# Patient Record
Sex: FEMALE | Race: WHITE | NOT HISPANIC OR LATINO | Employment: UNEMPLOYED | URBAN - NONMETROPOLITAN AREA
[De-identification: names, ages, dates, MRNs, and addresses within clinical notes are randomized per-mention and may not be internally consistent; named-entity substitution may affect disease eponyms.]

---

## 2017-04-14 RX ORDER — PHENAZOPYRIDINE HYDROCHLORIDE 100 MG/1
TABLET, FILM COATED ORAL
Qty: 15 TABLET | Refills: 0 | OUTPATIENT
Start: 2017-04-14

## 2017-10-16 ENCOUNTER — APPOINTMENT (OUTPATIENT)
Dept: GENERAL RADIOLOGY | Facility: HOSPITAL | Age: 27
End: 2017-10-16

## 2017-10-16 ENCOUNTER — HOSPITAL ENCOUNTER (EMERGENCY)
Facility: HOSPITAL | Age: 27
Discharge: HOME | End: 2017-10-16
Attending: EMERGENCY MEDICINE | Admitting: EMERGENCY MEDICINE

## 2017-10-16 VITALS
TEMPERATURE: 97.8 F | SYSTOLIC BLOOD PRESSURE: 102 MMHG | WEIGHT: 108 LBS | HEIGHT: 61 IN | HEART RATE: 67 BPM | BODY MASS INDEX: 20.39 KG/M2 | OXYGEN SATURATION: 98 % | DIASTOLIC BLOOD PRESSURE: 62 MMHG | RESPIRATION RATE: 16 BRPM

## 2017-10-16 DIAGNOSIS — S90.32XA CONTUSION OF LEFT FOOT, INITIAL ENCOUNTER: Primary | ICD-10-CM

## 2017-10-16 PROCEDURE — 73630 X-RAY EXAM OF FOOT: CPT

## 2017-10-16 PROCEDURE — 99283 EMERGENCY DEPT VISIT LOW MDM: CPT

## 2017-10-16 RX ORDER — IBUPROFEN 400 MG/1
400 TABLET ORAL EVERY 8 HOURS PRN
Qty: 12 TABLET | Refills: 0 | Status: SHIPPED | OUTPATIENT
Start: 2017-10-16 | End: 2017-10-27

## 2017-10-16 RX ORDER — ACETAMINOPHEN 500 MG
500 TABLET ORAL EVERY 6 HOURS PRN
Qty: 12 TABLET | Refills: 0 | Status: SHIPPED | OUTPATIENT
Start: 2017-10-16 | End: 2018-10-11 | Stop reason: HOSPADM

## 2017-10-16 RX ORDER — ACETAMINOPHEN 500 MG
500 TABLET ORAL ONCE
Status: COMPLETED | OUTPATIENT
Start: 2017-10-16 | End: 2017-10-16

## 2017-10-16 RX ADMIN — ACETAMINOPHEN 500 MG: 500 TABLET, FILM COATED ORAL at 15:45

## 2017-10-16 NOTE — ED PROVIDER NOTES
Subjective   HPI Comments: Patient is here with localized injury,  left foot left small toe that occurred about a week ago continues to have soreness to this area presents here for evaluation denies pregnancy no other symptoms no numbness tingling      Review of Systems   Constitutional: Negative.    HENT: Negative.    Respiratory: Negative.    Cardiovascular: Negative.    Musculoskeletal: Positive for arthralgias.   Skin: Negative.    Neurological: Negative.    All other systems reviewed and are negative.      Past Medical History:   Diagnosis Date   • Acid reflux    • Arthritis    • Asthma    • Scoliosis        Allergies   Allergen Reactions   • Sulfa Antibiotics Hives       Past Surgical History:   Procedure Laterality Date   • APPENDECTOMY  2016   • CHOLECYSTECTOMY     • HIP SURGERY      right hip       History reviewed. No pertinent family history.    Social History     Social History   • Marital status: Single     Spouse name: N/A   • Number of children: N/A   • Years of education: N/A     Social History Main Topics   • Smoking status: None   • Smokeless tobacco: None   • Alcohol use None   • Drug use: None   • Sexual activity: Not Asked     Other Topics Concern   • None     Social History Narrative           Objective   Physical Exam   Constitutional: She is oriented to person, place, and time. She appears well-developed and well-nourished.   Afebrile nontoxic no acute distress   HENT:   Head: Normocephalic.   Eyes: Pupils are equal, round, and reactive to light.   Neck: Normal range of motion.   Cardiovascular: Normal rate, regular rhythm and intact distal pulses.    Pulmonary/Chest: Effort normal and breath sounds normal.   Musculoskeletal: She exhibits no edema or deformity.   Mild tenderness left small toe no deformity no bruising   Neurological: She is alert and oriented to person, place, and time.   Skin: Skin is warm and dry. No erythema. No pallor.   Psychiatric: She has a normal mood and affect. Her  behavior is normal. Thought content normal.   Nursing note and vitals reviewed.      Procedures         ED Course  ED Course                  MDM    Final diagnoses:   Contusion of left foot, initial encounter            Michel Díaz PA-C  10/16/17 [id number]

## 2017-10-27 ENCOUNTER — OFFICE VISIT (OUTPATIENT)
Dept: INTERNAL MEDICINE | Facility: CLINIC | Age: 27
End: 2017-10-27

## 2017-10-27 VITALS
OXYGEN SATURATION: 99 % | SYSTOLIC BLOOD PRESSURE: 96 MMHG | TEMPERATURE: 98.7 F | DIASTOLIC BLOOD PRESSURE: 64 MMHG | BODY MASS INDEX: 20.2 KG/M2 | WEIGHT: 107 LBS | HEIGHT: 61 IN | HEART RATE: 110 BPM

## 2017-10-27 DIAGNOSIS — R53.82 CHRONIC FATIGUE: ICD-10-CM

## 2017-10-27 DIAGNOSIS — R73.01 IMPAIRED FASTING GLUCOSE: Primary | ICD-10-CM

## 2017-10-27 DIAGNOSIS — R63.4 WEIGHT LOSS: ICD-10-CM

## 2017-10-27 DIAGNOSIS — R22.1 MASS OF RIGHT SIDE OF NECK: ICD-10-CM

## 2017-10-27 DIAGNOSIS — E01.0 THYROMEGALY: ICD-10-CM

## 2017-10-27 DIAGNOSIS — F41.8 DEPRESSION WITH ANXIETY: ICD-10-CM

## 2017-10-27 DIAGNOSIS — R00.0 TACHYCARDIA: ICD-10-CM

## 2017-10-27 PROBLEM — F60.3 BORDERLINE PERSONALITY DISORDER: Status: ACTIVE | Noted: 2017-10-27

## 2017-10-27 PROCEDURE — 99204 OFFICE O/P NEW MOD 45 MIN: CPT | Performed by: PHYSICIAN ASSISTANT

## 2017-10-27 RX ORDER — CITALOPRAM 10 MG/1
TABLET ORAL
Qty: 30 TABLET | Refills: 0 | Status: SHIPPED | OUTPATIENT
Start: 2017-10-27 | End: 2017-11-24 | Stop reason: SDUPTHER

## 2017-10-27 NOTE — PROGRESS NOTES
Subjective   Radha Dixon is a 27 y.o. female who presents to [facility] care. Previous PCP was with [facility].   She has two children, a 6 year old son and 1 year old daughter.     Chief Complaint:  Has had a knot on her neck for the last year but feels it is getting larger.  She discussed it with her previous PCP who did labs which were unremarkable. Losing weight, losing hair, poor appetite, staying tired, tunnel vision when walking up stairs for the last 2 months or so.  Reports staying cold year round.      Labs with previous PCP did reveal pre-diabetes. Denies polyuria, polydipsia, numbness/tingling/burning in feet.     Was previously treated with Celexa for borderline personality disorder, depression and anxiety prior to her last pregnancy. Interested in restarting due to continued dysphoric mood and anxiety.       The following portions of the patient's history were reviewed and updated as appropriate: She  has a past medical history of Acid reflux; Arthritis; Asthma; Bipolar 1 disorder; Depression; Migraine; Ovarian cyst; and Scoliosis.  She  does not have any pertinent problems on file.  She  has a past surgical history that includes Appendectomy (2016); Cholecystectomy; and Hip surgery.  Her family history includes Arthritis in her other; Cancer in her other; Diabetes in her other; Heart attack in her other; Migraines in her other.  She  reports that she has been smoking Cigarettes.  She has been smoking about 0.50 packs per day. She has never used smokeless tobacco. She reports that she does not drink alcohol or use illicit drugs.  Current Outpatient Prescriptions   Medication Sig Dispense Refill   • acetaminophen (TYLENOL) 500 MG tablet Take 1 tablet by mouth Every 6 (Six) Hours As Needed for Mild Pain . 12 tablet 0   • citalopram (CELEXA) 10 MG tablet Take 1/2 tablet daily for 1 week then increase to full tablet daily if well tolerated. 30 tablet 0     No current facility-administered  medications for this visit.        Review of Systems  Review of Systems   Constitutional: Positive for appetite change (decreased), fatigue and unexpected weight change. Negative for chills and fever.        Malaise   HENT: Positive for voice change (hoarse). Negative for ear pain, hearing loss, nosebleeds, rhinorrhea, sore throat and trouble swallowing.    Eyes: Negative for pain, discharge, redness, itching and visual disturbance.        No dry eyes   Respiratory: Positive for shortness of breath and wheezing. Negative for cough.         No SOB with exertion  No SOB lying down   Cardiovascular: Positive for palpitations (fast heart rate). Negative for chest pain and leg swelling.        No leg cramps     Gastrointestinal: Negative for abdominal pain, blood in stool, constipation, diarrhea, nausea and vomiting.        No change in bowel habits  No heartburn   Endocrine: Positive for cold intolerance. Negative for heat intolerance, polydipsia, polyphagia and polyuria.        No hot flashes  No muscle weakness  No feeling of weakness   Genitourinary: Negative for difficulty urinating, dyspareunia, dysuria, frequency, hematuria, menstrual problem, pelvic pain, urgency, vaginal discharge and vaginal pain.        No urinary incontinence  No change in periods   Musculoskeletal: Positive for arthralgias ( painful joints) and neck pain (swelling). Negative for joint swelling and myalgias.        No limb pain  No limb swelling   Skin: Negative for rash and wound.        No rash  No lesions  No change in mole  No itching   Neurological: Positive for dizziness, weakness and headaches. Negative for tremors, seizures, syncope and numbness.        No confusion  No tingling  No trouble walking   Hematological: Positive for adenopathy (neck). Does not bruise/bleed easily.   Psychiatric/Behavioral: Positive for sleep disturbance. Negative for agitation, behavioral problems, confusion, decreased concentration, dysphoric mood,  "self-injury and suicidal ideas. The patient is nervous/anxious. The patient is not hyperactive.         Change in personality         Objective    BP 96/64  Pulse 110  Temp 98.7 °F (37.1 °C)  Ht 61\" (154.9 cm)  Wt 107 lb (48.5 kg)  SpO2 99%  BMI 20.22 kg/m2  Physical Exam   Constitutional: She is oriented to person, place, and time. She appears well-developed. No distress.   Thin.    HENT:   Head: Normocephalic and atraumatic.   Right Ear: External ear normal.   Left Ear: External ear normal.   Nose: Nose normal.   Mouth/Throat: Oropharynx is clear and moist.   Eyes: Conjunctivae and EOM are normal. Pupils are equal, round, and reactive to light.   Neck: Normal range of motion. Neck supple. No JVD present. No tracheal deviation present. Thyromegaly (mild right larger than left) present.       Cardiovascular: Normal rate, regular rhythm and normal heart sounds.  Exam reveals no gallop and no friction rub.    No murmur heard.  Pulmonary/Chest: Effort normal and breath sounds normal. No stridor. No respiratory distress. She has no wheezes. She has no rales. She exhibits no tenderness.   Abdominal: Soft. Bowel sounds are normal. She exhibits no distension and no mass. There is no tenderness.   Musculoskeletal: Normal range of motion. She exhibits no edema, tenderness or deformity.   Lymphadenopathy:     She has cervical adenopathy.   Neurological: She is alert and oriented to person, place, and time. She displays normal reflexes. No cranial nerve deficit. She exhibits normal muscle tone. Coordination normal.   Skin: Skin is warm and dry. No rash noted. She is not diaphoretic.   Psychiatric: She has a normal mood and affect. Her behavior is normal. Judgment and thought content normal.   Nursing note and vitals reviewed.        Assessment/Plan     1. Impaired fasting glucose  - Comprehensive Metabolic Panel  - Hemoglobin A1c    2. Thyromegaly  - TSH  - T4  - T3, Free  - Thyroid Peroxidase Antibody  - US Head Neck " Soft Tissue    3. Mass of right side of neck  - US Head Neck Soft Tissue  goiter vs lymphadenopathy vs other etiology    4. Weight loss  - CBC & Differential  - Comprehensive Metabolic Panel  - TSH  - T4  - T3, Free  - Thyroid Peroxidase Antibody    5. Chronic fatigue  - TSH  - T4  - T3, Free  - Thyroid Peroxidase Antibody    6. Tachycardia  - TSH  - T4  - T3, Free  - Thyroid Peroxidase Antibody    7. Depression with anxiety  - TSH  - T4  - T3, Free  - Thyroid Peroxidase Antibody    - Begin Celexa 10 mg. Take 1/2 tab daily for 1 week then increase to full tablet daily if well tolerated.               Return in about 1 month (around 11/27/2017) for Next scheduled follow up.

## 2017-10-28 LAB
ALBUMIN SERPL-MCNC: 4.6 G/DL (ref 3.5–5)
ALBUMIN/GLOB SERPL: 1.8 G/DL (ref 1–2)
ALP SERPL-CCNC: 49 U/L (ref 38–126)
ALT SERPL-CCNC: 20 U/L (ref 13–69)
AST SERPL-CCNC: 17 U/L (ref 15–46)
BASOPHILS # BLD AUTO: 0.08 10*3/MM3 (ref 0–0.2)
BASOPHILS NFR BLD AUTO: 0.8 % (ref 0–2.5)
BILIRUB SERPL-MCNC: 1.7 MG/DL (ref 0.2–1.3)
BUN SERPL-MCNC: 11 MG/DL (ref 7–20)
BUN/CREAT SERPL: 13.8 (ref 7.1–23.5)
CALCIUM SERPL-MCNC: 9.7 MG/DL (ref 8.4–10.2)
CHLORIDE SERPL-SCNC: 108 MMOL/L (ref 98–107)
CO2 SERPL-SCNC: 25 MMOL/L (ref 26–30)
CREAT SERPL-MCNC: 0.8 MG/DL (ref 0.6–1.3)
EOSINOPHIL # BLD AUTO: 0.58 10*3/MM3 (ref 0–0.7)
EOSINOPHIL NFR BLD AUTO: 5.7 % (ref 0–7)
ERYTHROCYTE [DISTWIDTH] IN BLOOD BY AUTOMATED COUNT: 11.8 % (ref 11.5–14.5)
GFR SERPLBLD CREATININE-BSD FMLA CKD-EPI: 105 ML/MIN/1.73
GFR SERPLBLD CREATININE-BSD FMLA CKD-EPI: 87 ML/MIN/1.73
GLOBULIN SER CALC-MCNC: 2.6 GM/DL
GLUCOSE SERPL-MCNC: 89 MG/DL (ref 74–98)
HBA1C MFR BLD: 5.4 %
HCT VFR BLD AUTO: 39.4 % (ref 37–47)
HGB BLD-MCNC: 12.8 G/DL (ref 12–16)
IMM GRANULOCYTES # BLD: 0.03 10*3/MM3 (ref 0–0.06)
IMM GRANULOCYTES NFR BLD: 0.3 % (ref 0–0.6)
LYMPHOCYTES # BLD AUTO: 2.76 10*3/MM3 (ref 0.6–3.4)
LYMPHOCYTES NFR BLD AUTO: 27.2 % (ref 10–50)
MCH RBC QN AUTO: 29.6 PG (ref 27–31)
MCHC RBC AUTO-ENTMCNC: 32.5 G/DL (ref 30–37)
MCV RBC AUTO: 91.2 FL (ref 81–99)
MONOCYTES # BLD AUTO: 0.5 10*3/MM3 (ref 0–0.9)
MONOCYTES NFR BLD AUTO: 4.9 % (ref 0–12)
NEUTROPHILS # BLD AUTO: 6.2 10*3/MM3 (ref 2–6.9)
NEUTROPHILS NFR BLD AUTO: 61.1 % (ref 37–80)
NRBC BLD AUTO-RTO: 0 /100 WBC (ref 0–0)
PLATELET # BLD AUTO: 229 10*3/MM3 (ref 130–400)
POTASSIUM SERPL-SCNC: 4.3 MMOL/L (ref 3.5–5.1)
PROT SERPL-MCNC: 7.2 G/DL (ref 6.3–8.2)
RBC # BLD AUTO: 4.32 10*6/MM3 (ref 4.2–5.4)
SODIUM SERPL-SCNC: 144 MMOL/L (ref 137–145)
T3FREE SERPL-MCNC: 4 PG/ML (ref 2–4.4)
T4 SERPL-MCNC: 6.6 UG/DL (ref 4.5–12)
THYROPEROXIDASE AB SERPL-ACNC: 12 IU/ML (ref 0–34)
TSH SERPL DL<=0.005 MIU/L-ACNC: 1.8 MIU/ML (ref 0.47–4.68)
WBC # BLD AUTO: 10.15 10*3/MM3 (ref 4.8–10.8)

## 2017-10-30 ENCOUNTER — HOSPITAL ENCOUNTER (OUTPATIENT)
Dept: ULTRASOUND IMAGING | Facility: HOSPITAL | Age: 27
Discharge: HOME | End: 2017-10-30
Admitting: PHYSICIAN ASSISTANT

## 2017-10-30 PROCEDURE — 76536 US EXAM OF HEAD AND NECK: CPT

## 2017-11-01 DIAGNOSIS — E04.2 MULTINODULAR GOITER: Primary | ICD-10-CM

## 2017-11-01 DIAGNOSIS — E04.1 THYROID NODULE: ICD-10-CM

## 2017-11-03 ENCOUNTER — OFFICE VISIT (OUTPATIENT)
Dept: SURGERY | Facility: CLINIC | Age: 27
End: 2017-11-03

## 2017-11-03 VITALS
WEIGHT: 108.2 LBS | TEMPERATURE: 98.6 F | BODY MASS INDEX: 20.43 KG/M2 | HEART RATE: 79 BPM | DIASTOLIC BLOOD PRESSURE: 80 MMHG | OXYGEN SATURATION: 96 % | HEIGHT: 61 IN | SYSTOLIC BLOOD PRESSURE: 120 MMHG

## 2017-11-03 DIAGNOSIS — R13.11 ORAL PHASE DYSPHAGIA: Primary | ICD-10-CM

## 2017-11-03 DIAGNOSIS — E04.1 THYROID NODULE: ICD-10-CM

## 2017-11-03 DIAGNOSIS — K21.00 GASTROESOPHAGEAL REFLUX DISEASE WITH ESOPHAGITIS: ICD-10-CM

## 2017-11-03 PROCEDURE — 99204 OFFICE O/P NEW MOD 45 MIN: CPT | Performed by: SURGERY

## 2017-11-03 NOTE — PROGRESS NOTES
Patient: Radha Dixon    YOB: 1990    Date: 11/03/2017    Primary Care Provider: Liliana Reynolds MD    Chief complaint:   Chief Complaint   Patient presents with   • Thyroid Problem     Patient is here for a thyroid nodule       Subjective .     History of present illness:  Patient is here for a thyroid nodule.  Patient states that she has had the nodule for over a year. Patient states that she has noticed hair lose, patient states that she feels like food gets stuck in her throat.  Patient denies any pain.  Patient has had a ultrasound 10/27/17 patient has had blood work done also. patient states that she is constantly clearing her throat. Patient has bilateral thyroid nodules, 1.4 cm the left and 1 cm on the right.  Patient here for possible FNA.  Patient also has reflux esophagitis, symptoms she takes Zantac for as needed.  No dark stools or vomiting.    Review of Systems   Constitutional: Negative for chills, fever and unexpected weight change.   HENT: Negative for hearing loss, trouble swallowing and voice change.    Eyes: Negative for visual disturbance.   Respiratory: Positive for shortness of breath. Negative for apnea, cough, chest tightness and wheezing.    Cardiovascular: Negative for chest pain, palpitations and leg swelling.   Gastrointestinal: Negative for abdominal distention, abdominal pain, anal bleeding, blood in stool, constipation, diarrhea, nausea, rectal pain and vomiting.   Endocrine: Negative for cold intolerance and heat intolerance.   Genitourinary: Negative for difficulty urinating, dysuria and flank pain.   Musculoskeletal: Negative for back pain and gait problem.   Skin: Negative for color change, rash and wound.   Neurological: Negative for dizziness, syncope, speech difficulty, weakness, light-headedness, numbness and headaches.   Hematological: Negative for adenopathy. Does not bruise/bleed easily.   Psychiatric/Behavioral: Negative for confusion. The patient is  "not nervous/anxious.        Allergies:  Allergies   Allergen Reactions   • Sulfa Antibiotics Hives       Medications:    Current Outpatient Prescriptions:   •  acetaminophen (TYLENOL) 500 MG tablet, Take 1 tablet by mouth Every 6 (Six) Hours As Needed for Mild Pain ., Disp: 12 tablet, Rfl: 0  •  citalopram (CELEXA) 10 MG tablet, Take 1/2 tablet daily for 1 week then increase to full tablet daily if well tolerated., Disp: 30 tablet, Rfl: 0    History\"  Past Medical History:   Diagnosis Date   • Acid reflux    • Arthritis    • Asthma    • Bipolar 1 disorder    • Depression    • Migraine    • Ovarian cyst    • Scoliosis        Past Surgical History:   Procedure Laterality Date   • APPENDECTOMY  2016   • CHOLECYSTECTOMY     • CYST REMOVAL      cyst removed from ovarie   • HIP SURGERY      right hip       Family History   Problem Relation Age of Onset   • Arthritis Other    • Cancer Other      breast and lung   • Diabetes Other    • Heart attack Other    • Migraines Other        Social History   Substance Use Topics   • Smoking status: Current Every Day Smoker     Packs/day: 0.50     Types: Cigarettes   • Smokeless tobacco: Never Used   • Alcohol use No        Objective     Vital Signs:   /80  Pulse 79  Temp 98.6 °F (37 °C)  Ht 61\" (154.9 cm)  Wt 108 lb 3.2 oz (49.1 kg)  SpO2 96%  BMI 20.44 kg/m2    Physical Exam:   General Appearance:    Alert, cooperative, in no acute distress   Head:    Normocephalic, without obvious abnormality, atraumatic   Eyes:            Lids and lashes normal, conjunctivae and sclerae normal, no   icterus, no pallor, corneas clear, PERRLA   Ears:    Ears appear intact with no abnormalities noted   Throat:   No oral lesions, no thrush, oral mucosa moist   Neck:   No adenopathy, supple, trachea midline, Bilateral thyroid nodules, no   carotid bruit, no JVD   Lungs:     Clear to auscultation,respirations regular, even and                  unlabored    Heart:    Regular rhythm and normal " rate, normal S1 and S2, no            murmur, no gallop, no rub, no click   Chest Wall:    No abnormalities observed   Abdomen:     Normal bowel sounds, no masses, no organomegaly, soft        non-tender, non-distended, no guarding, no rebound                tenderness   Extremities:   Moves all extremities well, no edema, no cyanosis, no             redness   Pulses:   Pulses palpable and equal bilaterally   Skin:   No bleeding, bruising or rash   Lymph nodes:   No palpable adenopathy   Neurologic:   Cranial nerves 2 - 12 grossly intact, sensation intact, DTR       present and equal bilaterally     Results Review:   I reviewed the patient's new clinical results.    Assessment/Plan     1. Oral phase dysphagia    2. Thyroid nodule    3. Gastroesophageal reflux disease with esophagitis        Schedule ultrasound with FNA of thyroid nodules.  Patient will also need to be scheduled for an EGD to evaluate her hiatal hernia, reflux and gastric system.  Patient encouraged to continue Zantac daily.    I discussed the patients findings and my recommendations with patient    Review of Systems was reviewed and confirmed as accurate today.    Electronically signed by Danilo Shabazz MD  11/03/17   Scribed for Danilo Shabazz MD by Daiana Morlaes. 11/3/2017  3:56 PM        .

## 2017-11-06 ENCOUNTER — OFFICE VISIT (OUTPATIENT)
Dept: SURGERY | Facility: CLINIC | Age: 27
End: 2017-11-06

## 2017-11-06 VITALS
HEIGHT: 61 IN | BODY MASS INDEX: 20.39 KG/M2 | TEMPERATURE: 99.6 F | DIASTOLIC BLOOD PRESSURE: 70 MMHG | OXYGEN SATURATION: 99 % | WEIGHT: 108 LBS | HEART RATE: 76 BPM | SYSTOLIC BLOOD PRESSURE: 98 MMHG

## 2017-11-06 DIAGNOSIS — E04.1 THYROID NODULE: Primary | ICD-10-CM

## 2017-11-06 PROCEDURE — 10022 PR FINE NEEDLE ASP;W/IMAGING GUIDANCE: CPT | Performed by: SURGERY

## 2017-11-06 NOTE — PROGRESS NOTES
Procedure: Left thyroid Fine Needle Aspiration. Proper consent has been signed    I recommend a FNA of the left thyroid lesion. The procedure and risks were clearly explained including bleeding, infection and requirement for re-biopsy and the patient understood these and wishes to proceed.    The patient was brought to the procedure room. Consent and time out were performed. The area was prepped and draped in the usual fashion. 1% lidocaine with epinephrine was infused locally. A 20G needle was used to biopsy the lesion with ultrasound guidance.  Minimal blood loss had occurred and hemostasis had been well controlled with pressure.  The patient tolerated the procedure and there were no complications. We will make a f/u appointment to discuss results.

## 2017-11-08 ENCOUNTER — TELEPHONE (OUTPATIENT)
Dept: SURGERY | Facility: CLINIC | Age: 27
End: 2017-11-08

## 2017-11-08 NOTE — TELEPHONE ENCOUNTER
Received call from Gerard @ [facility]. Patient cannot be scheduled at [facility].  She has [employer] insurance.  Please reschedule at [facility].  Thanks.

## 2017-11-20 ENCOUNTER — PREP FOR SURGERY (OUTPATIENT)
Dept: OTHER | Facility: HOSPITAL | Age: 27
End: 2017-11-20

## 2017-11-20 DIAGNOSIS — K21.00 REFLUX ESOPHAGITIS: Primary | ICD-10-CM

## 2017-11-27 ENCOUNTER — OFFICE VISIT (OUTPATIENT)
Dept: INTERNAL MEDICINE | Facility: CLINIC | Age: 27
End: 2017-11-27

## 2017-11-27 VITALS
HEIGHT: 61 IN | BODY MASS INDEX: 20.01 KG/M2 | DIASTOLIC BLOOD PRESSURE: 60 MMHG | TEMPERATURE: 98.7 F | OXYGEN SATURATION: 99 % | HEART RATE: 103 BPM | SYSTOLIC BLOOD PRESSURE: 100 MMHG | WEIGHT: 106 LBS

## 2017-11-27 DIAGNOSIS — J06.9 ACUTE URI: ICD-10-CM

## 2017-11-27 DIAGNOSIS — F41.8 DEPRESSION WITH ANXIETY: Primary | ICD-10-CM

## 2017-11-27 PROCEDURE — 99214 OFFICE O/P EST MOD 30 MIN: CPT | Performed by: PHYSICIAN ASSISTANT

## 2017-11-27 RX ORDER — CITALOPRAM 20 MG/1
20 TABLET ORAL DAILY
Qty: 30 TABLET | Refills: 5 | Status: SHIPPED | OUTPATIENT
Start: 2017-11-27 | End: 2018-09-25

## 2017-11-27 RX ORDER — CITALOPRAM 10 MG/1
TABLET ORAL
Qty: 30 TABLET | Refills: 5 | Status: SHIPPED | OUTPATIENT
Start: 2017-11-27 | End: 2017-11-27 | Stop reason: SDUPTHER

## 2017-11-27 NOTE — PROGRESS NOTES
Radha Dixon is a 27 y.o. female.     Subjective   History of Present Illness   Here today for 1 month follow up of depression and anxiety. Resumed use of Celexa at last appointment.  Has increased from 1/2 to full tablet of Celexa 10 mg daily. Feels it is helping her depression and anxiety some but feels there is room for improvement. Sleeping fine. Denies SI or HI.     Also concerned with cough, little sore throat and congestion for the last 3 days.  Denies SOA, fever or chills. Has heard herself wheeze a few times.     Scheduled for EGD with Dr. Shabazz on 12/1 due to dysphagia.       The following portions of the patient's history were reviewed and updated as appropriate: allergies, current medications, past family history, past medical history, past social history, past surgical history and problem list.    Review of Systems    Constitutional: Negative for appetite change, chills, fatigue, fever and unexpected weight change.   HENT: sore throat, congestion. Negative for congestion, ear pain, hearing loss, nosebleeds, postnasal drip, rhinorrhea, tinnitus and trouble swallowing.    Eyes: Negative for photophobia, discharge and visual disturbance.   Respiratory: cough. Negative for chest tightness, shortness of breath and wheezing.    Cardiovascular: Negative for chest pain, palpitations and leg swelling.   Gastrointestinal: Negative for abdominal distention, abdominal pain, blood in stool, constipation, diarrhea, nausea and vomiting.   Endocrine: Negative for cold intolerance, heat intolerance, polydipsia, polyphagia and polyuria.   Musculoskeletal: Negative for arthralgias, back pain, joint swelling, myalgias, neck pain and neck stiffness.   Skin: Negative for color change, pallor, rash and wound.   Allergic/Immunologic: Negative for environmental allergies, food allergies and immunocompromised state.   Neurological: Negative for dizziness, tremors, seizures, weakness, numbness and headaches.   Hematological:  "Negative for adenopathy. Does not bruise/bleed easily.   Psychiatric/Behavioral: dysphoric mood, anxious. Negative for sleep disturbances, agitation, behavioral problems, confusion, hallucinations, self-injury and suicidal ideas.       Objective    Physical Exam  Constitutional: Oriented to person, place, and time. Appears well-developed and well-nourished.   HENT: OP normal. TMs normal.   Head: no sinus tenderness. Normocephalic and atraumatic.   Eyes: EOM are normal. Pupils are equal, round, and reactive to light.   Neck: Normal range of motion. Neck supple.   Cardiovascular: Normal rate, regular rhythm and normal heart sounds.    Pulmonary/Chest: Effort normal and breath sounds normal. No respiratory distress.  Has no wheezes or rales. Exhibits no chest wall tenderness.   Abdominal: Soft. Bowel sounds are normal. Exhibits no distension and no mass. There is no tenderness.   Musculoskeletal: Normal range of motion. Exhibits no tenderness.   Neurological: Alert and oriented to person, place, and time.   Skin: Skin is warm and dry.   Psychiatric: Has a normal mood and affect. Behavior is normal. Judgment and thought content normal.       /60  Pulse 103  Temp 98.7 °F (37.1 °C)  Ht 61\" (154.9 cm)  Wt 106 lb (48.1 kg)  SpO2 99%  BMI 20.03 kg/m2    Nursing note and vitals reviewed.        Assessment/Plan   Radha was seen today for depression, anxiety, follow-up, sinus problem and cough.    Diagnoses and all orders for this visit:    Depression with anxiety  -     citalopram (CeleXA) 20 MG tablet; Take 1 tablet by mouth Daily.  Increase Celexa from 10 to 20 mg daily.     Acute URI  Increase PO fluid intake. Call or RTC with new or worsened symptoms.       F/u 3 months for depression and anxiety.       F/u 3 months or sooner if needed.        "

## 2017-12-01 ENCOUNTER — ANESTHESIA EVENT (OUTPATIENT)
Dept: GASTROENTEROLOGY | Facility: HOSPITAL | Age: 27
End: 2017-12-01

## 2017-12-01 ENCOUNTER — ANESTHESIA (OUTPATIENT)
Dept: GASTROENTEROLOGY | Facility: HOSPITAL | Age: 27
End: 2017-12-01

## 2017-12-01 ENCOUNTER — HOSPITAL ENCOUNTER (OUTPATIENT)
Facility: HOSPITAL | Age: 27
Setting detail: HOSPITAL OUTPATIENT SURGERY
Discharge: HOME | End: 2017-12-01
Attending: SURGERY | Admitting: SURGERY

## 2017-12-01 VITALS
HEART RATE: 70 BPM | OXYGEN SATURATION: 97 % | WEIGHT: 106 LBS | DIASTOLIC BLOOD PRESSURE: 64 MMHG | RESPIRATION RATE: 18 BRPM | BODY MASS INDEX: 20.01 KG/M2 | TEMPERATURE: 98.3 F | SYSTOLIC BLOOD PRESSURE: 114 MMHG | HEIGHT: 61 IN

## 2017-12-01 DIAGNOSIS — K21.00 REFLUX ESOPHAGITIS: ICD-10-CM

## 2017-12-01 LAB — B-HCG UR QL: NEGATIVE

## 2017-12-01 PROCEDURE — 25010000002 ONDANSETRON PER 1 MG: Performed by: NURSE ANESTHETIST, CERTIFIED REGISTERED

## 2017-12-01 PROCEDURE — 25010000002 PROPOFOL 200 MG/20ML EMULSION: Performed by: NURSE ANESTHETIST, CERTIFIED REGISTERED

## 2017-12-01 PROCEDURE — 81025 URINE PREGNANCY TEST: CPT | Performed by: SURGERY

## 2017-12-01 RX ORDER — PROPOFOL 10 MG/ML
INJECTION, EMULSION INTRAVENOUS AS NEEDED
Status: DISCONTINUED | OUTPATIENT
Start: 2017-12-01 | End: 2017-12-01 | Stop reason: SURG

## 2017-12-01 RX ORDER — ONDANSETRON 2 MG/ML
4 INJECTION INTRAMUSCULAR; INTRAVENOUS ONCE AS NEEDED
Status: DISCONTINUED | OUTPATIENT
Start: 2017-12-01 | End: 2017-12-01 | Stop reason: HOSPADM

## 2017-12-01 RX ORDER — ONDANSETRON 2 MG/ML
INJECTION INTRAMUSCULAR; INTRAVENOUS AS NEEDED
Status: DISCONTINUED | OUTPATIENT
Start: 2017-12-01 | End: 2017-12-01 | Stop reason: SURG

## 2017-12-01 RX ORDER — SODIUM CHLORIDE, SODIUM LACTATE, POTASSIUM CHLORIDE, CALCIUM CHLORIDE 600; 310; 30; 20 MG/100ML; MG/100ML; MG/100ML; MG/100ML
1000 INJECTION, SOLUTION INTRAVENOUS CONTINUOUS PRN
Status: DISCONTINUED | OUTPATIENT
Start: 2017-12-01 | End: 2017-12-01 | Stop reason: HOSPADM

## 2017-12-01 RX ORDER — SODIUM CHLORIDE 0.9 % (FLUSH) 0.9 %
3 SYRINGE (ML) INJECTION AS NEEDED
Status: DISCONTINUED | OUTPATIENT
Start: 2017-12-01 | End: 2017-12-01 | Stop reason: HOSPADM

## 2017-12-01 RX ADMIN — ONDANSETRON 4 MG: 2 INJECTION INTRAMUSCULAR; INTRAVENOUS at 10:42

## 2017-12-01 RX ADMIN — PROPOFOL 100 MG: 10 INJECTION, EMULSION INTRAVENOUS at 10:41

## 2017-12-01 RX ADMIN — SODIUM CHLORIDE, POTASSIUM CHLORIDE, SODIUM LACTATE AND CALCIUM CHLORIDE 1000 ML: 600; 310; 30; 20 INJECTION, SOLUTION INTRAVENOUS at 10:09

## 2017-12-01 NOTE — ANESTHESIA PREPROCEDURE EVALUATION
Anesthesia Evaluation     Patient summary reviewed and Nursing notes reviewed   history of anesthetic complications: PONV  NPO Solid Status: > 8 hours  NPO Liquid Status: > 8 hours     Airway   Mallampati: I  TM distance: >3 FB  Neck ROM: full  no difficulty expected  Dental - normal exam     Pulmonary - negative pulmonary ROS and normal exam   Cardiovascular - negative cardio ROS and normal exam        Neuro/Psych- negative ROS  GI/Hepatic/Renal/Endo - negative ROS     Musculoskeletal (-) negative ROS    Abdominal    Substance History - negative use     OB/GYN negative ob/gyn ROS         Other - negative ROS                                       Anesthesia Plan    ASA 2     MAC     intravenous induction   Anesthetic plan and risks discussed with patient.

## 2017-12-01 NOTE — DISCHARGE INSTRUCTIONS
Rest today  No pushing,pulling,tugging,heavy lifting, or strenuous activity   No major decision making,driving,or drinking alcoholic beverages for 24 hours due to the sedation you received  Always use good hand hygiene/washing technique  No driving on pain medication.

## 2017-12-01 NOTE — PLAN OF CARE
Problem: GI Endoscopy (Adult)  Goal: Signs and Symptoms of Listed Potential Problems Will be Absent or Manageable (GI Endoscopy)    12/01/17 1038   GI Endoscopy   Problems Assessed (GI Endoscopy) all   Problems Present (GI Endoscopy) none

## 2017-12-01 NOTE — PLAN OF CARE
Problem: GI Endoscopy (Adult)  Goal: Signs and Symptoms of Listed Potential Problems Will be Absent or Manageable (GI Endoscopy)  Outcome: Ongoing (interventions implemented as appropriate)    12/01/17 0924   GI Endoscopy   Problems Assessed (GI Endoscopy) all   Problems Present (GI Endoscopy) none

## 2017-12-01 NOTE — ANESTHESIA POSTPROCEDURE EVALUATION
Patient: Radha Dixon    Procedure Summary     Date Anesthesia Start Anesthesia Stop Room / Location    12/01/17 1040 1046 [facility] ENDOSCOPY 3 / [facility] ENDOSCOPY       Procedure Diagnosis Surgeon Provider    ESOPHAGOGASTRODUODENOSCOPY WITH COLD FORCEP BIOPSY (N/A Esophagus) Reflux esophagitis  (Reflux esophagitis [K21.0]) MD Feliciano Larry CRNA          Anesthesia Type: MAC  Last vitals  BP   114/64 (12/01/17 1121)   Temp   98.3 °F (36.8 °C) (12/01/17 1051)   Pulse   70 (12/01/17 1121)   Resp   18 (12/01/17 1121)     SpO2   97 % (12/01/17 1121)     Post Anesthesia Care and Evaluation    Patient location during evaluation: bedside  Patient participation: complete - patient participated  Level of consciousness: awake  Pain score: 0  Pain management: adequate  Airway patency: patent  Anesthetic complications: No anesthetic complications  PONV Status: controlled  Cardiovascular status: acceptable and stable  Respiratory status: acceptable and room air  Hydration status: acceptable

## 2017-12-01 NOTE — H&P (VIEW-ONLY)
Patient: Radha Dixon    YOB: 1990    Date: 11/03/2017    Primary Care Provider: Liliana Reynolds MD    Chief complaint:   Chief Complaint   Patient presents with   • Thyroid Problem     Patient is here for a thyroid nodule       Subjective .     History of present illness:  Patient is here for a thyroid nodule.  Patient states that she has had the nodule for over a year. Patient states that she has noticed hair lose, patient states that she feels like food gets stuck in her throat.  Patient denies any pain.  Patient has had a ultrasound 10/27/17 patient has had blood work done also. patient states that she is constantly clearing her throat. Patient has bilateral thyroid nodules, 1.4 cm the left and 1 cm on the right.  Patient here for possible FNA.  Patient also has reflux esophagitis, symptoms she takes Zantac for as needed.  No dark stools or vomiting.    Review of Systems   Constitutional: Negative for chills, fever and unexpected weight change.   HENT: Negative for hearing loss, trouble swallowing and voice change.    Eyes: Negative for visual disturbance.   Respiratory: Positive for shortness of breath. Negative for apnea, cough, chest tightness and wheezing.    Cardiovascular: Negative for chest pain, palpitations and leg swelling.   Gastrointestinal: Negative for abdominal distention, abdominal pain, anal bleeding, blood in stool, constipation, diarrhea, nausea, rectal pain and vomiting.   Endocrine: Negative for cold intolerance and heat intolerance.   Genitourinary: Negative for difficulty urinating, dysuria and flank pain.   Musculoskeletal: Negative for back pain and gait problem.   Skin: Negative for color change, rash and wound.   Neurological: Negative for dizziness, syncope, speech difficulty, weakness, light-headedness, numbness and headaches.   Hematological: Negative for adenopathy. Does not bruise/bleed easily.   Psychiatric/Behavioral: Negative for confusion. The patient is  "not nervous/anxious.        Allergies:  Allergies   Allergen Reactions   • Sulfa Antibiotics Hives       Medications:    Current Outpatient Prescriptions:   •  acetaminophen (TYLENOL) 500 MG tablet, Take 1 tablet by mouth Every 6 (Six) Hours As Needed for Mild Pain ., Disp: 12 tablet, Rfl: 0  •  citalopram (CELEXA) 10 MG tablet, Take 1/2 tablet daily for 1 week then increase to full tablet daily if well tolerated., Disp: 30 tablet, Rfl: 0    History\"  Past Medical History:   Diagnosis Date   • Acid reflux    • Arthritis    • Asthma    • Bipolar 1 disorder    • Depression    • Migraine    • Ovarian cyst    • Scoliosis        Past Surgical History:   Procedure Laterality Date   • APPENDECTOMY  2016   • CHOLECYSTECTOMY     • CYST REMOVAL      cyst removed from ovarie   • HIP SURGERY      right hip       Family History   Problem Relation Age of Onset   • Arthritis Other    • Cancer Other      breast and lung   • Diabetes Other    • Heart attack Other    • Migraines Other        Social History   Substance Use Topics   • Smoking status: Current Every Day Smoker     Packs/day: 0.50     Types: Cigarettes   • Smokeless tobacco: Never Used   • Alcohol use No        Objective     Vital Signs:   /80  Pulse 79  Temp 98.6 °F (37 °C)  Ht 61\" (154.9 cm)  Wt 108 lb 3.2 oz (49.1 kg)  SpO2 96%  BMI 20.44 kg/m2    Physical Exam:   General Appearance:    Alert, cooperative, in no acute distress   Head:    Normocephalic, without obvious abnormality, atraumatic   Eyes:            Lids and lashes normal, conjunctivae and sclerae normal, no   icterus, no pallor, corneas clear, PERRLA   Ears:    Ears appear intact with no abnormalities noted   Throat:   No oral lesions, no thrush, oral mucosa moist   Neck:   No adenopathy, supple, trachea midline, Bilateral thyroid nodules, no   carotid bruit, no JVD   Lungs:     Clear to auscultation,respirations regular, even and                  unlabored    Heart:    Regular rhythm and normal " rate, normal S1 and S2, no            murmur, no gallop, no rub, no click   Chest Wall:    No abnormalities observed   Abdomen:     Normal bowel sounds, no masses, no organomegaly, soft        non-tender, non-distended, no guarding, no rebound                tenderness   Extremities:   Moves all extremities well, no edema, no cyanosis, no             redness   Pulses:   Pulses palpable and equal bilaterally   Skin:   No bleeding, bruising or rash   Lymph nodes:   No palpable adenopathy   Neurologic:   Cranial nerves 2 - 12 grossly intact, sensation intact, DTR       present and equal bilaterally     Results Review:   I reviewed the patient's new clinical results.    Assessment/Plan     1. Oral phase dysphagia    2. Thyroid nodule    3. Gastroesophageal reflux disease with esophagitis        Schedule ultrasound with FNA of thyroid nodules.  Patient will also need to be scheduled for an EGD to evaluate her hiatal hernia, reflux and gastric system.  Patient encouraged to continue Zantac daily.    I discussed the patients findings and my recommendations with patient    Review of Systems was reviewed and confirmed as accurate today.    Electronically signed by Danilo Shabazz MD  11/03/17   Scribed for Danilo Shabazz MD by Daiana Morales. 11/3/2017  3:56 PM        .

## 2017-12-06 LAB
LAB AP CASE REPORT: NORMAL
Lab: NORMAL
PATH REPORT.FINAL DX SPEC: NORMAL

## 2017-12-08 ENCOUNTER — OFFICE VISIT (OUTPATIENT)
Dept: SURGERY | Facility: CLINIC | Age: 27
End: 2017-12-08

## 2017-12-08 VITALS
HEIGHT: 61 IN | TEMPERATURE: 98.7 F | OXYGEN SATURATION: 98 % | SYSTOLIC BLOOD PRESSURE: 110 MMHG | DIASTOLIC BLOOD PRESSURE: 60 MMHG | HEART RATE: 99 BPM | WEIGHT: 106 LBS | BODY MASS INDEX: 20.01 KG/M2

## 2017-12-08 DIAGNOSIS — E04.1 THYROID NODULE: ICD-10-CM

## 2017-12-08 DIAGNOSIS — K21.00 GASTROESOPHAGEAL REFLUX DISEASE WITH ESOPHAGITIS: Primary | ICD-10-CM

## 2017-12-08 PROCEDURE — 99213 OFFICE O/P EST LOW 20 MIN: CPT | Performed by: SURGERY

## 2017-12-08 NOTE — PROGRESS NOTES
Patient: Radha Dixon    YOB: 1990    Date: 12/08/2017    Primary Care Provider: BRITNI Peres    Reason for Consultation: Follow-up EGD    Chief Complaint:   Chief Complaint   Patient presents with   • Follow-up     Patient is here for a follow up EGD       History of present illness:  I saw the patient in the office today as a followup from their recent EGD with biopsy, the pathology report did show no pathologic alterations negative for H pylori, metaplasia, hysplasia or malignancy.   Patient states that she has had some nausea at times.  Patient states that she is having chest pain when she takes a deep breath, or stretching.Occasional heartburn symptoms controlled with Zantac as needed.  Thyroid FNA was benign, colloid nodule.    The following portions of the patient's history were reviewed and updated as appropriate: allergies, current medications, past family history, past medical history, past social history, past surgical history and problem list.      Review of Systems   Constitutional: Negative for chills, fatigue and fever.   HENT: Negative for trouble swallowing.    Respiratory: Negative for cough and shortness of breath.    Cardiovascular: Negative for chest pain.   Gastrointestinal: Positive for nausea. Negative for abdominal pain, diarrhea and vomiting.       Vital Signs:   Temp:  [98.7 °F (37.1 °C)] 98.7 °F (37.1 °C)  Heart Rate:  [99] 99  BP: (110)/(60) 110/60    Allergies:  Allergies   Allergen Reactions   • Onion Other (See Comments)     REPORTS CAUSES MIGRAINES     • Sulfa Antibiotics Hives       Medications:    Current Outpatient Prescriptions:   •  acetaminophen (TYLENOL) 500 MG tablet, Take 1 tablet by mouth Every 6 (Six) Hours As Needed for Mild Pain ., Disp: 12 tablet, Rfl: 0  •  citalopram (CeleXA) 20 MG tablet, Take 1 tablet by mouth Daily., Disp: 30 tablet, Rfl: 5  •  Levonorgestrel (MEAGHAN) 13.5 MG intrauterine device IUD, 1 each by Intrauterine route 1  (One) Time., Disp: , Rfl:     Physical Exam:   General Appearance:    Alert, cooperative, in no acute distress   Abdomen:     no masses, no organomegaly, soft non-tender, non-distended, no guarding, wounds are well healed, no evidence of recurrent hernia   Chest:      Clear toausculation            Cor:  Regular rate and rhythm      Results Review:   I reviewed the patient's new clinical results.    Assessment / Plan:    1. Gastroesophageal reflux disease with esophagitis    2. Thyroid nodule        I did discuss the situation with the patient today in the office and they have done well from their recent EGD with biopsy. I have told the patient Continue Zantac as needed.  Follow-up in one year for repeat thyroid ultrasound.    Electronically signed by Danilo Shabazz MD  12/08/17   Scribed for Danilo Shabazz MD by Daiana Morales. 12/8/2017  2:13 PM

## 2018-02-02 ENCOUNTER — OFFICE VISIT (OUTPATIENT)
Dept: INTERNAL MEDICINE | Facility: CLINIC | Age: 28
End: 2018-02-02

## 2018-02-02 VITALS
HEART RATE: 79 BPM | OXYGEN SATURATION: 98 % | WEIGHT: 106 LBS | HEIGHT: 61 IN | DIASTOLIC BLOOD PRESSURE: 70 MMHG | BODY MASS INDEX: 20.01 KG/M2 | TEMPERATURE: 98.4 F | SYSTOLIC BLOOD PRESSURE: 98 MMHG

## 2018-02-02 DIAGNOSIS — J02.9 ACUTE PHARYNGITIS, UNSPECIFIED ETIOLOGY: Primary | ICD-10-CM

## 2018-02-02 LAB
EXPIRATION DATE: NORMAL
EXPIRATION DATE: NORMAL
FLUAV AG NPH QL: NEGATIVE
FLUBV AG NPH QL: NEGATIVE
INTERNAL CONTROL: NORMAL
INTERNAL CONTROL: NORMAL
Lab: NORMAL
Lab: NORMAL
S PYO AG THROAT QL: NEGATIVE

## 2018-02-02 PROCEDURE — 87804 INFLUENZA ASSAY W/OPTIC: CPT | Performed by: PHYSICIAN ASSISTANT

## 2018-02-02 PROCEDURE — 99213 OFFICE O/P EST LOW 20 MIN: CPT | Performed by: PHYSICIAN ASSISTANT

## 2018-02-02 PROCEDURE — 87880 STREP A ASSAY W/OPTIC: CPT | Performed by: PHYSICIAN ASSISTANT

## 2018-02-02 NOTE — PROGRESS NOTES
Radha Dixon is a 27 y.o. female.     Subjective   History of Present Illness   Here today with concern of sore throat, chills, runny nose, headache and little cough since yesterday which is worse today. Denies fever, body aches, SOA or sinus pressure. Her son was diagnosed with strep 4 days ago.       The following portions of the patient's history were reviewed and updated as appropriate: allergies, current medications, past family history, past medical history, past social history, past surgical history and problem list.    Review of Systems    Constitutional: chills. Negative for appetite change, fatigue, fever and unexpected weight change.   HENT: rhinorrhea, sore throat.  Negative for congestion, ear pain, hearing loss, nosebleeds, postnasal drip, tinnitus and trouble swallowing.    Eyes: Negative for photophobia, discharge and visual disturbance.   Respiratory: Negative for cough, chest tightness, shortness of breath and wheezing.    Cardiovascular: Negative for chest pain, palpitations and leg swelling.   Gastrointestinal: Negative for abdominal distention, abdominal pain, blood in stool, constipation, diarrhea, nausea and vomiting.   Endocrine: Negative for cold intolerance, heat intolerance, polydipsia, polyphagia and polyuria.   Musculoskeletal: Negative for arthralgias, back pain, joint swelling, myalgias, neck pain and neck stiffness.   Skin: Negative for color change, pallor, rash and wound.   Allergic/Immunologic: Negative for environmental allergies, food allergies and immunocompromised state.   Neurological: headache.  Negative for dizziness, tremors, seizures, weakness, numbness.  Hematological: Negative for adenopathy. Does not bruise/bleed easily.   Psychiatric/Behavioral: Negative for sleep disturbances, agitation, behavioral problems, confusion, hallucinations, self-injury and suicidal ideas. The patient is not nervous/anxious.      Objective    Physical Exam  Constitutional: Oriented to  "person, place, and time. Appears well-developed and well-nourished.   HENT: minor OP erythema, absent tonsils, no exudate  Head: no sinus tenderness. Normocephalic and atraumatic.   Eyes: EOM are normal. Pupils are equal, round, and reactive to light.   Neck: Normal range of motion. Neck supple.   Cardiovascular: Normal rate, regular rhythm and normal heart sounds.    Pulmonary/Chest: Effort normal and breath sounds normal. No respiratory distress.  Has no wheezes or rales. Exhibits no chest wall tenderness.   Abdominal: Soft. Bowel sounds are normal. Exhibits no distension and no mass. There is no tenderness.   Musculoskeletal: Normal range of motion. Exhibits no tenderness.   Neurological: Alert and oriented to person, place, and time.   Skin: Skin is warm and dry.   Psychiatric: Has a normal mood and affect. Behavior is normal. Judgment and thought content normal.       BP 98/70  Pulse 79  Temp 98.4 °F (36.9 °C)  Ht 154.9 cm (60.98\")  Wt 48.1 kg (106 lb)  SpO2 98%  BMI 20.04 kg/m2    Nursing note and vitals reviewed.        Assessment/Plan   Radha was seen today for sore throat, sinus problem and chills.    Diagnoses and all orders for this visit:    Acute pharyngitis, unspecified etiology  Encouraged to increase PO fluid intake and use Tylenol prn.     Strep- negative  Flu- negative.     Call or RTC if symptoms worsen or persist.        "

## 2018-03-26 ENCOUNTER — OFFICE VISIT (OUTPATIENT)
Dept: INTERNAL MEDICINE | Facility: CLINIC | Age: 28
End: 2018-03-26

## 2018-03-26 VITALS
HEART RATE: 88 BPM | BODY MASS INDEX: 20.39 KG/M2 | WEIGHT: 108 LBS | SYSTOLIC BLOOD PRESSURE: 98 MMHG | DIASTOLIC BLOOD PRESSURE: 64 MMHG | OXYGEN SATURATION: 100 % | TEMPERATURE: 98.9 F | HEIGHT: 61 IN

## 2018-03-26 DIAGNOSIS — M54.6 ACUTE RIGHT-SIDED THORACIC BACK PAIN: Primary | ICD-10-CM

## 2018-03-26 PROCEDURE — 99213 OFFICE O/P EST LOW 20 MIN: CPT | Performed by: PHYSICIAN ASSISTANT

## 2018-03-26 RX ORDER — CYCLOBENZAPRINE HCL 5 MG
5 TABLET ORAL NIGHTLY PRN
Qty: 15 TABLET | Refills: 0 | Status: SHIPPED | OUTPATIENT
Start: 2018-03-26 | End: 2018-04-05 | Stop reason: SDUPTHER

## 2018-03-26 RX ORDER — IBUPROFEN 800 MG/1
800 TABLET ORAL EVERY 8 HOURS PRN
Qty: 60 TABLET | Refills: 0 | Status: SHIPPED | OUTPATIENT
Start: 2018-03-26 | End: 2018-04-10 | Stop reason: SDUPTHER

## 2018-03-26 NOTE — PROGRESS NOTES
Radha Dixon is a 28 y.o. female.     Subjective   History of Present Illness   Here today with concern of pain in the right upper back for the last 4 days which has caused her trouble sleeping. She describes the pain as sharp which eases after a few minutes. Pain is triggered by turning and reaching with the right arm.  No known injury and no previous similar occurrences. She denies any abdominal pain or nausea.  She has been using ibuprofen which eases the pain very little. She has not tried heat, ice or muscle relaxer.  Her history is significant for scoliosis.         The following portions of the patient's history were reviewed and updated as appropriate: allergies, current medications, past family history, past medical history, past social history, past surgical history and problem list.    Review of Systems    Constitutional: Negative for appetite change, chills, fatigue, fever and unexpected weight change.   HENT: Negative for congestion, ear pain, hearing loss, nosebleeds, postnasal drip, rhinorrhea, sore throat, tinnitus and trouble swallowing.    Eyes: Negative for photophobia, discharge and visual disturbance.   Respiratory: Negative for cough, chest tightness, shortness of breath and wheezing.    Cardiovascular: Negative for chest pain, palpitations and leg swelling.   Gastrointestinal: Negative for abdominal distention, abdominal pain, blood in stool, constipation, diarrhea, nausea and vomiting.   Endocrine: Negative for cold intolerance, heat intolerance, polydipsia, polyphagia and polyuria.   Musculoskeletal: right upper back pain, scoliosis.  Negative for arthralgias, joint swelling, myalgias, neck pain and neck stiffness.   Skin: Negative for color change, pallor, rash and wound.   Allergic/Immunologic: Negative for environmental allergies, food allergies and immunocompromised state.   Neurological: Negative for dizziness, tremors, seizures, weakness, numbness and headaches.   Hematological:  "Negative for adenopathy. Does not bruise/bleed easily.   Psychiatric/Behavioral: Negative for sleep disturbances, agitation, behavioral problems, confusion, hallucinations, self-injury and suicidal ideas. The patient is not nervous/anxious.      Objective    Physical Exam   Musculoskeletal:        Thoracic back: She exhibits deformity (scoliosis).        Back:      Constitutional: Oriented to person, place, and time. Appears well-developed and well-nourished.   Head: Normocephalic and atraumatic.   Eyes: EOM are normal. Pupils are equal, round, and reactive to light.   Neck: Normal range of motion. Neck supple.   Cardiovascular: Normal rate, regular rhythm and normal heart sounds.    Pulmonary/Chest: Effort normal and breath sounds normal. No respiratory distress.  Has no wheezes or rales. Exhibits no chest wall tenderness.   Abdominal: Soft. Bowel sounds are normal. Exhibits no distension and no mass. There is no tenderness.   Neurological: Alert and oriented to person, place, and time.   Skin: Skin is warm and dry.   Psychiatric: Has a normal mood and affect. Behavior is normal. Judgment and thought content normal.       BP 98/64   Pulse 88   Temp 98.9 °F (37.2 °C)   Ht 154.9 cm (60.98\")   Wt 49 kg (108 lb)   SpO2 100%   BMI 20.42 kg/m²     Nursing note and vitals reviewed.        Assessment/Plan   Radha was seen today for back pain.    Diagnoses and all orders for this visit:    Acute right-sided thoracic back pain  -     cyclobenzaprine (FLEXERIL) 5 MG tablet; Take 1 tablet by mouth At Night As Needed for Muscle Spasms.  -     ibuprofen (ADVIL,MOTRIN) 800 MG tablet; Take 1 tablet by mouth Every 8 (Eight) Hours As Needed for Mild Pain .    Heat prn, rest, frequent stretching and avoid lifting > 10 pounds for the next week.     Call or RTC if symptoms worsen or persist.            "

## 2018-04-03 ENCOUNTER — TELEPHONE (OUTPATIENT)
Dept: INTERNAL MEDICINE | Facility: CLINIC | Age: 28
End: 2018-04-03

## 2018-04-03 DIAGNOSIS — M54.6 ACUTE RIGHT-SIDED THORACIC BACK PAIN: Primary | ICD-10-CM

## 2018-04-03 NOTE — TELEPHONE ENCOUNTER
Please find out if she is needing an extension of her limited duty note for work or if she is interested in a referral to PT or chiropractic care.

## 2018-04-05 DIAGNOSIS — M54.6 ACUTE RIGHT-SIDED THORACIC BACK PAIN: ICD-10-CM

## 2018-04-05 RX ORDER — CYCLOBENZAPRINE HCL 5 MG
TABLET ORAL
Qty: 15 TABLET | Refills: 0 | Status: SHIPPED | OUTPATIENT
Start: 2018-04-05 | End: 2018-04-23 | Stop reason: SDUPTHER

## 2018-04-10 DIAGNOSIS — M54.6 ACUTE RIGHT-SIDED THORACIC BACK PAIN: ICD-10-CM

## 2018-04-10 RX ORDER — IBUPROFEN 800 MG/1
TABLET ORAL
Qty: 60 TABLET | Refills: 0 | Status: SHIPPED | OUTPATIENT
Start: 2018-04-10 | End: 2018-04-27 | Stop reason: SDUPTHER

## 2018-04-19 ENCOUNTER — APPOINTMENT (OUTPATIENT)
Dept: CT IMAGING | Facility: HOSPITAL | Age: 28
End: 2018-04-19

## 2018-04-19 ENCOUNTER — HOSPITAL ENCOUNTER (EMERGENCY)
Facility: HOSPITAL | Age: 28
Discharge: HOME | End: 2018-04-19
Attending: EMERGENCY MEDICINE | Admitting: EMERGENCY MEDICINE

## 2018-04-19 VITALS
HEART RATE: 94 BPM | HEIGHT: 61 IN | RESPIRATION RATE: 16 BRPM | OXYGEN SATURATION: 100 % | SYSTOLIC BLOOD PRESSURE: 114 MMHG | BODY MASS INDEX: 20.92 KG/M2 | TEMPERATURE: 98.6 F | WEIGHT: 110.8 LBS | DIASTOLIC BLOOD PRESSURE: 75 MMHG

## 2018-04-19 DIAGNOSIS — G43.909 MIGRAINE WITHOUT STATUS MIGRAINOSUS, NOT INTRACTABLE, UNSPECIFIED MIGRAINE TYPE: Primary | ICD-10-CM

## 2018-04-19 PROCEDURE — 25010000002 KETOROLAC TROMETHAMINE PER 15 MG: Performed by: PHYSICIAN ASSISTANT

## 2018-04-19 PROCEDURE — 96374 THER/PROPH/DIAG INJ IV PUSH: CPT

## 2018-04-19 PROCEDURE — 96375 TX/PRO/DX INJ NEW DRUG ADDON: CPT

## 2018-04-19 PROCEDURE — 70450 CT HEAD/BRAIN W/O DYE: CPT

## 2018-04-19 PROCEDURE — 25010000002 DIPHENHYDRAMINE PER 50 MG: Performed by: PHYSICIAN ASSISTANT

## 2018-04-19 PROCEDURE — 96361 HYDRATE IV INFUSION ADD-ON: CPT

## 2018-04-19 PROCEDURE — 25010000002 METOCLOPRAMIDE PER 10 MG: Performed by: PHYSICIAN ASSISTANT

## 2018-04-19 PROCEDURE — 99283 EMERGENCY DEPT VISIT LOW MDM: CPT

## 2018-04-19 RX ORDER — DIPHENHYDRAMINE HYDROCHLORIDE 50 MG/ML
25 INJECTION INTRAMUSCULAR; INTRAVENOUS ONCE
Status: COMPLETED | OUTPATIENT
Start: 2018-04-19 | End: 2018-04-19

## 2018-04-19 RX ORDER — METOCLOPRAMIDE HYDROCHLORIDE 5 MG/ML
10 INJECTION INTRAMUSCULAR; INTRAVENOUS ONCE
Status: COMPLETED | OUTPATIENT
Start: 2018-04-19 | End: 2018-04-19

## 2018-04-19 RX ORDER — KETOROLAC TROMETHAMINE 30 MG/ML
30 INJECTION, SOLUTION INTRAMUSCULAR; INTRAVENOUS ONCE
Status: COMPLETED | OUTPATIENT
Start: 2018-04-19 | End: 2018-04-19

## 2018-04-19 RX ADMIN — SODIUM CHLORIDE 1000 ML: 9 INJECTION, SOLUTION INTRAVENOUS at 12:42

## 2018-04-19 RX ADMIN — DIPHENHYDRAMINE HYDROCHLORIDE 25 MG: 50 INJECTION, SOLUTION INTRAMUSCULAR; INTRAVENOUS at 12:39

## 2018-04-19 RX ADMIN — KETOROLAC TROMETHAMINE 30 MG: 30 INJECTION, SOLUTION INTRAMUSCULAR; INTRAVENOUS at 12:38

## 2018-04-19 RX ADMIN — METOCLOPRAMIDE 10 MG: 5 INJECTION, SOLUTION INTRAMUSCULAR; INTRAVENOUS at 12:41

## 2018-04-19 NOTE — ED PROVIDER NOTES
"Subjective   28-year-old female complains of \"migraine\" for the past 3 days or so.  Left temporal region with radiation across to the right forehead.  She's had a little bit of blurry vision from the left eye.  No motor weakness or sensory loss.  No head trauma, fever, chills, URI symptoms, rash.  She said the headache has been persistent and not relieved with ibuprofen.  Furthermore, she said she has had similar headaches for over 10 years although this when is much more intense than usual.  She said she's never had a CT scan of her head.  She denies any recent change of chronic medicines.  She said her sibling also has chronic migraines.  The headache is not worsened by anything specific.  No significant photophobia or phonophobia.            Review of Systems   All other systems reviewed and are negative.      Past Medical History:   Diagnosis Date   • Acid reflux    • Anxiety and depression    • Arthritis    • Asthma    • Bipolar 1 disorder    • Body piercing     EARS, NOSE, BELLY BUTTON   • Depression    • Dysphagia     REPORTS SOMETIMES FEELS LIKE \"FOOD GETS STUCK\"   • Fracture     HISTORY OF RIGHT WRIST AS A CHILD   • History of migraine    • Migraine    • Ovarian cyst    • PONV (postoperative nausea and vomiting)    • Scoliosis    • Tattoo        Allergies   Allergen Reactions   • Onion Other (See Comments)     REPORTS CAUSES MIGRAINES     • Sulfa Antibiotics Hives       Past Surgical History:   Procedure Laterality Date   • APPENDECTOMY  2016   • CHOLECYSTECTOMY     • CYST REMOVAL      OVARIAN CYST   • ENDOSCOPY N/A 12/1/2017    Procedure: ESOPHAGOGASTRODUODENOSCOPY WITH COLD FORCEP BIOPSY;  Surgeon: Danilo Shabazz MD;  Location: [facility] ENDOSCOPY;  Service:    • HIP SURGERY Right     RIGHT HIP, REPORTS HAD BONES SCRAPED   • WISDOM TOOTH EXTRACTION         Family History   Problem Relation Age of Onset   • Arthritis Other    • Cancer Other      breast and lung   • Diabetes Other    • Heart attack Other    • " Migraines Other        Social History     Social History   • Marital status: Single     Social History Main Topics   • Smoking status: Current Every Day Smoker     Packs/day: 0.50     Years: 13.00     Types: Cigarettes   • Smokeless tobacco: Never Used   • Alcohol use No   • Drug use: No   • Sexual activity: Defer     Other Topics Concern   • Not on file           Objective   Physical Exam   Constitutional: She is oriented to person, place, and time. She appears well-developed and well-nourished. No distress.   Thin, healthy-appearing young female in no acute distress   HENT:   Head: Normocephalic and atraumatic.   Nose: Nose normal.   Mouth/Throat: Oropharynx is clear and moist.   Eyes: Conjunctivae and EOM are normal. Right eye exhibits no discharge. Left eye exhibits no discharge. No scleral icterus.   Nondilated funduscopic exam without hemorrhage or papilledema   Neck: Normal range of motion. Neck supple.   Cardiovascular: Normal rate, regular rhythm and normal heart sounds.    No murmur heard.  Pulmonary/Chest: Effort normal and breath sounds normal. No respiratory distress. She has no wheezes. She has no rales.   Abdominal: She exhibits no distension.   Musculoskeletal: Normal range of motion. She exhibits no edema, tenderness or deformity.   Neurological: She is alert and oriented to person, place, and time. No cranial nerve deficit. She exhibits normal muscle tone. Coordination normal.   Skin: Skin is warm and dry. Capillary refill takes less than 2 seconds. No rash noted. She is not diaphoretic.   Psychiatric: She has a normal mood and affect. Her behavior is normal. Thought content normal.   Vitals reviewed.      Procedures         ED Course  ED Course   Comment By Time   Formal CT read is normal Brayan Hernandez PA-C 04/19 4366   Patient said her headache is down to about a 2 or 3 out of 10 from an 8 when she was initially evaluated.  She said she is fine with discharge and will follow up with her PCP.  Brayan Hernandez PA-C 04/19 1411   Since the patient admits to having these intermittent headaches for at least a decade with no prior CT scan, I'm going to pursue CT scan today. Brayan Hernandez PA-C 04/19 1419                  MDM    Final diagnoses:   Migraine without status migrainosus, not intractable, unspecified migraine type            Brayan Hernandez PA-C  04/19/18 1411       Brayan Hernandez PA-C  04/19/18 1423

## 2018-04-20 ENCOUNTER — TREATMENT (OUTPATIENT)
Dept: PHYSICAL THERAPY | Facility: CLINIC | Age: 28
End: 2018-04-20

## 2018-04-20 DIAGNOSIS — S46.911S MUSCLE STRAIN OF RIGHT SCAPULAR REGION, SEQUELA: Primary | ICD-10-CM

## 2018-04-20 PROCEDURE — 97161 PT EVAL LOW COMPLEX 20 MIN: CPT | Performed by: PHYSICAL THERAPIST
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
## 2018-04-23 DIAGNOSIS — M54.6 ACUTE RIGHT-SIDED THORACIC BACK PAIN: ICD-10-CM

## 2018-04-23 RX ORDER — CYCLOBENZAPRINE HCL 5 MG
TABLET ORAL
Qty: 15 TABLET | Refills: 0 | Status: SHIPPED | OUTPATIENT
Start: 2018-04-23 | End: 2018-08-04

## 2018-04-25 ENCOUNTER — TREATMENT (OUTPATIENT)
Dept: PHYSICAL THERAPY | Facility: CLINIC | Age: 28
End: 2018-04-25

## 2018-04-25 DIAGNOSIS — S46.911S MUSCLE STRAIN OF RIGHT SCAPULAR REGION, SEQUELA: Primary | ICD-10-CM

## 2018-04-25 PROCEDURE — 97530 THERAPEUTIC ACTIVITIES: CPT | Performed by: PHYSICAL THERAPIST

## 2018-04-25 PROCEDURE — 97110 THERAPEUTIC EXERCISES: CPT | Performed by: PHYSICAL THERAPIST

## 2018-04-25 NOTE — PROGRESS NOTES
Physical Therapy Daily Progress Note      Visit # : 2    Radha Dixon reports 5/10 pain today at rest.  5/10 post PT.         Objective Pt present to PT today with minimal distress noted with activity.     Pt needed VC for posture feedback.  Posture assisted helps scapular mechanics.       See Exercise, Manual, and Modality Logs for complete treatment.     Assessment/Plan  Pt with less pain after ice.  Pt needed more mechanical assistance today.       Progress per Plan of Care  Check response to treatment.            Manual Therapy:         mins  39709;  Therapeutic Exercise:    42     mins  62820;     Neuromuscular Joi:        mins  11187;    Therapeutic Activity:     12     mins  10575;     Gait Training:        ___  mins  60708;     Ultrasound:          mins  88286;    Electrical Stimulation:         mins  87504 ( );  Dry Needling          mins self-pay    Timed Treatment:   54   mins   Total Treatment:     58   mins    Terrell Healy, PT  Physical Therapist

## 2018-04-27 ENCOUNTER — TREATMENT (OUTPATIENT)
Dept: PHYSICAL THERAPY | Facility: CLINIC | Age: 28
End: 2018-04-27

## 2018-04-27 DIAGNOSIS — S46.911S MUSCLE STRAIN OF RIGHT SCAPULAR REGION, SEQUELA: Primary | ICD-10-CM

## 2018-04-27 DIAGNOSIS — M54.6 ACUTE RIGHT-SIDED THORACIC BACK PAIN: ICD-10-CM

## 2018-04-27 PROCEDURE — 97140 MANUAL THERAPY 1/> REGIONS: CPT | Performed by: PHYSICAL THERAPIST

## 2018-04-27 PROCEDURE — 97110 THERAPEUTIC EXERCISES: CPT | Performed by: PHYSICAL THERAPIST

## 2018-04-27 PROCEDURE — 97530 THERAPEUTIC ACTIVITIES: CPT | Performed by: PHYSICAL THERAPIST

## 2018-04-27 RX ORDER — IBUPROFEN 800 MG/1
TABLET ORAL
Qty: 60 TABLET | Refills: 0 | Status: SHIPPED | OUTPATIENT
Start: 2018-04-27 | End: 2018-05-14 | Stop reason: SDUPTHER

## 2018-04-27 NOTE — PROGRESS NOTES
Physical Therapy Daily Progress Note      Visit # : 3    Radha Dixon reports 2/10 pain today at rest.  Pt reports the R shoulder is the primary area of pain now.  The subacromial area is the primary area of pain.         Objective Pt present to PT today with no distress noted at rest.     R pec minor and major tightness and hypertonicity/guarding.   R proximal bicep tendon tender and guarded to palpation.       See Exercise, Manual, and Modality Logs for complete treatment.     Assessment/Plan  Pt with less pain overall.  Pain is getting more focal and it seems to be more of a R proximal bicep tendon strain.       Progress per Plan of Care  Assess response to taping and ice.            Manual Therapy:    12     mins  75361;  Therapeutic Exercise:    31     mins  52618;     Neuromuscular Joi:        mins  91619;    Therapeutic Activity:     14     mins  33168;     Gait Training:        ___  mins  47566;     Ultrasound:          mins  60532;    Electrical Stimulation:         mins  23618 ( );  Dry Needling          mins self-pay    Timed Treatment:   57   mins   Total Treatment:     67   mins    Terrell Healy, PT  Physical Therapist

## 2018-05-02 ENCOUNTER — TREATMENT (OUTPATIENT)
Dept: PHYSICAL THERAPY | Facility: CLINIC | Age: 28
End: 2018-05-02

## 2018-05-02 DIAGNOSIS — S46.911S MUSCLE STRAIN OF RIGHT SCAPULAR REGION, SEQUELA: Primary | ICD-10-CM

## 2018-05-02 PROCEDURE — 97140 MANUAL THERAPY 1/> REGIONS: CPT | Performed by: PHYSICAL THERAPIST

## 2018-05-02 PROCEDURE — 97110 THERAPEUTIC EXERCISES: CPT | Performed by: PHYSICAL THERAPIST

## 2018-05-02 NOTE — PROGRESS NOTES
Physical Therapy Daily Progress Note      Visit # : 4    Radha Dixon reports 7/10 pain today at rest.  Pt reports elevated pain from working in the yard yesterday.  She had been pain free all weekend from the treatment before.         Objective Pt present to PT today with minimal distress noted entering PT area.     Pt with T/S hypomobility noted more at rest prior to PT .     HVLAT to thoracic spine + for complete relief of pain.       See Exercise, Manual, and Modality Logs for complete treatment.     Assessment/Plan  Pt with less pain noted after last PT.  Pt was inflamed from increased yard work but is quickly relieved from HVLAT.       Progress per Plan of Care             Manual Therapy:    13     mins  24471;  Therapeutic Exercise:    29     mins  45168;     Neuromuscular Joi:        mins  63380;    Therapeutic Activity:          mins  68168;     Gait Training:        ___  mins  89634;     Ultrasound:          mins  39080;    Electrical Stimulation:         mins  25934 ( );  Dry Needling          mins self-pay    Timed Treatment:   42   mins   Total Treatment:     46   mins    Terrell Healy, PT  Physical Therapist

## 2018-05-04 ENCOUNTER — TREATMENT (OUTPATIENT)
Dept: PHYSICAL THERAPY | Facility: CLINIC | Age: 28
End: 2018-05-04

## 2018-05-04 DIAGNOSIS — S46.911S MUSCLE STRAIN OF RIGHT SCAPULAR REGION, SEQUELA: Primary | ICD-10-CM

## 2018-05-04 PROCEDURE — 97530 THERAPEUTIC ACTIVITIES: CPT | Performed by: PHYSICAL THERAPIST

## 2018-05-04 PROCEDURE — 97110 THERAPEUTIC EXERCISES: CPT | Performed by: PHYSICAL THERAPIST

## 2018-05-04 NOTE — PROGRESS NOTES
Physical Therapy Daily Progress Note      Visit # : 5    Radha Dixon reports 5/10 pain today at rest.  Pt reports soreness more localized today.         Objective Pt present to PT today with scapular mobility more limited today.     R scapular mobility limited on the R.  More crunching noted when scapula mobility stops.     Palpation:  Tender in the levator scapula       See Exercise, Manual, and Modality Logs for complete treatment.     Assessment/Plan  Scapular position seems to be a big issue today.  Her levator scapular MM is very tender and guarded as well.       Progress per Plan of Care             Manual Therapy:    9     mins  63724;  Therapeutic Exercise:    26     mins  22250;     Neuromuscular Joi:        mins  02419;    Therapeutic Activity:     12     mins  09710;     Gait Training:        ___  mins  13405;     Ultrasound:          mins  89871;    Electrical Stimulation:         mins  88025 ( );  Dry Needling          mins self-pay    Timed Treatment:   47   mins   Total Treatment:     49   mins    Terrell Healy, PT  Physical Therapist

## 2018-05-08 ENCOUNTER — TREATMENT (OUTPATIENT)
Dept: PHYSICAL THERAPY | Facility: CLINIC | Age: 28
End: 2018-05-08

## 2018-05-08 PROCEDURE — 97140 MANUAL THERAPY 1/> REGIONS: CPT | Performed by: PHYSICAL THERAPIST

## 2018-05-08 PROCEDURE — 97110 THERAPEUTIC EXERCISES: CPT | Performed by: PHYSICAL THERAPIST

## 2018-05-08 NOTE — PROGRESS NOTES
Physical Therapy Daily Progress Note      Visit # : 6    Radha Dixon reports 2/10 pain today at rest.  Pain more in the Subacromial area today.         Objective Pt present to PT today with minimal distress initially with exercises today.      Self myofascial release + for relief of sxs.  R Levator scapula.        See Exercise, Manual, and Modality Logs for complete treatment.     Assessment/Plan  Pt with improved sxs post PT today.  Sxs seem to be arising from the levator scapula and posterior cuff.       Progress per Plan of Care             Manual Therapy:    12     mins  63473;  Therapeutic Exercise:    41     mins  10988;     Neuromuscular Joi:        mins  04245;    Therapeutic Activity:          mins  35728;     Gait Training:        ___  mins  49808;     Ultrasound:     10     mins  20122;    Electrical Stimulation:         mins  61243 ( );  Dry Needling          mins self-pay    Timed Treatment:   63   mins   Total Treatment:     66   mins    Terrell Healy, PT  Physical Therapist

## 2018-05-09 DIAGNOSIS — M54.6 ACUTE RIGHT-SIDED THORACIC BACK PAIN: ICD-10-CM

## 2018-05-10 ENCOUNTER — TREATMENT (OUTPATIENT)
Dept: PHYSICAL THERAPY | Facility: CLINIC | Age: 28
End: 2018-05-10

## 2018-05-10 ENCOUNTER — OFFICE VISIT (OUTPATIENT)
Dept: INTERNAL MEDICINE | Facility: CLINIC | Age: 28
End: 2018-05-10

## 2018-05-10 VITALS
WEIGHT: 110 LBS | TEMPERATURE: 98.2 F | SYSTOLIC BLOOD PRESSURE: 98 MMHG | OXYGEN SATURATION: 99 % | HEIGHT: 61 IN | DIASTOLIC BLOOD PRESSURE: 60 MMHG | HEART RATE: 80 BPM | BODY MASS INDEX: 20.77 KG/M2

## 2018-05-10 DIAGNOSIS — S46.911S MUSCLE STRAIN OF RIGHT SCAPULAR REGION, SEQUELA: Primary | ICD-10-CM

## 2018-05-10 DIAGNOSIS — R53.83 FATIGUE, UNSPECIFIED TYPE: Primary | ICD-10-CM

## 2018-05-10 DIAGNOSIS — G44.031 INTRACTABLE EPISODIC PAROXYSMAL HEMICRANIA: ICD-10-CM

## 2018-05-10 DIAGNOSIS — R53.81 MALAISE: ICD-10-CM

## 2018-05-10 DIAGNOSIS — R07.81 PLEURITIC CHEST PAIN: ICD-10-CM

## 2018-05-10 LAB
EXPIRATION DATE: NORMAL
INTERNAL CONTROL: NORMAL
Lab: NORMAL
S PYO AG THROAT QL: NEGATIVE

## 2018-05-10 PROCEDURE — 97530 THERAPEUTIC ACTIVITIES: CPT | Performed by: PHYSICAL THERAPIST

## 2018-05-10 PROCEDURE — 87880 STREP A ASSAY W/OPTIC: CPT | Performed by: PHYSICIAN ASSISTANT

## 2018-05-10 PROCEDURE — 97110 THERAPEUTIC EXERCISES: CPT | Performed by: PHYSICAL THERAPIST

## 2018-05-10 PROCEDURE — 93000 ELECTROCARDIOGRAM COMPLETE: CPT | Performed by: PHYSICIAN ASSISTANT

## 2018-05-10 PROCEDURE — 99214 OFFICE O/P EST MOD 30 MIN: CPT | Performed by: PHYSICIAN ASSISTANT

## 2018-05-10 PROCEDURE — 97140 MANUAL THERAPY 1/> REGIONS: CPT | Performed by: PHYSICAL THERAPIST

## 2018-05-10 RX ORDER — CYCLOBENZAPRINE HCL 5 MG
TABLET ORAL
Qty: 15 TABLET | Refills: 0 | OUTPATIENT
Start: 2018-05-10

## 2018-05-10 NOTE — PROGRESS NOTES
Physical Therapy Daily Progress Note      Visit # : 7    Radha Dixon reports 2/10 pain today at rest.  Pt reports she was pain free for a whole day after last PT session.          Objective Pt present to PT today with no distress noted.     AROM:  Cervical spine ROM is WNL's without elevated pain only minimal amount of tension noted in the levator scapula MM.      R shoulder Flexion is WNL's with only minimal pain at end range.       MMT:  R shoulder flexion 3+/5,  IR 4/5 at side, 4-/5 at 90 degrees,  abd 4-/5      Scapular winging noted with testing.  Scapular MM fatigues quickly       See Exercise, Manual, and Modality Logs for complete treatment.     Assessment/Plan  Pt with R shoulder pain still limiting function.  She seems to have a scapular MM strain but I can't rule out internal derangement of the shoulder.       She would benefit from continued PT if more approval given.            Manual Therapy:    14     mins  52692;  Therapeutic Exercise:    30     mins  03159;     Neuromuscular Joi:        mins  53092;    Therapeutic Activity:     9     mins  60129;     Gait Training:        ___  mins  09035;     Ultrasound:          mins  43375;    Electrical Stimulation:         mins  67175 ( );  Dry Needling          mins self-pay    Timed Treatment:   53   mins   Total Treatment:     58   mins    Terrell Healy, PT  Physical Therapist

## 2018-05-10 NOTE — PROGRESS NOTES
"Radha Dixon is a 28 y.o. female.     Subjective   History of Present Illness   Here today with concern of a headache for 3 weeks which began as a migraine for 3 days with left eye blurriness. She went to the ER where she was given a \"migraine cocktail\" which downgraded the headache to tolerable but it has persisted since then. For the last week she has had dizziness, lightheadedness and sees spots when she stands up quickly or changes body position. For the last week she has had a sharp pain under the left breast when she takes a deep breath. No fever, chills, cough, sore throat, night sweats, nausea, diarrhea or abdominal pain. She has been extremely tired for the last 3 weeks. She has had mononucleosis confirmed by a blood test in the past.    She has been using Flexeril at bedtime nightly for right shoulder pain which she was attending physical therapy for. Her physical therapy sessions helped some but the physical therapist recommended MRI.         The following portions of the patient's history were reviewed and updated as appropriate: allergies, current medications, past family history, past medical history, past social history, past surgical history and problem list.    Review of Systems    Constitutional: fatigue. Negative for appetite change, chills, fever and unexpected weight change.   HENT: Negative for congestion, ear pain, hearing loss, nosebleeds, postnasal drip, rhinorrhea, sore throat, tinnitus and trouble swallowing.    Eyes: blurred vision left eye. Negative for photophobia, discharge.  Respiratory: Negative for cough, chest tightness, shortness of breath and wheezing.    Cardiovascular: pleuritic chest pain. Negative for palpitations and leg swelling.   Gastrointestinal: Negative for abdominal distention, abdominal pain, blood in stool, constipation, diarrhea, nausea and vomiting.   Endocrine: Negative for cold intolerance, heat intolerance, polydipsia, polyphagia and polyuria. "   Musculoskeletal: right shoulder pain.  Negative for back pain, joint swelling, myalgias, neck pain and neck stiffness.   Skin: Negative for color change, pallor, rash and wound.   Allergic/Immunologic: Negative for environmental allergies, food allergies and immunocompromised state.   Neurological: headaches, dizziness, lightheadedness. Negative for tremors, seizures, weakness, numbness.  Hematological: Negative for adenopathy. Does not bruise/bleed easily.   Psychiatric/Behavioral: Negative for sleep disturbances, agitation, behavioral problems, confusion, hallucinations, self-injury and suicidal ideas. The patient is not nervous/anxious.      Objective    Physical Exam  Constitutional: Oriented to person, place, and time. Appears well-developed and well-nourished.   HENT: mild OP erythema, TMs normal.  Head: No sinus tenderness. Normocephalic and atraumatic.   Eyes: EOM are normal. Pupils are equal, round, and reactive to light.   Neck: right posterior cervical lymphadenopathy. Normal range of motion. Neck supple.   Cardiovascular: Normal rate, regular rhythm and normal heart sounds.    Pulmonary/Chest: Effort normal and breath sounds normal. No respiratory distress.  Has no wheezes or rales. Exhibits no chest wall tenderness.   Abdominal: Soft. Bowel sounds are normal. Exhibits no distension and no mass. There is no tenderness.   Musculoskeletal: tenderness right posterior shoulder. Normal range of motion.   Neurological: Alert and oriented to person, place, and time.   Skin: Skin is warm and dry.   Psychiatric: Has a normal mood and affect. Behavior is normal. Judgment and thought content normal.       ECG 12 Lead  Date/Time: 5/10/2018 6:56 PM  Performed by: GLORIA DASH  Authorized by: GLORIA DASH   Comparison: compared with previous ECG from 12/5/2016  Similar to previous ECG  Rhythm: sinus rhythm  Rate: normal  BPM: 71  Conduction: conduction normal  ST Segments: ST segments normal  T  "Waves: T waves normal  QRS axis: normal  Other: no other findings  Clinical impression: normal ECG            BP 98/60   Pulse 80   Temp 98.2 °F (36.8 °C)   Ht 154.9 cm (60.98\")   Wt 49.9 kg (110 lb)   SpO2 99%   BMI 20.80 kg/m²     Nursing note and vitals reviewed.        Assessment/Plan   Radha was seen today for dizziness.    Diagnoses and all orders for this visit:    Fatigue, unspecified type  -     POC Rapid Strep A  -     CBC & Differential  -     Comprehensive Metabolic Panel  -     Brenda-Barr Virus VCA Antibody Panel  -     TSH    Intractable episodic paroxysmal hemicrania  -     CBC & Differential  -     Comprehensive Metabolic Panel  -     Brenda-Barr Virus VCA Antibody Panel  -     TSH    Malaise  -     CBC & Differential  -     Comprehensive Metabolic Panel  -     Brenda-Barr Virus VCA Antibody Panel  -     TSH    Pleuritic chest pain  -     ECG 12 Lead      Strep negative.     ED record reviewed.      "

## 2018-05-11 LAB
ALBUMIN SERPL-MCNC: 4.5 G/DL (ref 3.5–5)
ALBUMIN/GLOB SERPL: 1.6 G/DL (ref 1–2)
ALP SERPL-CCNC: 52 U/L (ref 38–126)
ALT SERPL-CCNC: 20 U/L (ref 13–69)
AST SERPL-CCNC: 22 U/L (ref 15–46)
BASOPHILS # BLD AUTO: 0.08 10*3/MM3 (ref 0–0.2)
BASOPHILS NFR BLD AUTO: 0.8 % (ref 0–2.5)
BILIRUB SERPL-MCNC: 1.2 MG/DL (ref 0.2–1.3)
BUN SERPL-MCNC: 14 MG/DL (ref 7–20)
BUN/CREAT SERPL: 20 (ref 7.1–23.5)
CALCIUM SERPL-MCNC: 9.6 MG/DL (ref 8.4–10.2)
CHLORIDE SERPL-SCNC: 104 MMOL/L (ref 98–107)
CO2 SERPL-SCNC: 27 MMOL/L (ref 26–30)
CREAT SERPL-MCNC: 0.7 MG/DL (ref 0.6–1.3)
EBV EA IGG SER-ACNC: <9 U/ML (ref 0–8.9)
EBV NA IGG SER IA-ACNC: 93.5 U/ML (ref 0–17.9)
EBV VCA IGG SER IA-ACNC: 164 U/ML (ref 0–17.9)
EBV VCA IGM SER IA-ACNC: <36 U/ML (ref 0–35.9)
EOSINOPHIL # BLD AUTO: 0.62 10*3/MM3 (ref 0–0.7)
EOSINOPHIL NFR BLD AUTO: 6.3 % (ref 0–7)
ERYTHROCYTE [DISTWIDTH] IN BLOOD BY AUTOMATED COUNT: 11.7 % (ref 11.5–14.5)
GFR SERPLBLD CREATININE-BSD FMLA CKD-EPI: 100 ML/MIN/1.73
GFR SERPLBLD CREATININE-BSD FMLA CKD-EPI: 122 ML/MIN/1.73
GLOBULIN SER CALC-MCNC: 2.9 GM/DL
GLUCOSE SERPL-MCNC: 85 MG/DL (ref 74–98)
HCT VFR BLD AUTO: 38.1 % (ref 37–47)
HGB BLD-MCNC: 12.8 G/DL (ref 12–16)
IMM GRANULOCYTES # BLD: 0.02 10*3/MM3 (ref 0–0.06)
IMM GRANULOCYTES NFR BLD: 0.2 % (ref 0–0.6)
LYMPHOCYTES # BLD AUTO: 3.17 10*3/MM3 (ref 0.6–3.4)
LYMPHOCYTES NFR BLD AUTO: 32.4 % (ref 10–50)
MCH RBC QN AUTO: 30.2 PG (ref 27–31)
MCHC RBC AUTO-ENTMCNC: 33.6 G/DL (ref 30–37)
MCV RBC AUTO: 89.9 FL (ref 81–99)
MONOCYTES # BLD AUTO: 0.59 10*3/MM3 (ref 0–0.9)
MONOCYTES NFR BLD AUTO: 6 % (ref 0–12)
NEUTROPHILS # BLD AUTO: 5.31 10*3/MM3 (ref 2–6.9)
NEUTROPHILS NFR BLD AUTO: 54.3 % (ref 37–80)
NRBC BLD AUTO-RTO: 0 /100 WBC (ref 0–0)
PLATELET # BLD AUTO: 211 10*3/MM3 (ref 130–400)
POTASSIUM SERPL-SCNC: 4.4 MMOL/L (ref 3.5–5.1)
PROT SERPL-MCNC: 7.4 G/DL (ref 6.3–8.2)
RBC # BLD AUTO: 4.24 10*6/MM3 (ref 4.2–5.4)
SERVICE CMNT-IMP: ABNORMAL
SODIUM SERPL-SCNC: 143 MMOL/L (ref 137–145)
TSH SERPL DL<=0.005 MIU/L-ACNC: 1.43 MIU/ML (ref 0.47–4.68)
WBC # BLD AUTO: 9.79 10*3/MM3 (ref 4.8–10.8)

## 2018-05-14 DIAGNOSIS — M54.6 ACUTE RIGHT-SIDED THORACIC BACK PAIN: ICD-10-CM

## 2018-05-15 RX ORDER — IBUPROFEN 800 MG/1
TABLET ORAL
Qty: 60 TABLET | Refills: 0 | Status: SHIPPED | OUTPATIENT
Start: 2018-05-15 | End: 2018-06-05 | Stop reason: SDUPTHER

## 2018-05-21 ENCOUNTER — HOSPITAL ENCOUNTER (EMERGENCY)
Facility: HOSPITAL | Age: 28
Discharge: HOME | End: 2018-05-21
Attending: EMERGENCY MEDICINE | Admitting: EMERGENCY MEDICINE

## 2018-05-21 VITALS
WEIGHT: 108 LBS | HEIGHT: 61 IN | SYSTOLIC BLOOD PRESSURE: 128 MMHG | TEMPERATURE: 100 F | RESPIRATION RATE: 16 BRPM | OXYGEN SATURATION: 98 % | BODY MASS INDEX: 20.39 KG/M2 | DIASTOLIC BLOOD PRESSURE: 82 MMHG | HEART RATE: 98 BPM

## 2018-05-21 DIAGNOSIS — R50.9 FEVER, UNSPECIFIED FEVER CAUSE: ICD-10-CM

## 2018-05-21 DIAGNOSIS — J10.1 INFLUENZA A: Primary | ICD-10-CM

## 2018-05-21 LAB
BACTERIA UR QL AUTO: ABNORMAL /HPF
BILIRUB UR QL STRIP: NEGATIVE
CLARITY UR: CLEAR
COLOR UR: ABNORMAL
FLUAV AG NPH QL: POSITIVE
FLUBV AG NPH QL IA: NEGATIVE
GLUCOSE UR STRIP-MCNC: NEGATIVE MG/DL
HGB UR QL STRIP.AUTO: NEGATIVE
HYALINE CASTS UR QL AUTO: ABNORMAL /LPF
KETONES UR QL STRIP: ABNORMAL
LEUKOCYTE ESTERASE UR QL STRIP.AUTO: NEGATIVE
MUCOUS THREADS URNS QL MICRO: ABNORMAL /HPF
NITRITE UR QL STRIP: NEGATIVE
PH UR STRIP.AUTO: 6 [PH] (ref 5–8)
PROT UR QL STRIP: ABNORMAL
RBC # UR: ABNORMAL /HPF
REF LAB TEST METHOD: ABNORMAL
SP GR UR STRIP: >=1.03 (ref 1–1.03)
SQUAMOUS #/AREA URNS HPF: ABNORMAL /HPF
UROBILINOGEN UR QL STRIP: ABNORMAL
WBC UR QL AUTO: ABNORMAL /HPF

## 2018-05-21 PROCEDURE — 87804 INFLUENZA ASSAY W/OPTIC: CPT | Performed by: PHYSICIAN ASSISTANT

## 2018-05-21 PROCEDURE — 99283 EMERGENCY DEPT VISIT LOW MDM: CPT

## 2018-05-21 PROCEDURE — 81001 URINALYSIS AUTO W/SCOPE: CPT | Performed by: PHYSICIAN ASSISTANT

## 2018-05-21 RX ORDER — IBUPROFEN 600 MG/1
600 TABLET ORAL EVERY 6 HOURS PRN
Qty: 40 TABLET | Refills: 0 | Status: SHIPPED | OUTPATIENT
Start: 2018-05-21 | End: 2018-08-04

## 2018-05-21 RX ORDER — DEXTROMETHORPHAN HYDROBROMIDE AND PROMETHAZINE HYDROCHLORIDE 15; 6.25 MG/5ML; MG/5ML
5 SYRUP ORAL 4 TIMES DAILY PRN
Qty: 100 ML | Refills: 0 | Status: SHIPPED | OUTPATIENT
Start: 2018-05-21 | End: 2018-06-21

## 2018-05-21 RX ORDER — OSELTAMIVIR PHOSPHATE 75 MG/1
75 CAPSULE ORAL 2 TIMES DAILY
Qty: 10 CAPSULE | Refills: 0 | Status: SHIPPED | OUTPATIENT
Start: 2018-05-21 | End: 2018-06-21

## 2018-05-21 NOTE — DISCHARGE INSTRUCTIONS
Increase fluid intake.  Alternate Tylenol and ibuprofen every 3 hours to control fever and body aches.  Follow-up with your primary care provider for recheck in 3 days.  Return to emergency department immediately if any change or worsening of symptoms.

## 2018-05-21 NOTE — ED PROVIDER NOTES
"Subjective   28-year-old female presents to emergency department with onset yesterday of fever headache body aches myalgias arthralgias runny nose and nasal congestion, cough chest congestion.  No chest pain shortness of breath.  No abdominal pain nausea vomiting diarrhea skin rash stiff neck vision changes photophobia.  Patient's son had similar symptoms last week, diagnosed with virus.  Patient's daughter is also being seen here for similar symptoms.  Past medical history is reviewed.  She denies current medication, she is allergic to \"sulfa antibiotics\".        Illness   Location:  Per HPI  Quality:  Per HPI  Severity:  Moderate  Onset quality:  Sudden  Duration:  1 day  Timing:  Constant  Progression:  Waxing and waning  Chronicity:  New  Context:  Per HPI  Relieved by:  Per HPI  Worsened by:  Nothing  Ineffective treatments:  Tylenol  Associated symptoms: congestion, cough, fever, headaches, myalgias and nausea    Associated symptoms: no abdominal pain, no chest pain, no diarrhea, no ear pain, no fatigue, no rash, no rhinorrhea, no shortness of breath, no sore throat, no vomiting and no wheezing        Review of Systems   Constitutional: Positive for fever. Negative for chills, diaphoresis and fatigue.   HENT: Positive for congestion. Negative for ear pain, rhinorrhea and sore throat.    Respiratory: Positive for cough. Negative for choking, chest tightness, shortness of breath and wheezing.    Cardiovascular: Negative for chest pain and leg swelling.   Gastrointestinal: Positive for nausea. Negative for abdominal distention, abdominal pain, anal bleeding, blood in stool, constipation, diarrhea and vomiting.   Genitourinary: Negative for difficulty urinating, dysuria, flank pain, frequency, hematuria and urgency.   Musculoskeletal: Positive for myalgias.   Skin: Negative for rash.   Neurological: Positive for headaches.   All other systems reviewed and are negative.      Past Medical History:   Diagnosis Date " "  • Acid reflux    • Allergic    • Anxiety    • Anxiety and depression    • Arthritis    • Asthma    • Bipolar 1 disorder    • Body piercing     EARS, NOSE, BELLY BUTTON   • Depression    • Dysphagia     REPORTS SOMETIMES FEELS LIKE \"FOOD GETS STUCK\"   • Fracture     HISTORY OF RIGHT WRIST AS A CHILD   • History of migraine    • Injury of neck    • Migraine    • Ovarian cyst    • PONV (postoperative nausea and vomiting)    • Scoliosis    • Tattoo        Allergies   Allergen Reactions   • Onion Other (See Comments)     REPORTS CAUSES MIGRAINES     • Sulfa Antibiotics Hives       Past Surgical History:   Procedure Laterality Date   • APPENDECTOMY  2016   • CHOLECYSTECTOMY     • CYST REMOVAL      OVARIAN CYST   • ENDOSCOPY N/A 12/1/2017    Procedure: ESOPHAGOGASTRODUODENOSCOPY WITH COLD FORCEP BIOPSY;  Surgeon: Danilo Shabazz MD;  Location: [facility] ENDOSCOPY;  Service:    • HIP SURGERY Right     RIGHT HIP, REPORTS HAD BONES SCRAPED   • WISDOM TOOTH EXTRACTION         Family History   Problem Relation Age of Onset   • Arthritis Other    • Cancer Other         breast and lung   • Diabetes Other    • Heart attack Other    • Migraines Other        Social History     Social History   • Marital status: Single     Social History Main Topics   • Smoking status: Current Every Day Smoker     Packs/day: 0.50     Years: 13.00     Types: Cigarettes   • Smokeless tobacco: Never Used   • Alcohol use No   • Drug use: No   • Sexual activity: Defer     Other Topics Concern   • Not on file           Objective   Physical Exam   Constitutional: She is oriented to person, place, and time. She appears well-developed and well-nourished. No distress.   Oral temp 100, not toxic appearing, does not clinically appear dehydrated.  No acute distress.   HENT:   Head: Normocephalic and atraumatic.   Right Ear: External ear normal.   Left Ear: External ear normal.   Nose: Nose normal.   Mouth/Throat: Oropharynx is clear and moist. No oropharyngeal " exudate.   Eyes: Conjunctivae and EOM are normal. Pupils are equal, round, and reactive to light. Right eye exhibits no discharge. Left eye exhibits no discharge. No scleral icterus.   Neck: Normal range of motion. Neck supple. No JVD present. No tracheal deviation present. No thyromegaly present.   Cardiovascular: Normal rate, regular rhythm, normal heart sounds and intact distal pulses.  Exam reveals no gallop and no friction rub.    No murmur heard.  Pulmonary/Chest: Effort normal. No stridor. No respiratory distress. She has no wheezes. She has no rales. She exhibits no tenderness.   Abdominal: Soft. She exhibits no distension and no mass. There is no tenderness. There is no rebound and no guarding. No hernia.   Musculoskeletal: Normal range of motion. She exhibits no edema, tenderness or deformity.   Neurological: She is alert and oriented to person, place, and time. No cranial nerve deficit. She exhibits normal muscle tone. Coordination normal.   Skin: Skin is warm and dry. No rash noted. She is not diaphoretic. No erythema. No pallor.   Psychiatric: She has a normal mood and affect. Her behavior is normal. Judgment and thought content normal.   Nursing note and vitals reviewed.      Procedures        Recent Results (from the past 24 hour(s))   Influenza Antigen, Rapid - Swab, Nasopharynx    Collection Time: 05/21/18  1:04 PM   Result Value Ref Range    Influenza A Ag, EIA Positive (A) Negative    Influenza B Ag, EIA Negative Negative   Urinalysis With / Culture If Indicated - Urine, Clean Catch    Collection Time: 05/21/18  1:14 PM   Result Value Ref Range    Color, UA Dark Yellow (A) Yellow, Straw    Appearance, UA Clear Clear    pH, UA 6.0 5.0 - 8.0    Specific Gravity, UA >=1.030 1.005 - 1.030    Glucose, UA Negative Negative    Ketones, UA Trace (A) Negative    Bilirubin, UA Negative Negative    Blood, UA Negative Negative    Protein, UA 30 mg/dL (1+) (A) Negative    Leuk Esterase, UA Negative Negative     "Nitrite, UA Negative Negative    Urobilinogen, UA 1.0 E.U./dL 0.2 - 1.0 E.U./dL   Urinalysis, Microscopic Only - Urine, Clean Catch    Collection Time: 05/21/18  1:14 PM   Result Value Ref Range    RBC, UA 0-2 (A) None Seen /HPF    WBC, UA 0-2 (A) None Seen /HPF    Bacteria, UA Trace (A) None Seen /HPF    Squamous Epithelial Cells, UA 3-6 (A) None Seen, 0-2 /HPF    Hyaline Casts, UA None Seen None Seen /LPF    Mucus, UA Small/1+ (A) None Seen, Trace /HPF    Methodology Manual Light Microscopy      Note: In addition to lab results from this visit, the labs listed above may include labs taken at another facility or during a different encounter within the last 24 hours. Please correlate lab times with ED admission and discharge times for further clarification of the services performed during this visit.    No orders to display     Vitals:    05/21/18 1144   BP: 128/82   Pulse: 113   Resp: 16   Temp: 100 °F (37.8 °C)   SpO2: 98%   Weight: 49 kg (108 lb)   Height: 154.9 cm (61\")     Medications - No data to display  ECG/EMG Results (last 24 hours)     ** No results found for the last 24 hours. **              ED Course                  MDM      Final diagnoses:   Influenza A   Fever, unspecified fever cause            Toby Castillo PA-C  05/21/18 1411    "

## 2018-06-05 DIAGNOSIS — M54.6 ACUTE RIGHT-SIDED THORACIC BACK PAIN: ICD-10-CM

## 2018-06-05 RX ORDER — IBUPROFEN 800 MG/1
TABLET ORAL
Qty: 60 TABLET | Refills: 0 | Status: SHIPPED | OUTPATIENT
Start: 2018-06-05 | End: 2018-06-21

## 2018-06-21 ENCOUNTER — OFFICE VISIT (OUTPATIENT)
Dept: INTERNAL MEDICINE | Facility: CLINIC | Age: 28
End: 2018-06-21

## 2018-06-21 VITALS
TEMPERATURE: 98.5 F | HEIGHT: 61 IN | OXYGEN SATURATION: 99 % | SYSTOLIC BLOOD PRESSURE: 102 MMHG | WEIGHT: 109 LBS | BODY MASS INDEX: 20.58 KG/M2 | DIASTOLIC BLOOD PRESSURE: 70 MMHG

## 2018-06-21 DIAGNOSIS — R05.9 COUGH: Primary | ICD-10-CM

## 2018-06-21 DIAGNOSIS — R06.09 DYSPNEA ON EXERTION: ICD-10-CM

## 2018-06-21 DIAGNOSIS — R53.82 CHRONIC FATIGUE: ICD-10-CM

## 2018-06-21 LAB
ALBUMIN SERPL-MCNC: 4.5 G/DL (ref 3.5–5)
ALBUMIN/GLOB SERPL: 1.6 G/DL (ref 1–2)
ALP SERPL-CCNC: 46 U/L (ref 38–126)
ALT SERPL-CCNC: 16 U/L (ref 13–69)
AST SERPL-CCNC: 19 U/L (ref 15–46)
BASOPHILS # BLD AUTO: 0.07 10*3/MM3 (ref 0–0.2)
BASOPHILS NFR BLD AUTO: 0.9 % (ref 0–2.5)
BILIRUB SERPL-MCNC: 1.2 MG/DL (ref 0.2–1.3)
BUN SERPL-MCNC: 10 MG/DL (ref 7–20)
BUN/CREAT SERPL: 14.3 (ref 7.1–23.5)
CALCIUM SERPL-MCNC: 9.3 MG/DL (ref 8.4–10.2)
CHLORIDE SERPL-SCNC: 104 MMOL/L (ref 98–107)
CO2 SERPL-SCNC: 29 MMOL/L (ref 26–30)
CREAT SERPL-MCNC: 0.7 MG/DL (ref 0.6–1.3)
EOSINOPHIL # BLD AUTO: 0.37 10*3/MM3 (ref 0–0.7)
EOSINOPHIL NFR BLD AUTO: 4.5 % (ref 0–7)
ERYTHROCYTE [DISTWIDTH] IN BLOOD BY AUTOMATED COUNT: 11.8 % (ref 11.5–14.5)
GFR SERPLBLD CREATININE-BSD FMLA CKD-EPI: 100 ML/MIN/1.73
GFR SERPLBLD CREATININE-BSD FMLA CKD-EPI: 122 ML/MIN/1.73
GLOBULIN SER CALC-MCNC: 2.9 GM/DL
GLUCOSE SERPL-MCNC: 62 MG/DL (ref 74–98)
HCT VFR BLD AUTO: 37.5 % (ref 37–47)
HGB BLD-MCNC: 12.5 G/DL (ref 12–16)
IMM GRANULOCYTES # BLD: 0.02 10*3/MM3 (ref 0–0.06)
IMM GRANULOCYTES NFR BLD: 0.2 % (ref 0–0.6)
LYMPHOCYTES # BLD AUTO: 2.42 10*3/MM3 (ref 0.6–3.4)
LYMPHOCYTES NFR BLD AUTO: 29.7 % (ref 10–50)
MCH RBC QN AUTO: 29.6 PG (ref 27–31)
MCHC RBC AUTO-ENTMCNC: 33.3 G/DL (ref 30–37)
MCV RBC AUTO: 88.7 FL (ref 81–99)
MONOCYTES # BLD AUTO: 0.51 10*3/MM3 (ref 0–0.9)
MONOCYTES NFR BLD AUTO: 6.3 % (ref 0–12)
NEUTROPHILS # BLD AUTO: 4.76 10*3/MM3 (ref 2–6.9)
NEUTROPHILS NFR BLD AUTO: 58.4 % (ref 37–80)
NRBC BLD AUTO-RTO: 0 /100 WBC (ref 0–0)
PLATELET # BLD AUTO: 177 10*3/MM3 (ref 130–400)
POTASSIUM SERPL-SCNC: 4.2 MMOL/L (ref 3.5–5.1)
PROT SERPL-MCNC: 7.4 G/DL (ref 6.3–8.2)
RBC # BLD AUTO: 4.23 10*6/MM3 (ref 4.2–5.4)
SODIUM SERPL-SCNC: 141 MMOL/L (ref 137–145)
WBC # BLD AUTO: 8.15 10*3/MM3 (ref 4.8–10.8)

## 2018-06-21 PROCEDURE — 99213 OFFICE O/P EST LOW 20 MIN: CPT | Performed by: PHYSICIAN ASSISTANT

## 2018-06-21 RX ORDER — AZITHROMYCIN 250 MG/1
TABLET, FILM COATED ORAL
Qty: 6 TABLET | Refills: 0 | Status: SHIPPED | OUTPATIENT
Start: 2018-06-21 | End: 2018-09-25

## 2018-06-21 NOTE — PROGRESS NOTES
Radha Dixon is a 28 y.o. female.     Subjective   History of Present Illness   Here today with concern of swollen lymph nodes and fatigue. Also has hoarse voice with intermittent loss of voice. Continues to have a little cough and some SOA with exertion since she was diagnosed with influenza A on 5/21. She denies any sore throat but has had frequent headaches. Labs indicate she has had mononucleosis in the past. She denies any GI or  symptoms.       The following portions of the patient's history were reviewed and updated as appropriate: allergies, current medications, past family history, past medical history, past social history, past surgical history and problem list.    Review of Systems    Constitutional: fatigue. Negative for appetite change, chills, fever and unexpected weight change.   HENT: hoarse voice. Negative for congestion, ear pain, hearing loss, nosebleeds, postnasal drip, rhinorrhea, sore throat, tinnitus and trouble swallowing.    Eyes: Negative for photophobia, discharge and visual disturbance.   Respiratory: cough, SOA. Negative for chest tightness and wheezing.    Cardiovascular: Negative for chest pain, palpitations and leg swelling.   Gastrointestinal: Negative for abdominal distention, abdominal pain, blood in stool, constipation, diarrhea, nausea and vomiting.   Endocrine: Negative for cold intolerance, heat intolerance, polydipsia, polyphagia and polyuria.   Musculoskeletal: Negative for arthralgias, back pain, joint swelling, myalgias, neck pain and neck stiffness.   Skin: Negative for color change, pallor, rash and wound.   Allergic/Immunologic: Negative for environmental allergies, food allergies and immunocompromised state.   Neurological: headaches. Negative for dizziness, tremors, seizures, weakness, numbness.  Hematological: Adenopathy. Does not bruise/bleed easily.   Psychiatric/Behavioral: Negative for sleep disturbances, agitation, behavioral problems, confusion,  "hallucinations, self-injury and suicidal ideas. The patient is not nervous/anxious.      Objective    Physical Exam  Constitutional: No acute distress. Appears well-developed and well-nourished.   HENT: OP normal. TMs normal.   Head: No sinus tenderness. Normocephalic and atraumatic.   Eyes: EOM are normal. Pupils are equal, round, and reactive to light.   Neck: Normal range of motion. Neck supple.   Cardiovascular: Normal rate, regular rhythm and normal heart sounds.    Pulmonary/Chest: Effort normal and breath sounds normal. No respiratory distress.  Has no wheezes or rales. Exhibits no chest wall tenderness.   Abdominal: Soft. Bowel sounds are normal. Exhibits no distension and no mass. There is no tenderness.   Musculoskeletal: Normal range of motion. Exhibits no tenderness.   Neurological: Alert and oriented to person, place, and time.   Lymph: slightly enlarged and tender posterior cervical nodes bilaterally. Palpable, normal-sized, non-tender nodes in axilla and groin bilaterally.   Skin: Skin is warm and dry.   Psychiatric: Has a normal mood and affect. Behavior is normal. Judgment and thought content normal.       /70   Temp 98.5 °F (36.9 °C)   Ht 154.9 cm (60.98\")   Wt 49.4 kg (109 lb)   SpO2 99%   BMI 20.61 kg/m²     Nursing note and vitals reviewed.        Assessment/Plan   Radha was seen today for fatigue.    Diagnoses and all orders for this visit:    Cough  -     CBC & Differential  -     Comprehensive Metabolic Panel  -     azithromycin (ZITHROMAX Z-JONAS) 250 MG tablet; Take 2 tablets the first day, then 1 tablet daily for 4 days.    Dyspnea on exertion  -     CBC & Differential  -     Comprehensive Metabolic Panel  -     azithromycin (ZITHROMAX Z-JONAS) 250 MG tablet; Take 2 tablets the first day, then 1 tablet daily for 4 days.    Chronic fatigue  -     CBC & Differential  -     Comprehensive Metabolic Panel        Call, RTC or send [employer] message in 1 week if symptoms are not resolving. "

## 2018-06-26 DIAGNOSIS — M54.6 ACUTE RIGHT-SIDED THORACIC BACK PAIN: ICD-10-CM

## 2018-06-26 RX ORDER — IBUPROFEN 800 MG/1
TABLET ORAL
Qty: 60 TABLET | Refills: 3 | Status: SHIPPED | OUTPATIENT
Start: 2018-06-26 | End: 2018-08-04

## 2018-06-27 ENCOUNTER — OFFICE VISIT (OUTPATIENT)
Dept: SURGERY | Facility: CLINIC | Age: 28
End: 2018-06-27

## 2018-06-27 VITALS
HEART RATE: 88 BPM | TEMPERATURE: 98.2 F | HEIGHT: 61 IN | OXYGEN SATURATION: 99 % | SYSTOLIC BLOOD PRESSURE: 110 MMHG | DIASTOLIC BLOOD PRESSURE: 60 MMHG | BODY MASS INDEX: 20.58 KG/M2 | WEIGHT: 109 LBS

## 2018-06-27 DIAGNOSIS — E04.1 THYROID NODULE: Primary | ICD-10-CM

## 2018-06-27 PROCEDURE — 99213 OFFICE O/P EST LOW 20 MIN: CPT | Performed by: SURGERY

## 2018-06-27 RX ORDER — LEVOTHYROXINE SODIUM 0.05 MG/1
50 TABLET ORAL DAILY
Qty: 30 TABLET | Refills: 11 | Status: SHIPPED | OUTPATIENT
Start: 2018-06-27 | End: 2018-09-25

## 2018-06-27 NOTE — PROGRESS NOTES
"Patient: Radha Dixon    YOB: 1990    Date: 06/27/2018    Primary Care Provider: BRITNI Peres    Chief Complaint   Patient presents with   • Follow-up     FOLLOW UP        History:Patient is in the office today to follow up on a left thyroid nodule.  Patient complains of dysphagia and pain @ left neck.There appears to be no increase in size on ultrasound today of the thyroid gland.  Patient does not notice an increase in size of thyroid nodule.  No difficulty swallowing or difficulty breathing.  No hoarseness.  FNA was -6 months ago.    The following portions of the patient's history were reviewed and updated as appropriate: allergies, current medications, past family history, past medical history, past social history, past surgical history and problem list.      Review of Systems   Constitutional: Negative for chills, fatigue and fever.   Respiratory: Negative for cough.    Cardiovascular: Negative for chest pain.   Gastrointestinal: Negative for abdominal pain, diarrhea, nausea and vomiting.       Vital Signs  Vitals:    06/27/18 1127   BP: 110/60   Pulse: 88   Temp: 98.2 °F (36.8 °C)   TempSrc: Temporal Artery    SpO2: 99%   Weight: 49.4 kg (109 lb)   Height: 154.9 cm (60.98\")       Allergies:  Allergies   Allergen Reactions   • Onion Other (See Comments)     REPORTS CAUSES MIGRAINES     • Sulfa Antibiotics Hives       Medications:    Current Outpatient Prescriptions:   •  acetaminophen (TYLENOL) 500 MG tablet, Take 1 tablet by mouth Every 6 (Six) Hours As Needed for Mild Pain ., Disp: 12 tablet, Rfl: 0  •  azithromycin (ZITHROMAX Z-JONAS) 250 MG tablet, Take 2 tablets the first day, then 1 tablet daily for 4 days., Disp: 6 tablet, Rfl: 0  •  citalopram (CeleXA) 20 MG tablet, Take 1 tablet by mouth Daily., Disp: 30 tablet, Rfl: 5  •  cyclobenzaprine (FLEXERIL) 5 MG tablet, TAKE 1 TABLET BY MOUTH AT BEDTIME AS NEEDED FOR MUSCLE SPASM, Disp: 15 tablet, Rfl: 0  •  ibuprofen " (ADVIL,MOTRIN) 600 MG tablet, Take 1 tablet by mouth Every 6 (Six) Hours As Needed for Mild Pain ., Disp: 40 tablet, Rfl: 0  •  ibuprofen (ADVIL,MOTRIN) 800 MG tablet, TAKE 1 TABLET BY MOUTH EVERY EIGHT HOURS AS NEEDED FOR PAIN, Disp: 60 tablet, Rfl: 3  •  Levonorgestrel (MEAGHAN) 13.5 MG intrauterine device IUD, 1 each by Intrauterine route 1 (One) Time., Disp: , Rfl:   •  levothyroxine (SYNTHROID) 50 MCG tablet, Take 1 tablet by mouth Daily., Disp: 30 tablet, Rfl: 11    Physical Exam:   General Appearance:    Alert, cooperative, in no acute distress   Head:    Normocephalic, without obvious abnormality, atraumatic   Lungs:     Clear to auscultation,respirations regular, even and                  unlabored    Heart:    Regular rhythm and normal rate, normal S1 and S2, no            murmur, no gallop, no rub, no click   Abdomen:     Normal bowel sounds, no masses, no organomegaly, soft        non-tender, non-distended, no guarding, no rebound                tenderness   Extremities:   Moves all extremities well, no edema, no cyanosis, no             redness   Pulses:   Pulses palpable and equal bilaterally   Skin:   No bleeding, bruising or rash       Results Review:   I reviewed the patient's new clinical results.     Assessment/Plan     1. Thyroid nodule      Stable multinodular goiter, patient doing well.  Symptoms are minimal.  No change on ultrasound.  Consider Synthroid daily, repeat ultrasound in 6 months.  Electronically signed by Danilo Shabazz MD  06/28/18    Portoins of this note have been scribed for Danilo Shabazz MD by Kathy Feliciano. 6/28/2018  7:59 AM

## 2018-08-04 ENCOUNTER — HOSPITAL ENCOUNTER (EMERGENCY)
Facility: HOSPITAL | Age: 28
Discharge: HOME | End: 2018-08-04
Attending: EMERGENCY MEDICINE | Admitting: EMERGENCY MEDICINE

## 2018-08-04 ENCOUNTER — APPOINTMENT (OUTPATIENT)
Dept: GENERAL RADIOLOGY | Facility: HOSPITAL | Age: 28
End: 2018-08-04

## 2018-08-04 VITALS
WEIGHT: 112.2 LBS | HEART RATE: 64 BPM | TEMPERATURE: 98.3 F | SYSTOLIC BLOOD PRESSURE: 119 MMHG | RESPIRATION RATE: 16 BRPM | BODY MASS INDEX: 21.18 KG/M2 | HEIGHT: 61 IN | DIASTOLIC BLOOD PRESSURE: 74 MMHG | OXYGEN SATURATION: 99 %

## 2018-08-04 DIAGNOSIS — M25.551 RIGHT HIP PAIN: Primary | ICD-10-CM

## 2018-08-04 DIAGNOSIS — M54.6 ACUTE RIGHT-SIDED THORACIC BACK PAIN: ICD-10-CM

## 2018-08-04 LAB — B-HCG UR QL: NEGATIVE

## 2018-08-04 PROCEDURE — 81025 URINE PREGNANCY TEST: CPT | Performed by: NURSE PRACTITIONER

## 2018-08-04 PROCEDURE — 73502 X-RAY EXAM HIP UNI 2-3 VIEWS: CPT

## 2018-08-04 PROCEDURE — 99284 EMERGENCY DEPT VISIT MOD MDM: CPT

## 2018-08-04 RX ORDER — ONDANSETRON 4 MG/1
4 TABLET, ORALLY DISINTEGRATING ORAL ONCE
Status: COMPLETED | OUTPATIENT
Start: 2018-08-04 | End: 2018-08-04

## 2018-08-04 RX ORDER — IBUPROFEN 600 MG/1
600 TABLET ORAL EVERY 6 HOURS PRN
Qty: 20 TABLET | Refills: 0 | Status: SHIPPED | OUTPATIENT
Start: 2018-08-04 | End: 2018-09-06 | Stop reason: ALTCHOICE

## 2018-08-04 RX ORDER — METHOCARBAMOL 750 MG/1
750 TABLET, FILM COATED ORAL 3 TIMES DAILY PRN
Qty: 21 TABLET | Refills: 0 | Status: SHIPPED | OUTPATIENT
Start: 2018-08-04 | End: 2018-09-25

## 2018-08-04 RX ORDER — IBUPROFEN 800 MG/1
800 TABLET ORAL ONCE
Status: COMPLETED | OUTPATIENT
Start: 2018-08-04 | End: 2018-08-04

## 2018-08-04 RX ADMIN — IBUPROFEN 800 MG: 800 TABLET ORAL at 18:59

## 2018-08-04 RX ADMIN — ONDANSETRON 4 MG: 4 TABLET, ORALLY DISINTEGRATING ORAL at 18:59

## 2018-08-04 NOTE — ED PROVIDER NOTES
"Subjective   History of Present Illness  This is a 28 year old female who comes in today complaining of right hip pain. She reports she was working yesterday and twisted feeling a pop in her hip and felt pain. She reports having a history of right hip surgery. She states her muscle is too short and they placed a spacer in her muscle of her upper leg to stretch. She feels like the pain she feels is in the muscle and runs to the groin. She is able to bear weight.   Review of Systems   Constitutional: Negative.    HENT: Negative.    Eyes: Negative.    Respiratory: Negative.    Cardiovascular: Negative.    Gastrointestinal: Negative.    Endocrine: Negative.    Genitourinary: Negative.    Musculoskeletal:        Right hip pain     Skin: Negative.    Allergic/Immunologic: Negative.    Neurological: Negative.    Hematological: Negative.    Psychiatric/Behavioral: Negative.    All other systems reviewed and are negative.      Past Medical History:   Diagnosis Date   • Acid reflux    • Allergic    • Anxiety    • Anxiety and depression    • Arthritis    • Asthma    • Bipolar 1 disorder (CMS/HCC)    • Body piercing     EARS, NOSE, BELLY BUTTON   • Depression    • Dysphagia     REPORTS SOMETIMES FEELS LIKE \"FOOD GETS STUCK\"   • Fracture     HISTORY OF RIGHT WRIST AS A CHILD   • History of migraine    • Injury of neck    • Migraine    • Ovarian cyst    • PONV (postoperative nausea and vomiting)    • Scoliosis    • Tattoo        Allergies   Allergen Reactions   • Onion Other (See Comments)     REPORTS CAUSES MIGRAINES     • Sulfa Antibiotics Hives       Past Surgical History:   Procedure Laterality Date   • APPENDECTOMY  2016   • CHOLECYSTECTOMY     • CYST REMOVAL      OVARIAN CYST   • ENDOSCOPY N/A 12/1/2017    Procedure: ESOPHAGOGASTRODUODENOSCOPY WITH COLD FORCEP BIOPSY;  Surgeon: Danilo Shabazz MD;  Location: [facility] ENDOSCOPY;  Service:    • HIP SURGERY Right     RIGHT HIP, REPORTS HAD BONES SCRAPED   • WISDOM TOOTH " EXTRACTION         Family History   Problem Relation Age of Onset   • Arthritis Other    • Cancer Other         breast and lung   • Diabetes Other    • Heart attack Other    • Migraines Other    • No Known Problems Mother    • No Known Problems Father        Social History     Social History   • Marital status: Single     Social History Main Topics   • Smoking status: Current Every Day Smoker     Packs/day: 0.50     Years: 13.00     Types: Cigarettes   • Smokeless tobacco: Never Used   • Alcohol use No   • Drug use: No   • Sexual activity: Defer     Other Topics Concern   • Not on file           Objective   Physical Exam   Constitutional: She appears well-developed and well-nourished.   Nursing note and vitals reviewed.  GEN: No acute distress  Head: Normocephalic, atraumatic  Eyes: Pupils equal round reactive to light  ENT: Posterior pharynx normal in appearance, oral mucosa is moist  Chest: Nontender to palpation  Cardiovascular: Regular rate  Lungs: Clear to auscultation bilaterally  Abdomen: Soft, nontender, nondistended, no peritoneal signs  Extremities: No edema, normal appearance, tender right hip and thigh. No bruising noted. Limited ROM due to pain.   Neuro: GCS 15  Psych: Mood and affect are appropriate      Procedures           ED Course                  MDM  Number of Diagnoses or Management Options     Amount and/or Complexity of Data Reviewed  Tests in the radiology section of CPT®: ordered and reviewed  Review and summarize past medical records: yes  Discuss the patient with other providers: yes  Independent visualization of images, tracings, or specimens: yes    Risk of Complications, Morbidity, and/or Mortality  Presenting problems: low  Diagnostic procedures: low  Management options: low          Final diagnoses:   Right hip pain            Alexandria Galeano, APRN  08/04/18 1959

## 2018-08-20 DIAGNOSIS — M25.511 RIGHT SHOULDER PAIN, UNSPECIFIED CHRONICITY: Primary | ICD-10-CM

## 2018-08-21 ENCOUNTER — OFFICE VISIT (OUTPATIENT)
Dept: ORTHOPEDIC SURGERY | Facility: CLINIC | Age: 28
End: 2018-08-21

## 2018-08-21 ENCOUNTER — TELEPHONE (OUTPATIENT)
Dept: INTERNAL MEDICINE | Facility: CLINIC | Age: 28
End: 2018-08-21

## 2018-08-21 VITALS — HEIGHT: 61 IN | RESPIRATION RATE: 16 BRPM | BODY MASS INDEX: 20.58 KG/M2 | WEIGHT: 109 LBS

## 2018-08-21 DIAGNOSIS — M54.9 UPPER BACK PAIN ON RIGHT SIDE: ICD-10-CM
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
## 2018-08-21 DIAGNOSIS — S49.91XA RIGHT SHOULDER INJURY, INITIAL ENCOUNTER: ICD-10-CM

## 2018-08-21 DIAGNOSIS — G89.29 CHRONIC RIGHT SHOULDER PAIN: Primary | ICD-10-CM

## 2018-08-21 DIAGNOSIS — S46.011A ROTATOR CUFF STRAIN, RIGHT, INITIAL ENCOUNTER: ICD-10-CM

## 2018-08-21 DIAGNOSIS — M25.511 CHRONIC RIGHT SHOULDER PAIN: Primary | ICD-10-CM

## 2018-08-21 PROCEDURE — 99213 OFFICE O/P EST LOW 20 MIN: CPT | Performed by: PHYSICIAN ASSISTANT
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
## 2018-08-21 NOTE — TELEPHONE ENCOUNTER
Chayito called from [facility] requesting a referral for patient for her right shoulder as her appointment is today.

## 2018-08-24 ENCOUNTER — HOSPITAL ENCOUNTER (OUTPATIENT)
Dept: MRI IMAGING | Facility: HOSPITAL | Age: 28
Discharge: HOME | End: 2018-08-24
Admitting: PHYSICIAN ASSISTANT

## 2018-08-24 PROCEDURE — 73221 MRI JOINT UPR EXTREM W/O DYE: CPT

## 2018-09-06 ENCOUNTER — OFFICE VISIT (OUTPATIENT)
Dept: ORTHOPEDIC SURGERY | Facility: CLINIC | Age: 28
End: 2018-09-06

## 2018-09-06 VITALS — BODY MASS INDEX: 20.62 KG/M2 | HEIGHT: 61 IN | WEIGHT: 109.2 LBS | RESPIRATION RATE: 18 BRPM

## 2018-09-06 DIAGNOSIS — M75.51 ACUTE BURSITIS OF RIGHT SHOULDER: ICD-10-CM

## 2018-09-06 DIAGNOSIS — R20.2 PARESTHESIA OF RIGHT ARM: ICD-10-CM

## 2018-09-06 DIAGNOSIS — M25.511 RIGHT SHOULDER PAIN, UNSPECIFIED CHRONICITY: Primary | ICD-10-CM

## 2018-09-06 PROCEDURE — 20610 DRAIN/INJ JOINT/BURSA W/O US: CPT | Performed by: PHYSICIAN ASSISTANT

## 2018-09-06 PROCEDURE — 99213 OFFICE O/P EST LOW 20 MIN: CPT | Performed by: PHYSICIAN ASSISTANT

## 2018-09-06 RX ORDER — ESTRADIOL 0.5 MG/1
TABLET ORAL
Refills: 2 | COMMUNITY
Start: 2018-07-23 | End: 2018-09-25

## 2018-09-06 RX ORDER — METHYLPREDNISOLONE ACETATE 40 MG/ML
40 INJECTION, SUSPENSION INTRA-ARTICULAR; INTRALESIONAL; INTRAMUSCULAR; SOFT TISSUE
Status: COMPLETED | OUTPATIENT
Start: 2018-09-06 | End: 2018-09-06

## 2018-09-06 RX ORDER — LIDOCAINE HYDROCHLORIDE 10 MG/ML
2 INJECTION, SOLUTION INFILTRATION; PERINEURAL
Status: COMPLETED | OUTPATIENT
Start: 2018-09-06 | End: 2018-09-06

## 2018-09-06 RX ORDER — IBUPROFEN 800 MG/1
TABLET ORAL
Refills: 2 | COMMUNITY
Start: 2018-08-23 | End: 2018-09-06 | Stop reason: ALTCHOICE

## 2018-09-06 RX ORDER — INDOMETHACIN 50 MG/1
50 CAPSULE ORAL 2 TIMES DAILY WITH MEALS
Qty: 10 CAPSULE | Refills: 0 | Status: SHIPPED | OUTPATIENT
Start: 2018-09-06 | End: 2018-09-07 | Stop reason: SDUPTHER

## 2018-09-06 RX ADMIN — LIDOCAINE HYDROCHLORIDE 2 ML: 10 INJECTION, SOLUTION INFILTRATION; PERINEURAL at 11:05

## 2018-09-06 RX ADMIN — METHYLPREDNISOLONE ACETATE 40 MG: 40 INJECTION, SUSPENSION INTRA-ARTICULAR; INTRALESIONAL; INTRAMUSCULAR; SOFT TISSUE at 11:05
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
## 2018-09-07 DIAGNOSIS — M75.51 ACUTE BURSITIS OF RIGHT SHOULDER: ICD-10-CM

## 2018-09-07 DIAGNOSIS — M25.511 RIGHT SHOULDER PAIN, UNSPECIFIED CHRONICITY: ICD-10-CM

## 2018-09-07 RX ORDER — INDOMETHACIN 50 MG/1
CAPSULE ORAL
Qty: 10 CAPSULE | Refills: 0 | Status: SHIPPED | OUTPATIENT
Start: 2018-09-07 | End: 2018-09-25

## 2018-09-19 ENCOUNTER — PROCEDURE VISIT (OUTPATIENT)
Dept: ORTHOPEDIC SURGERY | Facility: CLINIC | Age: 28
End: 2018-09-19

## 2018-09-19 DIAGNOSIS — R20.2 PARESTHESIA: ICD-10-CM

## 2018-09-19 DIAGNOSIS — M79.601 RIGHT UPPER LIMB PAIN: Primary | ICD-10-CM

## 2018-09-19 PROCEDURE — 95886 MUSC TEST DONE W/N TEST COMP: CPT | Performed by: PHYSICAL THERAPIST

## 2018-09-19 PROCEDURE — 95909 NRV CNDJ TST 5-6 STUDIES: CPT | Performed by: PHYSICAL THERAPIST

## 2018-09-25 ENCOUNTER — PREP FOR SURGERY (OUTPATIENT)
Dept: OTHER | Facility: HOSPITAL | Age: 28
End: 2018-09-25

## 2018-09-25 ENCOUNTER — OFFICE VISIT (OUTPATIENT)
Dept: ORTHOPEDIC SURGERY | Facility: CLINIC | Age: 28
End: 2018-09-25

## 2018-09-25 VITALS — RESPIRATION RATE: 18 BRPM | HEIGHT: 61 IN | WEIGHT: 111 LBS | BODY MASS INDEX: 20.96 KG/M2

## 2018-09-25 DIAGNOSIS — M25.511 RIGHT SHOULDER PAIN, UNSPECIFIED CHRONICITY: ICD-10-CM

## 2018-09-25 DIAGNOSIS — M75.51 ACUTE BURSITIS OF RIGHT SHOULDER: Primary | ICD-10-CM

## 2018-09-25 DIAGNOSIS — M75.51 ACUTE SHOULDER BURSITIS, RIGHT: Primary | ICD-10-CM

## 2018-09-25 PROCEDURE — 99213 OFFICE O/P EST LOW 20 MIN: CPT | Performed by: PHYSICIAN ASSISTANT
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
## 2018-09-26 ENCOUNTER — HOSPITAL ENCOUNTER (OUTPATIENT)
Dept: GENERAL RADIOLOGY | Facility: HOSPITAL | Age: 28
Discharge: HOME | End: 2018-09-26
Admitting: PHYSICIAN ASSISTANT

## 2018-09-26 ENCOUNTER — APPOINTMENT (OUTPATIENT)
Dept: PREADMISSION TESTING | Facility: HOSPITAL | Age: 28
End: 2018-09-26

## 2018-09-26 DIAGNOSIS — M75.51 ACUTE SHOULDER BURSITIS, RIGHT: ICD-10-CM

## 2018-09-26 LAB
ALBUMIN SERPL-MCNC: 5.2 G/DL (ref 3.5–5)
ALBUMIN/GLOB SERPL: 1.7 G/DL (ref 1–2)
ALP SERPL-CCNC: 45 U/L (ref 38–126)
ALT SERPL W P-5'-P-CCNC: 20 U/L (ref 13–69)
ANION GAP SERPL CALCULATED.3IONS-SCNC: 14 MMOL/L (ref 10–20)
AST SERPL-CCNC: 24 U/L (ref 15–46)
BACTERIA UR QL AUTO: ABNORMAL /HPF
BASOPHILS # BLD AUTO: 0.07 10*3/MM3 (ref 0–0.2)
BASOPHILS NFR BLD AUTO: 0.8 % (ref 0–2.5)
BILIRUB SERPL-MCNC: 1.4 MG/DL (ref 0.2–1.3)
BILIRUB UR QL STRIP: NEGATIVE
BUN BLD-MCNC: 7 MG/DL (ref 7–20)
BUN/CREAT SERPL: 10 (ref 7.1–23.5)
CALCIUM SPEC-SCNC: 9.6 MG/DL (ref 8.4–10.2)
CHLORIDE SERPL-SCNC: 106 MMOL/L (ref 98–107)
CLARITY UR: CLEAR
CO2 SERPL-SCNC: 26 MMOL/L (ref 26–30)
COLOR UR: YELLOW
CREAT BLD-MCNC: 0.7 MG/DL (ref 0.6–1.3)
DEPRECATED RDW RBC AUTO: 38 FL (ref 37–54)
EOSINOPHIL # BLD AUTO: 0.38 10*3/MM3 (ref 0–0.7)
EOSINOPHIL NFR BLD AUTO: 4.1 % (ref 0–7)
ERYTHROCYTE [DISTWIDTH] IN BLOOD BY AUTOMATED COUNT: 11.4 % (ref 11.5–14.5)
GFR SERPL CREATININE-BSD FRML MDRD: 100 ML/MIN/1.73
GLOBULIN UR ELPH-MCNC: 3.1 GM/DL
GLUCOSE BLD-MCNC: 83 MG/DL (ref 74–98)
GLUCOSE UR STRIP-MCNC: NEGATIVE MG/DL
HCT VFR BLD AUTO: 42.3 % (ref 37–47)
HGB BLD-MCNC: 13.9 G/DL (ref 12–16)
HGB UR QL STRIP.AUTO: ABNORMAL
HYALINE CASTS UR QL AUTO: ABNORMAL /LPF
IMM GRANULOCYTES # BLD: 0.08 10*3/MM3 (ref 0–0.06)
IMM GRANULOCYTES NFR BLD: 0.9 % (ref 0–0.6)
KETONES UR QL STRIP: NEGATIVE
LEUKOCYTE ESTERASE UR QL STRIP.AUTO: ABNORMAL
LYMPHOCYTES # BLD AUTO: 3.5 10*3/MM3 (ref 0.6–3.4)
LYMPHOCYTES NFR BLD AUTO: 37.7 % (ref 10–50)
MCH RBC QN AUTO: 29.8 PG (ref 27–31)
MCHC RBC AUTO-ENTMCNC: 32.9 G/DL (ref 30–37)
MCV RBC AUTO: 90.8 FL (ref 81–99)
MONOCYTES # BLD AUTO: 0.6 10*3/MM3 (ref 0–0.9)
MONOCYTES NFR BLD AUTO: 6.5 % (ref 0–12)
MUCOUS THREADS URNS QL MICRO: ABNORMAL /HPF
NEUTROPHILS # BLD AUTO: 4.66 10*3/MM3 (ref 2–6.9)
NEUTROPHILS NFR BLD AUTO: 50 % (ref 37–80)
NITRITE UR QL STRIP: NEGATIVE
NRBC BLD MANUAL-RTO: 0 /100 WBC (ref 0–0)
PH UR STRIP.AUTO: 6 [PH] (ref 5–8)
PLATELET # BLD AUTO: 220 10*3/MM3 (ref 130–400)
PMV BLD AUTO: 12.1 FL (ref 6–12)
POTASSIUM BLD-SCNC: 4 MMOL/L (ref 3.5–5.1)
PROT SERPL-MCNC: 8.3 G/DL (ref 6.3–8.2)
PROT UR QL STRIP: NEGATIVE
RBC # BLD AUTO: 4.66 10*6/MM3 (ref 4.2–5.4)
RBC # UR: ABNORMAL /HPF
REF LAB TEST METHOD: ABNORMAL
SODIUM BLD-SCNC: 142 MMOL/L (ref 137–145)
SP GR UR STRIP: 1.02 (ref 1–1.03)
SQUAMOUS #/AREA URNS HPF: ABNORMAL /HPF
TRANS CELLS #/AREA URNS HPF: ABNORMAL /HPF
UROBILINOGEN UR QL STRIP: ABNORMAL
WBC CLUMPS # UR AUTO: ABNORMAL /HPF
WBC NRBC COR # BLD: 9.29 10*3/MM3 (ref 4.8–10.8)
WBC UR QL AUTO: ABNORMAL /HPF

## 2018-09-26 PROCEDURE — 87086 URINE CULTURE/COLONY COUNT: CPT | Performed by: PHYSICIAN ASSISTANT

## 2018-09-26 PROCEDURE — 93005 ELECTROCARDIOGRAM TRACING: CPT | Performed by: PHYSICIAN ASSISTANT

## 2018-09-26 PROCEDURE — 87081 CULTURE SCREEN ONLY: CPT | Performed by: PHYSICIAN ASSISTANT

## 2018-09-26 PROCEDURE — 85025 COMPLETE CBC W/AUTO DIFF WBC: CPT | Performed by: PHYSICIAN ASSISTANT

## 2018-09-26 PROCEDURE — 36415 COLL VENOUS BLD VENIPUNCTURE: CPT

## 2018-09-26 PROCEDURE — 80053 COMPREHEN METABOLIC PANEL: CPT | Performed by: PHYSICIAN ASSISTANT

## 2018-09-26 PROCEDURE — 71046 X-RAY EXAM CHEST 2 VIEWS: CPT

## 2018-09-26 PROCEDURE — 81001 URINALYSIS AUTO W/SCOPE: CPT | Performed by: PHYSICIAN ASSISTANT

## 2018-09-26 NOTE — DISCHARGE INSTRUCTIONS
PAT PASS GIVEN/REVIEWED WITH PT.  VERBALIZED UNDERSTANDING OF THE FOLLOWING:  DO NOT EAT, DRINK, SMOKE, USE SMOKELESS TOBACCO OR CHEW GUM AFTER MIDNIGHT THE NIGHT BEFORE SURGERY.  THIS ALSO INCLUDES HARD CANDIES AND MINTS.    DO NOT SHAVE THE AREA TO BE OPERATED ON AT LEAST 48 HOURS PRIOR TO THE PROCEDURE.  DO NOT WEAR MAKE UP OR NAIL POLISH.  DO NOT LEAVE IN ANY PIERCING OR WEAR JEWELRY THE DAY OF SURGERY.      DO NOT USE ADHESIVES IF YOU WEAR DENTURES.    DO NOT WEAR EYE CONTACTS; BRING IN YOUR GLASSES.    ONLY TAKE MEDICATION THE MORNING OF YOUR PROCEDURE IF INSTRUCTED BY YOUR SURGEON WITH ENOUGH WATER TO SWALLOW THE MEDICATION.  IF YOUR SURGEON DID NOT SPECIFY WHICH MEDICATIONS TO TAKE, YOU WILL NEED TO CALL THEIR OFFICE FOR FURTHER INSTRUCTIONS AND DO AS THEY INSTRUCT.    LEAVE ANYTHING YOU CONSIDER VALUABLE AT HOME.    YOU WILL NEED TO ARRANGE FOR SOMEONE TO DRIVE YOU HOME AFTER SURGERY.  IT IS RECOMMENDED THAT YOU DO NOT DRIVE, WORK, DRINK ALCOHOL OR MAKE MAJOR DECISIONS FOR AT LEAST 24 HOURS AFTER YOUR PROCEDURE IS COMPLETE.      THE DAY OF YOUR PROCEDURE, BRING IN THE FOLLOWING IF APPLICABLE:   PICTURE ID AND INSURANCE/MEDICARE OR MEDICAID CARDS   COPY OF ADVANCED DIRECTIVE/LIVING WILL/POWER OR    CPAP/BIPAP/INHALERS   SKIN PREP SHEET   YOUR PREADMISSION TESTING PASS (IF NOT A PHONE HISTORY)    Chlorhexidine wipes along with instruction/verification sheet given to pt.  Instructed pt to apply stickers from chlorhexidine wipes to verification sheet once skin prep has been  completed, and to return to [facility] the day of the procedure.  Pt. Verbalizes understanding.

## 2018-09-28 LAB
BACTERIA SPEC AEROBE CULT: NORMAL
MRSA SPEC QL CULT: NORMAL

## 2018-10-10 RX ORDER — HYDROCODONE BITARTRATE AND ACETAMINOPHEN 7.5; 325 MG/1; MG/1
1 TABLET ORAL EVERY 8 HOURS PRN
Qty: 30 TABLET | Refills: 0 | Status: SHIPPED | OUTPATIENT
Start: 2018-10-10 | End: 2018-11-30

## 2018-10-11 ENCOUNTER — ANESTHESIA (OUTPATIENT)
Dept: PERIOP | Facility: HOSPITAL | Age: 28
End: 2018-10-11

## 2018-10-11 ENCOUNTER — ANESTHESIA EVENT (OUTPATIENT)
Dept: PERIOP | Facility: HOSPITAL | Age: 28
End: 2018-10-11

## 2018-10-11 ENCOUNTER — HOSPITAL ENCOUNTER (OUTPATIENT)
Facility: HOSPITAL | Age: 28
Setting detail: HOSPITAL OUTPATIENT SURGERY
Discharge: HOME | End: 2018-10-11
Attending: ORTHOPAEDIC SURGERY | Admitting: ORTHOPAEDIC SURGERY

## 2018-10-11 VITALS
BODY MASS INDEX: 20.77 KG/M2 | SYSTOLIC BLOOD PRESSURE: 119 MMHG | HEART RATE: 68 BPM | WEIGHT: 110 LBS | OXYGEN SATURATION: 97 % | HEIGHT: 61 IN | TEMPERATURE: 98 F | RESPIRATION RATE: 16 BRPM | DIASTOLIC BLOOD PRESSURE: 65 MMHG

## 2018-10-11 LAB
B-HCG UR QL: NEGATIVE
BACTERIA UR QL AUTO: ABNORMAL /HPF
BILIRUB UR QL STRIP: NEGATIVE
CLARITY UR: CLEAR
COLOR UR: YELLOW
GLUCOSE UR STRIP-MCNC: NEGATIVE MG/DL
HGB UR QL STRIP.AUTO: NEGATIVE
HYALINE CASTS UR QL AUTO: ABNORMAL /LPF
KETONES UR QL STRIP: NEGATIVE
LEUKOCYTE ESTERASE UR QL STRIP.AUTO: ABNORMAL
MUCOUS THREADS URNS QL MICRO: ABNORMAL /HPF
NITRITE UR QL STRIP: NEGATIVE
PH UR STRIP.AUTO: 7 [PH] (ref 5–8)
PROT UR QL STRIP: NEGATIVE
RBC # UR: ABNORMAL /HPF
REF LAB TEST METHOD: ABNORMAL
SP GR UR STRIP: 1.02 (ref 1–1.03)
SQUAMOUS #/AREA URNS HPF: ABNORMAL /HPF
UROBILINOGEN UR QL STRIP: ABNORMAL
WBC UR QL AUTO: ABNORMAL /HPF

## 2018-10-11 PROCEDURE — 25010000002 FENTANYL CITRATE (PF) 250 MCG/5ML SOLUTION: Performed by: NURSE ANESTHETIST, CERTIFIED REGISTERED

## 2018-10-11 PROCEDURE — 81025 URINE PREGNANCY TEST: CPT | Performed by: ORTHOPAEDIC SURGERY

## 2018-10-11 PROCEDURE — 25010000002 DEXAMETHASONE PER 1 MG: Performed by: NURSE ANESTHETIST, CERTIFIED REGISTERED

## 2018-10-11 PROCEDURE — 25010000003 CEFAZOLIN PER 500 MG: Performed by: PHYSICIAN ASSISTANT

## 2018-10-11 PROCEDURE — 25010000002 HYDROMORPHONE 1 MG/ML SOLUTION

## 2018-10-11 PROCEDURE — 25010000002 ROPIVACAINE PER 1 MG: Performed by: ORTHOPAEDIC SURGERY

## 2018-10-11 PROCEDURE — 25010000002 ONDANSETRON PER 1 MG: Performed by: NURSE ANESTHETIST, CERTIFIED REGISTERED

## 2018-10-11 PROCEDURE — 29822 SHO ARTHRS SRG LMTD DBRDMT: CPT | Performed by: ORTHOPAEDIC SURGERY

## 2018-10-11 PROCEDURE — 81001 URINALYSIS AUTO W/SCOPE: CPT | Performed by: ORTHOPAEDIC SURGERY

## 2018-10-11 PROCEDURE — 87086 URINE CULTURE/COLONY COUNT: CPT | Performed by: ORTHOPAEDIC SURGERY

## 2018-10-11 PROCEDURE — 25010000002 PROPOFOL 200 MG/20ML EMULSION: Performed by: NURSE ANESTHETIST, CERTIFIED REGISTERED

## 2018-10-11 RX ORDER — DEXAMETHASONE SODIUM PHOSPHATE 10 MG/ML
INJECTION, SOLUTION INTRAMUSCULAR; INTRAVENOUS
Status: DISCONTINUED
Start: 2018-10-11 | End: 2018-10-11 | Stop reason: HOSPADM

## 2018-10-11 RX ORDER — ROCURONIUM BROMIDE 10 MG/ML
INJECTION, SOLUTION INTRAVENOUS AS NEEDED
Status: DISCONTINUED | OUTPATIENT
Start: 2018-10-11 | End: 2018-10-11 | Stop reason: SURG

## 2018-10-11 RX ORDER — SODIUM CHLORIDE, SODIUM LACTATE, POTASSIUM CHLORIDE, CALCIUM CHLORIDE 600; 310; 30; 20 MG/100ML; MG/100ML; MG/100ML; MG/100ML
1000 INJECTION, SOLUTION INTRAVENOUS CONTINUOUS
Status: DISCONTINUED | OUTPATIENT
Start: 2018-10-11 | End: 2018-10-11 | Stop reason: HOSPADM

## 2018-10-11 RX ORDER — BUPIVACAINE HYDROCHLORIDE 2.5 MG/ML
INJECTION, SOLUTION EPIDURAL; INFILTRATION; INTRACAUDAL
Status: DISCONTINUED
Start: 2018-10-11 | End: 2018-10-11 | Stop reason: HOSPADM

## 2018-10-11 RX ORDER — ROPIVACAINE HYDROCHLORIDE 5 MG/ML
INJECTION, SOLUTION EPIDURAL; INFILTRATION; PERINEURAL AS NEEDED
Status: DISCONTINUED | OUTPATIENT
Start: 2018-10-11 | End: 2018-10-11 | Stop reason: HOSPADM

## 2018-10-11 RX ORDER — FENTANYL CITRATE 50 UG/ML
INJECTION, SOLUTION INTRAMUSCULAR; INTRAVENOUS AS NEEDED
Status: DISCONTINUED | OUTPATIENT
Start: 2018-10-11 | End: 2018-10-11 | Stop reason: SURG

## 2018-10-11 RX ORDER — PROPOFOL 10 MG/ML
INJECTION, EMULSION INTRAVENOUS AS NEEDED
Status: DISCONTINUED | OUTPATIENT
Start: 2018-10-11 | End: 2018-10-11 | Stop reason: SURG

## 2018-10-11 RX ORDER — LIDOCAINE HYDROCHLORIDE AND EPINEPHRINE 10; 10 MG/ML; UG/ML
INJECTION, SOLUTION INFILTRATION; PERINEURAL AS NEEDED
Status: DISCONTINUED | OUTPATIENT
Start: 2018-10-11 | End: 2018-10-11 | Stop reason: HOSPADM

## 2018-10-11 RX ORDER — DEXAMETHASONE SODIUM PHOSPHATE 4 MG/ML
INJECTION, SOLUTION INTRA-ARTICULAR; INTRALESIONAL; INTRAMUSCULAR; INTRAVENOUS; SOFT TISSUE AS NEEDED
Status: DISCONTINUED | OUTPATIENT
Start: 2018-10-11 | End: 2018-10-11 | Stop reason: SURG

## 2018-10-11 RX ORDER — SCOLOPAMINE TRANSDERMAL SYSTEM 1 MG/1
1 PATCH, EXTENDED RELEASE TRANSDERMAL CONTINUOUS
Status: DISCONTINUED | OUTPATIENT
Start: 2018-10-11 | End: 2018-10-11 | Stop reason: HOSPADM

## 2018-10-11 RX ORDER — ONDANSETRON 2 MG/ML
INJECTION INTRAMUSCULAR; INTRAVENOUS AS NEEDED
Status: DISCONTINUED | OUTPATIENT
Start: 2018-10-11 | End: 2018-10-11 | Stop reason: SURG

## 2018-10-11 RX ORDER — SODIUM CHLORIDE 0.9 % (FLUSH) 0.9 %
3 SYRINGE (ML) INJECTION AS NEEDED
Status: DISCONTINUED | OUTPATIENT
Start: 2018-10-11 | End: 2018-10-11 | Stop reason: HOSPADM

## 2018-10-11 RX ADMIN — ROCURONIUM BROMIDE 25 MG: 10 INJECTION INTRAVENOUS at 09:05

## 2018-10-11 RX ADMIN — SCOPALAMINE 1 PATCH: 1 PATCH, EXTENDED RELEASE TRANSDERMAL at 08:27

## 2018-10-11 RX ADMIN — PROPOFOL 150 MG: 10 INJECTION, EMULSION INTRAVENOUS at 09:04

## 2018-10-11 RX ADMIN — Medication 0.5 MG: at 10:27

## 2018-10-11 RX ADMIN — CEFAZOLIN SODIUM 2 G: 2 SOLUTION INTRAVENOUS at 09:01

## 2018-10-11 RX ADMIN — SODIUM CHLORIDE, POTASSIUM CHLORIDE, SODIUM LACTATE AND CALCIUM CHLORIDE: 600; 310; 30; 20 INJECTION, SOLUTION INTRAVENOUS at 09:44

## 2018-10-11 RX ADMIN — EPHEDRINE SULFATE 10 MG: 50 INJECTION INTRAMUSCULAR; INTRAVENOUS; SUBCUTANEOUS at 09:11

## 2018-10-11 RX ADMIN — HYDROMORPHONE HYDROCHLORIDE 0.5 MG: 1 INJECTION, SOLUTION INTRAMUSCULAR; INTRAVENOUS; SUBCUTANEOUS at 10:27

## 2018-10-11 RX ADMIN — SODIUM CHLORIDE, POTASSIUM CHLORIDE, SODIUM LACTATE AND CALCIUM CHLORIDE 1000 ML: 600; 310; 30; 20 INJECTION, SOLUTION INTRAVENOUS at 08:23

## 2018-10-11 RX ADMIN — DEXAMETHASONE SODIUM PHOSPHATE 4 MG: 4 INJECTION, SOLUTION INTRAMUSCULAR; INTRAVENOUS at 09:17

## 2018-10-11 RX ADMIN — ONDANSETRON 4 MG: 2 INJECTION INTRAMUSCULAR; INTRAVENOUS at 09:17

## 2018-10-11 RX ADMIN — FENTANYL CITRATE 50 MCG: 50 INJECTION, SOLUTION INTRAMUSCULAR; INTRAVENOUS at 09:08

## 2018-10-11 RX ADMIN — FAMOTIDINE 20 MG: 10 INJECTION, SOLUTION INTRAVENOUS at 08:25

## 2018-10-11 RX ADMIN — EPHEDRINE SULFATE 10 MG: 50 INJECTION INTRAMUSCULAR; INTRAVENOUS; SUBCUTANEOUS at 09:22

## 2018-10-11 RX ADMIN — ROCURONIUM BROMIDE 5 MG: 10 INJECTION INTRAVENOUS at 09:01

## 2018-10-11 RX ADMIN — SUGAMMADEX 125 MG: 100 INJECTION, SOLUTION INTRAVENOUS at 09:48

## 2018-10-11 RX ADMIN — FENTANYL CITRATE 100 MCG: 50 INJECTION, SOLUTION INTRAMUSCULAR; INTRAVENOUS at 09:02

## 2018-10-11 NOTE — OP NOTE
[address], [address]  [place]  [address] [phone]      OPERATIVE REPORT      PATIENT NAME:  Radha Dixon                            YOB: 1990       PRE-OP DIAGNOSIS:    Right shoulder impingement and small anterosuperior labral tears.    POST-OP DIAGNOSIS:  Same plus low undersurface articular side supraspinatus low grade partial tear..    PROCEDURE:    Right shoulder diagnostic arthroscopy and limited labral and rotator cuff supraspinatus debridement with assessment of stable outlet space.    SURGEON:     Bowen Uribe MD    OPERATIVE TEAM:  Circulator: Sherri Hill RN  Scrub Person: Kaitlin Wright    ANESTHETIST:  KENNA: Tony Doss CRNA    ANESTHESIA:  General    ESTIM BLOOD LOSS:   5 ml    FINDINGS:     Noted type 1 anterosuperior small frayed labral degenerative tear, normal long head of biceps tendon and stable rotator cuff with very low grade PASTA (partial articular side limited tear, intact normal subscapularis tendon, hypoplastic middle and normal inferior glenohumeral ligaments, no Bankart lesion or Hill-Sachs deformity, with no joint synovitis, no loose bodies and no glenohumeral osteoarthritis. In the subacriomial outlet space, type 1 - 2 acromion morphology without impingement, minimal subacromial bursitis and stable smooth acromioclavicular joint without spurs.    SPECIMENS:    None.    IMPLANTS:     None.    COMPLICATIONS:    None.    DISPOSITION:    Stable to recovery.    INDICATIONS:     Shoulder pain and dysfunction.    NARRATIVE:    Risks, benefits of proposed treatment and alternative options discussed and informed consent for surgical procedure obtained.  Risks discussed including but not limited to anesthesia, infection, nerve/vessel/tendon injury, fracture, DVT, PE and further symptoms or limitations.  Goals outlined including the potential for relief of pain and improved function and activity  tolerance.    Antibiotic prophylaxis was given.  Surgeon site marking and a time out were performed prior to the procedure.  Anesthesia was effective and well-tolerated.  The patient was maintained in the modified reclined beach chair position with care taken to pad all areas and keep the away arm at and above the level of the atrium for DVT prophylaxis.  The shoulder, arm and hand was prepped and draped in the usual sterile fashion.  At the start of the procedure, a local injection was given at the portal sites and at the end of the procedure a shoulder injection given for post-op pain control.    Portal sites were made for the arthroscopic portion of the procedure.  Diagnostic survey of the shoulder joint revealed all of the detailed findings as noted above.  Treatment consisted of shoulder diagnostic assessment and limited labral and supraspinatus rotator cuff tendon debridement of the involved areas.  Representative arthroscopic photos were saved during the diagnostic assessment and procedural steps.  The portal sites were closed, a sterile dressing applied and a shoulder sling placed for support, protection and comfort.   Anesthesia was effective and well tolerated.  There were no complications of the procedure.  The patient was transferred in stable condition to recovery.

## 2018-10-11 NOTE — DISCHARGE INSTRUCTIONS
Please follow all post op instructions and follow up appointment time from your physician's office included in your discharge packet.    No pushing, pulling, tugging,  heavy lifting, or strenuous activity.  No major decision making, driving, or drinking alcoholic beverages for 24 hours. ( due to the medications you have  received)  Always use good hand hygiene/washing techniques.  NO driving while taking pain medications.    To assist you in voiding:  Drink plenty of fluids  Listen to running water while attempting to void.    If you are unable to urinate and you have an uncomfortable urge to void or it has been   6 hours since you were discharged, return to the Emergency Room  Keep the affected extremity elevated above  level of the heart.    Use your ice pack as instructed, do not use continuously.  Use your sling as directed    Follow your physicians instructions as previously directed.
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
## 2018-10-11 NOTE — ANESTHESIA PROCEDURE NOTES
Airway  Urgency: elective    Airway not difficult    General Information and Staff    Patient location during procedure: OR  CRNA: SIMONE MIRANDA    Indications and Patient Condition  Indications for airway management: airway protection    Preoxygenated: yes      Final Airway Details  Final airway type: endotracheal airway      Successful airway: ETT  Cuffed: yes   Successful intubation technique: direct laryngoscopy  Endotracheal tube insertion site: oral  Blade: Liu  Blade size: 2  ETT size: 7.0 mm  Cormack-Lehane Classification: grade I - full view of glottis  Placement verified by: chest auscultation and capnometry   Measured from: teeth  ETT to teeth (cm): 21  Number of attempts at approach: 1

## 2018-10-11 NOTE — INTERVAL H&P NOTE
"H&P reviewed. The patient was examined and there are no changes to the H&P, inclusive of the physical exam heart, lungs and procedure site specific exam as noted on the current (within 30 days) history and physical full detailed report.    /74 (BP Location: Left arm, Patient Position: Sitting)   Pulse 87   Temp 98.3 °F (36.8 °C) (Temporal Artery )   Resp 18   Ht 154.9 cm (61\")   Wt 49.9 kg (110 lb)   LMP 09/18/2018   SpO2 92%   BMI 20.78 kg/m²       Tino Uribe MD  10/11/2018  8:40 AM    "

## 2018-10-11 NOTE — ANESTHESIA PREPROCEDURE EVALUATION
Anesthesia Evaluation     Patient summary reviewed and Nursing notes reviewed   history of anesthetic complications: PONV  NPO Solid Status: > 8 hours  NPO Liquid Status: > 8 hours           Airway   Mallampati: II  TM distance: >3 FB  Neck ROM: full  No difficulty expected  Dental - normal exam     Pulmonary - normal exam   (+) a smoker Current Abstained day of surgery, asthma,   Cardiovascular - negative cardio ROS and normal exam  Exercise tolerance: good (4-7 METS)    ECG reviewed      ROS comment: 12 lead ECG NSR with short OH interval.     Neuro/Psych  (+) headaches, psychiatric history Anxiety, Depression and Bipolar,     GI/Hepatic/Renal/Endo    (+)  GERD well controlled,      ROS Comment: Occasional difficulty swallowing    Musculoskeletal     Abdominal  - normal exam   Substance History - negative use     OB/GYN negative ob/gyn ROS         Other   (+) arthritis       Other Comment: HCG PENDING                  Anesthesia Plan    ASA 2     general and regional   (Risks and benefits of general anesthesia discussed with patient, including, aspiration, recall, dental damage, cardiac or respiratory compromise, stroke, fluctuations in blood pressure, seizure or death.     Pt advised that a endotracheal tube (ETT), laryngeal mask airway (LMA) or mask would be utilized to maintain the airway. Pt verbalized understanding and agreed to plan.    Regional anesthesia block (right interscalene) offered to patient for post operative pain control. Risks/benefits of procedure discussed with patient. Pt agreed to plan of care. )  intravenous induction   Anesthetic plan, all risks, benefits, and alternatives have been provided, discussed and informed consent has been obtained with: patient.    Plan discussed with CRNA.

## 2018-10-13 LAB — BACTERIA SPEC AEROBE CULT: NO GROWTH

## 2018-10-23 ENCOUNTER — OFFICE VISIT (OUTPATIENT)
Dept: ORTHOPEDIC SURGERY | Facility: CLINIC | Age: 28
End: 2018-10-23

## 2018-10-23 VITALS — RESPIRATION RATE: 18 BRPM | HEIGHT: 61 IN | BODY MASS INDEX: 20.88 KG/M2 | WEIGHT: 110.6 LBS

## 2018-10-23 DIAGNOSIS — Z98.890 S/P ARTHROSCOPY OF RIGHT SHOULDER: Primary | ICD-10-CM

## 2018-10-23 PROCEDURE — 99024 POSTOP FOLLOW-UP VISIT: CPT | Performed by: PHYSICIAN ASSISTANT
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
## 2018-10-29 ENCOUNTER — TREATMENT (OUTPATIENT)
Dept: PHYSICAL THERAPY | Facility: CLINIC | Age: 28
End: 2018-10-29

## 2018-10-29 DIAGNOSIS — G89.29 CHRONIC RIGHT SHOULDER PAIN: Primary | ICD-10-CM

## 2018-10-29 DIAGNOSIS — Z98.890 STATUS POST ARTHROSCOPY OF RIGHT SHOULDER: ICD-10-CM

## 2018-10-29 DIAGNOSIS — M25.511 CHRONIC RIGHT SHOULDER PAIN: Primary | ICD-10-CM

## 2018-10-29 PROCEDURE — 97161 PT EVAL LOW COMPLEX 20 MIN: CPT | Performed by: PHYSICAL THERAPIST

## 2018-10-29 NOTE — PROGRESS NOTES
Physical Therapy Initial Evaluation and Plan of Care      Patient: Radha Dixon   : 1990  Diagnosis/ICD-10 Code:  No primary diagnosis found.  Referring practitioner: LISSETH Rivera*    Subjective Evaluation    History of Present Illness  Date of onset: 10/11/2018  Mechanism of injury: Pt reports having arthroscopic surgery 4 weeks for acromioplasty and fluid removal. Pt reports horizontal abduction, abduction, and extension increase pain. Pain lingers for a couple of minutes and then relaxes. Pt states she has some difficulty with getting comfortable when going to sleep. Pt off work until  and will be on a 10 lbs lifting limit when she returns.       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[employer]  Current pain ratin  At best pain ratin  At worst pain ratin  Quality: sharp  Aggravating factors: movement and sleeping    Social Support  Lives with: young children    Hand dominance: right    Diagnostic Tests  MRI studies: abnormal    Treatments  Previous treatment: physical therapy  Patient Goals  Patient goals for therapy: return to work, increased strength, increased motion and decreased pain             Objective       Neurological Testing     Sensation     Shoulder   Left Shoulder   Intact: light touch    Right Shoulder   Intact: light touch    Elbow   Left Elbow   Intact: light touch    Right Elbow   Intact: light touch    Reflexes   Left   Biceps (C5/C6): normal (2+)  Triceps (C7): normal (2+)    Right   Biceps (C5/C6): normal (2+)  Triceps (C7): normal (2+)    Additional Neurological Details  No tenderness noted in R shoulder with palpation.     Active Range of Motion   Left Shoulder   Flexion: WFL  Abduction: WFL  External rotation 90°: WFL  Internal rotation 90°: WFL    Right Shoulder   Flexion: 145 degrees   Abduction: 114 (Sitting) degrees   External rotation 90°: 46 degrees  Internal rotation 90°: 60 degrees     Passive Range of Motion     Additional Passive Range of Motion  Details  Pt motion improved with manual PROM.     Strength/Myotome Testing   Cervical Spine     Left   Levator scapulae (C4): 4+    Right   Levator scapulae (C4): 4+    Left Shoulder     Isolated Muscles   Levator scapulae: 4+     Right Shoulder     Planes of Motion   Right shoulder abduction strength: not tested.     Isolated Muscles   Levator scapulae: 4+     Left Elbow   Flexion: 4+  Extension: 4+    Right Elbow   Flexion: 4  Extension: 4+    Additional Strength Details  EPL: 5/5 bilaterally  Palmar Interossei: 5/5 bilaterally         Assessment & Plan     Assessment  Impairments: abnormal coordination, abnormal or restricted ROM, activity intolerance, impaired physical strength, lacks appropriate home exercise program and pain with function  Assessment details: Pt presents with decreased strength, ROM, and function of R shoulder normal of status post R acromioplasty. Pt has no neurological signs or symptoms noted with exam. Pt with pain at end range motions and limited AROM and PROM due to pain and tightness. Pt would benefit from PT to help increase ROM, strength, and function of R shoulder to help return her to work.   Prognosis: good  Prognosis details: Short Term Goals (2 weeks)  1. Pt will demonstrate independence with HEP  2. Pt will increase R flexion and abduction shoulder ROM to WNL to allow for return to ADLs and work activities.   3. Pt will increase R shoulder flexion and abduction strength to 4/5 to help increase R shoulder function    Long Term Goals (4 weeks)  1. Pt will increase R internal rotation of shoulder to WNL to allow for comfortable and unrestricted movements with dressing and hygiene  2. Pt will be able to perform 45 mins of UE exercise in the clinic  3. Pt will be able to perform 40 lbs box lift with no pain in R shoulder.    Functional Limitations: carrying objects, lifting, uncomfortable because of pain and reaching behind back  Plan  Therapy options: will be seen for skilled  physical therapy services  Planned modality interventions: cryotherapy, thermotherapy (hydrocollator packs) and TENS  Planned therapy interventions: flexibility, fine motor coordination training, functional ROM exercises, home exercise program, joint mobilization, manual therapy, neuromuscular re-education, soft tissue mobilization, strengthening, spinal/joint mobilization, stretching and therapeutic activities  Plan details: Pt to be seen 2x per week for 4-6 weeks.         Manual Therapy:   7      mins  41225;  Therapeutic Exercise:    10     mins  53640;     Neuromuscular Joi:        mins  50781;    Therapeutic Activity:          mins  14948;     Gait Training:           mins  42631;     Ultrasound:          mins  36768;    Electrical Stimulation:         mins  28316 ( );  Dry Needling          mins self-pay    Timed Treatment:   17   mins   Total Treatment:     45   mins    PT SIGNATURE: Terrell Healy, PT   DATE TREATMENT INITIATED: 10/29/2018    Initial Certification  Certification Period: 1/27/2019  I certify that the therapy services are furnished while this patient is under my care.  The services outlined above are required by this patient, and will be reviewed every 90 days.     PHYSICIAN: Shane Uriarte PA-C      DATE:     Please sign and return via fax to  .. Thank you, [facility].

## 2018-11-01 ENCOUNTER — TREATMENT (OUTPATIENT)
Dept: PHYSICAL THERAPY | Facility: CLINIC | Age: 28
End: 2018-11-01

## 2018-11-01 PROCEDURE — 97140 MANUAL THERAPY 1/> REGIONS: CPT | Performed by: PHYSICAL THERAPIST

## 2018-11-01 PROCEDURE — 97110 THERAPEUTIC EXERCISES: CPT | Performed by: PHYSICAL THERAPIST

## 2018-11-01 NOTE — PROGRESS NOTES
Physical Therapy Daily Progress Note        Radha Dixon reports 0 /10 pain today at rest.  Pt reports doing well with exercises at home and not being sore following last session. Pt reports shoulder seems to be less sore and is able to sleep some on her shoulder without waking.         Objective Pt present to PT today with no distress at rest.     Pt tolerated exercises well today with no increased pain with light strengthening. Pt shoulder musculature fatigued quickly.       See Exercise, Manual, and Modality Logs for complete treatment.     Assessment/Plan  Pt with improved ROM today with tightness at end range. Internal most painful and limited. Pt may benefit from continued PT to help increase strength, motion and function of R shoulder to help return to work.       Progress per Plan of Care  Progress strengthening           Manual Therapy:    12     mins  11912;  Therapeutic Exercise:    34     mins  66712;     Neuromuscular Joi:        mins  96831;    Therapeutic Activity:          mins  84633;     Gait Training:        ___  mins  30002;     Ultrasound:          mins  26590;    Electrical Stimulation:         mins  41484 ( );  Dry Needling          mins self-pay    Timed Treatment:   46   mins   Total Treatment:     56   mins    Terrell Healy, PT  Physical Therapist

## 2018-11-05 ENCOUNTER — TELEPHONE (OUTPATIENT)
Dept: ORTHOPEDIC SURGERY | Facility: CLINIC | Age: 28
End: 2018-11-05

## 2018-11-05 ENCOUNTER — TREATMENT (OUTPATIENT)
Dept: PHYSICAL THERAPY | Facility: CLINIC | Age: 28
End: 2018-11-05

## 2018-11-05 PROCEDURE — 97140 MANUAL THERAPY 1/> REGIONS: CPT | Performed by: PHYSICAL THERAPIST

## 2018-11-05 PROCEDURE — 97110 THERAPEUTIC EXERCISES: CPT | Performed by: PHYSICAL THERAPIST

## 2018-11-05 NOTE — PROGRESS NOTES
Physical Therapy Daily Progress Note        Radha Dixon reports 3/10 pain today at rest.  Pt reports shoulder being a little more sore today in anterior shoulder and scapula. Pt states she was sore following last visit but did not carry over into the next day.         Objective Pt present to PT today with no distress at rest.     Pt with improvement in IR with less pain at end range.     Pt tolerated increased repetitions with exercises today with no increased pain in shoulder.       See Exercise, Manual, and Modality Logs for complete treatment.     Assessment/Plan  Pt continuing to improve with R shoulder ROM and activity tolerance in shoulder. Pt fatigues quickly with exercises. Pt may benefit from continued PT to help decrease pain and increase activity tolerance and strength in order to return to work.       Progress per Plan of Care  Continue strengthening           Manual Therapy:    16     mins  21293;  Therapeutic Exercise:    15     mins  36602;     Neuromuscular Joi:        mins  75735;    Therapeutic Activity:          mins  69041;     Gait Training:        ___  mins  51969;     Ultrasound:          mins  12487;    Electrical Stimulation:         mins  52893 ( );  Dry Needling          mins self-pay    Timed Treatment:   31   mins   Total Treatment:     54   mins    Terrell Healy, PT  Physical Therapist

## 2018-11-05 NOTE — TELEPHONE ENCOUNTER
Patient came into the office in regards to request to see if she can get a extension on her work release for an additional 2 weeks. She states that she has just began physical therapy and has only had 3 therapy sessions. She states that it runs out 11/6/18. I informed the patient that Tk was out of the office today and would be in office tomorrow that we could see if it could be extended and we would contact her and let her know a status.

## 2018-11-06 NOTE — TELEPHONE ENCOUNTER
Called patient, she was explaining what date she needed on back to work form and had to hang up. She said she would call back before call ended.

## 2018-11-07 ENCOUNTER — TREATMENT (OUTPATIENT)
Dept: PHYSICAL THERAPY | Facility: CLINIC | Age: 28
End: 2018-11-07

## 2018-11-07 PROCEDURE — 97110 THERAPEUTIC EXERCISES: CPT | Performed by: PHYSICAL THERAPIST

## 2018-11-07 NOTE — PROGRESS NOTES
Physical Therapy Daily Progress Note        Radha Dixon reports 5/10 pain today at rest.  Pt reports being a little sore after last visit. Pt states shoulder is achy today although is feeling good. Pt states she is off work until 11/20.        Objective Pt present to PT today with no distress at rest.     Pt tolerated exercises well today and was able to increase scapular and shoulder strengthening activities without increased pain.       See Exercise, Manual, and Modality Logs for complete treatment.     Assessment/Plan  Pt has improved with shoulder motion and mobility. Pt to continue PT to strengthen and increase shoulder function in order to return to work.       Progress per Plan of Care  Increase strengthening as tolerated.            Manual Therapy:    4     mins  97237;  Therapeutic Exercise:    45     mins  03885;     Neuromuscular Joi:        mins  49474;    Therapeutic Activity:          mins  88932;     Gait Training:        ___  mins  36819;     Ultrasound:          mins  72861;    Electrical Stimulation:         mins  20915 ( );  Dry Needling          mins self-pay    Timed Treatment:   49   mins   Total Treatment:     59   mins    Peterson Weiner, PT  Physical Therapist

## 2018-11-12 ENCOUNTER — TREATMENT (OUTPATIENT)
Dept: PHYSICAL THERAPY | Facility: CLINIC | Age: 28
End: 2018-11-12

## 2018-11-12 PROCEDURE — 97110 THERAPEUTIC EXERCISES: CPT | Performed by: PHYSICAL THERAPIST

## 2018-11-12 PROCEDURE — 97140 MANUAL THERAPY 1/> REGIONS: CPT | Performed by: PHYSICAL THERAPIST

## 2018-11-12 NOTE — PROGRESS NOTES
Physical Therapy Daily Progress Note        Radha Dixon reports 3/10 pain today at rest.  Pt reports R UT being very sore after last visit lasting through the next day. Pt follows up with doctor on 12/28.         Objective Pt present to PT today with no distress at rest.     Pt tolerated exercises well today with no increased pain. Pt required verbal and manual cues to avoid protraction and/or elevation of R scapula during exercises.       See Exercise, Manual, and Modality Logs for complete treatment.     Assessment/Plan  Pt continues to improve strength and function of R shoulder. Pt with some abnormal scapular mechanics noted during exercises. Pt may benefit from PT to help increase normal mobility and function of shoulder to help pt return to work.       Progress per Plan of Care  Progress exercises as tolerated           Manual Therapy:    10     mins  22975;  Therapeutic Exercise:    41     mins  52672;     Neuromuscular Joi:        mins  11314;    Therapeutic Activity:          mins  51301;     Gait Training:        ___  mins  95886;     Ultrasound:          mins  36762;    Electrical Stimulation:         mins  57498 ( );  Dry Needling          mins self-pay    Timed Treatment:   51   mins   Total Treatment:     61   mins    Peterson Weiner, PT  Physical Therapist

## 2018-11-14 ENCOUNTER — TREATMENT (OUTPATIENT)
Dept: PHYSICAL THERAPY | Facility: CLINIC | Age: 28
End: 2018-11-14

## 2018-11-14 PROCEDURE — 97110 THERAPEUTIC EXERCISES: CPT | Performed by: PHYSICAL THERAPIST

## 2018-11-14 PROCEDURE — 97140 MANUAL THERAPY 1/> REGIONS: CPT | Performed by: PHYSICAL THERAPIST

## 2018-11-14 NOTE — PROGRESS NOTES
Physical Therapy Daily Progress Note        Radha Dixon reports 3/10 pain today at rest.  Pt reports R shoulder is achy in UT and over scapula. Pt states she can tell she has improved a lot since beginning PT.         Objective Pt present to PT today with no distress at rest.     Pt tolerated exercises well today with no increased pain with activities.     Pt with protracted R scapula and required multiple cues throughout session to correct.       See Exercise, Manual, and Modality Logs for complete treatment.     Assessment/Plan  Pt continues to tolerate strengthening exercises well and is improving with overhead movement. Pt to continue PT to help strengthen R shoulder and increase function to help pt return to work.       Progress per Plan of Care  Progress overhead exercises.           Manual Therapy:    11     mins  34233;  Therapeutic Exercise:    41     mins  12545;     Neuromuscular Joi:        mins  90788;    Therapeutic Activity:          mins  86434;     Gait Training:        ___  mins  89924;     Ultrasound:          mins  40417;    Electrical Stimulation:         mins  69753 ( );  Dry Needling          mins self-pay    Timed Treatment:   52   mins   Total Treatment:     62   mins    Peterson Weiner, PT  Physical Therapist

## 2018-11-16 ENCOUNTER — TREATMENT (OUTPATIENT)
Dept: PHYSICAL THERAPY | Facility: CLINIC | Age: 28
End: 2018-11-16

## 2018-11-16 PROCEDURE — 97530 THERAPEUTIC ACTIVITIES: CPT | Performed by: PHYSICAL THERAPIST

## 2018-11-16 PROCEDURE — 97110 THERAPEUTIC EXERCISES: CPT | Performed by: PHYSICAL THERAPIST

## 2018-11-16 NOTE — PROGRESS NOTES
Physical Therapy Daily Progress Note        Radha Dixon reports 2/10 pain today at rest.  Pt reports being sore in shoulder muscles after last visit but no pain in shoulder. Pt still reports soreness in R UT.         Objective Pt present to PT today with no distress at rest.     Pt able to increase exercise and strengthening today with no increased pain in shoulder, however shoulder muscles fatigued quickly.    Pt able to perform 32 lbs box lift with no pain in shoulder.       See Exercise, Manual, and Modality Logs for complete treatment.     Assessment/Plan  Pt continues to improve with shoulder strength and function. Pt ROM is WFL and not limited due to pain. Pt may benefit from PT to help strengthen shoulder and improve activity tolerance to help return to full work duty.       Progress per Plan of Care  Progress strengthening as tolerated.            Manual Therapy:         mins  63244;  Therapeutic Exercise:    42     mins  00043;     Neuromuscular Joi:        mins  88390;    Therapeutic Activity:     13     mins  75131;     Gait Training:        ___  mins  42981;     Ultrasound:          mins  52566;    Electrical Stimulation:         mins  72208 ( );  Dry Needling          mins self-pay    Timed Treatment:   55   mins   Total Treatment:     65   mins    Peterson Weiner, PT  Physical Therapist

## 2018-11-21 ENCOUNTER — TREATMENT (OUTPATIENT)
Dept: PHYSICAL THERAPY | Facility: CLINIC | Age: 28
End: 2018-11-21

## 2018-11-21 PROCEDURE — 97530 THERAPEUTIC ACTIVITIES: CPT | Performed by: PHYSICAL THERAPIST

## 2018-11-21 PROCEDURE — 97140 MANUAL THERAPY 1/> REGIONS: CPT | Performed by: PHYSICAL THERAPIST

## 2018-11-21 PROCEDURE — 97110 THERAPEUTIC EXERCISES: CPT | Performed by: PHYSICAL THERAPIST

## 2018-11-21 NOTE — PROGRESS NOTES
Physical Therapy Daily Progress Note        Radha Dixon reports 0/10 pain today at rest.  Pt reports shoulder doing well with no pain today. Pt states the shoulder is still a little tight with full flexion at end range.         Objective Pt present to PT today with no distress at rest.     Pt tolerated exercises well today with no increased pain in shoulder after session.     Pt with some discomfort with farmer carries in R upper trap but pain passed after finishing exercise.       See Exercise, Manual, and Modality Logs for complete treatment.     Assessment/Plan  Pt continues to progress well with strengthening exercises and has progress to functional carries. Pt may benefit from PT to help increase strength and function of R shoulder to help return to full duty at work.       Progress per Plan of Care  Continue as tolerated           Manual Therapy:    10     mins  84266;  Therapeutic Exercise:    28     mins  11259;     Neuromuscular Joi:        mins  68173;    Therapeutic Activity:     11     mins  93196;     Gait Training:        ___  mins  91185;     Ultrasound:          mins  59274;    Electrical Stimulation:         mins  16531 ( );  Dry Needling          mins self-pay    Timed Treatment:   49   mins   Total Treatment:     61   mins    Peterson Weiner, PT  Physical Therapist

## 2018-11-26 ENCOUNTER — APPOINTMENT (OUTPATIENT)
Dept: GENERAL RADIOLOGY | Facility: HOSPITAL | Age: 28
End: 2018-11-26
Attending: EMERGENCY MEDICINE

## 2018-11-26 ENCOUNTER — HOSPITAL ENCOUNTER (EMERGENCY)
Facility: HOSPITAL | Age: 28
Discharge: HOME | End: 2018-11-26
Attending: EMERGENCY MEDICINE | Admitting: EMERGENCY MEDICINE

## 2018-11-26 VITALS
BODY MASS INDEX: 21.07 KG/M2 | DIASTOLIC BLOOD PRESSURE: 86 MMHG | RESPIRATION RATE: 16 BRPM | OXYGEN SATURATION: 99 % | TEMPERATURE: 98.2 F | HEIGHT: 61 IN | WEIGHT: 111.6 LBS | HEART RATE: 122 BPM | SYSTOLIC BLOOD PRESSURE: 119 MMHG

## 2018-11-26 DIAGNOSIS — S60.00XA CONTUSION OF FINGER OF LEFT HAND, UNSPECIFIED FINGER, INITIAL ENCOUNTER: Primary | ICD-10-CM

## 2018-11-26 PROCEDURE — 99283 EMERGENCY DEPT VISIT LOW MDM: CPT

## 2018-11-26 PROCEDURE — 73130 X-RAY EXAM OF HAND: CPT

## 2018-11-26 RX ORDER — IBUPROFEN 800 MG/1
800 TABLET ORAL ONCE
Status: COMPLETED | OUTPATIENT
Start: 2018-11-26 | End: 2018-11-26

## 2018-11-26 RX ADMIN — IBUPROFEN 800 MG: 800 TABLET, FILM COATED ORAL at 09:07

## 2018-11-26 NOTE — ED PROVIDER NOTES
"Subjective   27 yo f with cc of left hand pain. Pt complains of dull ache in the lateral 3 fingers in the left hand. Pt states pain started just pta when she accidentally closed her hand in the wooden front door at her home. Pain is worse with rom. No numbness or tingling. No prior treatments. No other complaints at this time.             Review of Systems   Musculoskeletal: Positive for arthralgias and joint swelling.        Left hand pain     All other systems reviewed and are negative.      Past Medical History:   Diagnosis Date   • Acid reflux    • Allergic    • Anxiety    • Anxiety and depression    • Arthritis    • Asthma    • Bipolar 1 disorder (CMS/HCC)    • Body piercing     EARS, NOSE, BELLY BUTTON   • Depression    • Dysphagia     REPORTS SOMETIMES FEELS LIKE \"FOOD GETS STUCK\"   • Fracture     HISTORY OF RIGHT WRIST AS A CHILD   • History of migraine    • Injury of neck    • Insomnia    • Migraine    • Ovarian cyst    • PONV (postoperative nausea and vomiting)    • Scoliosis    • Tattoo    • Toe fracture    • Wrist fracture        Allergies   Allergen Reactions   • Onion Other (See Comments)     REPORTS CAUSES MIGRAINES     • Sulfa Antibiotics Hives       Past Surgical History:   Procedure Laterality Date   • APPENDECTOMY  2016   • CHOLECYSTECTOMY     • CYST REMOVAL      OVARIAN CYST   • DILATATION AND CURETTAGE     • HIP SURGERY Right     RIGHT HIP, REPORTS HAD BONES SCRAPED   • WISDOM TOOTH EXTRACTION         Family History   Problem Relation Age of Onset   • Arthritis Other    • Cancer Other         breast and lung   • Diabetes Other    • Heart attack Other    • Migraines Other    • No Known Problems Mother    • No Known Problems Father        Social History     Socioeconomic History   • Marital status: Single     Spouse name: Not on file   • Number of children: Not on file   • Years of education: Not on file   • Highest education level: Not on file   Tobacco Use   • Smoking status: Current Every Day " Smoker     Packs/day: 0.50     Years: 13.00     Pack years: 6.50     Types: Cigarettes   • Smokeless tobacco: Never Used   Substance and Sexual Activity   • Alcohol use: No   • Drug use: No   • Sexual activity: Defer           Objective   Physical Exam   Constitutional: She is oriented to person, place, and time. She appears well-developed and well-nourished. No distress.   HENT:   Head: Normocephalic and atraumatic.   Nose: Nose normal.   Eyes: Conjunctivae and EOM are normal.   Cardiovascular: Normal rate and regular rhythm.   Pulmonary/Chest: Effort normal and breath sounds normal. No respiratory distress.   Abdominal: Soft. She exhibits no distension. There is no tenderness. There is no guarding.   Musculoskeletal: She exhibits no edema or deformity.   TTP of left lateral 3 fingers, no deformity, small subungal hematomas left lateral 2 fingers    Neurological: She is alert and oriented to person, place, and time. No cranial nerve deficit.   Skin: She is not diaphoretic.   Nursing note and vitals reviewed.      Procedures           ED Course        Mashed left lateral 3 fingers in the door.  Imaging negative for acute fractures.  Small subungual hematomas less than 20% the entire nail bed.  Patient will be discharged follow-up as needed.          MDM      Final diagnoses:   Contusion of finger of left hand, unspecified finger, initial encounter            Nando Raines,   11/26/18 6886

## 2018-11-28 ENCOUNTER — TREATMENT (OUTPATIENT)
Dept: PHYSICAL THERAPY | Facility: CLINIC | Age: 28
End: 2018-11-28

## 2018-11-28 PROCEDURE — 97110 THERAPEUTIC EXERCISES: CPT | Performed by: PHYSICAL THERAPIST

## 2018-11-28 PROCEDURE — 97140 MANUAL THERAPY 1/> REGIONS: CPT | Performed by: PHYSICAL THERAPIST

## 2018-11-28 PROCEDURE — 97112 NEUROMUSCULAR REEDUCATION: CPT | Performed by: PHYSICAL THERAPIST

## 2018-11-28 NOTE — PROGRESS NOTES
Physical Therapy Daily Progress Note        Radha Dixon reports 5/10 pain today at rest.  Pt reports returning to work 3 days ago and feeling good for the first 2 days; however, yesterday she had 25 dogs to wash and is sore today. Pt reports having scapular pain even after icing last night.         Objective Pt present to PT today with no distress at rest.     Pt tolerated exercises well today with slight discomfort with scapular strengthening but pain decreased to 3/10 after exercises and manual ROM.       See Exercise, Manual, and Modality Logs for complete treatment.     Assessment/Plan  Pt has some pain from returning to work. Pt seems to be lacking endurance in shoulder musculature and will continue to work with PT to improve activity tolerance for pain free work activities.       Progress per Plan of Care  Work on endurance           Manual Therapy:    12     mins  08525;  Therapeutic Exercise:    27     mins  44985;     Neuromuscular Joi:   10     mins  90488;    Therapeutic Activity:          mins  72213;     Gait Training:        ___  mins  26723;     Ultrasound:          mins  09534;    Electrical Stimulation:         mins  18593 ( );  Dry Needling          mins self-pay    Timed Treatment:   49   mins   Total Treatment:     60   mins    Peterson Weiner, PT  Physical Therapist

## 2018-11-30 ENCOUNTER — OFFICE VISIT (OUTPATIENT)
Dept: INTERNAL MEDICINE | Facility: CLINIC | Age: 28
End: 2018-11-30

## 2018-11-30 ENCOUNTER — TREATMENT (OUTPATIENT)
Dept: PHYSICAL THERAPY | Facility: CLINIC | Age: 28
End: 2018-11-30

## 2018-11-30 VITALS
TEMPERATURE: 98.4 F | OXYGEN SATURATION: 99 % | BODY MASS INDEX: 20.2 KG/M2 | HEART RATE: 117 BPM | DIASTOLIC BLOOD PRESSURE: 82 MMHG | HEIGHT: 61 IN | WEIGHT: 107 LBS | SYSTOLIC BLOOD PRESSURE: 120 MMHG

## 2018-11-30 DIAGNOSIS — T30.4 CHEMICAL BURN: Primary | ICD-10-CM

## 2018-11-30 PROCEDURE — 97110 THERAPEUTIC EXERCISES: CPT | Performed by: PHYSICAL THERAPIST

## 2018-11-30 PROCEDURE — 99213 OFFICE O/P EST LOW 20 MIN: CPT | Performed by: PHYSICIAN ASSISTANT

## 2018-11-30 NOTE — PROGRESS NOTES
Physical Therapy Daily Progress Note        Radha Dixon reports 3/10 pain today at rest.  Pt reports having pain medial to R scapula but pain has lessened since last visit. Pt report having nearly 30 dogs to wash today.         Objective Pt present to PT today with no distress at rest.     Pt with tenderness over rhomboids of R scapula.     Pt with some tightness in R shoulder with passive shoulder flexion.       See Exercise, Manual, and Modality Logs for complete treatment.     Assessment/Plan  Pt continues to improve with PT. Pt with decreased activity tolerance at work due to deconditioning or shoulder musculature. Pt to continue PT to help increase shoulder strength, endurance, and function.       Progress per Plan of Care  Continue strengthening           Manual Therapy:         mins  70985;  Therapeutic Exercise:    32     mins  22030;     Neuromuscular Joi:        mins  08268;    Therapeutic Activity:          mins  40188;     Gait Training:        ___  mins  06547;     Ultrasound:          mins  96235;    Electrical Stimulation:         mins  50096 ( );  Dry Needling          mins self-pay    Timed Treatment:   32   mins   Total Treatment:     65   mins    Peterson Weiner, PT  Physical Therapist

## 2018-11-30 NOTE — PROGRESS NOTES
Radha Dixon is a 28 y.o. female.     Subjective   History of Present Illness   Here today with concern of a rash on both forearms. The rash began yesterday after she bathed a dog that had been sprayed by a skunk which required hydrogen peroxide, vinegar, lemon juice, heriberto dish soap and baking soda. Rash feels like it is burning. No broken skin. No previous similar symptoms.         The following portions of the patient's history were reviewed and updated as appropriate: allergies, current medications, past family history, past medical history, past social history, past surgical history and problem list.    Review of Systems    Constitutional: Negative for appetite change, chills, fatigue, fever and unexpected weight change.   Respiratory: Negative for cough, chest tightness, shortness of breath and wheezing.    Cardiovascular: Negative for chest pain, palpitations and leg swelling.   Musculoskeletal: Negative for arthralgias, back pain, joint swelling, myalgias, neck pain and neck stiffness.   Skin: burning rash. Negative for color change, pallor and wound.   Neurological: Negative for dizziness, tremors, seizures, weakness, numbness and headaches.   Psychiatric/Behavioral: Negative for sleep disturbances, agitation, behavioral problems, confusion, hallucinations, self-injury and suicidal ideas. The patient is not nervous/anxious.      Objective    Physical Exam  Constitutional: Appears well-developed and well-nourished.   Cardiovascular: Normal rate, regular rhythm and normal heart sounds.    Pulmonary/Chest: Effort normal and breath sounds normal. No respiratory distress.  Has no wheezes or rales. Exhibits no chest wall tenderness.   Neurological: Alert and oriented to person, place, and time.   Skin: erythematous reticular rash bilateral forearms.  Skin is warm and dry.   Psychiatric: Has a normal mood and affect. Behavior is normal. Judgment and thought content normal.       /82   Pulse 117   Temp  "98.4 °F (36.9 °C)   Ht 154.9 cm (60.98\")   Wt 48.5 kg (107 lb)   LMP 11/19/2018   SpO2 99%   BMI 20.23 kg/m²     Nursing note and vitals reviewed.        Assessment/Plan   Radha was seen today for rash.    Diagnoses and all orders for this visit:    Chemical burn  -     silver sulfadiazine (SILVADENE) 1 % cream; Apply  topically to the appropriate area as directed 2 (Two) Times a Day for 5 days.      Keep skin clean. Avoid further exposure to chemicals.            "

## 2018-12-06 ENCOUNTER — TREATMENT (OUTPATIENT)
Dept: PHYSICAL THERAPY | Facility: CLINIC | Age: 28
End: 2018-12-06

## 2018-12-06 PROCEDURE — 97110 THERAPEUTIC EXERCISES: CPT | Performed by: PHYSICAL THERAPIST

## 2018-12-06 PROCEDURE — 97140 MANUAL THERAPY 1/> REGIONS: CPT | Performed by: PHYSICAL THERAPIST

## 2018-12-06 NOTE — PROGRESS NOTES
Physical Therapy Daily Progress Note        Radha Dixon reports 6/10 pain today at rest.  Pt reports feeling good until Tuesday this week when soreness in R shoulder increased. Pt reports that a dog yanked on R shoulder and pt had painful pop that took awhile to return to normal/baseline soreness.         Objective Pt present to PT today with no distress at rest.     Pt with tenderness to palpation over UT and rhomboids on the R side.     Pt able to complete exercises without increased pain and had less pain after completing PT session.       See Exercise, Manual, and Modality Logs for complete treatment.     Assessment/Plan  Pt continues to have soreness and pain in R shoulder with work. Pt has improved motion and strength greatly. Pt to continue PT to help increase shoulder function and activity tolerance.       Progress per Plan of Care  Assess Shoulder soreness           Manual Therapy:    16     mins  89979;  Therapeutic Exercise:    35     mins  33928;     Neuromuscular Joi:        mins  92246;    Therapeutic Activity:          mins  43835;     Gait Training:        ___  mins  82319;     Ultrasound:          mins  21979;    Electrical Stimulation:         mins  82126 ( );  Dry Needling          mins self-pay    Timed Treatment:   51   mins   Total Treatment:     61   mins    Peterson Weiner, PT  Physical Therapist

## 2018-12-12 ENCOUNTER — TREATMENT (OUTPATIENT)
Dept: PHYSICAL THERAPY | Facility: CLINIC | Age: 28
End: 2018-12-12

## 2018-12-12 PROCEDURE — 97140 MANUAL THERAPY 1/> REGIONS: CPT | Performed by: PHYSICAL THERAPIST

## 2018-12-12 PROCEDURE — 97110 THERAPEUTIC EXERCISES: CPT | Performed by: PHYSICAL THERAPIST

## 2018-12-12 NOTE — PROGRESS NOTES
Physical Therapy Daily Progress Note        Radha Dixon reports 5/10 pain today at rest.  Pt reports having the day off yesterday and shoulder feeling good, but the shoulder has flared up during work today.         Objective Pt present to PT today with no distress at rest.     Pt with hypertonic pec minor limiting scapular retraction and movement.     Pt with tenderness in subacromial area, lower trap/rhomboids, and anterior shoulder.       See Exercise, Manual, and Modality Logs for complete treatment.     Assessment/Plan  Pt continues to have pain with function and limited strength limiting her ability to work full days without pain. Pt would benefit from further PT to help increase activity tolerance and function and decrease pain with movement and work.       Progress per Plan of Care  Await authorization            Manual Therapy:    16     mins  24337;  Therapeutic Exercise:    35     mins  57202;     Neuromuscular Joi:        mins  58788;    Therapeutic Activity:          mins  04539;     Gait Training:        ___  mins  98557;     Ultrasound:          mins  95625;    Electrical Stimulation:         mins  16823 ( );  Dry Needling          mins self-pay    Timed Treatment:   51   mins   Total Treatment:     61   mins    Peterson Weiner, PT  Physical Therapist

## 2018-12-18 ENCOUNTER — OFFICE VISIT (OUTPATIENT)
Dept: ORTHOPEDIC SURGERY | Facility: CLINIC | Age: 28
End: 2018-12-18

## 2018-12-18 VITALS — BODY MASS INDEX: 20.2 KG/M2 | RESPIRATION RATE: 18 BRPM | HEIGHT: 61 IN | WEIGHT: 107 LBS

## 2018-12-18 DIAGNOSIS — Z98.890 S/P ARTHROSCOPY OF RIGHT SHOULDER: ICD-10-CM

## 2018-12-18 DIAGNOSIS — M54.12 CERVICAL RADICULAR PAIN: Primary | ICD-10-CM

## 2018-12-18 PROCEDURE — 99024 POSTOP FOLLOW-UP VISIT: CPT | Performed by: PHYSICIAN ASSISTANT

## 2018-12-18 RX ORDER — METHYLPREDNISOLONE 4 MG/1
TABLET ORAL
Qty: 21 TABLET | Refills: 0 | OUTPATIENT
Start: 2018-12-18 | End: 2019-01-13 | Stop reason: HOSPADM

## 2018-12-18 NOTE — PROGRESS NOTES
"Subjective   Patient ID: Radha Dixon is a 28 y.o. right hand dominant female is here today for a post-operative visit.  Post-op of the Right Shoulder (Right shoulder diagnostic arthroscopy, limited rotator cuff debridement 10/11/18. Went back to work on 12/2/18. C/O increased pain under shoulder blade, difficulty extending arm due to pain. She states pain is worse at the end of the work week.  )          CHIEF COMPLAINT:        History of Present Illness       Patient states she has been having right shoulder blade pain and pain to the base of her neck since returning to work 12-3-18.  Patient reports she does have a history of bulging disc in cervical spine, and that the TENS unit does seem to help.   She reports occasional numbness and tingling into the RUE.    Pain controlled: [] no   [x] yes   Medication refill requested: [x] no   [] yes    Patient compliant with instructions: [] no   [x] yes         Past Medical History:   Diagnosis Date   • Acid reflux    • Allergic    • Anxiety    • Anxiety and depression    • Arthritis    • Asthma    • Bipolar 1 disorder (CMS/HCC)    • Body piercing     EARS, NOSE, BELLY BUTTON   • Depression    • Dysphagia     REPORTS SOMETIMES FEELS LIKE \"FOOD GETS STUCK\"   • Fracture     HISTORY OF RIGHT WRIST AS A CHILD   • History of migraine    • Injury of neck    • Insomnia    • Migraine    • Ovarian cyst    • PONV (postoperative nausea and vomiting)    • Scoliosis    • Tattoo    • Toe fracture    • Wrist fracture         Past Surgical History:   Procedure Laterality Date   • APPENDECTOMY  2016   • CHOLECYSTECTOMY     • CYST REMOVAL      OVARIAN CYST   • DILATATION AND CURETTAGE     • ENDOSCOPY N/A 12/1/2017    Procedure: ESOPHAGOGASTRODUODENOSCOPY WITH COLD FORCEP BIOPSY;  Surgeon: Danilo Shabazz MD;  Location: [facility] ENDOSCOPY;  Service:    • ESOPHAGOGASTRODUODENOSCOPY WITH COLD FORCEP BIOPSY N/A 12/1/2017    Performed by Danilo Shabazz MD at [facility] ENDOSCOPY   • HIP " "SURGERY Right     RIGHT HIP, REPORTS HAD BONES SCRAPED   • Right shoulder diagnostic arthroscopy, limited rotator cuff debridement Right 10/11/2018    Performed by Tino Uribe MD at [facility] OR   • SHOULDER ARTHROSCOPY Right 10/11/2018    Procedure: Right shoulder diagnostic arthroscopy, limited rotator cuff debridement;  Surgeon: Tino Uribe MD;  Location: [facility];  Service: Orthopedics   • WISDOM TOOTH EXTRACTION         Allergies   Allergen Reactions   • Onion Other (See Comments)     REPORTS CAUSES MIGRAINES     • Sulfa Antibiotics Hives       Review of Systems   Constitutional: Negative for fever.   HENT: Negative for voice change.    Eyes: Negative for visual disturbance.   Respiratory: Negative for shortness of breath.    Cardiovascular: Negative for chest pain.   Gastrointestinal: Negative for abdominal distention and abdominal pain.   Genitourinary: Negative for dysuria.   Musculoskeletal: Positive for arthralgias. Negative for gait problem and joint swelling.   Skin: Negative for rash.   Neurological: Negative for speech difficulty.   Hematological: Does not bruise/bleed easily.   Psychiatric/Behavioral: Negative for confusion.       Objective   Resp 18   Ht 154.9 cm (60.98\")   Wt 48.5 kg (107 lb)   LMP 11/19/2018   BMI 20.23 kg/m²       Signs of infection: [x] no                    [] yes   Drainage: [x] no                    [] yes   Incision: [x] healing well     []healed well   Motor exam intact: [] no                    [x] yes   Neurovascular exam intact: [] no                    [x] yes   Signs of compartment syndrome: [x] no                    [] yes   Signs of DVT: [x] no                    [] yes   Other:      Physical Exam   Constitutional: She is oriented to person, place, and time. She appears well-nourished.   Musculoskeletal:        Right shoulder: She exhibits no tenderness, no bony tenderness, no crepitus, no spasm and normal strength.        Arms:       Right hand: She " exhibits normal capillary refill and no swelling. Normal sensation noted. Normal strength noted. She exhibits no thumb/finger opposition.   Neurological: She is alert and oriented to person, place, and time.   Skin: Capillary refill takes less than 2 seconds.   Psychiatric: She has a normal mood and affect.   Vitals reviewed.    Back Exam     Tenderness   The patient is experiencing tenderness in the cervical.      Right Shoulder Exam     Range of Motion   Active abduction: 130   Forward flexion: 130   Internal rotation 0 degrees: Sacrum     Muscle Strength   The patient has normal right shoulder strength.    Tests   Cross arm: negative  Impingement: negative  Drop arm: negative            No evidence of winged scapula  Neurologic Exam     Mental Status   Oriented to person, place, and time.         Assessment/Plan   Independent Review of Radiographic Studies:    I did review an MRI report of the cervical spine performed 5/8/2017 There was a small central protrusion at c3-c4   Patient had a normal EMG of RUE 9/2018       Procedures     Radha was seen today for post-op.    Diagnoses and all orders for this visit:    Cervical radicular pain  -     Ambulatory Referral to Physical Therapy Evaluate and treat (history of cervical bulging disc), Ortho  -     Osteogenesis Stimulator    S/P arthroscopy of right shoulder    Other orders  -     MethylPREDNISolone (MEDROL, JONAS,) 4 MG tablet; Use as directed by package instructions         Recommendations/Plan:     Sutures Staples or Pins [] Removed today  [] At prior visit  [] Plan removal later   Physical therapy: []rehab facility  []outpatient referral  [] therapy ongoing   Ultrasound: []not ordered         []order given to patient   Labs: []not ordered         []order given to patient   Weight Bearing status: []Full []WBAT []PWB []NWB []Other            Exercise, medications, injections, other patient advice, and return appointment as noted.      Continue PT  We will try and  order TENS unit for home use  Patient agreeable to call or return sooner for any concerns.

## 2018-12-20 ENCOUNTER — TREATMENT (OUTPATIENT)
Dept: PHYSICAL THERAPY | Facility: CLINIC | Age: 28
End: 2018-12-20

## 2018-12-20 PROCEDURE — 97140 MANUAL THERAPY 1/> REGIONS: CPT | Performed by: PHYSICAL THERAPIST

## 2018-12-20 PROCEDURE — 97110 THERAPEUTIC EXERCISES: CPT | Performed by: PHYSICAL THERAPIST

## 2018-12-20 NOTE — PROGRESS NOTES
Physical Therapy Daily Progress Note        Radha Dixon reports 2/10 pain in neck and 4/10 in shoulder today at rest.  Pt reports she has not been at work today and will not be working for unknown amount of time.         Objective Pt present to PT today with no distress at rest.     Cervical AROM  Rotation R: 71  Rotation L: 68  Lateral Flexion R: 41  Lateral Flexion L: 53  Flexion: 55  Extension: 58    Shoulder AROM  Flexion: 165  Abduction: 134  ER: 92  IR: 68    MMT  UT: 5/5  Shoulder abd  R: 4-/5  L: 5/5  Shoulder ER  R: 4-/5  L: 5/5  Shoulder IR  R: 5/5  L :5/5  Shoulder flexion  R: 5/5  L: 4-/5  Biceps: 5/5 bilaterally  Triceps 5/5 bilaterally     Pt with reduction of neck pain to 0/10 with PAs, UT stretching, and distraction.       See Exercise, Manual, and Modality Logs for complete treatment.     Assessment/Plan  Pt with good cervical ROM with some guarding in UT on the R side. Pt with decreased symptoms with distraction of cervical spine. Pt responded well to postural training today. Pt to continue cervical stabilization and postural training to help decrease neck and shoulder pain.       Progress per Plan of Care  Continue with strengthening of cervical and shoulder muscles            Manual Therapy:    25     mins  50563;  Therapeutic Exercise:    21     mins  65132;     Neuromuscular Joi:        mins  12548;    Therapeutic Activity:          mins  35567;     Gait Training:        ___  mins  09358;     Ultrasound:          mins  75140;    Electrical Stimulation:         mins  56211 ( );  Dry Needling          mins self-pay    Timed Treatment:   46   mins   Total Treatment:     61   mins    Peterson Weiner, PT  Physical Therapist

## 2018-12-26 ENCOUNTER — TREATMENT (OUTPATIENT)
Dept: PHYSICAL THERAPY | Facility: CLINIC | Age: 28
End: 2018-12-26

## 2018-12-26 PROCEDURE — 97110 THERAPEUTIC EXERCISES: CPT | Performed by: PHYSICAL THERAPIST

## 2018-12-26 PROCEDURE — 97140 MANUAL THERAPY 1/> REGIONS: CPT | Performed by: PHYSICAL THERAPIST

## 2018-12-26 NOTE — PROGRESS NOTES
Physical Therapy Daily Progress Note        aRdha Dixon reports 4/10 pain in shoulder and 0/10 pain in neck today at rest.  PT reports having worse pain in shoulder with abduction and horizontal adduction of shoulder. Pt states her neck has not been bothering her that much.         Objective Pt present to PT today with no distress at rest.     Pt unable to perform ER exercises with band due to increased pain in shoulder.     Pt with numbness in hand with corner stretch.       See Exercise, Manual, and Modality Logs for complete treatment.     Assessment/Plan  Pt with some neuro symptoms with shoulder flexion and stressing of the anterior musculature of shoulder and chest. Pt with symptoms and signs consistent with TOS. Pt to continue working on shoulder stabilization and scapular strengthening to help decrease shoulder pain and neuro symptoms.       Progress per Plan of Care  Assess neuro signs.            Manual Therapy:    12     mins  29837;  Therapeutic Exercise:    34     mins  39819;     Neuromuscular Joi:        mins  78152;    Therapeutic Activity:          mins  89948;     Gait Training:        ___  mins  27171;     Ultrasound:          mins  31203;    Electrical Stimulation:         mins  56580 ( );  Dry Needling          mins self-pay    Timed Treatment:   46   mins   Total Treatment:     56   mins    Peterson Weiner, PT  Physical Therapist

## 2018-12-28 ENCOUNTER — TREATMENT (OUTPATIENT)
Dept: PHYSICAL THERAPY | Facility: CLINIC | Age: 28
End: 2018-12-28

## 2018-12-28 PROCEDURE — 97110 THERAPEUTIC EXERCISES: CPT | Performed by: PHYSICAL THERAPIST

## 2018-12-28 PROCEDURE — 97140 MANUAL THERAPY 1/> REGIONS: CPT | Performed by: PHYSICAL THERAPIST

## 2018-12-28 NOTE — PROGRESS NOTES
Physical Therapy Daily Progress Note        Radha Dixon reports 3/10 pain today at rest.  Pt reports no soreness after last session and that neck pain his improved.         Objective Pt present to PT today with no distress at rest.     Pt continues to present with rounded R shoulder with protracted scapula. Pt with more comfort with corrected posture.       See Exercise, Manual, and Modality Logs for complete treatment.     Assessment/Plan  Pt shoulder pain improved with postural correction. Pt instructed to try and sit and stand with proper shoulder position. Pt may benefit from continued PT to help increase shoulder activity tolerance and function and decrease pain with activity.       Progress per Plan of Care  Assess posture           Manual Therapy:    14     mins  86461;  Therapeutic Exercise:    26     mins  71585;     Neuromuscular Joi:        mins  90110;    Therapeutic Activity:          mins  82960;     Gait Training:        ___  mins  48615;     Ultrasound:          mins  12390;    Electrical Stimulation:         mins  04878 ( );  Dry Needling          mins self-pay    Timed Treatment:   40   mins   Total Treatment:     65   mins    Peterson Weiner, PT  Physical Therapist

## 2019-01-02 ENCOUNTER — OFFICE VISIT (OUTPATIENT)
Dept: SURGERY | Facility: CLINIC | Age: 29
End: 2019-01-02

## 2019-01-02 VITALS
SYSTOLIC BLOOD PRESSURE: 92 MMHG | OXYGEN SATURATION: 99 % | HEART RATE: 102 BPM | WEIGHT: 108.8 LBS | BODY MASS INDEX: 20.54 KG/M2 | DIASTOLIC BLOOD PRESSURE: 60 MMHG | HEIGHT: 61 IN | TEMPERATURE: 98.7 F

## 2019-01-02 DIAGNOSIS — E04.1 THYROID NODULE: Primary | ICD-10-CM

## 2019-01-02 DIAGNOSIS — R13.11 ORAL PHASE DYSPHAGIA: ICD-10-CM

## 2019-01-02 PROCEDURE — 99213 OFFICE O/P EST LOW 20 MIN: CPT | Performed by: SURGERY

## 2019-01-02 RX ORDER — METRONIDAZOLE 500 MG/1
TABLET ORAL
Refills: 0 | COMMUNITY
Start: 2018-12-27 | End: 2019-01-13 | Stop reason: HOSPADM

## 2019-01-02 NOTE — PROGRESS NOTES
Patient: Radha Dixon    YOB: 1990    Date: 01/02/2019    Primary Care Provider: Augustina Falk PA    Chief Complaint   Patient presents with   • Follow-up     6 month follow up thyroid       Subjective .     History of present illness:  The patient is in the office today for a 6 month follow up on her thyroid.  She had bilateral nodules on ultrasound.  She complains of difficulty swallowing both foods and liquids.  She states that she has had the problem for awhile but it is worsening.  Nodule unchanged on ultrasound today.  Patient still has problems swallowing, especially pills.  EGD 1 year ago indicates no evidence of significant stricture.  Patient does have a small hiatal hernia.    The following portions of the patient's history were reviewed and updated as appropriate: allergies, current medications, past family history, past medical history, past social history, past surgical history and problem list.       Review of Systems   Constitutional: Negative for chills, fever and unexpected weight change.   HENT: Positive for trouble swallowing. Negative for hearing loss and voice change.    Eyes: Negative for visual disturbance.   Respiratory: Negative for apnea, cough, chest tightness, shortness of breath and wheezing.    Cardiovascular: Negative for chest pain, palpitations and leg swelling.   Gastrointestinal: Negative for abdominal distention, abdominal pain, anal bleeding, blood in stool, constipation, diarrhea, nausea, rectal pain and vomiting.   Endocrine: Negative for cold intolerance and heat intolerance.   Genitourinary: Negative for difficulty urinating, dysuria and flank pain.   Musculoskeletal: Negative for back pain and gait problem.   Skin: Negative for color change, rash and wound.   Neurological: Negative for dizziness, syncope, speech difficulty, weakness, light-headedness, numbness and headaches.   Hematological: Negative for adenopathy. Does not bruise/bleed easily.  "  Psychiatric/Behavioral: Negative for confusion. The patient is not nervous/anxious.        Allergies:  Allergies   Allergen Reactions   • Onion Other (See Comments)     REPORTS CAUSES MIGRAINES     • Sulfa Antibiotics Hives       Medications:    Current Outpatient Medications:   •  Levonorgestrel (MEAGHAN) 13.5 MG intrauterine device IUD, 1 each by Intrauterine route 1 (One) Time., Disp: , Rfl:   •  MethylPREDNISolone (MEDROL, JONAS,) 4 MG tablet, Use as directed by package instructions, Disp: 21 tablet, Rfl: 0  •  metroNIDAZOLE (FLAGYL) 500 MG tablet, TAKE 1 TABLET BY MOUTH TWO TIMES A DAY UNTIL GONE, Disp: , Rfl: 0    History\"  Past Medical History:   Diagnosis Date   • Acid reflux    • Allergic    • Anxiety    • Anxiety and depression    • Arthritis    • Asthma    • Bipolar 1 disorder (CMS/HCC)    • Body piercing     EARS, NOSE, BELLY BUTTON   • Depression    • Dysphagia     REPORTS SOMETIMES FEELS LIKE \"FOOD GETS STUCK\"   • Fracture     HISTORY OF RIGHT WRIST AS A CHILD   • History of migraine    • Injury of neck    • Insomnia    • Migraine    • Ovarian cyst    • PONV (postoperative nausea and vomiting)    • Scoliosis    • Tattoo    • Toe fracture    • Wrist fracture        Past Surgical History:   Procedure Laterality Date   • APPENDECTOMY  2016   • CHOLECYSTECTOMY     • CYST REMOVAL      OVARIAN CYST   • DILATATION AND CURETTAGE     • ENDOSCOPY N/A 12/1/2017    Procedure: ESOPHAGOGASTRODUODENOSCOPY WITH COLD FORCEP BIOPSY;  Surgeon: Danilo Shabazz MD;  Location: [facility] ENDOSCOPY;  Service:    • HIP SURGERY Right     RIGHT HIP, REPORTS HAD BONES SCRAPED   • SHOULDER ARTHROSCOPY Right 10/11/2018    Procedure: Right shoulder diagnostic arthroscopy, limited rotator cuff debridement;  Surgeon: Tino Uribe MD;  Location: [facility] OR;  Service: Orthopedics   • WISDOM TOOTH EXTRACTION         Family History   Problem Relation Age of Onset   • Arthritis Other    • Cancer Other         breast and lung   • Diabetes " "Other    • Heart attack Other    • Migraines Other    • No Known Problems Mother    • No Known Problems Father        Social History     Tobacco Use   • Smoking status: Current Every Day Smoker     Packs/day: 0.50     Years: 13.00     Pack years: 6.50     Types: Cigarettes   • Smokeless tobacco: Never Used   Substance Use Topics   • Alcohol use: No   • Drug use: No        Objective     Vital Signs:   Vitals:    01/02/19 0940   BP: 92/60   Pulse: 102   Temp: 98.7 °F (37.1 °C)   TempSrc: Temporal   SpO2: 99%   Weight: 49.4 kg (108 lb 12.8 oz)   Height: 154.9 cm (61\")       Physical Exam:   General Appearance:    Alert, cooperative, in no acute distress   Head:    Normocephalic, without obvious abnormality, atraumatic   Eyes:            Lids and lashes normal, conjunctivae and sclerae normal, no   icterus, no pallor, corneas clear, PERRLA   Ears:    Ears appear intact with no abnormalities noted   Throat:   No oral lesions, no thrush, oral mucosa moist   Neck:   No adenopathy, supple, trachea midline, no thyromegaly, no   carotid bruit, no JVD   Lungs:     Clear to auscultation,respirations regular, even and                  unlabored    Heart:    Regular rhythm and normal rate, normal S1 and S2, no            murmur, no gallop, no rub, no click   Chest Wall:    No abnormalities observed   Abdomen:     Normal bowel sounds, no masses, no organomegaly, soft        non-tender, non-distended, no guarding, no rebound                tenderness   Extremities:   Moves all extremities well, no edema, no cyanosis, no             redness   Pulses:   Pulses palpable and equal bilaterally   Skin:   No bleeding, bruising or rash   Lymph nodes:   No palpable adenopathy   Neurologic:   Cranial nerves 2 - 12 grossly intact, sensation intact, DTR       present and equal bilaterally     Results Review:   I reviewed the patient's new clinical results.    Assessment/Plan     1. Thyroid nodule    2. Oral phase dysphagia        Patient " reassured that thyroid nodule stable.  Minimal EGD findings 1 year ago.  Recommend esophageal manometry if dysphagia continues.  At this time, it appears very minor and patient would like to just try conservative measures.  Repeat ultrasound in 1 year    I discussed the patients findings and my recommendations with patient    Review of Systems was reviewed and confirmed as accurate today.    Electronically signed by Danilo Shabazz MD  01/02/19      .Portions of this note have been scribed for Danilo Shabazz MD by Michelle Milligan. 1/2/2019  10:20 AM

## 2019-01-03 ENCOUNTER — TREATMENT (OUTPATIENT)
Dept: PHYSICAL THERAPY | Facility: CLINIC | Age: 29
End: 2019-01-03

## 2019-01-03 DIAGNOSIS — M75.51 ACUTE SHOULDER BURSITIS, RIGHT: Primary | ICD-10-CM

## 2019-01-03 PROCEDURE — 97112 NEUROMUSCULAR REEDUCATION: CPT | Performed by: PHYSICAL THERAPIST

## 2019-01-03 NOTE — PROGRESS NOTES
Physical Therapy Daily Progress Note        Radha Dixon reports 2/10 pain today at rest.  Pt reports almost falling down stairs and reaching back with R shoulder to catch herself. Pt reports having some pain afterwards but has seemed to have felt better since incident.         Objective Pt present to PT today with no distress at rest.     Pt with no increased pain in shoulder with exercises. Pt with 0/10 pain upon leaving PT session today.       See Exercise, Manual, and Modality Logs for complete treatment.     Assessment/Plan  Pt with improved shoulder pain today with scapular strengthening and concentration on serratus anterior and thoracic mobility. Pt to continue with PT to help improve shoulder pain and function to allow pt to return to pain free work duties.       Progress per Plan of Care  Continue serratus ant strengthening           Manual Therapy:         mins  08695;  Therapeutic Exercise:         mins  03499;     Neuromuscular Joi:    11    mins  64620;    Therapeutic Activity:          mins  63116;     Gait Training:        ___  mins  17732;     Ultrasound:          mins  31512;    Electrical Stimulation:         mins  96743 ( );  Dry Needling          mins self-pay    Timed Treatment:   11   mins   Total Treatment:     65   mins    Peterson Weiner, PT  Physical Therapist

## 2019-01-04 ENCOUNTER — TREATMENT (OUTPATIENT)
Dept: PHYSICAL THERAPY | Facility: CLINIC | Age: 29
End: 2019-01-04

## 2019-01-04 PROCEDURE — 97110 THERAPEUTIC EXERCISES: CPT | Performed by: PHYSICAL THERAPIST

## 2019-01-04 PROCEDURE — 97112 NEUROMUSCULAR REEDUCATION: CPT | Performed by: PHYSICAL THERAPIST

## 2019-01-04 PROCEDURE — 97140 MANUAL THERAPY 1/> REGIONS: CPT | Performed by: PHYSICAL THERAPIST

## 2019-01-04 NOTE — PROGRESS NOTES
Physical Therapy Daily Progress Note        Radha Dixon reports 2/10 pain today at rest.  Pt reports feeling good after leaving PT yesterday but slept on her shoulder last night and has some soreness today because of that.         Objective Pt present to PT today with no distress at rest.     Pt tolerated exercises well today with no increased pain with PT today.       See Exercise, Manual, and Modality Logs for complete treatment.     Assessment/Plan  Pt continues to improve slowly with less pain and greater function in shoulder. Pt to continue working on thoracic mobilization and scapular stabilization to help improve shoulder function and decrease pain.       Progress per Plan of Care  Assess overhead strength.            Manual Therapy:    17     mins  97878;  Therapeutic Exercise:     12    mins  87957;     Neuromuscular Joi:    10    mins  92026;    Therapeutic Activity:          mins  29014;     Gait Training:        ___  mins  95848;     Ultrasound:          mins  38568;    Electrical Stimulation:         mins  98539 ( );  Dry Needling          mins self-pay    Timed Treatment:   39   mins   Total Treatment:     66   mins    Peterson Weiner, PT  Physical Therapist

## 2019-01-09 ENCOUNTER — TREATMENT (OUTPATIENT)
Dept: PHYSICAL THERAPY | Facility: CLINIC | Age: 29
End: 2019-01-09

## 2019-01-09 PROCEDURE — 97110 THERAPEUTIC EXERCISES: CPT | Performed by: PHYSICAL THERAPIST

## 2019-01-09 NOTE — PROGRESS NOTES
Physical Therapy Daily Progress Note        Radha Dixon reports 0/10 pain today at rest.  Pt states that she has no had any trouble with neck or shoulder but has not been working. Pt states that she has had a headache most of the week.         Objective Pt present to PT today with no distress at rest.     R shoulder flexion: 171  R shoulder Abduction: 175  R shoulder IR: 64  R shoulder ER: 102    R shoulder MMT  Flexion: 4/5  Abduction: 4/5 with slight pain   ER: 4/5 with slight pain  IR: 4/5  Scaption: 4/5      See Exercise, Manual, and Modality Logs for complete treatment.      Assessment/Plan  PT unable to reproduce headache with neck and shoulder palpation and movement. Pt continues to have tenderness in UTs bilaterally. Pt with pain with abduction and ER when lifting or manually resisted. Pt may benefit from continued PT to help increase function, strength, and activity tolerance of R shoulder.         Progress per Plan of Care  Await reauthorization.            Manual Therapy:         mins  89491;  Therapeutic Exercise:    10     mins  84938;     Neuromuscular Joi:        mins  14850;    Therapeutic Activity:          mins  42824;     Gait Training:        ___  mins  45789;     Ultrasound:          mins  79808;    Electrical Stimulation:         mins  16548 ( );  Dry Needling          mins self-pay    Timed Treatment:   10   mins   Total Treatment:     71   mins    Peterson Weiner, PT  Physical Therapist

## 2019-01-13 ENCOUNTER — APPOINTMENT (OUTPATIENT)
Dept: CT IMAGING | Facility: HOSPITAL | Age: 29
End: 2019-01-13

## 2019-01-13 ENCOUNTER — HOSPITAL ENCOUNTER (EMERGENCY)
Facility: HOSPITAL | Age: 29
Discharge: HOME | End: 2019-01-13
Attending: EMERGENCY MEDICINE | Admitting: EMERGENCY MEDICINE

## 2019-01-13 VITALS
SYSTOLIC BLOOD PRESSURE: 104 MMHG | WEIGHT: 106.2 LBS | HEART RATE: 79 BPM | RESPIRATION RATE: 16 BRPM | OXYGEN SATURATION: 100 % | TEMPERATURE: 98.4 F | DIASTOLIC BLOOD PRESSURE: 69 MMHG | HEIGHT: 61 IN | BODY MASS INDEX: 20.05 KG/M2

## 2019-01-13 DIAGNOSIS — R10.31 RIGHT LOWER QUADRANT ABDOMINAL PAIN: Primary | ICD-10-CM

## 2019-01-13 DIAGNOSIS — N83.201 CYST OF RIGHT OVARY: ICD-10-CM

## 2019-01-13 DIAGNOSIS — G89.18 POST-OP PAIN: ICD-10-CM

## 2019-01-13 LAB
ALBUMIN SERPL-MCNC: 4.9 G/DL (ref 3.5–5)
ALBUMIN/GLOB SERPL: 1.8 G/DL (ref 1–2)
ALP SERPL-CCNC: 37 U/L (ref 38–126)
ALT SERPL W P-5'-P-CCNC: 16 U/L (ref 13–69)
ANION GAP SERPL CALCULATED.3IONS-SCNC: 13.1 MMOL/L (ref 10–20)
AST SERPL-CCNC: 21 U/L (ref 15–46)
B-HCG UR QL: NEGATIVE
BASOPHILS # BLD AUTO: 0.05 10*3/MM3 (ref 0–0.2)
BASOPHILS NFR BLD AUTO: 0.7 % (ref 0–2.5)
BILIRUB SERPL-MCNC: 1.7 MG/DL (ref 0.2–1.3)
BILIRUB UR QL STRIP: NEGATIVE
BUN BLD-MCNC: 14 MG/DL (ref 7–20)
BUN/CREAT SERPL: 20 (ref 7.1–23.5)
CALCIUM SPEC-SCNC: 9.1 MG/DL (ref 8.4–10.2)
CHLORIDE SERPL-SCNC: 107 MMOL/L (ref 98–107)
CLARITY UR: CLEAR
CLUE CELLS SPEC QL WET PREP: ABNORMAL
CO2 SERPL-SCNC: 25 MMOL/L (ref 26–30)
COLOR UR: YELLOW
CREAT BLD-MCNC: 0.7 MG/DL (ref 0.6–1.3)
DEPRECATED RDW RBC AUTO: 36.4 FL (ref 37–54)
EOSINOPHIL # BLD AUTO: 0.33 10*3/MM3 (ref 0–0.7)
EOSINOPHIL NFR BLD AUTO: 4.3 % (ref 0–7)
ERYTHROCYTE [DISTWIDTH] IN BLOOD BY AUTOMATED COUNT: 11.3 % (ref 11.5–14.5)
GFR SERPL CREATININE-BSD FRML MDRD: 100 ML/MIN/1.73
GLOBULIN UR ELPH-MCNC: 2.8 GM/DL
GLUCOSE BLD-MCNC: 93 MG/DL (ref 74–98)
GLUCOSE UR STRIP-MCNC: NEGATIVE MG/DL
HCT VFR BLD AUTO: 41.3 % (ref 37–47)
HGB BLD-MCNC: 13.9 G/DL (ref 12–16)
HGB UR QL STRIP.AUTO: NEGATIVE
HYDATID CYST SPEC WET PREP: ABNORMAL
IMM GRANULOCYTES # BLD AUTO: 0.02 10*3/MM3 (ref 0–0.06)
IMM GRANULOCYTES NFR BLD AUTO: 0.3 % (ref 0–0.6)
KETONES UR QL STRIP: ABNORMAL
KOH PREP NAIL: NORMAL
LEUKOCYTE ESTERASE UR QL STRIP.AUTO: NEGATIVE
LYMPHOCYTES # BLD AUTO: 2.54 10*3/MM3 (ref 0.6–3.4)
LYMPHOCYTES NFR BLD AUTO: 33.2 % (ref 10–50)
MCH RBC QN AUTO: 30 PG (ref 27–31)
MCHC RBC AUTO-ENTMCNC: 33.7 G/DL (ref 30–37)
MCV RBC AUTO: 89 FL (ref 81–99)
MONOCYTES # BLD AUTO: 0.42 10*3/MM3 (ref 0–0.9)
MONOCYTES NFR BLD AUTO: 5.5 % (ref 0–12)
NEUTROPHILS # BLD AUTO: 4.28 10*3/MM3 (ref 2–6.9)
NEUTROPHILS NFR BLD AUTO: 56 % (ref 37–80)
NITRITE UR QL STRIP: NEGATIVE
NRBC BLD AUTO-RTO: 0 /100 WBC (ref 0–0)
PH UR STRIP.AUTO: 6 [PH] (ref 5–8)
PLATELET # BLD AUTO: 187 10*3/MM3 (ref 130–400)
PMV BLD AUTO: 12 FL (ref 6–12)
POTASSIUM BLD-SCNC: 4.1 MMOL/L (ref 3.5–5.1)
PROT SERPL-MCNC: 7.7 G/DL (ref 6.3–8.2)
PROT UR QL STRIP: NEGATIVE
RBC # BLD AUTO: 4.64 10*6/MM3 (ref 4.2–5.4)
SODIUM BLD-SCNC: 141 MMOL/L (ref 137–145)
SP GR UR STRIP: 1.02 (ref 1–1.03)
T VAGINALIS SPEC QL WET PREP: ABNORMAL
UROBILINOGEN UR QL STRIP: ABNORMAL
WBC NRBC COR # BLD: 7.64 10*3/MM3 (ref 4.8–10.8)
WBC SPEC QL WET PREP: ABNORMAL
YEAST GENITAL QL WET PREP: ABNORMAL

## 2019-01-13 PROCEDURE — 25010000002 KETOROLAC TROMETHAMINE PER 15 MG: Performed by: PHYSICIAN ASSISTANT

## 2019-01-13 PROCEDURE — 81025 URINE PREGNANCY TEST: CPT | Performed by: PHYSICIAN ASSISTANT

## 2019-01-13 PROCEDURE — 36415 COLL VENOUS BLD VENIPUNCTURE: CPT | Performed by: PHYSICIAN ASSISTANT

## 2019-01-13 PROCEDURE — 80053 COMPREHEN METABOLIC PANEL: CPT | Performed by: PHYSICIAN ASSISTANT

## 2019-01-13 PROCEDURE — 87591 N.GONORRHOEAE DNA AMP PROB: CPT | Performed by: PHYSICIAN ASSISTANT

## 2019-01-13 PROCEDURE — 96374 THER/PROPH/DIAG INJ IV PUSH: CPT

## 2019-01-13 PROCEDURE — 99283 EMERGENCY DEPT VISIT LOW MDM: CPT

## 2019-01-13 PROCEDURE — 87220 TISSUE EXAM FOR FUNGI: CPT | Performed by: PHYSICIAN ASSISTANT

## 2019-01-13 PROCEDURE — 87210 SMEAR WET MOUNT SALINE/INK: CPT | Performed by: PHYSICIAN ASSISTANT

## 2019-01-13 PROCEDURE — 81003 URINALYSIS AUTO W/O SCOPE: CPT | Performed by: PHYSICIAN ASSISTANT

## 2019-01-13 PROCEDURE — 74176 CT ABD & PELVIS W/O CONTRAST: CPT

## 2019-01-13 PROCEDURE — 85025 COMPLETE CBC W/AUTO DIFF WBC: CPT | Performed by: PHYSICIAN ASSISTANT

## 2019-01-13 PROCEDURE — 96361 HYDRATE IV INFUSION ADD-ON: CPT

## 2019-01-13 PROCEDURE — 87491 CHLMYD TRACH DNA AMP PROBE: CPT | Performed by: PHYSICIAN ASSISTANT

## 2019-01-13 RX ORDER — IBUPROFEN 800 MG/1
800 TABLET ORAL 2 TIMES DAILY PRN
Qty: 8 TABLET | Refills: 0 | Status: SHIPPED | OUTPATIENT
Start: 2019-01-13 | End: 2019-03-26 | Stop reason: ALTCHOICE

## 2019-01-13 RX ORDER — SENNOSIDES 8.6 MG
650 CAPSULE ORAL EVERY 8 HOURS PRN
Qty: 12 TABLET | Refills: 0 | Status: SHIPPED | OUTPATIENT
Start: 2019-01-13 | End: 2020-02-26

## 2019-01-13 RX ORDER — SODIUM CHLORIDE 0.9 % (FLUSH) 0.9 %
10 SYRINGE (ML) INJECTION AS NEEDED
Status: DISCONTINUED | OUTPATIENT
Start: 2019-01-13 | End: 2019-01-13 | Stop reason: HOSPADM

## 2019-01-13 RX ORDER — IBUPROFEN 800 MG/1
800 TABLET ORAL EVERY 8 HOURS PRN
Qty: 12 TABLET | Refills: 0 | Status: SHIPPED | OUTPATIENT
Start: 2019-01-13 | End: 2019-01-13 | Stop reason: SDUPTHER

## 2019-01-13 RX ORDER — IBUPROFEN 800 MG/1
800 TABLET ORAL EVERY 6 HOURS PRN
COMMUNITY
End: 2019-01-13

## 2019-01-13 RX ORDER — KETOROLAC TROMETHAMINE 30 MG/ML
10 INJECTION, SOLUTION INTRAMUSCULAR; INTRAVENOUS ONCE
Status: COMPLETED | OUTPATIENT
Start: 2019-01-13 | End: 2019-01-13

## 2019-01-13 RX ADMIN — SODIUM CHLORIDE 1000 ML: 9 INJECTION, SOLUTION INTRAVENOUS at 14:18

## 2019-01-13 RX ADMIN — KETOROLAC TROMETHAMINE 10 MG: 30 INJECTION, SOLUTION INTRAMUSCULAR at 14:47

## 2019-01-13 NOTE — DISCHARGE INSTRUCTIONS
Call Dr. Bernal's office tomorrow for follow-up by tomorrow or Tuesday use medications as directed

## 2019-01-13 NOTE — ED NOTES
Pelvic exam complete with RN chaperone.  Soft creamy white discharge noted during exam.  Swabs sent to lab.      Hanna Madrigal RN  01/13/19 9856
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
## 2019-01-15 LAB
C TRACH RRNA SPEC DONR QL NAA+PROBE: NEGATIVE
N GONORRHOEA DNA SPEC QL NAA+PROBE: NEGATIVE

## 2019-01-16 ENCOUNTER — TREATMENT (OUTPATIENT)
Dept: PHYSICAL THERAPY | Facility: CLINIC | Age: 29
End: 2019-01-16

## 2019-01-16 PROCEDURE — 97112 NEUROMUSCULAR REEDUCATION: CPT | Performed by: PHYSICAL THERAPIST

## 2019-01-16 PROCEDURE — 97140 MANUAL THERAPY 1/> REGIONS: CPT | Performed by: PHYSICAL THERAPIST

## 2019-01-16 PROCEDURE — 97110 THERAPEUTIC EXERCISES: CPT | Performed by: PHYSICAL THERAPIST

## 2019-01-16 NOTE — PROGRESS NOTES
Physical Therapy Daily Progress Note        Radha Dixon reports 0/10 pain today at rest.  Pt states that the shoulder is doing pretty well but still has popping without pain when fully elevating arm in flexion. Pt states she still has pain with picking up/carrying her youngest child and folding laundry.         Objective Pt present to PT today with no distress at rest.     Pt with good strength with shoulder flexion, abduction, and internal rotation but pain with external rotation in superior shoulder.     Pt with tenderness over supraspinatus and supraspinous process. Pt with winging of R scapula compared to L at rest and protracted scapula on R.       See Exercise, Manual, and Modality Logs for complete treatment.     Assessment/Plan  Pt continues to have shoulder pain with functional activities at home. Pt with tenderness in scapular muscles and supraspinatus. Pt may benefit from PT to help increase function and pain free activity with daily activities around the house.       Progress per Plan of Care  Await MD orders           Manual Therapy:    16     mins  72212;  Therapeutic Exercise:    27     mins  52038;     Neuromuscular Joi:    10    mins  87155;    Therapeutic Activity:          mins  81564;     Gait Training:        ___  mins  48497;     Ultrasound:          mins  75881;    Electrical Stimulation:         mins  43459 ( );  Dry Needling          mins self-pay    Timed Treatment:   53   mins   Total Treatment:     63   mins    Peterson Weiner, PT  Physical Therapist

## 2019-01-22 ENCOUNTER — OFFICE VISIT (OUTPATIENT)
Dept: ORTHOPEDIC SURGERY | Facility: CLINIC | Age: 29
End: 2019-01-22

## 2019-01-22 VITALS — BODY MASS INDEX: 20.01 KG/M2 | WEIGHT: 106 LBS | RESPIRATION RATE: 18 BRPM | HEIGHT: 61 IN

## 2019-01-22 DIAGNOSIS — M54.2 CERVICALGIA: Primary | ICD-10-CM

## 2019-01-22 DIAGNOSIS — Z98.890 HX OF ARTHROSCOPY OF SHOULDER: ICD-10-CM

## 2019-01-22 DIAGNOSIS — M62.838 CERVICAL PARASPINAL MUSCLE SPASM: ICD-10-CM

## 2019-01-22 PROCEDURE — 99213 OFFICE O/P EST LOW 20 MIN: CPT | Performed by: PHYSICIAN ASSISTANT

## 2019-01-22 RX ORDER — TIZANIDINE 2 MG/1
2 TABLET ORAL NIGHTLY PRN
Qty: 21 TABLET | Refills: 0 | Status: SHIPPED | OUTPATIENT
Start: 2019-01-22 | End: 2019-03-22
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
## 2019-02-15 ENCOUNTER — OFFICE VISIT (OUTPATIENT)
Dept: INTERNAL MEDICINE | Facility: CLINIC | Age: 29
End: 2019-02-15

## 2019-02-15 VITALS
BODY MASS INDEX: 21.14 KG/M2 | SYSTOLIC BLOOD PRESSURE: 102 MMHG | HEIGHT: 61 IN | OXYGEN SATURATION: 99 % | WEIGHT: 112 LBS | HEART RATE: 103 BPM | DIASTOLIC BLOOD PRESSURE: 58 MMHG | TEMPERATURE: 98.9 F

## 2019-02-15 DIAGNOSIS — Z20.828 EXPOSURE TO INFLUENZA: ICD-10-CM

## 2019-02-15 DIAGNOSIS — J06.9 ACUTE URI: Primary | ICD-10-CM

## 2019-02-15 PROCEDURE — 87804 INFLUENZA ASSAY W/OPTIC: CPT | Performed by: PHYSICIAN ASSISTANT

## 2019-02-15 PROCEDURE — 99213 OFFICE O/P EST LOW 20 MIN: CPT | Performed by: PHYSICIAN ASSISTANT

## 2019-02-15 PROCEDURE — 87880 STREP A ASSAY W/OPTIC: CPT | Performed by: PHYSICIAN ASSISTANT

## 2019-02-15 RX ORDER — OSELTAMIVIR PHOSPHATE 75 MG/1
75 CAPSULE ORAL DAILY
Qty: 10 CAPSULE | Refills: 0 | Status: SHIPPED | OUTPATIENT
Start: 2019-02-15 | End: 2019-02-25

## 2019-02-15 RX ORDER — BROMPHENIRAMINE MALEATE, PSEUDOEPHEDRINE HYDROCHLORIDE, AND DEXTROMETHORPHAN HYDROBROMIDE 2; 30; 10 MG/5ML; MG/5ML; MG/5ML
10 SYRUP ORAL 4 TIMES DAILY PRN
Qty: 118 ML | Refills: 0 | Status: SHIPPED | OUTPATIENT
Start: 2019-02-15 | End: 2019-03-22

## 2019-02-15 NOTE — PROGRESS NOTES
Radha Dixon is a 28 y.o. female.     Subjective   History of Present Illness   Here today with concern of 3-4 days of sore throat, congestion, ear pain, headache and head pressure.  She denies cough, sneezing, fever or body aches.  Both of her children had the Flu last week.  She did receive an influenza vaccine this season.         The following portions of the patient's history were reviewed and updated as appropriate: allergies, current medications, past family history, past medical history, past social history, past surgical history and problem list.    Review of Systems   Constitutional: Negative for appetite change, chills, fatigue, fever and unexpected weight change.   HENT: Positive for congestion, ear pain, postnasal drip, rhinorrhea, sinus pressure and sore throat.    Respiratory: Negative for cough, chest tightness, shortness of breath and wheezing.    Cardiovascular: Negative for chest pain, palpitations and leg swelling.   Neurological: Positive for headaches.   Psychiatric/Behavioral: Negative for dysphoric mood, self-injury and suicidal ideas. The patient is not nervous/anxious.          Objective    Physical Exam   Constitutional: She is oriented to person, place, and time. She appears well-developed and well-nourished. No distress.   HENT:   Head: Normocephalic and atraumatic.   Right Ear: External ear normal.   Left Ear: External ear normal.   Nose: Nose normal.   Mouth/Throat: No oropharyngeal exudate.   OP erythema.    Eyes: Conjunctivae and EOM are normal. Pupils are equal, round, and reactive to light.   Neck: Normal range of motion. Neck supple. No thyromegaly present.   Cardiovascular: Normal rate, regular rhythm and normal heart sounds. Exam reveals no gallop and no friction rub.   No murmur heard.  Pulmonary/Chest: Effort normal and breath sounds normal. No respiratory distress. She has no wheezes. She has no rales. She exhibits no tenderness.   Abdominal: Soft. Bowel sounds are  "normal. She exhibits no distension. There is no tenderness. There is no guarding.   Musculoskeletal: Normal range of motion. She exhibits no tenderness.   Lymphadenopathy:     She has no cervical adenopathy.   Neurological: She is alert and oriented to person, place, and time. No cranial nerve deficit or sensory deficit.   Skin: Skin is warm and dry. Capillary refill takes less than 2 seconds. No rash noted. She is not diaphoretic.   Psychiatric: She has a normal mood and affect. Her behavior is normal. Judgment and thought content normal.         /58   Pulse 103   Temp 98.9 °F (37.2 °C)   Ht 154.9 cm (60.98\")   Wt 50.8 kg (112 lb)   SpO2 99%   BMI 21.17 kg/m²     Nursing note and vitals reviewed.        Assessment/Plan   Radha was seen today for sinus problem.    Diagnoses and all orders for this visit:    Acute URI  -     POC Influenza A / B - negative  -     POC Rapid Strep A - negative  -     brompheniramine-pseudoephedrine-DM 30-2-10 MG/5ML syrup; Take 10 mL by mouth 4 (Four) Times a Day As Needed for Congestion, Cough or Allergies.    Exposure to influenza  -     oseltamivir (TAMIFLU) 75 MG capsule; Take 1 capsule by mouth Daily for 10 days.        Increase PO fluid intake. Ibuprofen and/or Tylenol prn for pain.      Call or RTC if symptoms worsen or persist.            "

## 2019-03-22 ENCOUNTER — OFFICE VISIT (OUTPATIENT)
Dept: INTERNAL MEDICINE | Facility: CLINIC | Age: 29
End: 2019-03-22

## 2019-03-22 VITALS
BODY MASS INDEX: 21.34 KG/M2 | OXYGEN SATURATION: 99 % | HEART RATE: 103 BPM | TEMPERATURE: 98.7 F | DIASTOLIC BLOOD PRESSURE: 62 MMHG | SYSTOLIC BLOOD PRESSURE: 98 MMHG | WEIGHT: 113 LBS | HEIGHT: 61 IN

## 2019-03-22 DIAGNOSIS — M25.561 ACUTE PAIN OF RIGHT KNEE: Primary | ICD-10-CM

## 2019-03-22 DIAGNOSIS — J06.9 ACUTE URI: ICD-10-CM

## 2019-03-22 PROCEDURE — 99214 OFFICE O/P EST MOD 30 MIN: CPT | Performed by: PHYSICIAN ASSISTANT

## 2019-03-22 RX ORDER — CETIRIZINE HYDROCHLORIDE 10 MG/1
10 TABLET ORAL DAILY
Qty: 30 TABLET | Refills: 12 | Status: SHIPPED | OUTPATIENT
Start: 2019-03-22 | End: 2020-01-23

## 2019-03-22 RX ORDER — BROMPHENIRAMINE MALEATE, PSEUDOEPHEDRINE HYDROCHLORIDE, AND DEXTROMETHORPHAN HYDROBROMIDE 2; 30; 10 MG/5ML; MG/5ML; MG/5ML
10 SYRUP ORAL 4 TIMES DAILY PRN
Qty: 473 ML | Refills: 0 | Status: SHIPPED | OUTPATIENT
Start: 2019-03-22 | End: 2019-04-29

## 2019-03-22 NOTE — PROGRESS NOTES
Radha Dixon is a 29 y.o. female.     Subjective   History of Present Illness   Here today with concern of right knee pain since hyperextending the knee while walking up stairs 6 days ago.  She reports that her pain is interior and medial to the patella.  She did feel pop which preceded the pain.  Since then she has had pain with ambulation as well as any bending and straightening of the knee. She has also had trouble sleeping due to the pain. She is talking Ibuprofen 800 mg which does not help. She has been wearing a knee brace which helps some until she takes it off it is burning.  She has noticed some swelling but no bruising. The knee feels unstable with ambulation.      Today she also reports several weeks of intermittent cough, congestion and postnasal drip.  She denies fever, chills, headache, wheezing, SOA or sore throat.  She is not taking anything for symptoms.      The following portions of the patient's history were reviewed and updated as appropriate: allergies, current medications, past family history, past medical history, past social history, past surgical history and problem list.    Review of Systems   Constitutional: Negative for appetite change, chills, fatigue, fever and unexpected weight change.   HENT: Positive for congestion, postnasal drip and rhinorrhea. Negative for dental problem, ear discharge, hearing loss, sinus pressure, sneezing, tinnitus and trouble swallowing.    Respiratory: Positive for cough. Negative for chest tightness, shortness of breath and wheezing.    Cardiovascular: Negative for chest pain, palpitations and leg swelling.   Musculoskeletal: Positive for arthralgias, gait problem and joint swelling. Negative for back pain, myalgias, neck pain and neck stiffness.   Neurological: Negative for dizziness, tremors, seizures, syncope, facial asymmetry, weakness, light-headedness, numbness and headaches.   Hematological: Negative for adenopathy. Does not bruise/bleed easily.    Psychiatric/Behavioral: Positive for sleep disturbance (due to pain). Negative for behavioral problems, dysphoric mood, self-injury and suicidal ideas. The patient is not nervous/anxious.          Objective    Physical Exam   Constitutional: She is oriented to person, place, and time. She appears well-developed and well-nourished. No distress.   Eyes: Conjunctivae and EOM are normal. Pupils are equal, round, and reactive to light.   Neck: Normal range of motion. Neck supple.   Cardiovascular: Normal rate, regular rhythm and normal heart sounds. Exam reveals no gallop and no friction rub.   No murmur heard.  Pulmonary/Chest: Effort normal and breath sounds normal. No respiratory distress. She has no wheezes. She has no rales.   Abdominal: Soft. Bowel sounds are normal. There is no tenderness.   Musculoskeletal: She exhibits edema (minimal) and tenderness. She exhibits no deformity.        Right knee: She exhibits decreased range of motion, swelling (minor), bony tenderness (mild crepitus of patella), abnormal meniscus (pain with Apley grind test) and MCL laxity. She exhibits no effusion, no ecchymosis, no deformity, no laceration, no erythema, normal alignment, no LCL laxity and normal patellar mobility. Tenderness found. Medial joint line, MCL and patellar tendon tenderness noted. No lateral joint line and no LCL tenderness noted.   Full ROM achievable though is restricted due to pain.    Neurological: She is alert and oriented to person, place, and time. She displays normal reflexes. No cranial nerve deficit or sensory deficit. She exhibits normal muscle tone. Coordination (ambulates with slight limp) abnormal.   Skin: Skin is warm and dry. Capillary refill takes less than 2 seconds. No rash noted. She is not diaphoretic.   Psychiatric: She has a normal mood and affect. Her behavior is normal. Judgment and thought content normal.   Nursing note and vitals reviewed.        BP 98/62   Pulse 103   Temp 98.7 °F  "(37.1 °C)   Ht 154.9 cm (60.98\")   Wt 51.3 kg (113 lb)   SpO2 99%   BMI 21.36 kg/m²     Nursing note and vitals reviewed.        Assessment/Plan   Rdaha was seen today for knee pain.    Diagnoses and all orders for this visit:    Acute pain of right knee  -     Ambulatory Referral to Orthopedic Surgery    Continue ibuprofen as needed. Ice prn.       Acute URI  -     brompheniramine-pseudoephedrine-DM 30-2-10 MG/5ML syrup; Take 10 mL by mouth 4 (Four) Times a Day As Needed for Congestion, Cough or Allergies.  -     cetirizine (zyrTEC) 10 MG tablet; Take 1 tablet by mouth Daily.                 "

## 2019-03-25 DIAGNOSIS — M25.561 ARTHRALGIA OF RIGHT KNEE: Primary | ICD-10-CM

## 2019-03-26 ENCOUNTER — OFFICE VISIT (OUTPATIENT)
Dept: ORTHOPEDIC SURGERY | Facility: CLINIC | Age: 29
End: 2019-03-26

## 2019-03-26 VITALS — WEIGHT: 113 LBS | BODY MASS INDEX: 21.34 KG/M2 | HEIGHT: 61 IN | RESPIRATION RATE: 18 BRPM

## 2019-03-26 DIAGNOSIS — S89.91XA KNEE INJURY, RIGHT, INITIAL ENCOUNTER: ICD-10-CM

## 2019-03-26 DIAGNOSIS — M25.561 ARTHRALGIA OF RIGHT KNEE: Primary | ICD-10-CM

## 2019-03-26 DIAGNOSIS — S83.206A POSITIVE MCMURRAY SIGN (MENISCUS TEAR) OF RIGHT KNEE, INITIAL ENCOUNTER: ICD-10-CM

## 2019-03-26 DIAGNOSIS — S83.511A SPRAIN OF ANTERIOR CRUCIATE LIGAMENT OF RIGHT KNEE, INITIAL ENCOUNTER: ICD-10-CM

## 2019-03-26 PROCEDURE — 99213 OFFICE O/P EST LOW 20 MIN: CPT | Performed by: PHYSICIAN ASSISTANT

## 2019-03-26 RX ORDER — INDOMETHACIN 25 MG/1
25 CAPSULE ORAL
Qty: 15 CAPSULE | Refills: 0 | Status: SHIPPED | OUTPATIENT
Start: 2019-03-26 | End: 2019-04-29

## 2019-03-26 RX ORDER — TIZANIDINE 4 MG/1
4 TABLET ORAL NIGHTLY PRN
Qty: 14 TABLET | Refills: 0 | Status: SHIPPED | OUTPATIENT
Start: 2019-03-26 | End: 2019-04-29

## 2019-03-26 NOTE — PROGRESS NOTES
"Subjective   Patient ID: Radha Dixon is a 29 y.o.  female  Pain of the Right Knee (Patient hurt right knee about 2 1/2 wks ago while running up the stairs, pushed on edge of stair with foot, knee gave out and hyperextended to the front and side. She has had pain and swelling since. )         History of Present Illness  Patient presents with right knee pain secondary to an injury that occurred on 3/16/2019 while walking up some steps.  She felt a painful pop and felt her knee \"moved backwards\".  She states she is having sensation of locking to the right knee.  She has trouble sleeping at night due to the pain    Pain Score: 8  Pain Location: Knee  Pain Orientation: Right     Pain Descriptors: Aching, Burning, Throbbing  Pain Frequency: Constant/continuous  Pain Onset: Ongoing  Date Pain First Started: 03/16/19  Clinical Progression: Gradually worsening  Aggravating Factors: Bending, Straightening, Stairs, Walking, Standing(driving)        Pain Intervention(s): Home medication, Elevated, Rest(Ibuprofen, ice after initial injury, has tried knee brace)  Result of Injury: Yes(knee gave out while running up stairs)  Work-Related Injury: No    Past Medical History:   Diagnosis Date   • Acid reflux    • Allergic    • Anxiety    • Anxiety and depression    • Arthritis    • Asthma    • Bipolar 1 disorder (CMS/HCC)    • Body piercing     EARS, NOSE, BELLY BUTTON   • Depression    • Dysphagia     REPORTS SOMETIMES FEELS LIKE \"FOOD GETS STUCK\"   • Fracture     HISTORY OF RIGHT WRIST AS A CHILD   • History of migraine    • Injury of neck    • Insomnia    • Migraine    • Ovarian cyst    • PONV (postoperative nausea and vomiting)    • Scoliosis    • Tattoo    • Toe fracture    • Wrist fracture         Past Surgical History:   Procedure Laterality Date   • APPENDECTOMY  2016   • CHOLECYSTECTOMY     • CYST REMOVAL      OVARIAN CYST   • DILATATION AND CURETTAGE     • ENDOSCOPY N/A 12/1/2017    Procedure: " ESOPHAGOGASTRODUODENOSCOPY WITH COLD FORCEP BIOPSY;  Surgeon: Danilo Shabazz MD;  Location: [facility] ENDOSCOPY;  Service:    • HIP SURGERY Right     RIGHT HIP, REPORTS HAD BONES SCRAPED   • SHOULDER ARTHROSCOPY Right 10/11/2018    Procedure: Right shoulder diagnostic arthroscopy, limited rotator cuff debridement;  Surgeon: Tino Uribe MD;  Location: [facility] OR;  Service: Orthopedics   • WISDOM TOOTH EXTRACTION         Family History   Problem Relation Age of Onset   • Arthritis Other    • Cancer Other         breast and lung   • Diabetes Other    • Heart attack Other    • Migraines Other    • No Known Problems Mother    • No Known Problems Father        Social History     Socioeconomic History   • Marital status: Single     Spouse name: Not on file   • Number of children: Not on file   • Years of education: Not on file   • Highest education level: Not on file   Tobacco Use   • Smoking status: Current Every Day Smoker     Packs/day: 0.50     Years: 13.00     Pack years: 6.50     Types: Cigarettes   • Smokeless tobacco: Never Used   Substance and Sexual Activity   • Alcohol use: No   • Drug use: No   • Sexual activity: Defer   Social History Narrative    Right hand dominant         Current Outpatient Medications:   •  acetaminophen (TYLENOL 8 HOUR) 650 MG 8 hr tablet, Take 1 tablet by mouth Every 8 (Eight) Hours As Needed for Mild Pain ., Disp: 12 tablet, Rfl: 0  •  brompheniramine-pseudoephedrine-DM 30-2-10 MG/5ML syrup, Take 10 mL by mouth 4 (Four) Times a Day As Needed for Congestion, Cough or Allergies., Disp: 473 mL, Rfl: 0  •  cetirizine (zyrTEC) 10 MG tablet, Take 1 tablet by mouth Daily., Disp: 30 tablet, Rfl: 12  •  indomethacin (INDOCIN) 25 MG capsule, Take 1 capsule by mouth 3 (Three) Times a Day With Meals., Disp: 15 capsule, Rfl: 0  •  Levonorgestrel (MEAGHAN) 13.5 MG intrauterine device IUD, 1 each by Intrauterine route 1 (One) Time., Disp: , Rfl:   •  tiZANidine (ZANAFLEX) 4 MG tablet, Take 1 tablet  "by mouth At Night As Needed (knee discomfort)., Disp: 14 tablet, Rfl: 0    Allergies   Allergen Reactions   • Onion Other (See Comments)     REPORTS CAUSES MIGRAINES     • Sulfa Antibiotics Hives       Review of Systems   Constitutional: Negative for fever.   HENT: Negative for voice change.    Eyes: Negative for visual disturbance.   Respiratory: Negative for shortness of breath.    Cardiovascular: Negative for chest pain.   Gastrointestinal: Negative for abdominal distention and abdominal pain.   Genitourinary: Negative for dysuria.   Musculoskeletal: Positive for arthralgias, gait problem and joint swelling.   Skin: Negative for rash.   Neurological: Negative for speech difficulty.   Hematological: Does not bruise/bleed easily.   Psychiatric/Behavioral: Negative for confusion.       Objective   Resp 18   Ht 154.7 cm (60.9\")   Wt 51.3 kg (113 lb)   BMI 21.42 kg/m²    Physical Exam   Constitutional: She is oriented to person, place, and time. She appears well-nourished.   Pulmonary/Chest: Effort normal.   Musculoskeletal:        Right knee: She exhibits decreased range of motion (due to pain). She exhibits no ecchymosis, no deformity and no erythema. Tenderness found.   Neurological: She is alert and oriented to person, place, and time.   Psychiatric: She has a normal mood and affect. Her behavior is normal.   Vitals reviewed.    Right Knee Exam     Range of Motion   Extension: 5   Flexion: 80     Tests   Nila:  Medial - positive   Varus: negative Valgus: negative  Drawer:  Anterior - trace    Posterior - negative  Patellar apprehension: trace    Other   Erythema: absent  Sensation: normal  Pulse: present           Extremity DVT signs are Negative on physical exam with negative Zohaib sign, with no calf pain, with no palpable cords, with no increased pain with passive stretch/extension and with no skin tone change   Neurologic Exam     Mental Status   Oriented to person, place, and time.    "           Assessment/Plan   Independent Review of Radiographic Studies:    Shows no acute fracture or dislocation.      Procedures       Radha was seen today for pain.    Diagnoses and all orders for this visit:    Arthralgia of right knee  -     MRI Knee Right Without Contrast    Knee injury, right, initial encounter  -     MRI Knee Right Without Contrast    Sprain of anterior cruciate ligament of right knee, initial encounter  -     MRI Knee Right Without Contrast    Positive Nila sign (meniscus tear) of right knee, initial encounter  -     MRI Knee Right Without Contrast    Other orders  -     tiZANidine (ZANAFLEX) 4 MG tablet; Take 1 tablet by mouth At Night As Needed (knee discomfort).  -     indomethacin (INDOCIN) 25 MG capsule; Take 1 capsule by mouth 3 (Three) Times a Day With Meals.       Orthopedic activities reviewed and patient expressed appreciation  Discussion of orthopedic goals  Risk, benefits, and merits of treatment alternatives reviewed with the patient and questions answered    Recommendations/Plan:  Exercise, medications, injections, other patient advice, and return appointment as noted.  Patient is encouraged to call or return for any issues or concerns.    FU after MRI     Patient agreeable to call or return sooner for any concerns.

## 2019-03-28 ENCOUNTER — HOSPITAL ENCOUNTER (OUTPATIENT)
Dept: MRI IMAGING | Facility: HOSPITAL | Age: 29
Discharge: HOME | End: 2019-03-28
Admitting: PHYSICIAN ASSISTANT

## 2019-03-28 PROCEDURE — 73721 MRI JNT OF LWR EXTRE W/O DYE: CPT

## 2019-04-02 ENCOUNTER — OFFICE VISIT (OUTPATIENT)
Dept: ORTHOPEDIC SURGERY | Facility: CLINIC | Age: 29
End: 2019-04-02

## 2019-04-02 VITALS — WEIGHT: 113 LBS | HEIGHT: 61 IN | BODY MASS INDEX: 21.34 KG/M2 | RESPIRATION RATE: 18 BRPM

## 2019-04-02 DIAGNOSIS — M22.41 PATELLA, CHONDROMALACIA, RIGHT: ICD-10-CM

## 2019-04-02 DIAGNOSIS — S83.8X1A MENISCAL INJURY, RIGHT, INITIAL ENCOUNTER: ICD-10-CM

## 2019-04-02 DIAGNOSIS — M25.561 ARTHRALGIA OF RIGHT KNEE: Primary | ICD-10-CM

## 2019-04-02 PROCEDURE — 20610 DRAIN/INJ JOINT/BURSA W/O US: CPT | Performed by: PHYSICIAN ASSISTANT

## 2019-04-02 PROCEDURE — 99213 OFFICE O/P EST LOW 20 MIN: CPT | Performed by: PHYSICIAN ASSISTANT

## 2019-04-02 RX ORDER — LIDOCAINE HYDROCHLORIDE 10 MG/ML
2 INJECTION, SOLUTION INFILTRATION; PERINEURAL
Status: COMPLETED | OUTPATIENT
Start: 2019-04-02 | End: 2019-04-02

## 2019-04-02 RX ORDER — METHYLPREDNISOLONE ACETATE 40 MG/ML
40 INJECTION, SUSPENSION INTRA-ARTICULAR; INTRALESIONAL; INTRAMUSCULAR; SOFT TISSUE
Status: COMPLETED | OUTPATIENT
Start: 2019-04-02 | End: 2019-04-02

## 2019-04-02 RX ADMIN — METHYLPREDNISOLONE ACETATE 40 MG: 40 INJECTION, SUSPENSION INTRA-ARTICULAR; INTRALESIONAL; INTRAMUSCULAR; SOFT TISSUE at 11:34

## 2019-04-02 RX ADMIN — LIDOCAINE HYDROCHLORIDE 2 ML: 10 INJECTION, SOLUTION INFILTRATION; PERINEURAL at 11:34

## 2019-04-02 NOTE — PROGRESS NOTES
"Subjective   Patient ID: Radha Dixon is a 29 y.o.  female  Results of the Right Knee (MRI)         History of Present Illness  Patient presents for a scheduled follow-up visit in regards to right knee pain.  She initially injured her right knee the beginning of March 2019 while running upstairs.  She has been wearing a knee brace since her initial eval.  She reports she is still having knee pain with sensation of knee instability.                                                   Past Medical History:   Diagnosis Date   • Acid reflux    • Allergic    • Anxiety    • Anxiety and depression    • Arthritis    • Asthma    • Bipolar 1 disorder (CMS/HCC)    • Body piercing     EARS, NOSE, BELLY BUTTON   • Depression    • Dysphagia     REPORTS SOMETIMES FEELS LIKE \"FOOD GETS STUCK\"   • Fracture     HISTORY OF RIGHT WRIST AS A CHILD   • History of migraine    • Injury of neck    • Insomnia    • Migraine    • Ovarian cyst    • PONV (postoperative nausea and vomiting)    • Scoliosis    • Tattoo    • Toe fracture    • Wrist fracture         Past Surgical History:   Procedure Laterality Date   • APPENDECTOMY  2016   • CHOLECYSTECTOMY     • CYST REMOVAL      OVARIAN CYST   • DILATATION AND CURETTAGE     • ENDOSCOPY N/A 12/1/2017    Procedure: ESOPHAGOGASTRODUODENOSCOPY WITH COLD FORCEP BIOPSY;  Surgeon: Danilo Shabazz MD;  Location: [facility] ENDOSCOPY;  Service:    • HIP SURGERY Right     RIGHT HIP, REPORTS HAD BONES SCRAPED   • SHOULDER ARTHROSCOPY Right 10/11/2018    Procedure: Right shoulder diagnostic arthroscopy, limited rotator cuff debridement;  Surgeon: Tino Uribe MD;  Location: [facility] OR;  Service: Orthopedics   • WISDOM TOOTH EXTRACTION         Family History   Problem Relation Age of Onset   • Arthritis Other    • Cancer Other         breast and lung   • Diabetes Other    • Heart attack Other    • Migraines Other    • No Known Problems Mother    • No Known Problems Father        Social History "     Socioeconomic History   • Marital status: Single     Spouse name: Not on file   • Number of children: Not on file   • Years of education: Not on file   • Highest education level: Not on file   Tobacco Use   • Smoking status: Current Every Day Smoker     Packs/day: 0.50     Years: 13.00     Pack years: 6.50     Types: Cigarettes   • Smokeless tobacco: Never Used   Substance and Sexual Activity   • Alcohol use: No   • Drug use: No   • Sexual activity: Defer   Social History Narrative    Right hand dominant         Current Outpatient Medications:   •  acetaminophen (TYLENOL 8 HOUR) 650 MG 8 hr tablet, Take 1 tablet by mouth Every 8 (Eight) Hours As Needed for Mild Pain ., Disp: 12 tablet, Rfl: 0  •  brompheniramine-pseudoephedrine-DM 30-2-10 MG/5ML syrup, Take 10 mL by mouth 4 (Four) Times a Day As Needed for Congestion, Cough or Allergies., Disp: 473 mL, Rfl: 0  •  cetirizine (zyrTEC) 10 MG tablet, Take 1 tablet by mouth Daily., Disp: 30 tablet, Rfl: 12  •  indomethacin (INDOCIN) 25 MG capsule, Take 1 capsule by mouth 3 (Three) Times a Day With Meals., Disp: 15 capsule, Rfl: 0  •  Levonorgestrel (MEAGHAN) 13.5 MG intrauterine device IUD, 1 each by Intrauterine route 1 (One) Time., Disp: , Rfl:   •  tiZANidine (ZANAFLEX) 4 MG tablet, Take 1 tablet by mouth At Night As Needed (knee discomfort)., Disp: 14 tablet, Rfl: 0    Allergies   Allergen Reactions   • Onion Other (See Comments)     REPORTS CAUSES MIGRAINES     • Sulfa Antibiotics Hives       Review of Systems   Constitutional: Negative for fever.   HENT: Negative for voice change.    Eyes: Negative for visual disturbance.   Respiratory: Negative for shortness of breath.    Cardiovascular: Negative for chest pain.   Gastrointestinal: Negative for abdominal distention and abdominal pain.   Genitourinary: Negative for dysuria.   Musculoskeletal: Positive for arthralgias, gait problem and joint swelling.   Skin: Negative for rash.   Neurological: Negative for speech  "difficulty.   Hematological: Does not bruise/bleed easily.   Psychiatric/Behavioral: Negative for confusion.       Objective   Resp 18   Ht 154.7 cm (60.9\")   Wt 51.3 kg (113 lb)   BMI 21.42 kg/m²    Physical Exam   Constitutional: She appears well-developed.   Neck: No tracheal deviation present.   Pulmonary/Chest: Effort normal.   Musculoskeletal:        Right knee: She exhibits no swelling, no effusion, no ecchymosis, no deformity and no erythema. Tenderness found. Medial joint line and lateral joint line tenderness noted.   Neurological: She is alert.   Psychiatric: She has a normal mood and affect.   Vitals reviewed.    Right Knee Exam     Other   Effusion: no effusion present           Extremity DVT signs are Negative by clinical screen.   Neurologic Exam     + patella crepitus to right knee      Assessment/Plan   Independent Review of Radiographic Studies:    No new imaging done today.  MR seems to reflect a contusion to the posterior horn of the medial meniscus    Large Joint Arthrocentesis: R knee  Date/Time: 4/2/2019 11:34 AM  Consent given by: patient  Site marked: site marked  Timeout: Immediately prior to procedure a time out was called to verify the correct patient, procedure, equipment, support staff and site/side marked as required   Supporting Documentation  Indications: pain   Procedure Details  Location: knee - R knee  Preparation: Patient was prepped and draped in the usual sterile fashion  Needle size: 22 G  Approach: anterolateral  Medications administered: 40 mg methylPREDNISolone acetate 40 MG/ML; 2 mL lidocaine 1 %  Patient tolerance: patient tolerated the procedure well with no immediate complications             Radha was seen today for results.    Diagnoses and all orders for this visit:    Arthralgia of right knee  -     Large Joint Arthrocentesis: R knee    Meniscal injury, right, initial encounter    Patella, chondromalacia, right       Orthopedic activities reviewed and patient " expressed appreciation  Discussion of orthopedic goals  Risk, benefits, and merits of treatment alternatives reviewed with the patient and questions answered  Physical therapy referral given  Use brace as instructed  Call or notify for any adverse effect from injection therapy    Recommendations/Plan:  Exercise, medications, injections, other patient advice, and return appointment as noted.  Patient is encouraged to call or return for any issues or concerns.  Follow-up in 6 weeks  Patient agreeable to call or return sooner for any concerns.

## 2019-04-29 ENCOUNTER — OFFICE VISIT (OUTPATIENT)
Dept: INTERNAL MEDICINE | Facility: CLINIC | Age: 29
End: 2019-04-29

## 2019-04-29 VITALS
DIASTOLIC BLOOD PRESSURE: 66 MMHG | BODY MASS INDEX: 21.52 KG/M2 | WEIGHT: 114 LBS | OXYGEN SATURATION: 100 % | TEMPERATURE: 98.6 F | HEIGHT: 61 IN | HEART RATE: 92 BPM | SYSTOLIC BLOOD PRESSURE: 100 MMHG

## 2019-04-29 DIAGNOSIS — Z87.42 HISTORY OF OVARIAN CYST: ICD-10-CM

## 2019-04-29 DIAGNOSIS — R11.0 NAUSEA: ICD-10-CM

## 2019-04-29 DIAGNOSIS — K52.9 POSTPRANDIAL DIARRHEA: ICD-10-CM

## 2019-04-29 DIAGNOSIS — R10.31 RLQ ABDOMINAL PAIN: Primary | ICD-10-CM

## 2019-04-29 LAB
B-HCG UR QL: NEGATIVE
BILIRUB BLD-MCNC: NEGATIVE MG/DL
CLARITY, POC: ABNORMAL
COLOR UR: YELLOW
GLUCOSE UR STRIP-MCNC: NEGATIVE MG/DL
INTERNAL NEGATIVE CONTROL: NEGATIVE
INTERNAL POSITIVE CONTROL: POSITIVE
KETONES UR QL: NEGATIVE
LEUKOCYTE EST, POC: ABNORMAL
Lab: NORMAL
NITRITE UR-MCNC: NEGATIVE MG/ML
PH UR: 5 [PH] (ref 5–8)
PROT UR STRIP-MCNC: NEGATIVE MG/DL
RBC # UR STRIP: ABNORMAL /UL
SP GR UR: 1.03 (ref 1–1.03)
UROBILINOGEN UR QL: ABNORMAL

## 2019-04-29 PROCEDURE — 81025 URINE PREGNANCY TEST: CPT | Performed by: PHYSICIAN ASSISTANT

## 2019-04-29 PROCEDURE — 99214 OFFICE O/P EST MOD 30 MIN: CPT | Performed by: PHYSICIAN ASSISTANT

## 2019-04-29 RX ORDER — ONDANSETRON 8 MG/1
8 TABLET, ORALLY DISINTEGRATING ORAL EVERY 8 HOURS PRN
Qty: 12 TABLET | Refills: 1 | Status: SHIPPED | OUTPATIENT
Start: 2019-04-29 | End: 2020-01-23

## 2019-04-29 NOTE — PROGRESS NOTES
Radha Dixon is a 29 y.o. female.     Subjective   History of Present Illness   Here today with concern of right lower abdominal discomfort accompanied and constant nausea for the last week.  She also endorses postprandial diarrhea for a few weeks, worse with greasy foods.  She denies vomiting, constipation or blood in her stool.  She also reports her current period has lasted for 3 weeks but seems to have finally stopped today.  She currently has Judi IUD and has not been sexually active in around 3 months.  She is s/p cholecystectomy and appendectomy.  She does have a history of ovarian cysts.  No fever, chills, low back pain, flank pain, dysuria, hematuria, increased frequency or urgency.           The following portions of the patient's history were reviewed and updated as appropriate: allergies, current medications, past family history, past medical history, past social history, past surgical history and problem list.    Review of Systems   Constitutional: Negative for appetite change, chills, fatigue, fever and unexpected weight change.   HENT: Negative for congestion, ear pain, hearing loss, nosebleeds, postnasal drip, rhinorrhea, sore throat, tinnitus and trouble swallowing.    Eyes: Negative for photophobia, discharge and visual disturbance.   Respiratory: Negative for cough, chest tightness, shortness of breath and wheezing.    Cardiovascular: Negative for chest pain, palpitations and leg swelling.   Gastrointestinal: Positive for abdominal pain, diarrhea and nausea. Negative for abdominal distention, anal bleeding, blood in stool, constipation, rectal pain and vomiting.   Endocrine: Negative for cold intolerance, heat intolerance, polydipsia, polyphagia and polyuria.   Genitourinary: Positive for menstrual problem and pelvic pain. Negative for decreased urine volume, difficulty urinating, dyspareunia, dysuria, enuresis, flank pain, frequency, genital sores, hematuria, urgency, vaginal discharge and  vaginal pain.   Musculoskeletal: Negative for arthralgias, back pain, joint swelling, myalgias, neck pain and neck stiffness.   Skin: Negative for color change, pallor, rash and wound.   Allergic/Immunologic: Negative for environmental allergies, food allergies and immunocompromised state.   Neurological: Negative for dizziness, tremors, seizures, weakness, numbness and headaches.   Hematological: Negative for adenopathy. Does not bruise/bleed easily.   Psychiatric/Behavioral: Negative for agitation, behavioral problems, confusion, hallucinations, self-injury and suicidal ideas. The patient is not nervous/anxious.          Objective    Physical Exam   Constitutional: She is oriented to person, place, and time. She appears well-developed and well-nourished. No distress.   HENT:   Head: Normocephalic and atraumatic.   Mouth/Throat: Oropharynx is clear and moist.   Eyes: Conjunctivae and EOM are normal. Pupils are equal, round, and reactive to light.   Neck: Normal range of motion. Neck supple.   Cardiovascular: Normal rate, regular rhythm and normal heart sounds. Exam reveals no gallop and no friction rub.   No murmur heard.  Pulmonary/Chest: Effort normal and breath sounds normal. No respiratory distress. She has no wheezes. She has no rales.   Abdominal: Soft. Bowel sounds are normal. She exhibits no distension and no mass. There is tenderness ( RLQ priminent; mild suprapubic). There is no rebound and no guarding. No hernia.   Musculoskeletal: Normal range of motion. She exhibits no edema, tenderness or deformity.   Lymphadenopathy:     She has no cervical adenopathy.   Neurological: She is alert and oriented to person, place, and time. She displays normal reflexes. No cranial nerve deficit or sensory deficit. She exhibits normal muscle tone. Coordination normal.   Skin: Skin is warm and dry. Capillary refill takes less than 2 seconds. No rash noted. She is not diaphoretic.   Psychiatric: She has a normal mood and  "affect. Her behavior is normal. Judgment and thought content normal.   Nursing note and vitals reviewed.        /66   Pulse 92   Temp 98.6 °F (37 °C)   Ht 154.7 cm (60.91\")   Wt 51.7 kg (114 lb)   SpO2 100%   BMI 21.61 kg/m²     Nursing note and vitals reviewed.          Assessment/Plan   Radha was seen today for abdominal pain.    Diagnoses and all orders for this visit:    RLQ abdominal pain  -     US Non-ob Transvaginal  -     POC Urinalysis Dipstick, Automated  -     Urine Culture - Urine, Urine, Clean Catch  -     POCT pregnancy, urine    Nausea  -     POC Urinalysis Dipstick, Automated  -     Urine Culture - Urine, Urine, Clean Catch  -     POCT pregnancy, urine - negative  -     ondansetron ODT (ZOFRAN ODT) 8 MG disintegrating tablet; Place 1 tablet on the tongue Every 8 (Eight) Hours As Needed for Nausea or Vomiting.    Brief Urine Lab Results  (Last result in the past 365 days)      Color   Clarity   Blood   Leuk Est   Nitrite   Protein   CREAT   Urine HCG        04/29/19 1524 Yellow Cloudy 50 Shiv/ul 25 Tatianna/ul Negative Negative               Symptoms most likely due to ovarian cyst.  Will work-up with transvaginal ultrasound.    Postprandial diarrhea  She is status post cholecystectomy.  She was encouraged to avoid fatty foods.    History of ovarian cyst  -     US Non-ob Transvaginal        Proceed to the ER with any worsened symptoms.       "

## 2019-05-01 LAB
BACTERIA UR CULT: NORMAL
BACTERIA UR CULT: NORMAL

## 2019-09-04 ENCOUNTER — OFFICE VISIT (OUTPATIENT)
Dept: SURGERY | Facility: CLINIC | Age: 29
End: 2019-09-04

## 2019-09-04 VITALS
BODY MASS INDEX: 22.28 KG/M2 | OXYGEN SATURATION: 98 % | WEIGHT: 118 LBS | DIASTOLIC BLOOD PRESSURE: 68 MMHG | HEIGHT: 61 IN | HEART RATE: 93 BPM | SYSTOLIC BLOOD PRESSURE: 118 MMHG | TEMPERATURE: 98.5 F

## 2019-09-04 DIAGNOSIS — E04.1 THYROID NODULE: Primary | ICD-10-CM

## 2019-09-04 DIAGNOSIS — R13.11 ORAL PHASE DYSPHAGIA: ICD-10-CM

## 2019-09-04 DIAGNOSIS — R13.10 DYSPHAGIA, UNSPECIFIED TYPE: Primary | ICD-10-CM

## 2019-09-04 PROCEDURE — 99214 OFFICE O/P EST MOD 30 MIN: CPT | Performed by: SURGERY

## 2019-09-04 NOTE — PROGRESS NOTES
Patient: Radha Dixon    YOB: 1990    Date: 09/04/2019    Primary Care Provider: Augustina Falk PA    Reason for Consultation:Epigastric pain, GERD    Chief Complaint   Patient presents with   • Difficulty Swallowing     dysphagia/goiter       SUBJECTIVE:    History of present illness:  I saw the patient in the office  today as a consultation for evaluation and treatment of dysphagia.  Patient complains of random episodes for a year of dysphagia and hoarseness, no certain food seems to make it worse, relived with time.  She denies epigastric pain, nausea or reflux. Patient also has bilateral goiter noted on ultrasound.  Patient has symptoms daily, with liquids and solids.  No significant change in the very small thyroid nodules.  No significant sinus drainage or allergies.  Patient still has her tonsils, no recent evaluation by ENT.  Patient has minimal heartburn, takes over-the-counter meds if she needs it.    The following portions of the patient's history were reviewed and updated as appropriate: allergies, current medications, past family history, past medical history, past social history, past surgical history and problem list.      Review of Systems   Constitutional: Negative for activity change, appetite change and chills.   HENT: Positive for trouble swallowing and voice change. Negative for congestion and hearing loss.    Respiratory: Negative for cough, choking, chest tightness and shortness of breath.    Cardiovascular: Negative for chest pain, palpitations and leg swelling.   Endocrine: Negative for cold intolerance and heat intolerance.   Genitourinary: Negative for dysuria, flank pain and frequency.   Musculoskeletal: Negative for back pain and myalgias.   Skin: Negative for color change and rash.   Neurological: Negative for seizures, facial asymmetry, light-headedness, numbness and headaches.   Psychiatric/Behavioral: Negative for agitation, behavioral problems and  "confusion.       History:  Past Medical History:   Diagnosis Date   • Acid reflux    • Allergic    • Anxiety    • Anxiety and depression    • Arthritis    • Asthma    • Bipolar 1 disorder (CMS/HCC)    • Body piercing     EARS, NOSE, BELLY BUTTON   • Depression    • Dysphagia     REPORTS SOMETIMES FEELS LIKE \"FOOD GETS STUCK\"   • Fracture     HISTORY OF RIGHT WRIST AS A CHILD   • History of migraine    • Injury of neck    • Insomnia    • Migraine    • Ovarian cyst    • PONV (postoperative nausea and vomiting)    • Scoliosis    • Tattoo    • Toe fracture    • Wrist fracture        Past Surgical History:   Procedure Laterality Date   • APPENDECTOMY  2016   • CHOLECYSTECTOMY     • CYST REMOVAL      OVARIAN CYST   • DILATATION AND CURETTAGE     • ENDOSCOPY N/A 12/1/2017    Procedure: ESOPHAGOGASTRODUODENOSCOPY WITH COLD FORCEP BIOPSY;  Surgeon: Danilo Shabazz MD;  Location: [facility] ENDOSCOPY;  Service:    • HIP SURGERY Right     RIGHT HIP, REPORTS HAD BONES SCRAPED   • SHOULDER ARTHROSCOPY Right 10/11/2018    Procedure: Right shoulder diagnostic arthroscopy, limited rotator cuff debridement;  Surgeon: Tino Uribe MD;  Location: [facility] OR;  Service: Orthopedics   • WISDOM TOOTH EXTRACTION         Family History   Problem Relation Age of Onset   • Arthritis Other    • Cancer Other         breast and lung   • Diabetes Other    • Heart attack Other    • Migraines Other    • No Known Problems Mother    • No Known Problems Father        Social History     Tobacco Use   • Smoking status: Current Every Day Smoker     Packs/day: 0.50     Years: 13.00     Pack years: 6.50     Types: Cigarettes   • Smokeless tobacco: Never Used   Substance Use Topics   • Alcohol use: No   • Drug use: No       Allergies:  Allergies   Allergen Reactions   • Onion Other (See Comments)     REPORTS CAUSES MIGRAINES     • Sulfa Antibiotics Hives       Medications:    Current Outpatient Medications:   •  cetirizine (zyrTEC) 10 MG tablet, Take 1 " "tablet by mouth Daily., Disp: 30 tablet, Rfl: 12  •  Levonorgestrel (MEAGHAN) 13.5 MG intrauterine device IUD, 1 each by Intrauterine route 1 (One) Time., Disp: , Rfl:   •  acetaminophen (TYLENOL 8 HOUR) 650 MG 8 hr tablet, Take 1 tablet by mouth Every 8 (Eight) Hours As Needed for Mild Pain ., Disp: 12 tablet, Rfl: 0  •  ondansetron ODT (ZOFRAN ODT) 8 MG disintegrating tablet, Place 1 tablet on the tongue Every 8 (Eight) Hours As Needed for Nausea or Vomiting., Disp: 12 tablet, Rfl: 1    OBJECTIVE:    Vital Signs:   Vitals:    09/04/19 0835   BP: 118/68   Pulse: 93   Temp: 98.5 °F (36.9 °C)   SpO2: 98%   Weight: 53.5 kg (118 lb)   Height: 154.7 cm (60.91\")       Physical Exam:   General Appearance:    Alert, cooperative, in no acute distress   Head:    Normocephalic, without obvious abnormality, atraumatic   Eyes:            Lids and lashes normal, conjunctivae and sclerae normal, no   icterus, no pallor, corneas clear, PERRLA   Ears:    Ears appear intact with no abnormalities noted   Throat:   No oral lesions, no thrush, oral mucosa moist   Neck:   No adenopathy, supple, trachea midline, no thyromegaly, no   carotid bruit, no JVD   Lungs:     Clear to auscultation,respirations regular, even and                  unlabored    Heart:    Regular rhythm and normal rate, normal S1 and S2, no            murmur, no gallop, no rub, no click   Chest Wall:    No abnormalities observed   Abdomen:     Normal bowel sounds, no masses, no organomegaly, soft        non-tender, non-distended, no guarding, there is evidence of epigastric  tenderness   Extremities:   Moves all extremities well, no edema, no cyanosis, no             redness   Pulses:   Pulses palpable and equal bilaterally   Skin:   No bleeding, bruising or rash   Lymph nodes:   No palpable adenopathy   Neurologic:   Cranial nerves 2 - 12 grossly intact, sensation intact     Results Review:   I reviewed the patient's new clinical results.    Review of Systems was " reviewed and confirmed as accurate today.    ASSESSMENT/PLAN:    1. Thyroid nodule    2. Oral phase dysphagia        I had a long discussion with patient, at this time her difficulty swallowing appears to be related to dysmotility of the esophagus, EGD 2 years ago was unremarkable.  Patient scheduled for barium swallow to make sure she has normal swallowing mechanism.  If negative, we will consider ENT evaluation for possible partial obstruction from tonsils or upper airway issues.  Follow-up in 1 week.  Discussed thyroid ultrasound findings, no further questions or concerns about the thyroid nodules.  I discussed the patients findings and my recommendations with patient.     Electronically signed by Danilo Shabazz MD  09/04/19 8:32 AM            Portions of this note have been scribed for Danilo Shabazz MD by Kathy Feliciano. 9/4/2019  9:06 AM

## 2019-09-11 ENCOUNTER — HOSPITAL ENCOUNTER (OUTPATIENT)
Dept: GENERAL RADIOLOGY | Facility: HOSPITAL | Age: 29
Discharge: HOME | End: 2019-09-11
Admitting: SURGERY

## 2019-09-11 DIAGNOSIS — R13.10 DYSPHAGIA, UNSPECIFIED TYPE: ICD-10-CM

## 2019-09-11 PROCEDURE — 74220 X-RAY XM ESOPHAGUS 1CNTRST: CPT

## 2019-09-18 ENCOUNTER — OFFICE VISIT (OUTPATIENT)
Dept: SURGERY | Facility: CLINIC | Age: 29
End: 2019-09-18

## 2019-09-18 ENCOUNTER — TELEPHONE (OUTPATIENT)
Dept: SURGERY | Facility: CLINIC | Age: 29
End: 2019-09-18

## 2019-09-18 VITALS
TEMPERATURE: 97.8 F | OXYGEN SATURATION: 99 % | HEIGHT: 60 IN | DIASTOLIC BLOOD PRESSURE: 60 MMHG | HEART RATE: 68 BPM | WEIGHT: 118 LBS | BODY MASS INDEX: 23.16 KG/M2 | SYSTOLIC BLOOD PRESSURE: 120 MMHG

## 2019-09-18 DIAGNOSIS — R49.0 HOARSENESS: ICD-10-CM

## 2019-09-18 DIAGNOSIS — R13.11 ORAL PHASE DYSPHAGIA: Primary | ICD-10-CM

## 2019-09-18 PROCEDURE — 99212 OFFICE O/P EST SF 10 MIN: CPT | Performed by: SURGERY

## 2019-09-18 NOTE — TELEPHONE ENCOUNTER
appt with ENT Dr Howard 10/15/19 @ 10:20 pt to arrive @ 10:00 AM @ [facility] 3rd floor   PT Informed ,records faxed to [phone]

## 2019-09-18 NOTE — PROGRESS NOTES
Patient: Radha Dixon    YOB: 1990    Date: 09/18/2019    Primary Care Provider: Augustina Falk PA    Chief Complaint   Patient presents with   • Follow-up     Follow up barium swallow       History: I saw the patient in the office today in follow up for dysphagia. Patient complains of random episodes for a year of dysphagia and hoarseness, no certain food seems to make it worse, relived with time.  Patient has symptoms daily, with liquids and solids. The patient did have a recent barium swallow that did show gastroesophageal reflux into the distal esophagus.  Last EGD was in December of 2017.  Barium swallow indicates no significant reflux or hiatal hernia on study today.  Patient still has hoarseness and difficulty swallowing.    The following portions of the patient's history were reviewed and updated as appropriate: allergies, current medications, past family history, past medical history, past social history, past surgical history and problem list.      Review of Systems   Constitutional: Negative for chills, fever and unexpected weight change.   HENT: Positive for trouble swallowing. Negative for hearing loss and voice change.    Eyes: Negative for visual disturbance.   Respiratory: Negative for apnea, cough, chest tightness, shortness of breath and wheezing.    Cardiovascular: Negative for chest pain, palpitations and leg swelling.   Gastrointestinal: Negative for abdominal distention, abdominal pain, anal bleeding, blood in stool, constipation, diarrhea, nausea, rectal pain and vomiting.   Endocrine: Negative for cold intolerance and heat intolerance.   Genitourinary: Negative for difficulty urinating, dysuria and flank pain.   Musculoskeletal: Negative for back pain and gait problem.   Skin: Negative for color change, rash and wound.   Neurological: Negative for dizziness, syncope, speech difficulty, weakness, light-headedness, numbness and headaches.   Hematological: Negative  "for adenopathy. Does not bruise/bleed easily.   Psychiatric/Behavioral: Negative for confusion. The patient is not nervous/anxious.        Vital Signs  Vitals:    09/18/19 0828   BP: 120/60   Pulse: 68   Temp: 97.8 °F (36.6 °C)   TempSrc: Temporal   SpO2: 99%   Weight: 53.5 kg (118 lb)   Height: 152.4 cm (60\")       Allergies:  Allergies   Allergen Reactions   • Onion Other (See Comments)     REPORTS CAUSES MIGRAINES     • Sulfa Antibiotics Hives       Medications:    Current Outpatient Medications:   •  acetaminophen (TYLENOL 8 HOUR) 650 MG 8 hr tablet, Take 1 tablet by mouth Every 8 (Eight) Hours As Needed for Mild Pain ., Disp: 12 tablet, Rfl: 0  •  cetirizine (zyrTEC) 10 MG tablet, Take 1 tablet by mouth Daily., Disp: 30 tablet, Rfl: 12  •  Levonorgestrel (MEAGHAN) 13.5 MG intrauterine device IUD, 1 each by Intrauterine route 1 (One) Time., Disp: , Rfl:   •  ondansetron ODT (ZOFRAN ODT) 8 MG disintegrating tablet, Place 1 tablet on the tongue Every 8 (Eight) Hours As Needed for Nausea or Vomiting., Disp: 12 tablet, Rfl: 1    Physical Exam:   General Appearance:    Alert, cooperative, in no acute distress   Head:    Normocephalic, without obvious abnormality, atraumatic   Lungs:     Clear to auscultation,respirations regular, even and                  unlabored    Heart:    Regular rhythm and normal rate, normal S1 and S2, no            murmur, no gallop, no rub, no click   Abdomen:     Normal bowel sounds, no masses, no organomegaly, soft        and tender in epigastric region with no rebound or guarding.  No abdominal wall hernias.   Extremities:   Moves all extremities well, no edema, no cyanosis, no             redness   Pulses:   Pulses palpable and equal bilaterally   Skin:   No bleeding, bruising or rash       Results Review:   I reviewed the patient's new clinical results.     ASSESSMENT/PLAN:    1. Oral phase dysphagia    2. Hoarseness      Patient reassured, no symptoms consistent with reflux symptoms.  " Recommend ENT evaluation for hoarseness and upper airway issues.  Follow-up with me as needed.  Electronically signed by Danilo Shabazz MD  09/18/19    Portions of this note have been scribed for Danilo Shabazz MD by Rina Banks 9/18/2019  8:54 AM

## 2020-01-23 ENCOUNTER — OFFICE VISIT (OUTPATIENT)
Dept: INTERNAL MEDICINE | Facility: CLINIC | Age: 30
End: 2020-01-23

## 2020-01-23 VITALS
SYSTOLIC BLOOD PRESSURE: 98 MMHG | BODY MASS INDEX: 24.15 KG/M2 | HEIGHT: 60 IN | WEIGHT: 123 LBS | OXYGEN SATURATION: 98 % | HEART RATE: 91 BPM | TEMPERATURE: 98.7 F | DIASTOLIC BLOOD PRESSURE: 62 MMHG

## 2020-01-23 DIAGNOSIS — J01.00 ACUTE NON-RECURRENT MAXILLARY SINUSITIS: Primary | ICD-10-CM

## 2020-01-23 PROCEDURE — 99213 OFFICE O/P EST LOW 20 MIN: CPT | Performed by: PHYSICIAN ASSISTANT

## 2020-01-23 RX ORDER — METHYLPREDNISOLONE 4 MG/1
TABLET ORAL
Qty: 1 EACH | Refills: 0 | Status: SHIPPED | OUTPATIENT
Start: 2020-01-23 | End: 2020-02-14

## 2020-01-23 RX ORDER — CEFDINIR 300 MG/1
300 CAPSULE ORAL 2 TIMES DAILY
Qty: 14 CAPSULE | Refills: 0 | Status: SHIPPED | OUTPATIENT
Start: 2020-01-23 | End: 2020-01-30

## 2020-01-23 RX ORDER — BROMPHENIRAMINE MALEATE, PSEUDOEPHEDRINE HYDROCHLORIDE, AND DEXTROMETHORPHAN HYDROBROMIDE 2; 30; 10 MG/5ML; MG/5ML; MG/5ML
10 SYRUP ORAL 4 TIMES DAILY PRN
Qty: 200 ML | Refills: 0 | Status: SHIPPED | OUTPATIENT
Start: 2020-01-23 | End: 2020-02-26

## 2020-01-23 RX ORDER — AMOXICILLIN AND CLAVULANATE POTASSIUM 875; 125 MG/1; MG/1
TABLET, FILM COATED ORAL
COMMUNITY
Start: 2020-01-18 | End: 2020-01-23 | Stop reason: ALTCHOICE

## 2020-01-23 NOTE — PROGRESS NOTES
Radha Dixon is a 29 y.o. female.     Subjective   History of Present Illness   Here today with concern of 6 days of bilateral ear pain, right > left. She was prescribed Augmentin for otitis media 5 days ago which does not seem to be helping as the ear pain and cough have worsened. Nasal congestion has improved some. She denies SOA but notes some chest tightness and pain in the sternum with taking a deep breath. She has heard some wheezing at night.  She has had chills but no fever. Sore throat is mild and only in the mornings.           The following portions of the patient's history were reviewed and updated as appropriate: allergies, current medications, past family history, past medical history, past social history, past surgical history and problem list.    Review of Systems   Constitutional: Positive for chills. Negative for diaphoresis, fatigue and fever.   HENT: Positive for congestion, ear pain, postnasal drip, rhinorrhea, sinus pressure, sinus pain and sore throat. Negative for drooling, mouth sores and trouble swallowing.    Eyes: Negative for pain and visual disturbance.   Respiratory: Positive for cough, chest tightness and wheezing. Negative for choking and shortness of breath.    Cardiovascular: Negative for chest pain, palpitations and leg swelling.   Skin: Negative for color change and rash.   Neurological: Positive for headaches. Negative for dizziness, tremors, seizures and light-headedness.   Hematological: Negative for adenopathy. Does not bruise/bleed easily.   Psychiatric/Behavioral: Negative for behavioral problems, dysphoric mood and sleep disturbance. The patient is not hyperactive.          Objective    Physical Exam   Constitutional: She is oriented to person, place, and time. She appears well-developed and well-nourished. No distress.   HENT:   Head: Normocephalic and atraumatic.   Mouth/Throat: No oropharyngeal exudate.   Erythematous and boggy nasal turbinates bilaterally.  "Bilateral TM effusions. White postnasal drip. Right maxillary sinus tenderness.    Eyes: Pupils are equal, round, and reactive to light. Conjunctivae and EOM are normal. Right eye exhibits no discharge. Left eye exhibits no discharge.   Neck: Normal range of motion. Neck supple.   Cardiovascular: Normal rate, regular rhythm and normal heart sounds. Exam reveals no gallop and no friction rub.   No murmur heard.  Pulmonary/Chest: Effort normal. No respiratory distress. She has wheezes (single wheeze heard RUL ). She has no rales. She exhibits no tenderness.   Lymphadenopathy:     She has no cervical adenopathy.   Neurological: She is alert and oriented to person, place, and time. No cranial nerve deficit.   Skin: Skin is warm and dry. No rash noted. She is not diaphoretic.   Psychiatric: She has a normal mood and affect. Her behavior is normal. Judgment and thought content normal.   Nursing note and vitals reviewed.        BP 98/62   Pulse 91   Temp 98.7 °F (37.1 °C)   Ht 152.4 cm (60\")   Wt 55.8 kg (123 lb)   SpO2 98%   BMI 24.02 kg/m²     Nursing note and vitals reviewed.          Assessment/Plan   Radha was seen today for follow-up.    Diagnoses and all orders for this visit:    Acute non-recurrent maxillary sinusitis  -     methylPREDNISolone (MEDROL, JONAS,) 4 MG tablet; Take as directed on package instructions.  -     cefdinir (OMNICEF) 300 MG capsule; Take 1 capsule by mouth 2 (Two) Times a Day for 7 days.  -     brompheniramine-pseudoephedrine-DM 30-2-10 MG/5ML syrup; Take 10 mL by mouth 4 (Four) Times a Day As Needed for Congestion, Cough or Allergies.    D/C Augmentin and begin Omnicef.    Sip warm fluids to ease cough.     Call or RTC if symptoms worsen or persist.                "

## 2020-02-12 ENCOUNTER — TELEPHONE (OUTPATIENT)
Dept: INTERNAL MEDICINE | Facility: CLINIC | Age: 30
End: 2020-02-12

## 2020-02-12 NOTE — TELEPHONE ENCOUNTER
US Non-ob Transvaginal     An US was ordered may 2019 and was not completed. Can I cancel this order?

## 2020-02-14 ENCOUNTER — OFFICE VISIT (OUTPATIENT)
Dept: INTERNAL MEDICINE | Facility: CLINIC | Age: 30
End: 2020-02-14

## 2020-02-14 VITALS
BODY MASS INDEX: 24.15 KG/M2 | OXYGEN SATURATION: 99 % | WEIGHT: 123 LBS | HEIGHT: 60 IN | TEMPERATURE: 98.2 F | DIASTOLIC BLOOD PRESSURE: 78 MMHG | SYSTOLIC BLOOD PRESSURE: 100 MMHG | HEART RATE: 105 BPM

## 2020-02-14 DIAGNOSIS — R42 DIZZINESS: ICD-10-CM

## 2020-02-14 DIAGNOSIS — R00.2 PALPITATIONS: ICD-10-CM

## 2020-02-14 DIAGNOSIS — R13.10 DYSPHAGIA, UNSPECIFIED TYPE: ICD-10-CM

## 2020-02-14 DIAGNOSIS — J20.9 ACUTE BRONCHITIS, UNSPECIFIED ORGANISM: Primary | ICD-10-CM

## 2020-02-14 DIAGNOSIS — K21.9 GASTROESOPHAGEAL REFLUX DISEASE, ESOPHAGITIS PRESENCE NOT SPECIFIED: ICD-10-CM

## 2020-02-14 DIAGNOSIS — R53.82 CHRONIC FATIGUE: ICD-10-CM

## 2020-02-14 DIAGNOSIS — R00.0 TACHYCARDIA: ICD-10-CM

## 2020-02-14 LAB
25(OH)D3+25(OH)D2 SERPL-MCNC: 20.8 NG/ML (ref 30–100)
ALBUMIN SERPL-MCNC: 4.7 G/DL (ref 3.5–5.2)
ALBUMIN/GLOB SERPL: 1.9 G/DL
ALP SERPL-CCNC: 54 U/L (ref 39–117)
ALT SERPL-CCNC: 7 U/L (ref 1–33)
AST SERPL-CCNC: 11 U/L (ref 1–32)
BASOPHILS # BLD AUTO: 0.05 10*3/MM3 (ref 0–0.2)
BASOPHILS NFR BLD AUTO: 0.7 % (ref 0–1.5)
BILIRUB SERPL-MCNC: 1.5 MG/DL (ref 0.2–1.2)
BUN SERPL-MCNC: 9 MG/DL (ref 6–20)
BUN/CREAT SERPL: 11.8 (ref 7–25)
CALCIUM SERPL-MCNC: 9.4 MG/DL (ref 8.6–10.5)
CHLORIDE SERPL-SCNC: 103 MMOL/L (ref 98–107)
CO2 SERPL-SCNC: 25.4 MMOL/L (ref 22–29)
CREAT SERPL-MCNC: 0.76 MG/DL (ref 0.57–1)
EOSINOPHIL # BLD AUTO: 0.4 10*3/MM3 (ref 0–0.4)
EOSINOPHIL NFR BLD AUTO: 5.7 % (ref 0.3–6.2)
ERYTHROCYTE [DISTWIDTH] IN BLOOD BY AUTOMATED COUNT: 11.7 % (ref 12.3–15.4)
GLOBULIN SER CALC-MCNC: 2.5 GM/DL
GLUCOSE SERPL-MCNC: 92 MG/DL (ref 65–99)
HCT VFR BLD AUTO: 38.8 % (ref 34–46.6)
HGB BLD-MCNC: 13.3 G/DL (ref 12–15.9)
IMM GRANULOCYTES # BLD AUTO: 0.02 10*3/MM3 (ref 0–0.05)
IMM GRANULOCYTES NFR BLD AUTO: 0.3 % (ref 0–0.5)
LYMPHOCYTES # BLD AUTO: 2.04 10*3/MM3 (ref 0.7–3.1)
LYMPHOCYTES NFR BLD AUTO: 29.3 % (ref 19.6–45.3)
MCH RBC QN AUTO: 30.4 PG (ref 26.6–33)
MCHC RBC AUTO-ENTMCNC: 34.3 G/DL (ref 31.5–35.7)
MCV RBC AUTO: 88.6 FL (ref 79–97)
MONOCYTES # BLD AUTO: 0.46 10*3/MM3 (ref 0.1–0.9)
MONOCYTES NFR BLD AUTO: 6.6 % (ref 5–12)
NEUTROPHILS # BLD AUTO: 4 10*3/MM3 (ref 1.7–7)
NEUTROPHILS NFR BLD AUTO: 57.4 % (ref 42.7–76)
NRBC BLD AUTO-RTO: 0 /100 WBC (ref 0–0.2)
PLATELET # BLD AUTO: 216 10*3/MM3 (ref 140–450)
POTASSIUM SERPL-SCNC: 4.5 MMOL/L (ref 3.5–5.2)
PROT SERPL-MCNC: 7.2 G/DL (ref 6–8.5)
RBC # BLD AUTO: 4.38 10*6/MM3 (ref 3.77–5.28)
SODIUM SERPL-SCNC: 141 MMOL/L (ref 136–145)
T4 FREE SERPL-MCNC: 1.31 NG/DL (ref 0.93–1.7)
TSH SERPL DL<=0.005 MIU/L-ACNC: 1.43 UIU/ML (ref 0.27–4.2)
VIT B12 SERPL-MCNC: 507 PG/ML (ref 211–946)
WBC # BLD AUTO: 6.97 10*3/MM3 (ref 3.4–10.8)

## 2020-02-14 PROCEDURE — 99214 OFFICE O/P EST MOD 30 MIN: CPT | Performed by: PHYSICIAN ASSISTANT

## 2020-02-14 RX ORDER — AZITHROMYCIN 250 MG/1
TABLET, FILM COATED ORAL
Qty: 6 TABLET | Refills: 0 | Status: SHIPPED | OUTPATIENT
Start: 2020-02-14 | End: 2020-02-26

## 2020-02-14 RX ORDER — ALBUTEROL SULFATE 90 UG/1
2 AEROSOL, METERED RESPIRATORY (INHALATION) EVERY 4 HOURS PRN
Qty: 1 INHALER | Refills: 11 | Status: SHIPPED | OUTPATIENT
Start: 2020-02-14 | End: 2020-02-26

## 2020-02-14 NOTE — PROGRESS NOTES
Radha Dixon is a 29 y.o. female.     Subjective   History of Present Illness   Here today with concern of around 4 days of cough, wheezing, sputum production, fatigue and SOA.  She was seen here 3 weeks ago for similar symptoms which did resolve completely.  No fever, chills or body aches. She is taking Tylenol and occasionally Bromfed-DM which help a little.     Today she is also concerned with persistent fatigue and intermittently hoarse voice for the last year or so.  Menses are light due to Judi so should not contribute to any anemia.  She has been diagnosed with GERD from a swallow study and took a PPI daily for around 1 month but never felt there was any improvement in dysphasia so discontinued it.  She has not bothered by acid reflux symptoms other than dysphasia.  She also reports elevated heart rate at rest often with the highest being 140 according to her Apple Watch.  She does not consume much caffeine and notes that these episodes of elevated heart rate occur many hours after having any caffeine.  She endorses some associated SOA but no chest pain.  Rapid heart rate episodes happen at least once daily but usually a few times.  She also sometimes gets dizzy with standing quickly.  No leg edema or orthopnea.  No abnormal headaches.        The following portions of the patient's history were reviewed and updated as appropriate: allergies, current medications, past family history, past medical history, past social history, past surgical history and problem list.    Review of Systems   Constitutional: Positive for fatigue. Negative for appetite change, chills and fever.   HENT: Positive for congestion, ear pain (fullness), postnasal drip, rhinorrhea and trouble swallowing. Negative for drooling, ear discharge, hearing loss, mouth sores, sinus pressure, sinus pain, sore throat and voice change.    Eyes: Negative for pain and visual disturbance.   Respiratory: Positive for cough, chest tightness,  shortness of breath and wheezing.    Cardiovascular: Positive for palpitations. Negative for chest pain and leg swelling.   Gastrointestinal: Negative for abdominal pain, constipation, nausea and vomiting.   Endocrine: Negative for cold intolerance, heat intolerance, polydipsia, polyphagia and polyuria.   Genitourinary: Negative for decreased urine volume, difficulty urinating, dyspareunia, dysuria, frequency, menstrual problem and vaginal pain.   Musculoskeletal: Negative for back pain, myalgias and neck stiffness.   Skin: Negative for color change and rash.   Allergic/Immunologic: Negative for immunocompromised state.   Neurological: Positive for dizziness. Negative for tremors, seizures, weakness, light-headedness and headaches.   Hematological: Negative for adenopathy. Does not bruise/bleed easily.   Psychiatric/Behavioral: Negative for agitation, confusion, dysphoric mood, sleep disturbance and suicidal ideas. The patient is not hyperactive.          Objective    Physical Exam   Constitutional: She is oriented to person, place, and time. She appears well-developed and well-nourished. No distress.   HENT:   Head: Normocephalic and atraumatic.   Mouth/Throat: No oropharyngeal exudate.   Mild bilateral TM effusions.  White postnasal drip and nasal congestion.  No sinus tenderness.   Eyes: Pupils are equal, round, and reactive to light. Conjunctivae and EOM are normal. Right eye exhibits no discharge. Left eye exhibits no discharge.   Neck: Normal range of motion. Neck supple. Thyromegaly ( Mild) present.   Cardiovascular: Normal rate, regular rhythm and normal heart sounds. Exam reveals no gallop and no friction rub.   No murmur heard.  Pulmonary/Chest: Effort normal and breath sounds normal. No respiratory distress. She has no wheezes. She has no rales.   Abdominal: Soft. Bowel sounds are normal. She exhibits no mass. There is no tenderness. No hernia.   Musculoskeletal: Normal range of motion. She exhibits no  "edema, tenderness or deformity.   Lymphadenopathy:     She has no cervical adenopathy.   Neurological: She is alert and oriented to person, place, and time. She displays normal reflexes. No cranial nerve deficit or sensory deficit. She exhibits normal muscle tone. Coordination normal.   Skin: Skin is warm and dry. Capillary refill takes less than 2 seconds. No rash noted. She is not diaphoretic. No erythema.   Psychiatric: She has a normal mood and affect. Her behavior is normal. Judgment and thought content normal.   Nursing note and vitals reviewed.        /78   Pulse 105   Temp 98.2 °F (36.8 °C)   Ht 152.4 cm (60\")   Wt 55.8 kg (123 lb)   SpO2 99%   BMI 24.02 kg/m²     Nursing note and vitals reviewed.          Assessment/Plan   Radha was seen today for uri.    Diagnoses and all orders for this visit:    Acute bronchitis, unspecified organism  -     albuterol sulfate  (90 Base) MCG/ACT inhaler; Inhale 2 puffs Every 4 (Four) Hours As Needed for Wheezing or Shortness of Air.  -     azithromycin (ZITHROMAX Z-JONAS) 250 MG tablet; Take 2 tablets the first day, then 1 tablet daily for 4 days.  May continue Bromfed-DM prn.     Chronic fatigue  -     CBC & Differential  -     Comprehensive Metabolic Panel  -     TSH  -     T4, Free  -     Vitamin B12  -     Vitamin D 25 Hydroxy    Gastroesophageal reflux disease, esophagitis presence not specified  Dysphagia, unspecified type  -     Ambulatory Referral to Gastroenterology    Palpitations  Tachycardia  Dizziness  -     Holter monitor - 24 hour                 "

## 2020-02-15 ENCOUNTER — CLINICAL SUPPORT (OUTPATIENT)
Dept: INTERNAL MEDICINE | Facility: CLINIC | Age: 30
End: 2020-02-15

## 2020-02-17 DIAGNOSIS — E55.9 VITAMIN D DEFICIENCY: Primary | ICD-10-CM

## 2020-02-17 RX ORDER — ERGOCALCIFEROL 1.25 MG/1
50000 CAPSULE ORAL
Qty: 4 CAPSULE | Refills: 0 | Status: SHIPPED | OUTPATIENT
Start: 2020-02-17 | End: 2020-02-26

## 2020-02-17 NOTE — PROGRESS NOTES
Labs look fine with the exception that vitamin D level is very low.  I am sending in a prescription for a weekly dose of 50,000 units of vitamin D which she will use for 1 month then needs to begin a daily vitamin D supplement of 2000 units which she should buy over-the-counter.

## 2020-02-24 ENCOUNTER — TELEPHONE (OUTPATIENT)
Dept: INTERNAL MEDICINE | Facility: CLINIC | Age: 30
End: 2020-02-24

## 2020-02-24 DIAGNOSIS — R00.2 PALPITATIONS: ICD-10-CM

## 2020-02-24 DIAGNOSIS — R00.0 TACHYCARDIA: Primary | ICD-10-CM

## 2020-02-24 DIAGNOSIS — R42 DIZZINESS: ICD-10-CM

## 2020-02-24 NOTE — TELEPHONE ENCOUNTER
Patient called was curious if her heart monitor results have came in. If the results are in she would like a call discussing the results. Please advise.     Patient call back [phone]

## 2020-02-26 ENCOUNTER — OFFICE VISIT (OUTPATIENT)
Dept: GASTROENTEROLOGY | Facility: CLINIC | Age: 30
End: 2020-02-26

## 2020-02-26 VITALS
TEMPERATURE: 98.5 F | BODY MASS INDEX: 24.42 KG/M2 | RESPIRATION RATE: 18 BRPM | HEIGHT: 60 IN | DIASTOLIC BLOOD PRESSURE: 82 MMHG | HEART RATE: 68 BPM | SYSTOLIC BLOOD PRESSURE: 102 MMHG | WEIGHT: 124.4 LBS | OXYGEN SATURATION: 98 %

## 2020-02-26 DIAGNOSIS — R42 DIZZINESS: ICD-10-CM

## 2020-02-26 DIAGNOSIS — E04.1 THYROID NODULE: ICD-10-CM

## 2020-02-26 DIAGNOSIS — R00.2 PALPITATIONS: ICD-10-CM

## 2020-02-26 DIAGNOSIS — E80.6 BILIRUBINEMIA: ICD-10-CM

## 2020-02-26 DIAGNOSIS — R13.19 ESOPHAGEAL DYSPHAGIA: Primary | ICD-10-CM

## 2020-02-26 DIAGNOSIS — R00.0 TACHYCARDIA: ICD-10-CM

## 2020-02-26 PROCEDURE — 99204 OFFICE O/P NEW MOD 45 MIN: CPT | Performed by: INTERNAL MEDICINE

## 2020-02-26 RX ORDER — SODIUM CHLORIDE 9 MG/ML
70 INJECTION, SOLUTION INTRAVENOUS CONTINUOUS PRN
Status: CANCELLED | OUTPATIENT
Start: 2020-02-26

## 2020-02-26 NOTE — PROGRESS NOTES
"     New Patient Consult      Date: 2020   Patient Name: Radha Dixon  MRN: [id number]  : 1990     Referring Physician: Augustina Falk, *    Chief Complaint   Patient presents with   • Heartburn   • Difficulty Swallowing       History of Present Illness: Radha Dixon is a 29 y.o. female who is here today to establish care with Gastroenterology for dysphagia.     The patient has difficulty swallowing off and on for the last 2 years. The symptom is moderate in severity, occurs 2-3 times a week and associated with both solid foods and liquids.  The symptoms are progressive.  Feeling food hanging in in the middle. The patient points towards the lower substernal area for liquids and upper sternal area for solid food. Associated mild odynophagia occasionally.  No prior history of collagen-vascular disease and no acid reflux. Deny any nausea or vomiting.  There is no associated weight loss. She has bilateral thyroid nodule followed with US. Largest nodule 1.5cm left side and biopsy benign in the past. Her GB removed  and no stones at that time. She had esophagogram done in 2019 was normal.   No associated abdominal pain or distension. Deny  any change in bowel habit, hematochezia or melena.   There is no history of anemia. Prior history of EGD 2017 by dr lopez mild esophagitis.  No prior colonoscopy. No family history of colon cancer or any GI malignancy.       Subjective      Past Medical History:   Past Medical History:   Diagnosis Date   • Acid reflux    • Allergic    • Anxiety    • Anxiety and depression    • Arthritis    • Asthma    • Bipolar 1 disorder (CMS/HCC)    • Body piercing     EARS, NOSE, BELLY BUTTON   • Depression    • Dysphagia     REPORTS SOMETIMES FEELS LIKE \"FOOD GETS STUCK\"   • Fracture     HISTORY OF RIGHT WRIST AS A CHILD   • History of migraine    • Injury of neck    • Insomnia    • Migraine    • Ovarian cyst    • PONV (postoperative nausea and vomiting)    • " Scoliosis    • Tattoo    • Toe fracture    • Wrist fracture        Past Surgical History:   Past Surgical History:   Procedure Laterality Date   • APPENDECTOMY  2016   • CHOLECYSTECTOMY     • CYST REMOVAL      OVARIAN CYST   • DILATATION AND CURETTAGE     • ENDOSCOPY N/A 12/1/2017    Procedure: ESOPHAGOGASTRODUODENOSCOPY WITH COLD FORCEP BIOPSY;  Surgeon: Danilo Shabazz MD;  Location: [facility] ENDOSCOPY;  Service:    • HIP SURGERY Right     RIGHT HIP, REPORTS HAD BONES SCRAPED   • SHOULDER ARTHROSCOPY Right 10/11/2018    Procedure: Right shoulder diagnostic arthroscopy, limited rotator cuff debridement;  Surgeon: Tino Uribe MD;  Location: [facility] OR;  Service: Orthopedics   • WISDOM TOOTH EXTRACTION         Family History:   Family History   Problem Relation Age of Onset   • Arthritis Other    • Cancer Other         breast and lung   • Diabetes Other    • Heart attack Other    • Migraines Other    • No Known Problems Mother    • No Known Problems Father        Social History:   Social History     Socioeconomic History   • Marital status: Single     Spouse name: Not on file   • Number of children: Not on file   • Years of education: Not on file   • Highest education level: Not on file   Tobacco Use   • Smoking status: Current Every Day Smoker     Packs/day: 0.50     Years: 13.00     Pack years: 6.50     Types: Cigarettes   • Smokeless tobacco: Never Used   Substance and Sexual Activity   • Alcohol use: No   • Drug use: No   • Sexual activity: Defer   Social History Narrative    Right hand dominant       No current outpatient medications on file.    Allergies   Allergen Reactions   • Onion Itching     REPORTS CAUSES MIGRAINES and MAKES THROAT ITCHY     • Sulfa Antibiotics Hives       Review of Systems:   Review of Systems   Constitutional: Negative for chills, fever, unexpected weight gain and unexpected weight loss.   HENT: Positive for trouble swallowing. Negative for congestion, ear pain, postnasal drip,  "sinus pressure and sore throat.    Eyes: Negative for blurred vision and visual disturbance.   Respiratory: Negative for cough, chest tightness and shortness of breath.    Cardiovascular: Negative for chest pain, palpitations and leg swelling.   Gastrointestinal: Positive for indigestion. Negative for abdominal pain, blood in stool, constipation, diarrhea, nausea and vomiting.   Endocrine: Negative for polyphagia.   Genitourinary: Negative for dysuria and hematuria.   Musculoskeletal: Negative for back pain, joint swelling and neck pain.   Skin: Negative for rash, skin lesions and bruise.   Neurological: Negative for dizziness, seizures, speech difficulty, weakness, numbness and confusion.   Hematological: Negative for adenopathy. Does not bruise/bleed easily.   Psychiatric/Behavioral: Negative for hallucinations, suicidal ideas and depressed mood.       The following portions of the patient's history were reviewed and updated as appropriate: allergies, current medications, past family history, past medical history, past social history, past surgical history and problem list.    Objective     Physical Exam:  Vital Signs:   Vitals:    02/26/20 0831   BP: 102/82   Pulse: 68   Resp: 18   Temp: 98.5 °F (36.9 °C)   TempSrc: Temporal   SpO2: 98%   Weight: 56.4 kg (124 lb 6.4 oz)   Height: 152.4 cm (60\")       Physical Exam   Constitutional: She is oriented to person, place, and time. She appears well-developed and well-nourished.   HENT:   Head: Normocephalic and atraumatic.   Right Ear: External ear normal.   Left Ear: External ear normal.   Mouth/Throat: Oropharynx is clear and moist.   Did not feel any obvious large thyroid nodule.  Note that she has a small thyroid nodule noted on ultrasound   Eyes: Pupils are equal, round, and reactive to light. Conjunctivae and EOM are normal.   Neck: Normal range of motion. No tracheal deviation present. No thyromegaly present.   Cardiovascular: Normal rate and regular rhythm.   No " murmur heard.  Pulmonary/Chest: Effort normal and breath sounds normal. No respiratory distress.   Abdominal: Soft. Bowel sounds are normal. She exhibits no mass. No hernia.   Musculoskeletal: Normal range of motion. She exhibits no edema.   Neurological: She is alert and oriented to person, place, and time. No cranial nerve deficit or sensory deficit.   Skin: Skin is warm and dry.   Psychiatric: She has a normal mood and affect. Her behavior is normal. Judgment and thought content normal.   Nursing note and vitals reviewed.      Results Review:   I have reviewed the patient's new clinical and imaging results and agree with the interpretation.     Assessment / Plan      Assessment & Plan:  1. Esophageal dysphagia  Patient gives a progressive history of dysphagia to both solids and liquids over 2 years.  No significant odynophagia.  No history of any collagen vascular disease.  No any current reflux symptoms.   EGD done in 2017 revealed mild esophagitis.  Her recent flu esophagogram was normal.  She does have a small thyroid nodules largest being 1.5 cm in the left lobe of the thyroid which was benign.     Differential diagnosis include the EOE, but if any benign strictures given her prior esophagitis, and some functional component of dysphagia due to her antianxiety issue.   I doubt this small thyroid nodule giving any external compression and dysphagia  We will schedule her for an EGD and as above esophageal dilatation  We will get a random esophageal biopsy for EOE  We will consider esophageal manometry if above tests are all normal    - Case Request; Standing  - sodium chloride 0.9 % infusion  - Case Request    2. Bilirubinemia  Recent lab work revealed borderline elevated total bilirubin 1.5.  Enzymes normal.  Recent CT of the abdomen pelvis was normal reviewed.   She had a gallbladder removed in the past due to gallbladder dysmotility  We will repeat CMP in 4 weeks time, and assess further depending on the  repeat result    - Comprehensive Metabolic Panel; Future    3. Thyroid nodule  Known history of a bilateral thyroid nodules largest being 1.5 cm in the left lobe of the thyroid.   Followed by surgery with a serial ultrasound.  Some done 6 months ago did not reveal any change in size or morphology.  As per patient prior biopsy done was benign  To follow-up with surgeon as before    Follow Up:   No follow-ups on file.    Bhupinder Montenegro MD  [facility]  2/26/2020  9:00 AM    Please note that portions of this note may have been completed with a voice recognition program. Efforts were made to edit the dictations, but occasionally words are mistranscribed.

## 2020-03-13 ENCOUNTER — CONSULT (OUTPATIENT)
Dept: CARDIOLOGY | Facility: CLINIC | Age: 30
End: 2020-03-13

## 2020-03-13 VITALS
HEIGHT: 60 IN | SYSTOLIC BLOOD PRESSURE: 92 MMHG | OXYGEN SATURATION: 98 % | WEIGHT: 123 LBS | DIASTOLIC BLOOD PRESSURE: 70 MMHG | BODY MASS INDEX: 24.15 KG/M2 | HEART RATE: 100 BPM

## 2020-03-13 DIAGNOSIS — R00.2 PALPITATIONS: Primary | ICD-10-CM

## 2020-03-13 DIAGNOSIS — R00.0 INAPPROPRIATE SINUS TACHYCARDIA: ICD-10-CM

## 2020-03-13 PROBLEM — I47.11 INAPPROPRIATE SINUS TACHYCARDIA: Status: ACTIVE | Noted: 2020-03-13

## 2020-03-13 PROCEDURE — 99244 OFF/OP CNSLTJ NEW/EST MOD 40: CPT | Performed by: INTERNAL MEDICINE

## 2020-03-13 PROCEDURE — 93000 ELECTROCARDIOGRAM COMPLETE: CPT | Performed by: INTERNAL MEDICINE

## 2020-03-13 NOTE — PROGRESS NOTES
"    Subjective:     Encounter Date:03/13/2020      Patient ID: Radha Dixon is a 30 y.o. female.    Chief Complaint: Palpitations  HPI  This is a 30-year-old female patient who presents to cardiology clinic with palpitations for 2 years duration.  The patient indicates that she is always had a high resting heart rate.  The patient indicates that she has occasionally taken her pulse at rest and noticed that her heart rate was up to 140 bpm.  The patient has a sister with documented AV node reentry tachycardia who underwent a successful radiofrequency ablation.  The patient has no personal history of documented arrhythmia.  The patient describes her palpitations as a sense that her heart is racing.  She has had no dizziness or syncope.  This occurs on a variable frequency and usually occurs at night when she is laying in bed.  It will generally last anywhere from a few minutes to an hour.  She cannot identify any precipitating aggravating or alleviating features.  The patient underwent basic blood test demonstrating normal kidney and liver function.  There was no anemia.  Thyroid function testing was normal.  The patient has no chest discomfort at rest or with activity.  She has no exertional chest arm neck jaw shoulder back discomfort.  She has no shortness of breath at rest or with activity.  She has no orthopnea PND or lower extremity edema.  She is a non-smoker.  She reports avoiding caffeinated beverages.  She denies using illicit substances.  She does not use any prescription methamphetamine based medications for attention deficit disorder or weight loss.  She has never taken Adderall, Vyvanse, etc.  She has no history of anxiety disorder or chronic pain.  She denies using over-the-counter supplements medications for weight loss or other purposes such as Hydroxycut.  She denies using \"energy drinks\" such as Red Bull, Monster, etc. She is a lifelong non-smoker.  She does not use electronic cigarettes.  She " does not drink alcohol to excess.  Her only medication is birth control pills.  The patient underwent an outpatient 24-hour Holter monitor which showed heart rates ranging from 52 bpm during sleep with mild sinus bradycardia to sinus tachycardia during waking hours with a maximum heart rate of 152 bpm.  There was no significant atrial or ventricular ectopy.  There was no supraventricular ventricular tachycardia.  The following portions of the patient's history were reviewed and updated as appropriate: allergies, current medications, past family history, past medical history, past social history, past surgical history and problem  Review of Systems   Constitution: Negative for chills, diaphoresis, fever, malaise/fatigue, weight gain and weight loss.   HENT: Negative for ear discharge, hearing loss, hoarse voice and nosebleeds.    Eyes: Negative for discharge, double vision, pain and photophobia.   Cardiovascular: Positive for palpitations. Negative for chest pain, claudication, cyanosis, dyspnea on exertion, irregular heartbeat, leg swelling, near-syncope, orthopnea, paroxysmal nocturnal dyspnea and syncope.   Respiratory: Negative for cough, hemoptysis, shortness of breath, sputum production and wheezing.    Endocrine: Negative for cold intolerance, heat intolerance, polydipsia, polyphagia and polyuria.   Hematologic/Lymphatic: Negative for adenopathy and bleeding problem. Does not bruise/bleed easily.   Skin: Negative for color change, flushing, itching and rash.   Musculoskeletal: Negative for muscle cramps, muscle weakness, myalgias and stiffness.   Gastrointestinal: Negative for abdominal pain, diarrhea, hematemesis, hematochezia, nausea and vomiting.   Genitourinary: Negative for dysuria, frequency and nocturia.   Neurological: Negative for focal weakness, loss of balance, numbness, paresthesias and seizures.   Psychiatric/Behavioral: Negative for altered mental status, hallucinations and suicidal ideas.  "  Allergic/Immunologic: Negative for HIV exposure, hives and persistent infections.           Current Outpatient Medications:   •  levonorgestrel (MIRENA) 20 MCG/24HR IUD, 1 each by Intrauterine route 1 (One) Time., Disp: , Rfl:     Objective:   Physical Exam   Constitutional: She is oriented to person, place, and time. She appears well-developed and well-nourished. No distress.   HENT:   Head: Normocephalic and atraumatic.   Mouth/Throat: Oropharynx is clear and moist.   Eyes: Pupils are equal, round, and reactive to light. Conjunctivae and EOM are normal. No scleral icterus.   Neck: Normal range of motion. Neck supple. No JVD present. No tracheal deviation present. No thyromegaly present.   Cardiovascular: Normal rate, regular rhythm, S1 normal, S2 normal, normal heart sounds, intact distal pulses and normal pulses. PMI is not displaced. Exam reveals no gallop and no friction rub.   No murmur heard.  Pulmonary/Chest: Effort normal and breath sounds normal. No stridor. No respiratory distress. She has no wheezes. She has no rales.   Abdominal: Soft. Bowel sounds are normal. She exhibits no distension and no mass. There is no tenderness. There is no rebound and no guarding.   Musculoskeletal: Normal range of motion. She exhibits no edema or deformity.   Neurological: She is alert and oriented to person, place, and time. She displays normal reflexes. No cranial nerve deficit. Coordination normal.   Skin: Skin is warm and dry. No rash noted. She is not diaphoretic. No erythema.   Psychiatric: She has a normal mood and affect. Her behavior is normal. Thought content normal.     Blood pressure 92/70, pulse 100, height 152.4 cm (60\"), weight 55.8 kg (123 lb), SpO2 98 %.   Lab Review:     Assessment:       1. Palpitations  Some of the patient's symptoms could be due to underlying arrhythmia and/or ectopy.  She has never worn an outpatient heart monitor.  I suspect the patient has inappropriate sinus tachycardia as part of " "a more global umbrella of autonomic dysfunction.  She has a history of chronic migraine headaches as well as esophageal dysmotility which in retrospect is almost certainly related to autonomic dysfunction.      ECG 12 Lead  Date/Time: 3/13/2020 2:13 PM  Performed by: Ronal Dong MD  Authorized by: Ronal Dong MD   Previous ECG: no previous ECG available  Rhythm: sinus rhythm  Rate: normal  QRS axis: normal    Clinical impression: normal ECG            Plan:     I have recommended a 30-day cardiac monitor to screen for other tachycardias.  I have recommended a treadmill exercise stress test to screen for exercise-induced arrhythmia such as right ventricular outflow tract tachycardia.  I have recommended an echocardiogram.  The patient has been counseled to avoid over-the-counter stimulant medications such as cold, allergy or sinus medication.  She is instructed to avoid all caffeinated beverages.  She is instructed to avoid all prescription diuretics as well as to avoid activities or environmental factors which would lead to dehydration.  The patient has been counseled to increase her fluid intake.  The patient has been counseled to gauge the adequacy of her fluid intake by urine output.  The patient has been advised that she should be urinating every  minutes throughout the day.  She is instructed that if she goes more than 2 hours without urinating to immediately drink 32 ounces of water.  The patient has been counseled to observe the color of her urine and that if she notices any yellow color to her urine to immediately drink 32 ounces of fluid.  The patient has been advised that her urine should be \"crystal-clear\" in color.  The patient has been advised to keep 32 ounces of fluid at bedside and to immediately drink this volume of water prior to arising from bed.  We have discussed the potential for using beta-blocker therapy with the caveat that this could result in significant beta-blocker " related side effects as well as unacceptable lowering of her blood pressure.  The patient has been counseled regarding the potential utility of SSRI therapy for treatment of autonomic dysfunction.  The patient has been advised regarding the potential use for Corlanor in an off- label fashion as this would effectively lower her heart rate without significant side effects or effect on blood pressure.  The patient has been advised that this medication is used for this indication in [place] but is not FDA approved for use in the [place].  The patient has been advised that previous attempts to use this medication for this indication in the [place] has been met with denial by insurance and third-party payer's.  The patient has been educated as to the pathophysiology of autonomic dysfunction with specific examples provided.  She has been advised that in retrospect many of her previous diagnoses such as esophageal dysmotility and migraine headaches may have an underlying autonomic dysfunction etiology.  The patient has been educated that the first-line therapy revolves around adequate fluid replacement and avoiding known cardiac stimulant medications and over-the-counter products.  She has been counseled to avoid caffeinated beverages which can serve as a cardiac stimulant as well as having diuretic properties.  She has been counseled to avoid energy drinks such as Monster or Red Bull.  She has been advised to use electrolyte solutions and a 3: 1 ratio with water hydration.  Further recommendations will be predicated on the results of her outpatient testing.  65 minutes have been spent with the patient the entire time focused on counseling and education.

## 2020-04-01 LAB
BH CV ECHO MEAS - % IVS THICK: 50 %
BH CV ECHO MEAS - % LVPW THICK: 45.5 %
BH CV ECHO MEAS - AO MAX PG (FULL): 1.8 MMHG
BH CV ECHO MEAS - AO MAX PG: 5 MMHG
BH CV ECHO MEAS - AO MEAN PG (FULL): 1 MMHG
BH CV ECHO MEAS - AO MEAN PG: 3 MMHG
BH CV ECHO MEAS - AO ROOT AREA: 2.3 CM^2
BH CV ECHO MEAS - AO ROOT DIAM: 1.7 CM
BH CV ECHO MEAS - AO V2 MAX: 110 CM/SEC
BH CV ECHO MEAS - AO V2 MEAN: 80.8 CM/SEC
BH CV ECHO MEAS - AO V2 VTI: 20.4 CM
BH CV ECHO MEAS - AVA(I,A): 2 CM^2
BH CV ECHO MEAS - AVA(I,D): 2 CM^2
BH CV ECHO MEAS - AVA(V,A): 2.3 CM^2
BH CV ECHO MEAS - AVA(V,D): 2.3 CM^2
BH CV ECHO MEAS - EDV(CUBED): 61.6 ML
BH CV ECHO MEAS - EDV(MOD-SP4): 83 ML
BH CV ECHO MEAS - EDV(TEICH): 67.9 ML
BH CV ECHO MEAS - EF(CUBED): 71.5 %
BH CV ECHO MEAS - EF(MOD-SP4): 71.1 %
BH CV ECHO MEAS - EF(TEICH): 63.8 %
BH CV ECHO MEAS - ESV(CUBED): 17.6 ML
BH CV ECHO MEAS - ESV(MOD-SP4): 24 ML
BH CV ECHO MEAS - ESV(TEICH): 24.6 ML
BH CV ECHO MEAS - FS: 34.2 %
BH CV ECHO MEAS - IVS/LVPW: 1.5
BH CV ECHO MEAS - IVSD: 0.8 CM
BH CV ECHO MEAS - IVSS: 1.2 CM
BH CV ECHO MEAS - LA DIMENSION: 3 CM
BH CV ECHO MEAS - LA/AO: 1.8
BH CV ECHO MEAS - LAT PEAK E' VEL: 14.8 CM/SEC
BH CV ECHO MEAS - LATERAL E/E' RATIO: 4.9
BH CV ECHO MEAS - LV IVRT: 0.13 SEC
BH CV ECHO MEAS - LV MASS(C)D: 73.2 GRAMS
BH CV ECHO MEAS - LV MASS(C)S: 67 GRAMS
BH CV ECHO MEAS - LV MAX PG: 3.2 MMHG
BH CV ECHO MEAS - LV MEAN PG: 2 MMHG
BH CV ECHO MEAS - LV V1 MAX: 89.7 CM/SEC
BH CV ECHO MEAS - LV V1 MEAN: 56.4 CM/SEC
BH CV ECHO MEAS - LV V1 VTI: 14.7 CM
BH CV ECHO MEAS - LVIDD: 4 CM
BH CV ECHO MEAS - LVIDS: 2.6 CM
BH CV ECHO MEAS - LVLD AP4: 7.7 CM
BH CV ECHO MEAS - LVLS AP4: 6.1 CM
BH CV ECHO MEAS - LVOT AREA (M): 2.8 CM^2
BH CV ECHO MEAS - LVOT AREA: 2.8 CM^2
BH CV ECHO MEAS - LVOT DIAM: 1.9 CM
BH CV ECHO MEAS - LVPWD: 0.55 CM
BH CV ECHO MEAS - LVPWS: 0.8 CM
BH CV ECHO MEAS - MED PEAK E' VEL: 12.5 CM/SEC
BH CV ECHO MEAS - MEDIAL E/E' RATIO: 5.8
BH CV ECHO MEAS - MV A MAX VEL: 50.2 CM/SEC
BH CV ECHO MEAS - MV DEC SLOPE: 327.5 CM/SEC^2
BH CV ECHO MEAS - MV DEC TIME: 0.21 SEC
BH CV ECHO MEAS - MV E MAX VEL: 72.2 CM/SEC
BH CV ECHO MEAS - MV E/A: 1.4
BH CV ECHO MEAS - MV MAX PG: 2.4 MMHG
BH CV ECHO MEAS - MV MEAN PG: 1 MMHG
BH CV ECHO MEAS - MV P1/2T MAX VEL: 68.8 CM/SEC
BH CV ECHO MEAS - MV P1/2T: 61.5 MSEC
BH CV ECHO MEAS - MV V2 MAX: 77.2 CM/SEC
BH CV ECHO MEAS - MV V2 MEAN: 50.8 CM/SEC
BH CV ECHO MEAS - MV V2 VTI: 14.2 CM
BH CV ECHO MEAS - MVA P1/2T LCG: 3.2 CM^2
BH CV ECHO MEAS - MVA(P1/2T): 3.6 CM^2
BH CV ECHO MEAS - MVA(VTI): 2.9 CM^2
BH CV ECHO MEAS - PA MAX PG: 2.3 MMHG
BH CV ECHO MEAS - PA V2 MAX: 76.3 CM/SEC
BH CV ECHO MEAS - RAP SYSTOLE: 5 MMHG
BH CV ECHO MEAS - RVSP: 19 MMHG
BH CV ECHO MEAS - SV(AO): 46.3 ML
BH CV ECHO MEAS - SV(CUBED): 44.1 ML
BH CV ECHO MEAS - SV(LVOT): 41.7 ML
BH CV ECHO MEAS - SV(MOD-SP4): 59 ML
BH CV ECHO MEAS - SV(TEICH): 43.3 ML
BH CV ECHO MEAS - TR MAX PG: 9 MMHG
BH CV ECHO MEAS - TR MAX VEL: 154 CM/SEC
BH CV ECHO MEAS - TV MAX PG: 1.3 MMHG
BH CV ECHO MEAS - TV V2 MAX: 57.1 CM/SEC
BH CV ECHO MEASUREMENTS AVERAGE E/E' RATIO: 5.29
BH CV STRESS BP STAGE 1: NORMAL
BH CV STRESS BP STAGE 2: NORMAL
BH CV STRESS DURATION MIN STAGE 1: 3
BH CV STRESS DURATION MIN STAGE 2: 3
BH CV STRESS DURATION SEC STAGE 1: 0
BH CV STRESS DURATION SEC STAGE 2: 0
BH CV STRESS GRADE STAGE 1: 10
BH CV STRESS GRADE STAGE 2: 12
BH CV STRESS HR STAGE 1: 150
BH CV STRESS HR STAGE 2: 185
BH CV STRESS METS STAGE 1: 5
BH CV STRESS METS STAGE 2: 7.5
BH CV STRESS O2 STAGE 1: 98
BH CV STRESS O2 STAGE 2: 97
BH CV STRESS PROTOCOL 1: NORMAL
BH CV STRESS RECOVERY BP: NORMAL MMHG
BH CV STRESS RECOVERY HR: 108 BPM
BH CV STRESS RECOVERY O2: 97 %
BH CV STRESS SPEED STAGE 1: 1.7
BH CV STRESS SPEED STAGE 2: 2.5
BH CV STRESS STAGE 1: 1
BH CV STRESS STAGE 2: 2
LV EF 2D ECHO EST: 64 %
MAXIMAL PREDICTED HEART RATE: 191 BPM
PERCENT MAX PREDICTED HR: 96.86 %
STRESS BASELINE BP: NORMAL MMHG
STRESS BASELINE HR: 119 BPM
STRESS O2 SAT REST: 99 %
STRESS PERCENT HR: 114 %
STRESS POST ESTIMATED WORKLOAD: 10.1 METS
STRESS POST EXERCISE DUR MIN: 7 MIN
STRESS POST EXERCISE DUR SEC: 31 SEC
STRESS POST O2 SAT PEAK: 97 %
STRESS POST PEAK BP: NORMAL MMHG
STRESS POST PEAK HR: 185 BPM
STRESS TARGET HR: 162 BPM

## 2020-04-06 ENCOUNTER — TELEPHONE (OUTPATIENT)
Dept: CARDIOLOGY | Facility: CLINIC | Age: 30
End: 2020-04-06

## 2020-04-06 NOTE — TELEPHONE ENCOUNTER
Pt called stating she has been feeling palpitations, along with dizziness. Pt is currently wearing a MCOT and I asked if she was recording when she felt the symptoms and she states she has been. please advise.

## 2020-04-07 ENCOUNTER — OFFICE VISIT (OUTPATIENT)
Dept: CARDIOLOGY | Facility: CLINIC | Age: 30
End: 2020-04-07

## 2020-04-07 VITALS — SYSTOLIC BLOOD PRESSURE: 82 MMHG | HEART RATE: 107 BPM | DIASTOLIC BLOOD PRESSURE: 58 MMHG | OXYGEN SATURATION: 97 %

## 2020-04-07 DIAGNOSIS — R00.2 PALPITATIONS: Primary | ICD-10-CM

## 2020-04-07 DIAGNOSIS — R00.0 TACHYCARDIA: ICD-10-CM

## 2020-04-07 DIAGNOSIS — R42 POSTURAL DIZZINESS WITH NEAR SYNCOPE: ICD-10-CM

## 2020-04-07 DIAGNOSIS — R55 POSTURAL DIZZINESS WITH NEAR SYNCOPE: ICD-10-CM

## 2020-04-07 DIAGNOSIS — R00.0 INAPPROPRIATE SINUS TACHYCARDIA: ICD-10-CM

## 2020-04-07 PROCEDURE — 99214 OFFICE O/P EST MOD 30 MIN: CPT | Performed by: NURSE PRACTITIONER

## 2020-04-07 PROCEDURE — 93000 ELECTROCARDIOGRAM COMPLETE: CPT | Performed by: NURSE PRACTITIONER

## 2020-04-07 NOTE — PROGRESS NOTES
"Radha Dixon is a 30 y.o. female.  MRN #: [id number]    Referring Provider: Augustina GOMES     Chief Complaint:   Chief Complaint   Patient presents with   • Follow-up     Echo, Stress, Monitor        History of Present Illness:  Ms Dixon is a 30-year-old female that presents for evaluation today for complaints of vertigo and near syncopal spells with palpitations.  Patient states \"she has felt out of sorts today\".  Patient was evaluated initially 3/13/2020 for complaint of palpitations.  She is presently wearing a 30-day [employer] heart monitor, she did have a treadmill stress test which was negative for any myocardial ischemia, echocardiogram shows normal LV systolic functioning with ejection fraction of 64% with no wall motion abnormalities nor valvular abnormalities.  Patient states that yesterday she noticed an increased heart rate of \"231\" on her Fit[employer] watch.  Telephone consultation with [facility] reveals her highest heart rate was 118 bpm.  Patient does report that she has had episodes of dizziness with tunnel vision and near syncopal episodes.  She does report episodes of palpitations with her vertigo symptoms.  Patient does relate that she has a sister that had 2 ablation procedures due to a reentrant pathway abnormality.  Patient denies any chest pain, shortness of breath, or diaphoresis.  Denies possibility of pregnancy, patient has IUD.    The patient presents today with their self who contributes to the history of their care.     The following portions of the patient's history were reviewed and updated as appropriate: allergies, current medications, past family history, past medical history, past social history, past surgical history and problem list.     Review of Systems:     Review of Systems   Constitutional: Positive for fatigue. Negative for activity change, appetite change, diaphoresis, unexpected weight gain and unexpected weight loss.   HENT: Negative.    Eyes: Positive for visual " disturbance. Negative for blurred vision, double vision and photophobia.        Reports episodes of tunnel vision with near syncopal episodes   Respiratory: Negative.  Negative for apnea, cough, chest tightness, shortness of breath and wheezing.    Cardiovascular: Positive for palpitations. Negative for chest pain and leg swelling.   Gastrointestinal: Negative.  Negative for abdominal distention, abdominal pain, blood in stool, nausea, vomiting, GERD and indigestion.   Endocrine: Negative.  Negative for cold intolerance, heat intolerance, polydipsia, polyphagia and polyuria.   Genitourinary: Negative.  Negative for decreased libido, frequency, genital sores, hematuria and urgency.   Musculoskeletal: Negative.  Negative for back pain, joint swelling, myalgias, neck pain and neck stiffness.   Skin: Negative.  Negative for color change, pallor, rash and bruise.   Allergic/Immunologic: Negative.  Negative for environmental allergies, food allergies and immunocompromised state.   Neurological: Positive for dizziness and light-headedness. Negative for tremors, seizures, syncope, facial asymmetry, speech difficulty, weakness, numbness, headache, memory problem and confusion.        Near syncope.   Hematological: Negative.  Negative for adenopathy. Does not bruise/bleed easily.   Psychiatric/Behavioral: Negative.  Negative for agitation, decreased concentration, self-injury, sleep disturbance, suicidal ideas, negative for hyperactivity and stress. The patient is not nervous/anxious.    All other systems reviewed and are negative.         Current Outpatient Medications:   •  levonorgestrel (MIRENA) 20 MCG/24HR IUD, 1 each by Intrauterine route 1 (One) Time., Disp: , Rfl:     Vitals:    04/07/20 1332   BP: (!) 82/58   BP Location: Right arm   Patient Position: Sitting   Cuff Size: Adult   Pulse: 107   SpO2: 97%       Physical Exam:     Physical Exam   Constitutional: She is oriented to person, place, and time. She appears  well-developed and well-nourished. No distress.   HENT:   Head: Normocephalic and atraumatic.   Eyes: Pupils are equal, round, and reactive to light. Conjunctivae are normal. No scleral icterus.   Neck: Trachea normal and normal range of motion. Neck supple. No JVD present. Carotid bruit is not present. No tracheal deviation present. No thyroid mass and no thyromegaly present.   Cardiovascular: Regular rhythm, S1 normal, S2 normal, normal heart sounds and intact distal pulses. Tachycardia present. PMI is not displaced. Exam reveals no gallop and no friction rub.   No murmur heard.  Pulses:       Carotid pulses are 1+ on the right side, and 1+ on the left side.  Orthostatic VS:   Supine : /68    Upright : /68      Pulmonary/Chest: Effort normal and breath sounds normal. No respiratory distress. She has no wheezes. She has no rales. She exhibits no tenderness.   Abdominal: Soft. Bowel sounds are normal. She exhibits no mass. There is no tenderness.   Musculoskeletal: Normal range of motion. She exhibits no edema.   Neurological: She is alert and oriented to person, place, and time. No sensory deficit. Coordination normal.   Skin: Skin is warm and dry. Capillary refill takes less than 2 seconds. No rash noted. She is not diaphoretic. No pallor.   Psychiatric: She has a normal mood and affect. Her behavior is normal. Judgment and thought content normal.   Nursing note and vitals reviewed.        ECG 12 Lead  Date/Time: 4/7/2020 3:08 PM  Performed by: Erik Domniguez Jr., APRN  Authorized by: Erik Dominguez Jr., APRN   Comparison: compared with previous ECG from 3/13/2020  Similar to previous ECG  Comparison to previous ECG: No acute changes.  Continued normal sinus rhythm with a heart rate of 76  Rhythm: sinus rhythm  Rate: normal  Conduction: conduction normal  ST Segments: ST segments normal  T Waves: T waves normal  QRS axis: normal    Clinical impression: normal ECG  Comments: With  comparison of twelve-lead EKG obtained 3/13/2020, no acute changes, normal sinus rhythm.  Patient is on no cardiac medications.            Results:   Reviewed vital signs and evaluated orthostatic vital signs which patient shows no evidence of orthostasis.  Reviewed medication regimen.  Reviewed follow-up twelve-lead EKG which showed no acute changes.  Reviewed prior cardiac diagnostic testing which is all within normal limits.  Phone contact with [facility] to ascertain whether she has had any cardiac dysrhythmias, for which she has not.    Assessment/Plan:   We will obtain tilt table test to evaluate for neurocardiogenic syndrome.  No medication changes will be made at this time.  I have instructed Ms. Dixon to increase her fluid intake, increased sodium intake, and to wear lower extremity knee-high compression hose.  She is to follow-up after her complete cardiac evaluation.  Reiterated to Ms. Dixon that treatment with a beta-blocker medication may cause additional adverse symptoms such as lowering her blood pressure.  She is in agreement with this course of treatment at present.  Radha was seen today for follow-up.    Diagnoses and all orders for this visit:    Palpitations  -     ECG 12 Lead  -     Tilt Table; Future    Inappropriate sinus tachycardia  -     ECG 12 Lead  -     Tilt Table; Future    Tachycardia  -     ECG 12 Lead  -     Tilt Table; Future    Postural dizziness with near syncope  -     ECG 12 Lead  -     Tilt Table; Future        Return for F/U after testing complete.    ELISEO Velazquez

## 2020-04-07 NOTE — TELEPHONE ENCOUNTER
Pt called stating that her HR was 231 yesterday. I looked on [employer] and they have nothing from yesterday. Please advise.

## 2020-04-07 NOTE — TELEPHONE ENCOUNTER
I am not convinced that is accurate,especially if Familia did not alert. Did she have symptoms during this episode? Was this a one time occurrence? Might very well have been artifact.    Can we phone contact Familia to send us a reading from that time? If she would feel better being seen in office ,That's fine as well.

## 2020-04-07 NOTE — TELEPHONE ENCOUNTER
Contacted [facility] and they states the highest the Pts HR was 118 with no urgent notifications. Pt states she was feeling off when she woke up, had dizziness and palpitations, numbness in face and fatigue. Pt states she uses a smart watch to monitor her HR. Pt has been scheduled for today at 1:30.

## 2020-04-14 ENCOUNTER — TELEPHONE (OUTPATIENT)
Dept: GASTROENTEROLOGY | Facility: CLINIC | Age: 30
End: 2020-04-14

## 2020-04-14 NOTE — TELEPHONE ENCOUNTER
Patient was scheduled for procedure on 04/30/2020. We are having to cancel procedures due to the COVID-19 pandemic. Patient has been notified. We will call and reschedule once we are cleared.

## 2020-04-27 ENCOUNTER — TELEPHONE (OUTPATIENT)
Dept: CARDIOLOGY | Facility: CLINIC | Age: 30
End: 2020-04-27

## 2020-04-27 NOTE — TELEPHONE ENCOUNTER
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 Her Tilt Table was scheduled for tomorrow but the table is down.  Her next appointment with you is on 5-. I placed all the current events strips on your desk for review.

## 2020-04-28 ENCOUNTER — APPOINTMENT (OUTPATIENT)
Dept: GENERAL RADIOLOGY | Facility: HOSPITAL | Age: 30
End: 2020-04-28

## 2020-05-05 ENCOUNTER — APPOINTMENT (OUTPATIENT)
Dept: GENERAL RADIOLOGY | Facility: HOSPITAL | Age: 30
End: 2020-05-05

## 2020-05-06 LAB
Lab: 70
TOAL ENROLLMENT DAYS: 30

## 2020-05-08 ENCOUNTER — TELEPHONE (OUTPATIENT)
Dept: GASTROENTEROLOGY | Facility: CLINIC | Age: 30
End: 2020-05-08

## 2020-05-13 ENCOUNTER — TELEMEDICINE (OUTPATIENT)
Dept: CARDIOLOGY | Facility: CLINIC | Age: 30
End: 2020-05-13

## 2020-05-13 DIAGNOSIS — R00.2 PALPITATIONS: ICD-10-CM

## 2020-05-13 DIAGNOSIS — R00.0 INAPPROPRIATE SINUS TACHYCARDIA: Primary | ICD-10-CM

## 2020-05-13 PROCEDURE — 99213 OFFICE O/P EST LOW 20 MIN: CPT | Performed by: INTERNAL MEDICINE

## 2020-05-13 RX ORDER — BISOPROLOL FUMARATE 5 MG/1
5 TABLET, FILM COATED ORAL DAILY
Qty: 30 TABLET | Refills: 11 | Status: SHIPPED | OUTPATIENT
Start: 2020-05-13 | End: 2020-06-12 | Stop reason: ALTCHOICE

## 2020-05-13 NOTE — PROGRESS NOTES
Subjective:     Encounter Date:05/13/2020      Patient ID: Radha Dixon is a 30 y.o. female.    Chief Complaint: Dizziness  HPI  This is a 30-year-old female patient with recently confirmed inappropriate sinus tachycardia.  The patient had a normal treadmill exercise stress test.  The patient exercised to target heart rate without chest discomfort or shortness of breath.  There was no ischemic ST-T wave changes.  Blood pressure response was normal.  The patient had an exaggerated tachycardia response.  There was no exercise-induced arrhythmia.  The patient also had a normal echocardiogram. The echocardiogram showed no evidence of ventricular hypertrophy or cardiac chamber enlargement.  Left ventricular systolic and diastolic function was normal.  There was no evidence of valvular pathology of significance, pericardial disease, pulmonary hypertension or intracardiac shunting.  The left ventricular ejection fraction was normal and there were no regional wall motion abnormalities.  The patient wore a 30-day cardiac monitor with near 100% concordance between her symptoms and the presence of sinus tachycardia ranging from 105-145 bpm.  There was no supraventricular ventricular tachycardia.  There was no symptomatic atrial or ventricular ectopy.  The patient continues to have symptoms which are prominent with posture change.  She reports that if she bends over and raises up or stands from a squatting position she will develop dizziness and a pounding sensation in her head.  The patient has not been on beta-blocker therapy at this point due to marginal blood pressures.  She has had no syncopal episodes.  The patient has been unable to undergo tilt table testing due to damage to our equipment which has yet to be repaired.    The following portions of the patient's history were reviewed and updated as appropriate: allergies, current medications, past family history, past medical history, past social history, past  surgical history and problem  Review of Systems   Constitution: Negative for chills, diaphoresis, fever, malaise/fatigue, weight gain and weight loss.   HENT: Negative for ear discharge, hearing loss, hoarse voice and nosebleeds.    Eyes: Negative for discharge, double vision, pain and photophobia.   Cardiovascular: Positive for palpitations. Negative for chest pain, claudication, cyanosis, dyspnea on exertion, irregular heartbeat, leg swelling, near-syncope, orthopnea, paroxysmal nocturnal dyspnea and syncope.   Respiratory: Negative for cough, hemoptysis, shortness of breath, sputum production and wheezing.    Endocrine: Negative for cold intolerance, heat intolerance, polydipsia, polyphagia and polyuria.   Hematologic/Lymphatic: Negative for adenopathy and bleeding problem. Does not bruise/bleed easily.   Skin: Negative for color change, flushing, itching and rash.   Musculoskeletal: Negative for muscle cramps, muscle weakness, myalgias and stiffness.   Gastrointestinal: Negative for abdominal pain, diarrhea, hematemesis, hematochezia, nausea and vomiting.   Genitourinary: Negative for dysuria, frequency and nocturia.   Neurological: Positive for dizziness and headaches. Negative for focal weakness, loss of balance, numbness, paresthesias and seizures.   Psychiatric/Behavioral: Negative for altered mental status, hallucinations and suicidal ideas.   Allergic/Immunologic: Negative for HIV exposure, hives and persistent infections.           Current Outpatient Medications:   •  levonorgestrel (MIRENA) 20 MCG/24HR IUD, 1 each by Intrauterine route 1 (One) Time., Disp: , Rfl:     Objective:   There were no vitals taken for this visit.   Not applicable.  This was a video visit.  Lab Review:     Assessment:       1. Inappropriate sinus tachycardia  Confirmed by 30-day cardiac monitor.  No evidence of arrhythmia.  Possible POTS.    2. Palpitations  No evidence of arrhythmia or ectopy.    Procedures    Plan:     I have  "recommended starting bisoprolol 5 mg orally once per day as this is the most cardio selective beta-blocker available and should not adversely affect her underlying history of \"asthma\".    If the patient's blood pressure drops and she become symptomatic, I would recommend starting Midrin and/or Florinef to \"crutch-up\" her blood pressure.  In the meantime she is encouraged to continue forcing fluid intake.  She has been given specific instructions on how to gauge the adequacy of her fluid intake by monitoring urine output and urine color.    The patient has been instructed regarding avoidance strategies and particularly instructed to avoid changes in posture such as \"bending-over\", rising from a seated or squatting position and straining maneuvers.  A letter has been provided to her employer indicating that she should avoid these activities in her occupation.    A significant discussion has been obtained with the patient regarding the syndrome of autonomic dysfunction and the spectrum of inappropriate sinus tachycardia/POTS/neurocardiogenic syncope/etc. She expresses understanding.    The patient has been counseled to avoid caffeinated beverages.  The patient has been counseled regarding the essential need to discontinue cigarette smoking.  The patient has been counseled regarding cardiac stimulants such as nicotine and caffeine.  The patient has been advised that as long as she continues to smoke she will almost certainly have worsening tachycardia.    We are making arrangements for the patient to have her head upright tilt table test at an alternative facility as our equipment will be \"down\" for an indeterminate period of time.    This was a video visit via zoom with the patient's permission.  Length of service was 18 minutes.      "

## 2020-05-28 ENCOUNTER — HOSPITAL ENCOUNTER (OUTPATIENT)
Dept: CARDIOLOGY | Facility: HOSPITAL | Age: 30
Discharge: HOME | End: 2020-05-28
Admitting: NURSE PRACTITIONER

## 2020-05-28 DIAGNOSIS — R42 POSTURAL DIZZINESS WITH NEAR SYNCOPE: ICD-10-CM

## 2020-05-28 DIAGNOSIS — R00.0 TACHYCARDIA: ICD-10-CM

## 2020-05-28 DIAGNOSIS — R55 POSTURAL DIZZINESS WITH NEAR SYNCOPE: ICD-10-CM

## 2020-05-28 DIAGNOSIS — R00.0 INAPPROPRIATE SINUS TACHYCARDIA: ICD-10-CM

## 2020-05-28 DIAGNOSIS — R00.2 PALPITATIONS: ICD-10-CM

## 2020-05-28 PROCEDURE — 93660 TILT TABLE EVALUATION: CPT

## 2020-05-28 PROCEDURE — 93660 TILT TABLE EVALUATION: CPT | Performed by: INTERNAL MEDICINE

## 2020-06-04 ENCOUNTER — APPOINTMENT (OUTPATIENT)
Dept: GENERAL RADIOLOGY | Facility: HOSPITAL | Age: 30
End: 2020-06-04

## 2020-06-09 ENCOUNTER — TELEPHONE (OUTPATIENT)
Dept: GASTROENTEROLOGY | Facility: CLINIC | Age: 30
End: 2020-06-09

## 2020-06-12 DIAGNOSIS — R42 POSTURAL DIZZINESS WITH NEAR SYNCOPE: Primary | ICD-10-CM

## 2020-06-12 DIAGNOSIS — R55 POSTURAL DIZZINESS WITH NEAR SYNCOPE: Primary | ICD-10-CM

## 2020-06-12 RX ORDER — FLUDROCORTISONE ACETATE 0.1 MG/1
0.1 TABLET ORAL DAILY
Qty: 30 TABLET | Refills: 3 | Status: SHIPPED | OUTPATIENT
Start: 2020-06-12 | End: 2020-06-18 | Stop reason: ALTCHOICE

## 2020-06-18 ENCOUNTER — TELEPHONE (OUTPATIENT)
Dept: CARDIOLOGY | Facility: CLINIC | Age: 30
End: 2020-06-18

## 2020-06-18 DIAGNOSIS — R55 POSTURAL DIZZINESS WITH NEAR SYNCOPE: ICD-10-CM

## 2020-06-18 DIAGNOSIS — R00.0 INAPPROPRIATE SINUS TACHYCARDIA: ICD-10-CM

## 2020-06-18 DIAGNOSIS — R00.2 PALPITATIONS: Primary | ICD-10-CM

## 2020-06-18 DIAGNOSIS — R42 POSTURAL DIZZINESS WITH NEAR SYNCOPE: ICD-10-CM

## 2020-06-18 RX ORDER — MIDODRINE HYDROCHLORIDE 10 MG/1
10 TABLET ORAL 2 TIMES DAILY
Qty: 60 TABLET | Refills: 3 | Status: SHIPPED | OUTPATIENT
Start: 2020-06-18 | End: 2022-03-10

## 2020-06-18 RX ORDER — ATENOLOL 25 MG/1
12.5 TABLET ORAL 2 TIMES DAILY
Qty: 30 TABLET | Refills: 3 | Status: SHIPPED | OUTPATIENT
Start: 2020-06-18 | End: 2020-08-12 | Stop reason: DRUGHIGH

## 2020-06-18 NOTE — TELEPHONE ENCOUNTER
She may have to go back on her beta-blocker if she is experiencing heart racing, palpitations.  We can try Midrin as opposed to fludrocortisone but again this may cause palpitation symptoms.  Atenolol 12.5 mg BID  Midodrin 10mg BID  Stop fludrocortisone

## 2020-06-18 NOTE — TELEPHONE ENCOUNTER
Pt. Called c/o of nausea and appetite decline, HR racing, then with Headache, swimmy head since taking Florinef.   Please review and advise.

## 2020-07-07 ENCOUNTER — TELEPHONE (OUTPATIENT)
Dept: GASTROENTEROLOGY | Facility: CLINIC | Age: 30
End: 2020-07-07

## 2020-08-12 ENCOUNTER — OFFICE VISIT (OUTPATIENT)
Dept: CARDIOLOGY | Facility: CLINIC | Age: 30
End: 2020-08-12

## 2020-08-12 VITALS
WEIGHT: 113 LBS | HEIGHT: 60 IN | OXYGEN SATURATION: 98 % | DIASTOLIC BLOOD PRESSURE: 62 MMHG | BODY MASS INDEX: 22.19 KG/M2 | HEART RATE: 86 BPM | SYSTOLIC BLOOD PRESSURE: 90 MMHG

## 2020-08-12 DIAGNOSIS — Z01.818 PREOP TESTING: Primary | ICD-10-CM

## 2020-08-12 DIAGNOSIS — Z72.0 TOBACCO ABUSE: ICD-10-CM

## 2020-08-12 DIAGNOSIS — R00.0 INAPPROPRIATE SINUS TACHYCARDIA: ICD-10-CM

## 2020-08-12 DIAGNOSIS — R00.2 PALPITATIONS: Primary | ICD-10-CM

## 2020-08-12 PROCEDURE — 99214 OFFICE O/P EST MOD 30 MIN: CPT | Performed by: INTERNAL MEDICINE

## 2020-08-12 RX ORDER — ATENOLOL 25 MG/1
25 TABLET ORAL DAILY
Qty: 60 TABLET | Refills: 11 | Status: SHIPPED | OUTPATIENT
Start: 2020-08-12 | End: 2022-02-10 | Stop reason: SDUPTHER

## 2020-08-12 RX ORDER — ONDANSETRON 4 MG/1
4 TABLET, FILM COATED ORAL EVERY 8 HOURS PRN
Qty: 12 TABLET | Refills: 11 | Status: SHIPPED | OUTPATIENT
Start: 2020-08-12 | End: 2020-10-05 | Stop reason: SDUPTHER

## 2020-08-12 NOTE — PROGRESS NOTES
Subjective:     Encounter Date:08/12/2020      Patient ID: Radha Dixon is a 30 y.o. female.    Chief Complaint: Palpitations  HPI  This is a 30-year-old female patient with suspected inappropriate sinus tachycardia and postural hypotension.  The patient has undergone a complete battery of outpatient testing including a 30-day cardiac monitor, treadmill stress test, echocardiogram and tilt table test.  Cardiac monitoring showed a tendency towards sinus tachycardia which was mild.  There was inconsistent symptom correlation.  The patient was also noted to have some symptomatic orthostasis during tilt table testing but no evidence of neurocardiogenic syncope, cardioinhibitory response, vasodepressive response or postural orthostatic tachycardia syndrome.  The patient indicates that she is continuing to use caffeinated beverages.  She does not feel that beta-blocker therapy has improved her palpitations in terms of frequency duration or intensity.  She reports that she continues to have palpitations mostly at night especially when she is lying down.  She is remained active caring for 2 small children.  Unfortunately she continues to use an electronic cigarette.  She has had no syncopal episodes.  She has had no exertional chest arm neck jaw shoulder back discomfort.  There is no shortness of breath at rest or with activity.  There is no orthopnea PND or lower extremity edema.  The patient is not adhering to the strict fluid challenge that she was previously educated to adhere to.  The patient indicates that she is urinating approximately 3-4 times per day.  The following portions of the patient's history were reviewed and updated as appropriate: allergies, current medications, past family history, past medical history, past social history, past surgical history and problem  Review of Systems   Constitution: Negative for chills, diaphoresis, fever, malaise/fatigue, weight gain and weight loss.   HENT: Negative  "for ear discharge, hearing loss, hoarse voice and nosebleeds.    Eyes: Negative for discharge, double vision, pain and photophobia.   Cardiovascular: Positive for palpitations. Negative for chest pain, claudication, cyanosis, dyspnea on exertion, irregular heartbeat, leg swelling, near-syncope, orthopnea, paroxysmal nocturnal dyspnea and syncope.   Respiratory: Negative for cough, hemoptysis, shortness of breath, sputum production and wheezing.    Endocrine: Negative for cold intolerance, heat intolerance, polydipsia, polyphagia and polyuria.   Hematologic/Lymphatic: Negative for adenopathy and bleeding problem. Does not bruise/bleed easily.   Skin: Negative for color change, flushing, itching and rash.   Musculoskeletal: Negative for muscle cramps, muscle weakness, myalgias and stiffness.   Gastrointestinal: Negative for abdominal pain, diarrhea, hematemesis, hematochezia, nausea and vomiting.   Genitourinary: Negative for dysuria, frequency and nocturia.   Neurological: Negative for focal weakness, loss of balance, numbness, paresthesias and seizures.   Psychiatric/Behavioral: Negative for altered mental status, hallucinations and suicidal ideas.   Allergic/Immunologic: Negative for HIV exposure, hives and persistent infections.           Current Outpatient Medications:   •  atenolol (TENORMIN) 25 MG tablet, Take 0.5 tablets by mouth 2 (two) times a day., Disp: 30 tablet, Rfl: 3  •  levonorgestrel (MIRENA) 20 MCG/24HR IUD, 1 each by Intrauterine route 1 (One) Time., Disp: , Rfl:   •  midodrine (PROAMATINE) 10 MG tablet, Take 1 tablet by mouth 2 (Two) Times a Day., Disp: 60 tablet, Rfl: 3    Objective:   Physical Exam  Blood pressure 90/62, pulse 86, height 152.4 cm (60\"), weight 51.3 kg (113 lb), SpO2 98 %.   Lab Review:     Assessment:       1. Palpitations  No correlation of symptoms to underlying arrhythmia or ectopy.    2. Inappropriate sinus tachycardia      3. Tobacco abuse  Continued nicotine and caffeine " exposure.    Procedures    Plan:     The patient has again been counseled regarding the essential need to eliminate all caffeinated beverages and all forms of nicotine exposure.  She has been reeducated as to various cardiac stimulants both prescription and nonprescription.  I have recommended increasing her atenolol to 25 mg in the evening and 12.5 mg in the morning.  She has been advised that if she tolerates this after 7 to 10 days to increase her atenolol to 25 mg orally twice per day.  The patient has been advised that if she becomes symptomatic from low blood pressure she can increase her Midodrine to 10 mg orally 3 times per day.  Again the patient has been reeducated regarding the essential need to increase her volume intake.  She has been advised that she can only gauge her adequacy of volume intake through her frequency of urination and urine color.  She has been advised to achieve a goal of urinating every 90 minutes.  She has been advised that if she goes more than 120 minutes without urination to drink immediately 1 full quart of water.  She has been advised that if she sees any yellow tinge of color to her urine to immediately drink a full quart of water.  She has been advised to supplement her fluid intake with an electrolyte solution in a ratio of 4: 1.  No additional cardiovascular testing is warranted at this time.  The patient has been educated that without strict adherence to these instructions there is no expectation that her symptoms will improve with time.  She is encouraged to maintain an active lifestyle and to devote at least 30 minutes/day of sustained walking at a comfortable pace.  The patient has been advised that the nausea that she is experiencing at night in association with her palpitations is a prominent component to autonomic dysfunction.  I have given her a prescription for Zofran to use on an as-needed basis.

## 2020-08-19 DIAGNOSIS — R13.19 ESOPHAGEAL DYSPHAGIA: Primary | ICD-10-CM

## 2020-08-19 RX ORDER — SODIUM CHLORIDE 9 MG/ML
70 INJECTION, SOLUTION INTRAVENOUS CONTINUOUS PRN
Status: CANCELLED | OUTPATIENT
Start: 2020-08-19

## 2020-08-31 ENCOUNTER — LAB (OUTPATIENT)
Dept: LAB | Facility: HOSPITAL | Age: 30
End: 2020-08-31

## 2020-08-31 DIAGNOSIS — Z01.818 PREOP TESTING: ICD-10-CM

## 2020-08-31 PROCEDURE — U0002 COVID-19 LAB TEST NON-CDC: HCPCS

## 2020-08-31 PROCEDURE — C9803 HOPD COVID-19 SPEC COLLECT: HCPCS

## 2020-08-31 PROCEDURE — U0004 COV-19 TEST NON-CDC HGH THRU: HCPCS

## 2020-09-01 LAB
REF LAB TEST METHOD: NORMAL
SARS-COV-2 RNA RESP QL NAA+PROBE: NOT DETECTED

## 2020-09-01 NOTE — PAT
Called anesthesia phone and spoke with David Pablo CRNA.  Pt to have an EGD with Dr Montenegro on 9/3/20.  Reviewed pt's PMH and past cardiac history with CRNA.  Pt with DX of inappropriate sinus tachycardia, and has had recent cardiac testing done such as a tilt table test, cardiac monitor, exercise stress test, and echocardiogram (Pt last saw Dr. Dong 8/12/20.  Pt states that she still has near syncopal episodes at times, with the last time being two days ago.  States that this occurs when going up stairs or bending over.  States that she was given compression socks to wear takes midodrine, and was told to increase her water intake by Dr. Dong.  Pt states that she is on atenolol daily as well.  Per Dr. Dong's last office note, pt was to increase her atenolol to 25 mg in the evening and 12.5 mg in the morning, and to increase to 25 mg po BID in 7 to 10 days if she tolerates this.  Pt states that she only takes it daily, and doesn't remember this conversation with MD.  Instructed pt to call Dr. Dong's office for clarification and further instruction.  Verbalized understanding.  Asked David if anything further needed prior to EGD.  David stated that nothing further is needed.

## 2020-09-03 ENCOUNTER — HOSPITAL ENCOUNTER (OUTPATIENT)
Facility: HOSPITAL | Age: 30
Setting detail: HOSPITAL OUTPATIENT SURGERY
Discharge: HOME | End: 2020-09-03
Attending: INTERNAL MEDICINE | Admitting: INTERNAL MEDICINE

## 2020-09-03 ENCOUNTER — ANESTHESIA EVENT (OUTPATIENT)
Dept: GASTROENTEROLOGY | Facility: HOSPITAL | Age: 30
End: 2020-09-03

## 2020-09-03 ENCOUNTER — ANESTHESIA (OUTPATIENT)
Dept: GASTROENTEROLOGY | Facility: HOSPITAL | Age: 30
End: 2020-09-03

## 2020-09-03 VITALS
TEMPERATURE: 97.2 F | HEIGHT: 61 IN | RESPIRATION RATE: 18 BRPM | DIASTOLIC BLOOD PRESSURE: 68 MMHG | OXYGEN SATURATION: 96 % | WEIGHT: 109 LBS | HEART RATE: 73 BPM | SYSTOLIC BLOOD PRESSURE: 96 MMHG | BODY MASS INDEX: 20.58 KG/M2

## 2020-09-03 DIAGNOSIS — R13.19 ESOPHAGEAL DYSPHAGIA: ICD-10-CM

## 2020-09-03 LAB
B-HCG UR QL: NEGATIVE
INTERNAL NEGATIVE CONTROL: NEGATIVE
INTERNAL POSITIVE CONTROL: POSITIVE
Lab: NORMAL

## 2020-09-03 PROCEDURE — 81025 URINE PREGNANCY TEST: CPT | Performed by: INTERNAL MEDICINE

## 2020-09-03 PROCEDURE — 43239 EGD BIOPSY SINGLE/MULTIPLE: CPT | Performed by: INTERNAL MEDICINE

## 2020-09-03 PROCEDURE — 25010000002 PROPOFOL 200 MG/20ML EMULSION: Performed by: NURSE ANESTHETIST, CERTIFIED REGISTERED

## 2020-09-03 PROCEDURE — 43248 EGD GUIDE WIRE INSERTION: CPT | Performed by: INTERNAL MEDICINE

## 2020-09-03 PROCEDURE — 25010000002 MIDAZOLAM PER 1MG: Performed by: NURSE ANESTHETIST, CERTIFIED REGISTERED

## 2020-09-03 PROCEDURE — 25010000002 FENTANYL CITRATE (PF) 100 MCG/2ML SOLUTION: Performed by: NURSE ANESTHETIST, CERTIFIED REGISTERED

## 2020-09-03 RX ORDER — PROPOFOL 10 MG/ML
INJECTION, EMULSION INTRAVENOUS AS NEEDED
Status: DISCONTINUED | OUTPATIENT
Start: 2020-09-03 | End: 2020-09-03 | Stop reason: SURG

## 2020-09-03 RX ORDER — SODIUM CHLORIDE 9 MG/ML
70 INJECTION, SOLUTION INTRAVENOUS CONTINUOUS PRN
Status: DISCONTINUED | OUTPATIENT
Start: 2020-09-03 | End: 2020-09-03 | Stop reason: HOSPADM

## 2020-09-03 RX ORDER — SODIUM CHLORIDE 0.9 % (FLUSH) 0.9 %
10 SYRINGE (ML) INJECTION AS NEEDED
Status: DISCONTINUED | OUTPATIENT
Start: 2020-09-03 | End: 2020-09-03 | Stop reason: HOSPADM

## 2020-09-03 RX ORDER — LIDOCAINE HYDROCHLORIDE 20 MG/ML
INJECTION, SOLUTION INTRAVENOUS AS NEEDED
Status: DISCONTINUED | OUTPATIENT
Start: 2020-09-03 | End: 2020-09-03 | Stop reason: SURG

## 2020-09-03 RX ORDER — MIDAZOLAM HYDROCHLORIDE 2 MG/2ML
INJECTION, SOLUTION INTRAMUSCULAR; INTRAVENOUS AS NEEDED
Status: DISCONTINUED | OUTPATIENT
Start: 2020-09-03 | End: 2020-09-03 | Stop reason: SURG

## 2020-09-03 RX ORDER — FENTANYL CITRATE 50 UG/ML
INJECTION, SOLUTION INTRAMUSCULAR; INTRAVENOUS AS NEEDED
Status: DISCONTINUED | OUTPATIENT
Start: 2020-09-03 | End: 2020-09-03 | Stop reason: SURG

## 2020-09-03 RX ADMIN — PROPOFOL 50 MG: 10 INJECTION, EMULSION INTRAVENOUS at 08:21

## 2020-09-03 RX ADMIN — FENTANYL CITRATE 100 MCG: 50 INJECTION, SOLUTION INTRAMUSCULAR; INTRAVENOUS at 08:11

## 2020-09-03 RX ADMIN — LIDOCAINE HYDROCHLORIDE 100 MG: 20 INJECTION, SOLUTION INTRAVENOUS at 08:11

## 2020-09-03 RX ADMIN — SODIUM CHLORIDE 70 ML/HR: 9 INJECTION, SOLUTION INTRAVENOUS at 06:49

## 2020-09-03 RX ADMIN — MIDAZOLAM HYDROCHLORIDE 2 MG: 1 INJECTION, SOLUTION INTRAMUSCULAR; INTRAVENOUS at 08:11

## 2020-09-03 RX ADMIN — PROPOFOL 50 MG: 10 INJECTION, EMULSION INTRAVENOUS at 08:11

## 2020-09-03 NOTE — ANESTHESIA POSTPROCEDURE EVALUATION
Patient: Radha Dixon    Procedure Summary     Date:  09/03/20 Room / Location:  [facility] ENDOSCOPY 2 / [facility] ENDOSCOPY    Anesthesia Start:  0810 Anesthesia Stop:  0835    Procedure:  ESOPHAGOGASTRODUODENOSCOPY WITH BIOPSIES AND DILATATION (N/A ) Diagnosis:       Esophageal dysphagia      (Esophageal dysphagia [R13.10])    Surgeon:  Bhupinder Montenegro MD Provider:  Chivo Ferreira CRNA    Anesthesia Type:  MAC ASA Status:  2          Anesthesia Type: MAC    Vitals  Vitals Value Taken Time   BP 96/68 9/3/2020  9:02 AM   Temp 97.2 °F (36.2 °C) 9/3/2020  8:32 AM   Pulse 73 9/3/2020  9:02 AM   Resp 18 9/3/2020  9:02 AM   SpO2 96 % 9/3/2020  9:02 AM           Post Anesthesia Care and Evaluation    Patient location during evaluation: bedside  Patient participation: complete - patient participated  Level of consciousness: awake  Pain score: 0  Pain management: adequate  Airway patency: patent  Anesthetic complications: No anesthetic complications  PONV Status: controlled  Cardiovascular status: acceptable and stable  Respiratory status: acceptable and room air  Hydration status: acceptable

## 2020-09-03 NOTE — DISCHARGE INSTRUCTIONS
Rest today  No pushing,pulling,tugging,heavy lifting, or strenuous activity   No major decision making,driving,or drinking alcoholic beverages for 24 hours due to the sedation you received  Always use good hand hygiene/washing technique  No driving on pain medication.    - Await pathology results.   - Follow an antireflux regimen.   - Return to my office in 4 weeks.   - Discharge patient to home (ambulatory).   - Mechanical soft diet today.

## 2020-09-03 NOTE — H&P
"    [facility]  HISTORY AND PHYSICAL    Patient Name: Radha Dixon  : 1990  MRN: [id number]    Chief Complaint:   For EGD    History Of Presenting Illness:      Dysphagia    Past Medical History:   Diagnosis Date   • Acid reflux    • Anxiety    • Anxiety and depression    • Arthritis    • Asthma    • Bipolar 1 disorder (CMS/HCC)    • Body piercing     EARS, NOSE, BELLY BUTTON   • Dysphagia     REPORTS SOMETIMES FEELS LIKE \"FOOD GETS STUCK\"   • Fracture     HISTORY OF RIGHT WRIST AS A CHILD   • Goiter     x3   • Heart rate fast     states usually 120-130 (states on med for this)   • History of migraine    • Hx of exercise stress test 2020   • Inappropriate sinus tachycardia     per Dr. Dong's note on 20.     • Injury of neck    • Insomnia    • Migraine    • Ovarian cyst    • Palpitations    • PONV (postoperative nausea and vomiting)    • Scoliosis    • Tattoo    • Toe fracture     right great toe   • Wears glasses        Past Surgical History:   Procedure Laterality Date   • APPENDECTOMY     • CHOLECYSTECTOMY     • CYST REMOVAL      OVARIAN CYST   • DILATATION AND CURETTAGE     • ENDOSCOPY N/A 2017    Procedure: ESOPHAGOGASTRODUODENOSCOPY WITH COLD FORCEP BIOPSY;  Surgeon: Danilo Shabazz MD;  Location: [facility] ENDOSCOPY;  Service:    • HIP SURGERY Right     RIGHT HIP, REPORTS HAD BONES SCRAPED   • SHOULDER ARTHROSCOPY Right 10/11/2018    Procedure: Right shoulder diagnostic arthroscopy, limited rotator cuff debridement;  Surgeon: Tino Uribe MD;  Location: [facility] OR;  Service: Orthopedics   • THYROID BIOPSY      goiter   • WISDOM TOOTH EXTRACTION         Social History     Socioeconomic History   • Marital status: Single     Spouse name: Not on file   • Number of children: Not on file   • Years of education: Not on file   • Highest education level: Not on file   Tobacco Use   • Smoking status: Current Every Day Smoker     Packs/day: 0.50     Years: 13.00     " Pack years: 6.50     Types: Cigarettes   • Smokeless tobacco: Never Used   Substance and Sexual Activity   • Alcohol use: No   • Drug use: No   • Sexual activity: Defer   Social History Narrative    Right hand dominant       Family History   Problem Relation Age of Onset   • Diabetes Mother    • Arthritis Mother    • No Known Problems Father    • Cancer Maternal Grandmother    • Diabetes Maternal Grandmother    • Heart attack Maternal Grandmother    • Arthritis Maternal Grandmother    • Heart attack Paternal Grandmother    • Heart disease Sister    • Migraines Sister    • Cancer Paternal Grandfather         Lung       Prior to Admission Medications:  Medications Prior to Admission   Medication Sig Dispense Refill Last Dose   • atenolol (TENORMIN) 25 MG tablet Take 1 tablet by mouth Daily. 60 tablet 11 9/2/2020 at 0700   • levonorgestrel (MIRENA) 20 MCG/24HR IUD 1 each by Intrauterine route 1 (One) Time.   9/3/2020 at Unknown time   • midodrine (PROAMATINE) 10 MG tablet Take 1 tablet by mouth 2 (Two) Times a Day. 60 tablet 3 9/2/2020 at 1900   • ondansetron (Zofran) 4 MG tablet Take 1 tablet by mouth Every 8 (Eight) Hours As Needed for Nausea or Vomiting. 12 tablet 11 Past Week at Unknown time       Allergies:  Allergies   Allergen Reactions   • Onion Itching     REPORTS CAUSES MIGRAINES and MAKES THROAT ITCHY     • Cabbage Headache   • Msg [Monosodium Glutamate] Headache   • Sulfa Antibiotics Hives        Vitals: Temp:  [98.7 °F (37.1 °C)] 98.7 °F (37.1 °C)  Heart Rate:  [60] 60  Resp:  [17] 17  BP: (108)/(59) 108/59    Review Of Systems:  Constitutional:  Negative for chills, fever, and unexpected weight change.  Respiratory:  Negative for cough, chest tightness, shortness of breath, and wheezing.  Cardiovascular:  Negative for chest pain, palpitations, and leg swelling.  Gastrointestinal:  Negative for abdominal distention, abdominal pain, Nausea, vomiting.  Neurological:  Negative for Weakness, numbness, and  headaches.     Physical Exam:    General Appearance:  Alert, cooperative, in no acute distress.   Lungs:   Clear to auscultation, respirations regular, even and                 unlabored.   Heart:  Regular rhythm and normal rate.   Abdomen:   Normal bowel sounds, no masses, no organomegaly. Soft, non-tender, non-distended   Neurologic: Alert and oriented x 3. Moves all four limbs equally       Plan: ESOPHAGOGASTRODUODENOSCOPY WITH DILATATION (N/A)     Bhupinder Montenegro MD  9/3/2020

## 2020-09-03 NOTE — ANESTHESIA PREPROCEDURE EVALUATION
Anesthesia Evaluation     Patient summary reviewed and Nursing notes reviewed   history of anesthetic complications: PONV  NPO Solid Status: > 8 hours  NPO Liquid Status: > 8 hours           Airway   Mallampati: I  TM distance: >3 FB  Neck ROM: full  no difficulty expected  Dental - normal exam     Pulmonary - negative pulmonary ROS and normal exam   Cardiovascular - negative cardio ROS and normal exam        Neuro/Psych- negative ROS  GI/Hepatic/Renal/Endo - negative ROS     Musculoskeletal (-) negative ROS    Abdominal    Substance History - negative use     OB/GYN negative ob/gyn ROS         Other - negative ROS                         Anesthesia Plan    ASA 2     MAC     intravenous induction     Anesthetic plan, all risks, benefits, and alternatives have been provided, discussed and informed consent has been obtained with: patient.       Dental clearence faxed to dentist.

## 2020-09-08 LAB
LAB AP CASE REPORT: NORMAL
PATH REPORT.FINAL DX SPEC: NORMAL

## 2020-09-27 ENCOUNTER — APPOINTMENT (OUTPATIENT)
Dept: ULTRASOUND IMAGING | Facility: HOSPITAL | Age: 30
End: 2020-09-27

## 2020-09-27 ENCOUNTER — HOSPITAL ENCOUNTER (EMERGENCY)
Facility: HOSPITAL | Age: 30
Discharge: HOME | End: 2020-09-27
Attending: EMERGENCY MEDICINE | Admitting: EMERGENCY MEDICINE

## 2020-09-27 ENCOUNTER — APPOINTMENT (OUTPATIENT)
Dept: GENERAL RADIOLOGY | Facility: HOSPITAL | Age: 30
End: 2020-09-27

## 2020-09-27 VITALS
BODY MASS INDEX: 20.16 KG/M2 | SYSTOLIC BLOOD PRESSURE: 106 MMHG | WEIGHT: 106.8 LBS | HEIGHT: 61 IN | RESPIRATION RATE: 18 BRPM | TEMPERATURE: 99 F | DIASTOLIC BLOOD PRESSURE: 70 MMHG | HEART RATE: 114 BPM | OXYGEN SATURATION: 100 %

## 2020-09-27 DIAGNOSIS — R10.9 ABDOMINAL PAIN, UNSPECIFIED ABDOMINAL LOCATION: Primary | ICD-10-CM

## 2020-09-27 DIAGNOSIS — M25.531 RIGHT WRIST PAIN: ICD-10-CM

## 2020-09-27 LAB
ALBUMIN SERPL-MCNC: 4.6 G/DL (ref 3.5–5.2)
ALBUMIN/GLOB SERPL: 2 G/DL
ALP SERPL-CCNC: 47 U/L (ref 39–117)
ALT SERPL W P-5'-P-CCNC: 9 U/L (ref 1–33)
ANION GAP SERPL CALCULATED.3IONS-SCNC: 13.9 MMOL/L (ref 5–15)
AST SERPL-CCNC: 15 U/L (ref 1–32)
B-HCG UR QL: NEGATIVE
BASOPHILS # BLD AUTO: 0.07 10*3/MM3 (ref 0–0.2)
BASOPHILS NFR BLD AUTO: 0.8 % (ref 0–1.5)
BILIRUB SERPL-MCNC: 1.4 MG/DL (ref 0–1.2)
BILIRUB UR QL STRIP: NEGATIVE
BUN SERPL-MCNC: 8 MG/DL (ref 6–20)
BUN/CREAT SERPL: 11.9 (ref 7–25)
CALCIUM SPEC-SCNC: 9.1 MG/DL (ref 8.6–10.5)
CHLORIDE SERPL-SCNC: 103 MMOL/L (ref 98–107)
CLARITY UR: CLEAR
CO2 SERPL-SCNC: 22.1 MMOL/L (ref 22–29)
COLOR UR: YELLOW
CREAT SERPL-MCNC: 0.67 MG/DL (ref 0.57–1)
DEPRECATED RDW RBC AUTO: 37.3 FL (ref 37–54)
EOSINOPHIL # BLD AUTO: 0.2 10*3/MM3 (ref 0–0.4)
EOSINOPHIL NFR BLD AUTO: 2.4 % (ref 0.3–6.2)
ERYTHROCYTE [DISTWIDTH] IN BLOOD BY AUTOMATED COUNT: 11.4 % (ref 12.3–15.4)
GFR SERPL CREATININE-BSD FRML MDRD: 104 ML/MIN/1.73
GLOBULIN UR ELPH-MCNC: 2.3 GM/DL
GLUCOSE SERPL-MCNC: 85 MG/DL (ref 65–99)
GLUCOSE UR STRIP-MCNC: NEGATIVE MG/DL
HCT VFR BLD AUTO: 36.8 % (ref 34–46.6)
HGB BLD-MCNC: 12.5 G/DL (ref 12–15.9)
HGB UR QL STRIP.AUTO: NEGATIVE
IMM GRANULOCYTES # BLD AUTO: 0.01 10*3/MM3 (ref 0–0.05)
IMM GRANULOCYTES NFR BLD AUTO: 0.1 % (ref 0–0.5)
KETONES UR QL STRIP: ABNORMAL
LEUKOCYTE ESTERASE UR QL STRIP.AUTO: NEGATIVE
LYMPHOCYTES # BLD AUTO: 2.34 10*3/MM3 (ref 0.7–3.1)
LYMPHOCYTES NFR BLD AUTO: 27.8 % (ref 19.6–45.3)
MCH RBC QN AUTO: 30.4 PG (ref 26.6–33)
MCHC RBC AUTO-ENTMCNC: 34 G/DL (ref 31.5–35.7)
MCV RBC AUTO: 89.5 FL (ref 79–97)
MONOCYTES # BLD AUTO: 0.45 10*3/MM3 (ref 0.1–0.9)
MONOCYTES NFR BLD AUTO: 5.3 % (ref 5–12)
NEUTROPHILS NFR BLD AUTO: 5.35 10*3/MM3 (ref 1.7–7)
NEUTROPHILS NFR BLD AUTO: 63.6 % (ref 42.7–76)
NITRITE UR QL STRIP: NEGATIVE
NRBC BLD AUTO-RTO: 0 /100 WBC (ref 0–0.2)
PH UR STRIP.AUTO: 7 [PH] (ref 5–8)
PLATELET # BLD AUTO: 164 10*3/MM3 (ref 140–450)
PMV BLD AUTO: 12.6 FL (ref 6–12)
POTASSIUM SERPL-SCNC: 3.9 MMOL/L (ref 3.5–5.2)
PROT SERPL-MCNC: 6.9 G/DL (ref 6–8.5)
PROT UR QL STRIP: NEGATIVE
RBC # BLD AUTO: 4.11 10*6/MM3 (ref 3.77–5.28)
SODIUM SERPL-SCNC: 139 MMOL/L (ref 136–145)
SP GR UR STRIP: 1.02 (ref 1–1.03)
UROBILINOGEN UR QL STRIP: ABNORMAL
WBC # BLD AUTO: 8.42 10*3/MM3 (ref 3.4–10.8)

## 2020-09-27 PROCEDURE — 73110 X-RAY EXAM OF WRIST: CPT

## 2020-09-27 PROCEDURE — 81003 URINALYSIS AUTO W/O SCOPE: CPT | Performed by: EMERGENCY MEDICINE

## 2020-09-27 PROCEDURE — 25010000002 KETOROLAC TROMETHAMINE PER 15 MG: Performed by: EMERGENCY MEDICINE

## 2020-09-27 PROCEDURE — 80053 COMPREHEN METABOLIC PANEL: CPT | Performed by: EMERGENCY MEDICINE

## 2020-09-27 PROCEDURE — 81025 URINE PREGNANCY TEST: CPT | Performed by: EMERGENCY MEDICINE

## 2020-09-27 PROCEDURE — 96374 THER/PROPH/DIAG INJ IV PUSH: CPT

## 2020-09-27 PROCEDURE — 96375 TX/PRO/DX INJ NEW DRUG ADDON: CPT

## 2020-09-27 PROCEDURE — 25010000002 MORPHINE PER 10 MG: Performed by: EMERGENCY MEDICINE

## 2020-09-27 PROCEDURE — 73130 X-RAY EXAM OF HAND: CPT

## 2020-09-27 PROCEDURE — 85025 COMPLETE CBC W/AUTO DIFF WBC: CPT | Performed by: EMERGENCY MEDICINE

## 2020-09-27 PROCEDURE — 76830 TRANSVAGINAL US NON-OB: CPT

## 2020-09-27 PROCEDURE — 99284 EMERGENCY DEPT VISIT MOD MDM: CPT

## 2020-09-27 RX ORDER — KETOROLAC TROMETHAMINE 30 MG/ML
10 INJECTION, SOLUTION INTRAMUSCULAR; INTRAVENOUS ONCE
Status: COMPLETED | OUTPATIENT
Start: 2020-09-27 | End: 2020-09-27

## 2020-09-27 RX ORDER — NAPROXEN 500 MG/1
500 TABLET ORAL 2 TIMES DAILY PRN
Qty: 14 TABLET | Refills: 0 | Status: SHIPPED | OUTPATIENT
Start: 2020-09-27 | End: 2020-10-19

## 2020-09-27 RX ORDER — MORPHINE SULFATE 4 MG/ML
4 INJECTION, SOLUTION INTRAMUSCULAR; INTRAVENOUS ONCE
Status: COMPLETED | OUTPATIENT
Start: 2020-09-27 | End: 2020-09-27

## 2020-09-27 RX ADMIN — MORPHINE SULFATE 4 MG: 4 INJECTION, SOLUTION INTRAMUSCULAR; INTRAVENOUS at 14:56

## 2020-09-27 RX ADMIN — KETOROLAC TROMETHAMINE 10 MG: 30 INJECTION, SOLUTION INTRAMUSCULAR; INTRAVENOUS at 14:55

## 2020-09-27 RX ADMIN — SODIUM CHLORIDE 500 ML: 9 INJECTION, SOLUTION INTRAVENOUS at 14:55

## 2020-09-27 NOTE — ED PROVIDER NOTES
"TRIAGE CHIEF COMPLAINT:     Nursing and triage notes reviewed    Chief Complaint   Patient presents with   • Pelvic Pain      HPI: Radha Dixon is a 30 y.o. female who presents to the emergency department complaining of right lower quadrant abdominal pain.  Symptoms ongoing for the past several days.  Patient states she has sharp pain in this area.  She has a history of ovarian cysts and states this feels similarly.  She denies nausea or vomiting or diarrhea.  She has had an appendectomy.  Denies dysuria or vaginal discharge.  She is currently on her menstrual cycle.    REVIEW OF SYSTEMS: All other systems reviewed and are negative     PAST MEDICAL HISTORY:   Past Medical History:   Diagnosis Date   • Acid reflux    • Anxiety    • Anxiety and depression    • Arthritis    • Asthma    • Bipolar 1 disorder (CMS/HCC)    • Body piercing     EARS, NOSE, BELLY BUTTON   • Dysphagia     REPORTS SOMETIMES FEELS LIKE \"FOOD GETS STUCK\"   • Fracture     HISTORY OF RIGHT WRIST AS A CHILD   • Goiter     x3   • Heart rate fast     states usually 120-130 (states on med for this)   • History of migraine    • Hx of exercise stress test 04/01/2020   • Inappropriate sinus tachycardia     per Dr. Dong's note on 8/12/20.     • Injury of neck    • Insomnia    • Migraine    • Ovarian cyst    • Palpitations    • PONV (postoperative nausea and vomiting)    • Scoliosis    • Tattoo    • Toe fracture     right great toe   • Wears glasses         FAMILY HISTORY:   Family History   Problem Relation Age of Onset   • Diabetes Mother    • Arthritis Mother    • No Known Problems Father    • Cancer Maternal Grandmother    • Diabetes Maternal Grandmother    • Heart attack Maternal Grandmother    • Arthritis Maternal Grandmother    • Heart attack Paternal Grandmother    • Heart disease Sister    • Migraines Sister    • Cancer Paternal Grandfather         Lung        SOCIAL HISTORY:   Social History     Socioeconomic History   • Marital status: " Single     Spouse name: Not on file   • Number of children: Not on file   • Years of education: Not on file   • Highest education level: Not on file   Tobacco Use   • Smoking status: Current Every Day Smoker     Packs/day: 0.50     Years: 13.00     Pack years: 6.50     Types: Cigarettes   • Smokeless tobacco: Never Used   Substance and Sexual Activity   • Alcohol use: No   • Drug use: No   • Sexual activity: Defer   Social History Narrative    Right hand dominant        SURGICAL HISTORY:   Past Surgical History:   Procedure Laterality Date   • APPENDECTOMY  2016   • CHOLECYSTECTOMY     • CYST REMOVAL      OVARIAN CYST   • DILATATION AND CURETTAGE     • ENDOSCOPY N/A 12/1/2017    Procedure: ESOPHAGOGASTRODUODENOSCOPY WITH COLD FORCEP BIOPSY;  Surgeon: Danilo Shabazz MD;  Location: [facility] ENDOSCOPY;  Service:    • ENDOSCOPY N/A 9/3/2020    Procedure: ESOPHAGOGASTRODUODENOSCOPY WITH BIOPSIES AND DILATATION;  Surgeon: Bhupinder Montenegro MD;  Location: [facility] ENDOSCOPY;  Service: Gastroenterology;  Laterality: N/A;   • HIP SURGERY Right     RIGHT HIP, REPORTS HAD BONES SCRAPED   • SHOULDER ARTHROSCOPY Right 10/11/2018    Procedure: Right shoulder diagnostic arthroscopy, limited rotator cuff debridement;  Surgeon: Tino Uribe MD;  Location: [facility] OR;  Service: Orthopedics   • THYROID BIOPSY      goiter   • WISDOM TOOTH EXTRACTION          CURRENT MEDICATIONS:      Medication List      ASK your doctor about these medications    atenolol 25 MG tablet  Commonly known as: TENORMIN  Take 1 tablet by mouth Daily.     levonorgestrel 20 MCG/24HR IUD  Commonly known as: MIRENA     midodrine 10 MG tablet  Commonly known as: PROAMATINE  Take 1 tablet by mouth 2 (Two) Times a Day.     ondansetron 4 MG tablet  Commonly known as: Zofran  Take 1 tablet by mouth Every 8 (Eight) Hours As Needed for Nausea or Vomiting.             ALLERGIES: Onion, Cabbage, Msg [monosodium glutamate], and Sulfa antibiotics     PHYSICAL EXAM:    VITAL SIGNS:   Vitals:    09/27/20 1301   BP: 102/72   Pulse: 114   Resp: 18   Temp: 99 °F (37.2 °C)   SpO2: 99%      CONSTITUTIONAL: Awake, oriented, appears non-toxic   HENT: Atraumatic, normocephalic, oral mucosa pink and moist, airway patent. Nares patent without drainage. External ears normal.   EYES: Conjunctiva clear  NECK: Trachea midline, non-tender, supple   CARDIOVASCULAR: Normal heart rate, Normal rhythm, No murmurs, rubs, gallops   PULMONARY/CHEST: Clear to auscultation, no rhonchi, wheezes, or rales. Symmetrical breath sounds.  ABDOMINAL: Non-distended, soft, mild tenderness in the right lower quadrant- no rebound or guarding.  NEUROLOGIC: Non-focal, moving all four extremities, no gross sensory or motor deficits.   EXTREMITIES: No clubbing, cyanosis, or edema   SKIN: Warm, Dry, No erythema, No rash     ED COURSE / MEDICAL DECISION MAKING:   Radha Dixon is a 30 y.o. female who presents to the emergency department for evaluation of abdominal pain.  Patient nondistressed on arrival in the emergency department.  Vital signs are stable.  Physical exam is largely unremarkable aside from some tenderness in the right lower quadrant.  Will obtain an ultrasound, urinalysis, basic labs for evaluation.    Laboratory tests are largely unremarkable.  Urinalysis is not indicative of infection.    Ultrasound per radiology interpretation does not reveal any obvious cyst or other acute abnormality.  There is appropriate ovarian blood flow.    Patient improved with symptomatic therapy in the emergency department.  Will continue to treat symptomatically with outpatient follow-up and return precautions.    DECISION TO DISCHARGE/ADMIT: see ED care timeline     FINAL IMPRESSION:   1 --abdominal pain  2 --wrist pain  3 --     Electronically signed by: Lesli Alarcon MD, 9/27/2020 13:53 Lesli Yu MD  09/27/20 [id number]

## 2020-10-05 ENCOUNTER — OFFICE VISIT (OUTPATIENT)
Dept: GASTROENTEROLOGY | Facility: CLINIC | Age: 30
End: 2020-10-05

## 2020-10-05 VITALS
WEIGHT: 109 LBS | HEART RATE: 79 BPM | RESPIRATION RATE: 16 BRPM | DIASTOLIC BLOOD PRESSURE: 69 MMHG | SYSTOLIC BLOOD PRESSURE: 104 MMHG | BODY MASS INDEX: 20.58 KG/M2 | TEMPERATURE: 99.5 F | HEIGHT: 61 IN

## 2020-10-05 DIAGNOSIS — R11.0 NAUSEA: Chronic | ICD-10-CM

## 2020-10-05 DIAGNOSIS — E80.6 BILIRUBINEMIA: Chronic | ICD-10-CM

## 2020-10-05 DIAGNOSIS — R13.19 ESOPHAGEAL DYSPHAGIA: Primary | Chronic | ICD-10-CM

## 2020-10-05 DIAGNOSIS — E04.1 THYROID NODULE: Chronic | ICD-10-CM

## 2020-10-05 DIAGNOSIS — R63.4 WEIGHT LOSS: Chronic | ICD-10-CM

## 2020-10-05 PROCEDURE — 99214 OFFICE O/P EST MOD 30 MIN: CPT | Performed by: NURSE PRACTITIONER

## 2020-10-05 RX ORDER — ONDANSETRON 4 MG/1
4 TABLET, ORALLY DISINTEGRATING ORAL EVERY 8 HOURS PRN
Qty: 30 TABLET | Refills: 1 | Status: SHIPPED | OUTPATIENT
Start: 2020-10-05 | End: 2021-08-04 | Stop reason: SDUPTHER

## 2020-10-05 NOTE — PATIENT INSTRUCTIONS
1. Antireflux measures: Avoid fried, fatty foods, alcohol, chocolate, coffee, tea,  soft drinks, peppermint and spearmint, spicy foods, tomatoes and tomato based foods, onion based foods, and smoking. Other antireflux measures include weight reduction if overweight, avoiding tight clothing around the abdomen, elevating the head of the bed 6 inches with blocks under the head board, and don't drink or eat before going to bed and avoid lying down immediately after meals.  2. High fiber diet with liberal water intake.   3. Dietary instructions.  The patient should eat relatively soft diet, and should eat in upright position and chew well.  The patient should drink water after 2-3 bites and take medications with liberal amounts of water. Furthermore after eating and taking medications, the patient should remain in upright position for 5-10 minutes.  4. Zofran 4 mg 1 po every 8 hours as needed for nausea.   5. CMP, IgA, tTg IgA, total/direct bilirubin  6. Abdominal ultrasound.   7. Referral for esophageal manometry.  8. The patient may benefit from referral to endocrinology for evaluation.  9. Follow up: 3 months

## 2020-10-05 NOTE — PROGRESS NOTES
Follow Up Note     Date: 10/05/2020   Patient Name: Radha Dixon  MRN: [id number]  : 1990     Primary Care Provider: Augustina Falk PA     Chief Complaint:    Chief Complaint   Patient presents with   • Follow-up     History of present illness:   10/5/2020  Radha Dixon is a 30 y.o. female who is here today for follow up for difficulty swallowing.    The patient has continued to have difficulty swallowing. Having EGD with dilation did not change her symptoms. She has nausea on a daily basis that is mild. No vomiting. The patient denies abdominal pain, constipation, diarrhea or rectal bleeding. She does not have much of an appetite. She has noticed she is constantly cold and has had to cut her hair very short due to losing significant amounts of hair. Her skin texture is different and she continues to lose weight unintentionally.    Interval History:  2020    The patient has difficulty swallowing off and on for the last 2 years. The symptom is moderate in severity, occurs 2-3 times a week and associated with both solid foods and liquids.  The symptoms are progressive.  Feeling food hanging in in the middle. The patient points towards the lower substernal area for liquids and upper sternal area for solid food. Associated mild odynophagia occasionally.  No prior history of collagen-vascular disease and no acid reflux. Deny any nausea or vomiting.  There is no associated weight loss. She has bilateral thyroid nodule followed with US. Largest nodule 1.5cm left side and biopsy benign in the past. Her GB removed  and no stones at that time. She had esophagogram done in 2019 was normal.   No associated abdominal pain or distension. Deny  any change in bowel habit, hematochezia or melena.   There is no history of anemia. Prior history of EGD 2017 by dr lopez mild esophagitis.  No prior colonoscopy. No family history of colon cancer or any GI malignancy.     Subjective      Past Medical  "History:   Diagnosis Date   • Acid reflux    • Anxiety    • Anxiety and depression    • Arthritis    • Asthma    • Bipolar 1 disorder (CMS/HCC)    • Body piercing     EARS, NOSE, BELLY BUTTON   • Dysphagia     REPORTS SOMETIMES FEELS LIKE \"FOOD GETS STUCK\"   • Fracture     HISTORY OF RIGHT WRIST AS A CHILD   • Goiter     x3   • Heart rate fast     states usually 120-130 (states on med for this)   • History of migraine    • Hx of exercise stress test 04/01/2020   • Inappropriate sinus tachycardia     per Dr. Dong's note on 8/12/20.     • Injury of neck    • Insomnia    • Migraine    • Ovarian cyst    • Palpitations    • PONV (postoperative nausea and vomiting)    • Scoliosis    • Tattoo    • Toe fracture     right great toe   • Wears glasses      Past Surgical History:   Procedure Laterality Date   • APPENDECTOMY  2016   • CHOLECYSTECTOMY     • CYST REMOVAL      OVARIAN CYST   • DILATATION AND CURETTAGE     • ENDOSCOPY N/A 12/1/2017    Procedure: ESOPHAGOGASTRODUODENOSCOPY WITH COLD FORCEP BIOPSY;  Surgeon: Danilo Shabazz MD;  Location: [facility] ENDOSCOPY;  Service:    • ENDOSCOPY N/A 9/3/2020    Procedure: ESOPHAGOGASTRODUODENOSCOPY WITH BIOPSIES AND DILATATION;  Surgeon: Bhupinder Montenegro MD;  Location: [facility] ENDOSCOPY;  Service: Gastroenterology;  Laterality: N/A;   • HIP SURGERY Right     RIGHT HIP, REPORTS HAD BONES SCRAPED   • SHOULDER ARTHROSCOPY Right 10/11/2018    Procedure: Right shoulder diagnostic arthroscopy, limited rotator cuff debridement;  Surgeon: Tino Uribe MD;  Location: [facility] OR;  Service: Orthopedics   • THYROID BIOPSY      goiter   • WISDOM TOOTH EXTRACTION       Family History   Problem Relation Age of Onset   • Diabetes Mother    • Arthritis Mother    • No Known Problems Father    • Cancer Maternal Grandmother    • Diabetes Maternal Grandmother    • Heart attack Maternal Grandmother    • Arthritis Maternal Grandmother    • Heart attack Paternal Grandmother    • Heart " disease Sister    • Migraines Sister    • Cancer Paternal Grandfather         Lung     Social History     Socioeconomic History   • Marital status: Single     Spouse name: Not on file   • Number of children: Not on file   • Years of education: Not on file   • Highest education level: Not on file   Tobacco Use   • Smoking status: Current Every Day Smoker     Packs/day: 0.50     Years: 13.00     Pack years: 6.50     Types: Cigarettes   • Smokeless tobacco: Never Used   Substance and Sexual Activity   • Alcohol use: No   • Drug use: No   • Sexual activity: Defer   Social History Narrative    Right hand dominant       Current Outpatient Medications:   •  atenolol (TENORMIN) 25 MG tablet, Take 1 tablet by mouth Daily., Disp: 60 tablet, Rfl: 11  •  levonorgestrel (MIRENA) 20 MCG/24HR IUD, 1 each by Intrauterine route 1 (One) Time., Disp: , Rfl:   •  midodrine (PROAMATINE) 10 MG tablet, Take 1 tablet by mouth 2 (Two) Times a Day., Disp: 60 tablet, Rfl: 3  •  naproxen (NAPROSYN) 500 MG tablet, Take 1 tablet by mouth 2 (Two) Times a Day As Needed for Moderate Pain ., Disp: 14 tablet, Rfl: 0  •  ondansetron ODT (ZOFRAN-ODT) 4 MG disintegrating tablet, Place 1 tablet on the tongue Every 8 (Eight) Hours As Needed for Nausea or Vomiting., Disp: 30 tablet, Rfl: 1  Allergies   Allergen Reactions   • Onion Itching     REPORTS CAUSES MIGRAINES and MAKES THROAT ITCHY     • Cabbage Headache   • Msg [Monosodium Glutamate] Headache   • Sulfa Antibiotics Hives     Review of Systems   Constitutional: Positive for appetite change and unexpected weight loss.   HENT: Positive for trouble swallowing.    Eyes: Negative for blurred vision.   Respiratory: Negative for choking and chest tightness.    Cardiovascular: Negative for leg swelling.   Gastrointestinal: Positive for nausea. Negative for abdominal distention, abdominal pain, anal bleeding, blood in stool, constipation, diarrhea, rectal pain, vomiting, GERD and indigestion.   Endocrine:  Negative for polyphagia.   Genitourinary: Negative for hematuria.   Musculoskeletal: Negative for arthralgias and myalgias.   Skin: Negative for rash.   Allergic/Immunologic: Negative for food allergies.   Neurological: Negative for dizziness, syncope and confusion.   Hematological: Does not bruise/bleed easily.   Psychiatric/Behavioral: Negative for depressed mood.      The following portions of the patient's history were reviewed and updated as appropriate: allergies, current medications, past family history, past medical history, past social history, past surgical history and problem list.  Objective     Physical Exam  Vitals signs and nursing note reviewed.   Constitutional:       General: She is awake. She is not in acute distress.     Appearance: Normal appearance. She is well-developed and normal weight. She is not diaphoretic.   HENT:      Head: Normocephalic and atraumatic.      Right Ear: Hearing and external ear normal.      Left Ear: Hearing and external ear normal.      Nose: Nose normal.      Mouth/Throat:      Lips: Pink.      Mouth: Mucous membranes are not pale, not dry and not cyanotic. No oral lesions.      Pharynx: No oropharyngeal exudate.   Eyes:      General: Lids are normal.         Right eye: No discharge.         Left eye: No discharge.      Conjunctiva/sclera: Conjunctivae normal.   Neck:      Musculoskeletal: Neck supple. No edema.      Thyroid: No thyroid mass or thyromegaly.      Vascular: No JVD.      Trachea: Trachea normal.   Cardiovascular:      Rate and Rhythm: Normal rate and regular rhythm.      Heart sounds: Normal heart sounds and S2 normal. No murmur. No friction rub. No gallop. No S3 sounds.    Pulmonary:      Effort: Pulmonary effort is normal. No respiratory distress.      Breath sounds: Normal breath sounds.   Chest:      Chest wall: No tenderness.   Abdominal:      General: Bowel sounds are normal. There is no distension.      Palpations: Abdomen is not rigid. There is no  "hepatomegaly, splenomegaly or mass.      Tenderness: There is no abdominal tenderness. There is no guarding or rebound.      Hernia: No hernia is present.   Musculoskeletal: Normal range of motion.   Lymphadenopathy:      Cervical: No cervical adenopathy.      Upper Body:      Left upper body: No supraclavicular adenopathy.   Skin:     General: Skin is warm and dry.      Coloration: Skin is not pale.      Findings: No rash.      Nails: There is no clubbing.     Neurological:      Mental Status: She is alert and oriented to person, place, and time.      Cranial Nerves: No cranial nerve deficit.      Sensory: No sensory deficit.   Psychiatric:         Mood and Affect: Mood normal.         Speech: Speech normal.         Behavior: Behavior is cooperative.       Vitals:    10/05/20 1305   BP: 104/69   Pulse: 79   Resp: 16   Temp: 99.5 °F (37.5 °C)   Weight: 49.4 kg (109 lb)   Height: 154.9 cm (61\")     Results Review:   I reviewed the patient's new clinical results.    Admission on 09/27/2020, Discharged on 09/27/2020   Component Date Value Ref Range Status   • HCG, Urine QL 09/27/2020 Negative  Negative Final   • Color, UA 09/27/2020 Yellow  Yellow, Straw Final   • Appearance, UA 09/27/2020 Clear  Clear Final   • pH, UA 09/27/2020 7.0  5.0 - 8.0 Final   • Specific Gravity, UA 09/27/2020 1.024  1.005 - 1.030 Final   • Glucose, UA 09/27/2020 Negative  Negative Final   • Ketones, UA 09/27/2020 15 mg/dL (1+)* Negative Final   • Bilirubin, UA 09/27/2020 Negative  Negative Final   • Blood, UA 09/27/2020 Negative  Negative Final   • Protein, UA 09/27/2020 Negative  Negative Final   • Leuk Esterase, UA 09/27/2020 Negative  Negative Final   • Nitrite, UA 09/27/2020 Negative  Negative Final   • Urobilinogen, UA 09/27/2020 1.0 E.U./dL  0.2 - 1.0 E.U./dL Final   • Glucose 09/27/2020 85  65 - 99 mg/dL Final   • BUN 09/27/2020 8  6 - 20 mg/dL Final   • Creatinine 09/27/2020 0.67  0.57 - 1.00 mg/dL Final   • Sodium 09/27/2020 139  136 " - 145 mmol/L Final   • Potassium 09/27/2020 3.9  3.5 - 5.2 mmol/L Final   • Chloride 09/27/2020 103  98 - 107 mmol/L Final   • CO2 09/27/2020 22.1  22.0 - 29.0 mmol/L Final   • Calcium 09/27/2020 9.1  8.6 - 10.5 mg/dL Final   • Total Protein 09/27/2020 6.9  6.0 - 8.5 g/dL Final   • Albumin 09/27/2020 4.60  3.50 - 5.20 g/dL Final   • ALT (SGPT) 09/27/2020 9  1 - 33 U/L Final   • AST (SGOT) 09/27/2020 15  1 - 32 U/L Final   • Alkaline Phosphatase 09/27/2020 47  39 - 117 U/L Final   • Total Bilirubin 09/27/2020 1.4* 0.0 - 1.2 mg/dL Final   • eGFR Non African Amer 09/27/2020 104  >60 mL/min/1.73 Final   • Globulin 09/27/2020 2.3  gm/dL Final   • A/G Ratio 09/27/2020 2.0  g/dL Final   • BUN/Creatinine Ratio 09/27/2020 11.9  7.0 - 25.0 Final   • Anion Gap 09/27/2020 13.9  5.0 - 15.0 mmol/L Final   • WBC 09/27/2020 8.42  3.40 - 10.80 10*3/mm3 Final   • RBC 09/27/2020 4.11  3.77 - 5.28 10*6/mm3 Final   • Hemoglobin 09/27/2020 12.5  12.0 - 15.9 g/dL Final   • Hematocrit 09/27/2020 36.8  34.0 - 46.6 % Final   • MCV 09/27/2020 89.5  79.0 - 97.0 fL Final   • MCH 09/27/2020 30.4  26.6 - 33.0 pg Final   • MCHC 09/27/2020 34.0  31.5 - 35.7 g/dL Final   • RDW 09/27/2020 11.4* 12.3 - 15.4 % Final   • RDW-SD 09/27/2020 37.3  37.0 - 54.0 fl Final   • MPV 09/27/2020 12.6* 6.0 - 12.0 fL Final   • Platelets 09/27/2020 164  140 - 450 10*3/mm3 Final   • Neutrophil % 09/27/2020 63.6  42.7 - 76.0 % Final   • Lymphocyte % 09/27/2020 27.8  19.6 - 45.3 % Final   • Monocyte % 09/27/2020 5.3  5.0 - 12.0 % Final   • Eosinophil % 09/27/2020 2.4  0.3 - 6.2 % Final   • Basophil % 09/27/2020 0.8  0.0 - 1.5 % Final   • Immature Grans % 09/27/2020 0.1  0.0 - 0.5 % Final   • Neutrophils, Absolute 09/27/2020 5.35  1.70 - 7.00 10*3/mm3 Final   • Lymphocytes, Absolute 09/27/2020 2.34  0.70 - 3.10 10*3/mm3 Final   • Monocytes, Absolute 09/27/2020 0.45  0.10 - 0.90 10*3/mm3 Final   • Eosinophils, Absolute 09/27/2020 0.20  0.00 - 0.40 10*3/mm3 Final   •  Basophils, Absolute 09/27/2020 0.07  0.00 - 0.20 10*3/mm3 Final   • Immature Grans, Absolute 09/27/2020 0.01  0.00 - 0.05 10*3/mm3 Final   • nRBC 09/27/2020 0.0  0.0 - 0.2 /100 WBC Final   Admission on 09/03/2020, Discharged on 09/03/2020   Component Date Value Ref Range Status   • HCG, Urine, QL 09/03/2020 Negative  Negative Final    Exp:08/10/2021   • Lot Number 09/03/2020 [id number]   Final   • Internal Positive Control 09/03/2020 Positive   Final   • Internal Negative Control 09/03/2020 Negative   Final   • Case Report 09/03/2020    Final                    Value:Surgical Pathology Report                         Case: [id number]                                  Authorizing Provider:  Bhupinder Montenegro MD  Collected:           09/03/2020 08:16 AM          Ordering Location:     [facility]    Received:            09/03/2020 09:23 AM                                 [facility]                                                                    Pathologist:           Yves Hitchcock MD                                                            Specimens:   1) - Small Intestine, Duodenum, to R/O celiac disease                                               2) - Stomach, for H. Pylori                                                                         3) - Esophagus, proximal, mid and distal to R/O EOE                                       • Final Diagnosis 09/03/2020    Final                    Value:This result contains rich text formatting which cannot be displayed here.   Lab on 08/31/2020   Component Date Value Ref Range Status   • Reference Lab Report 08/31/2020 [facility]   Final   • COVID19 08/31/2020 Not Detected  Not Detected - Ref. Range Final      Xr Wrist 3+ View Right    Result Date: 9/27/2020  No displaced fracture.     This report was finalized on 9/27/2020 2:58 PM by Robert Coppola DO.    Xr Hand 3+ View Right    Result Date: 9/27/2020  No displaced fracture.     This report was  finalized on 9/27/2020 2:59 PM by Robert Coppola DO.    Us Non-ob Transvaginal    Result Date: 9/27/2020  Intrauterine device  is present.  This report was finalized on 9/27/2020 2:37 PM by Robert Coppola DO.     Fluoroscopic esophagram complete dated 9/11/2019 the esophagus is normal.  There is no hiatal hernia.  There is gastroesophageal reflux into the distal esophagus.  Peristalsis is normal.    EGD dated 9/3/2020 unremarkable.  No endoscopic esophageal abnormality to explain patient's dysphagia.  Esophagus dilated.  She may have functional dysphasia/globus sensation.  No endoscopic signs of EOE or achalasia.  Duodenum biopsy with nonspecific chronic duodenitis.  Stomach biopsy with oxyntic and antral type mucosa with reactive changes.  Negative for H. pylori, dysplasia or malignancy.  Esophagus biopsy proximal and distal squamous mucosa with reactive changes.  Negative for eosinophils, dysplasia or malignancy.    Assessment / Plan      1. Esophageal dysphagia  The patient has continued to have difficulty swallowing. EGD with no esophageal abnormality noted that could explain her difficulty swallowing. Esophagus empirically dilated. Swallowing did not improve after dilation. No heartburn or reflux. Biopsies negative for EOE.  Referral for esophageal manometry at [facility].    2/26/2020  Patient gives a progressive history of dysphagia to both solids and liquids over 2 years.  No significant odynophagia.  No history of any collagen vascular disease.  No any current reflux symptoms.   EGD done in 2017 revealed mild esophagitis.  Her recent flu esophagogram was normal.  She does have a small thyroid nodules largest being 1.5 cm in the left lobe of the thyroid which was benign.      Differential diagnosis include the EOE, but if any benign strictures given her prior esophagitis, and some functional component of dysphagia due to her antianxiety issue.   I doubt this small thyroid nodule giving any external  compression and dysphagia  We will schedule her for an EGD and as above esophageal dilatation  We will get a random esophageal biopsy for EOE  We will consider esophageal manometry if above tests are all normal    2. Weight loss  The patient has lost about 20 pounds over the past year unintentionally. She has not had much of an appetite and does not eat as much as she had in the past. TSH normal, although she has a history of thyroid nodules.    3. Nausea  The patient has a history of nausea that is worse in the mornings for several months. EGD unremarkable. Biopsies negative for H. Pylori.  Abdominal ultrasound to rule out pancreatobiliary disease.  CMP, IgA, tTg IgA     4. Bilirubinemia  Labs with continued mild elevation of bilirubin. No history of other elevated liver enzymes. No history of jaundice.  CMP, direct/total bilirubin to rule out Gilbert syndrome.  Abdominal ultrasound to evaluate CBD size.    2/26/2020  Recent lab work revealed borderline elevated total bilirubin 1.5.  Enzymes normal.  Recent CT of the abdomen pelvis was normal reviewed.   She had a gallbladder removed in the past due to gallbladder dysmotility  We will repeat CMP in 4 weeks time, and assess further depending on the repeat result     5. Thyroid nodule  Per chart, left thyroid nodule fine aspiration biopsies negative for malignancy.  Consistent with benign goiter is follicular nodule.  The aspirate shows abundant colloid, variably sized follicular groups and macrophages consistent with a benign goiters follicular nodule.  The patient has continued to have weight loss, decreased appetite, hair loss, skin changes and constantly feels cold.  The patient may benefit from referral to endocrinology for evaluation.    2/26/2020  Known history of a bilateral thyroid nodules largest being 1.5 cm in the left lobe of the thyroid.   Followed by surgery with a serial ultrasound.  Some done 6 months ago did not reveal any change in size or  morphology.  As per patient prior biopsy done was benign  To follow-up with surgeon as before    Patient Instructions   1. Antireflux measures: Avoid fried, fatty foods, alcohol, chocolate, coffee, tea,  soft drinks, peppermint and spearmint, spicy foods, tomatoes and tomato based foods, onion based foods, and smoking. Other antireflux measures include weight reduction if overweight, avoiding tight clothing around the abdomen, elevating the head of the bed 6 inches with blocks under the head board, and don't drink or eat before going to bed and avoid lying down immediately after meals.  2. High fiber diet with liberal water intake.   3. Dietary instructions.  The patient should eat relatively soft diet, and should eat in upright position and chew well.  The patient should drink water after 2-3 bites and take medications with liberal amounts of water. Furthermore after eating and taking medications, the patient should remain in upright position for 5-10 minutes.  4. Zofran 4 mg 1 po every 8 hours as needed for nausea.   5. CMP, IgA, tTg IgA, total/direct bilirubin  6. Abdominal ultrasound.   7. Referral for esophageal manometry.  8. The patient may benefit from referral to endocrinology for evaluation.  9. Follow up: 3 months    Hailey Mason, ELISEO  10/5/2020    Please note that portions of this note may have been completed with a voice recognition program. Efforts were made to edit the dictations, but occasionally words are mistranscribed.

## 2020-10-14 ENCOUNTER — HOSPITAL ENCOUNTER (OUTPATIENT)
Dept: ULTRASOUND IMAGING | Facility: HOSPITAL | Age: 30
Discharge: HOME | End: 2020-10-14

## 2020-10-14 ENCOUNTER — LAB (OUTPATIENT)
Dept: LAB | Facility: HOSPITAL | Age: 30
End: 2020-10-14

## 2020-10-14 DIAGNOSIS — E80.6 BILIRUBINEMIA: Chronic | ICD-10-CM

## 2020-10-14 DIAGNOSIS — R63.4 WEIGHT LOSS: Chronic | ICD-10-CM

## 2020-10-14 LAB
ALBUMIN SERPL-MCNC: 4.7 G/DL (ref 3.5–5.2)
ALBUMIN/GLOB SERPL: 2 G/DL
ALP SERPL-CCNC: 53 U/L (ref 39–117)
ALT SERPL W P-5'-P-CCNC: 9 U/L (ref 1–33)
ANION GAP SERPL CALCULATED.3IONS-SCNC: 7.9 MMOL/L (ref 5–15)
AST SERPL-CCNC: 14 U/L (ref 1–32)
BILIRUB CONJ SERPL-MCNC: 0.3 MG/DL (ref 0–0.3)
BILIRUB INDIRECT SERPL-MCNC: 0.9 MG/DL
BILIRUB SERPL-MCNC: 1.2 MG/DL (ref 0–1.2)
BILIRUB SERPL-MCNC: 1.2 MG/DL (ref 0–1.2)
BUN SERPL-MCNC: 11 MG/DL (ref 6–20)
BUN/CREAT SERPL: 14.5 (ref 7–25)
CALCIUM SPEC-SCNC: 9.1 MG/DL (ref 8.6–10.5)
CHLORIDE SERPL-SCNC: 103 MMOL/L (ref 98–107)
CO2 SERPL-SCNC: 27.1 MMOL/L (ref 22–29)
CREAT SERPL-MCNC: 0.76 MG/DL (ref 0.57–1)
GFR SERPL CREATININE-BSD FRML MDRD: 90 ML/MIN/1.73
GLOBULIN UR ELPH-MCNC: 2.4 GM/DL
GLUCOSE SERPL-MCNC: 92 MG/DL (ref 65–99)
POTASSIUM SERPL-SCNC: 4.4 MMOL/L (ref 3.5–5.2)
PROT SERPL-MCNC: 7.1 G/DL (ref 6–8.5)
SODIUM SERPL-SCNC: 138 MMOL/L (ref 136–145)

## 2020-10-14 PROCEDURE — 76705 ECHO EXAM OF ABDOMEN: CPT

## 2020-10-14 PROCEDURE — 80053 COMPREHEN METABOLIC PANEL: CPT

## 2020-10-14 PROCEDURE — 36415 COLL VENOUS BLD VENIPUNCTURE: CPT

## 2020-10-14 PROCEDURE — 82248 BILIRUBIN DIRECT: CPT

## 2020-10-14 PROCEDURE — 83516 IMMUNOASSAY NONANTIBODY: CPT

## 2020-10-14 PROCEDURE — 82247 BILIRUBIN TOTAL: CPT

## 2020-10-14 PROCEDURE — 82784 ASSAY IGA/IGD/IGG/IGM EACH: CPT

## 2020-10-15 LAB
IGA1 MFR SER: 170 MG/DL (ref 70–400)
TTG IGA SER-ACNC: <2 U/ML (ref 0–3)

## 2020-10-19 ENCOUNTER — OFFICE VISIT (OUTPATIENT)
Dept: INTERNAL MEDICINE | Facility: CLINIC | Age: 30
End: 2020-10-19

## 2020-10-19 ENCOUNTER — TELEPHONE (OUTPATIENT)
Dept: GASTROENTEROLOGY | Facility: CLINIC | Age: 30
End: 2020-10-19

## 2020-10-19 VITALS
HEART RATE: 101 BPM | WEIGHT: 105 LBS | OXYGEN SATURATION: 98 % | RESPIRATION RATE: 16 BRPM | SYSTOLIC BLOOD PRESSURE: 98 MMHG | HEIGHT: 61 IN | DIASTOLIC BLOOD PRESSURE: 64 MMHG | BODY MASS INDEX: 19.83 KG/M2 | TEMPERATURE: 98.4 F

## 2020-10-19 DIAGNOSIS — Z01.818 PREOP TESTING: Primary | ICD-10-CM

## 2020-10-19 DIAGNOSIS — R63.4 WEIGHT LOSS: ICD-10-CM

## 2020-10-19 DIAGNOSIS — R13.10 DYSPHAGIA, UNSPECIFIED TYPE: ICD-10-CM

## 2020-10-19 DIAGNOSIS — E04.1 THYROID NODULE: Primary | ICD-10-CM

## 2020-10-19 DIAGNOSIS — L65.9 HAIR LOSS: ICD-10-CM

## 2020-10-19 PROCEDURE — 99213 OFFICE O/P EST LOW 20 MIN: CPT | Performed by: PHYSICIAN ASSISTANT

## 2020-10-19 NOTE — PROGRESS NOTES
Radha Dixon is a 30 y.o. female.     Subjective   History of Present Illness   Here today with continued concern of dysphagia and new concern of weight loss, hair loss and very irregular menses for the last 2 months. Menses have been occurring every 2 weeks or so for the last 2 months and she has been struggling to prevent further weight loss after realizing she had already lost 11 pounds unintentionally around 2 months ago. Appetite is normal. She is always bothered by fatigue.  No adenopathy, fever or night sweats. She also feels as though she may have developed another thyroid nodule in the left lobe. She has been continually bothered by dysphagia and nausea despite negative workup thus far with gastroenterology. She is scheduled to undergo esophageal manometry in a few weeks with general surgery.          The following portions of the patient's history were reviewed and updated as appropriate: allergies, current medications, past family history, past medical history, past social history, past surgical history and problem list.    Review of Systems   Constitutional: Positive for fatigue and unexpected weight loss. Negative for activity change, appetite change, chills, diaphoresis, fever and unexpected weight gain.   HENT: Positive for trouble swallowing. Negative for congestion, dental problem, ear discharge, facial swelling, hearing loss, nosebleeds, postnasal drip, sneezing and swollen glands.         Lump in neck, new.   Eyes: Negative.  Negative for visual disturbance.   Respiratory: Negative for cough, chest tightness, shortness of breath and wheezing.    Cardiovascular: Negative for chest pain, palpitations and leg swelling.   Gastrointestinal: Positive for nausea. Negative for abdominal pain, blood in stool, constipation, diarrhea and vomiting.   Endocrine: Negative for cold intolerance, heat intolerance, polydipsia, polyphagia and polyuria.        Hair loss.   Genitourinary: Negative.     Musculoskeletal: Negative.    Skin: Negative.    Allergic/Immunologic: Negative for immunocompromised state.   Neurological: Negative for dizziness, speech difficulty, weakness, headache and confusion.   Hematological: Negative for adenopathy. Does not bruise/bleed easily.   Psychiatric/Behavioral: Negative for agitation, sleep disturbance and depressed mood. The patient is not nervous/anxious.          Objective    Physical Exam  Vitals signs and nursing note reviewed.   Constitutional:       General: She is not in acute distress.     Appearance: Normal appearance. She is well-developed. She is not ill-appearing, toxic-appearing or diaphoretic.   HENT:      Head: Normocephalic and atraumatic.   Eyes:      General: No scleral icterus.     Extraocular Movements: Extraocular movements intact.      Conjunctiva/sclera: Conjunctivae normal.      Pupils: Pupils are equal, round, and reactive to light.   Neck:      Musculoskeletal: Normal range of motion and neck supple. No neck rigidity or muscular tenderness.      Thyroid: Thyroid mass (left lobe proximally) present. No thyroid tenderness.      Trachea: Phonation normal.   Cardiovascular:      Rate and Rhythm: Normal rate and regular rhythm.      Heart sounds: Normal heart sounds. No murmur. No friction rub. No gallop.    Pulmonary:      Effort: Pulmonary effort is normal. No respiratory distress.      Breath sounds: Normal breath sounds. No wheezing, rhonchi or rales.   Chest:      Chest wall: No tenderness.   Abdominal:      General: Bowel sounds are normal. There is no distension.      Palpations: Abdomen is soft. There is no mass.      Tenderness: There is no abdominal tenderness. There is no right CVA tenderness, left CVA tenderness, guarding or rebound.      Hernia: No hernia is present.   Musculoskeletal: Normal range of motion.         General: No tenderness or deformity.      Right lower leg: No edema.      Left lower leg: No edema.   Lymphadenopathy:       "Cervical: No cervical adenopathy.      Right cervical: No superficial, deep or posterior cervical adenopathy.     Left cervical: No superficial, deep or posterior cervical adenopathy.   Skin:     General: Skin is warm and dry.      Capillary Refill: Capillary refill takes less than 2 seconds.      Findings: No lesion or rash.   Neurological:      Mental Status: She is alert and oriented to person, place, and time.      Cranial Nerves: No cranial nerve deficit.      Sensory: No sensory deficit.      Motor: No weakness or abnormal muscle tone.      Coordination: Coordination normal.      Gait: Gait normal.      Deep Tendon Reflexes: Reflexes normal.   Psychiatric:         Mood and Affect: Mood normal.         Behavior: Behavior normal.         Thought Content: Thought content normal.         Judgment: Judgment normal.           BP 98/64   Pulse 101   Temp 98.4 °F (36.9 °C)   Resp 16   Ht 154.9 cm (61\")   Wt 47.6 kg (105 lb)   LMP 09/24/2020   SpO2 98%   BMI 19.84 kg/m²     Nursing note and vitals reviewed.          Assessment/Plan   Diagnoses and all orders for this visit:    1. Thyroid nodule (Primary)  -     TSH  -     T4, Free  -     Thyroid Peroxidase Antibody  -     Ambulatory Referral to Endocrinology  -     Anti-Thyroglobulin Antibody  -     Thyroid Stimulating Immunoglobulin    2. Dysphagia, unspecified type  -     TSH  -     T4, Free  -     Thyroid Peroxidase Antibody  -     Ambulatory Referral to Endocrinology  -     Anti-Thyroglobulin Antibody  -     Thyroid Stimulating Immunoglobulin    3. Weight loss  -     TSH  -     T4, Free  -     Thyroid Peroxidase Antibody  -     Ambulatory Referral to Endocrinology  -     Anti-Thyroglobulin Antibody  -     Thyroid Stimulating Immunoglobulin    4. Hair loss  -     TSH  -     T4, Free  -     Thyroid Peroxidase Antibody  -     Ambulatory Referral to Endocrinology  -     Anti-Thyroglobulin Antibody  -     Thyroid Stimulating Immunoglobulin      Thyroid lab " workup as stated above. Endocrinology referral ordered. Continue care with gastroenterology and general surgery as directed.            BRITNI Victoria  10/19/2020  17:58 EDT

## 2020-10-19 NOTE — TELEPHONE ENCOUNTER
I called patient as reminder to do covid test on 11-6-2020 at [facility]. She asked about results for labwork - KEN Carey MA stated normal. I told patient lab work normal.

## 2020-10-21 LAB
T4 FREE SERPL-MCNC: 1.45 NG/DL (ref 0.93–1.7)
THYROGLOB AB SERPL-ACNC: <1 IU/ML (ref 0–0.9)
THYROPEROXIDASE AB SERPL-ACNC: 14 IU/ML (ref 0–34)
TSH SERPL DL<=0.005 MIU/L-ACNC: 1.78 UIU/ML (ref 0.27–4.2)
TSI SER-ACNC: <0.1 IU/L (ref 0–0.55)

## 2020-11-02 DIAGNOSIS — F17.210 CIGARETTE SMOKER: Primary | ICD-10-CM

## 2020-11-02 RX ORDER — BUPROPION HYDROCHLORIDE 100 MG/1
100 TABLET, EXTENDED RELEASE ORAL 2 TIMES DAILY
Qty: 60 TABLET | Refills: 5 | Status: SHIPPED | OUTPATIENT
Start: 2020-11-02 | End: 2022-03-16

## 2020-11-02 RX ORDER — NICOTINE 21 MG/24HR
1 PATCH, TRANSDERMAL 24 HOURS TRANSDERMAL EVERY 24 HOURS
Qty: 28 PATCH | Refills: 3 | Status: SHIPPED | OUTPATIENT
Start: 2020-11-02 | End: 2020-12-02

## 2020-11-06 ENCOUNTER — TRANSCRIBE ORDERS (OUTPATIENT)
Dept: LAB | Facility: HOSPITAL | Age: 30
End: 2020-11-06

## 2020-11-06 ENCOUNTER — LAB (OUTPATIENT)
Dept: LAB | Facility: HOSPITAL | Age: 30
End: 2020-11-06

## 2020-11-06 DIAGNOSIS — Z01.818 PRE-OP TESTING: Primary | ICD-10-CM

## 2020-11-06 DIAGNOSIS — Z01.818 PRE-OP TESTING: ICD-10-CM

## 2020-11-06 PROCEDURE — U0004 COV-19 TEST NON-CDC HGH THRU: HCPCS | Performed by: SURGERY

## 2020-11-07 LAB — SARS-COV-2 RNA RESP QL NAA+PROBE: NOT DETECTED

## 2020-11-10 ENCOUNTER — HOSPITAL ENCOUNTER (OUTPATIENT)
Facility: HOSPITAL | Age: 30
Setting detail: HOSPITAL OUTPATIENT SURGERY
Discharge: HOME | End: 2020-11-10
Attending: SURGERY | Admitting: SURGERY

## 2020-11-10 PROCEDURE — 91010 ESOPHAGUS MOTILITY STUDY: CPT | Performed by: SURGERY

## 2020-11-10 RX ORDER — LIDOCAINE HYDROCHLORIDE 20 MG/ML
SOLUTION OROPHARYNGEAL AS NEEDED
Status: DISCONTINUED | OUTPATIENT
Start: 2020-11-10 | End: 2020-11-10 | Stop reason: HOSPADM

## 2020-11-25 ENCOUNTER — TELEPHONE (OUTPATIENT)
Dept: GASTROENTEROLOGY | Facility: CLINIC | Age: 30
End: 2020-11-25

## 2020-12-02 ENCOUNTER — OFFICE VISIT (OUTPATIENT)
Dept: OBSTETRICS AND GYNECOLOGY | Facility: CLINIC | Age: 30
End: 2020-12-02

## 2020-12-02 VITALS — BODY MASS INDEX: 20.39 KG/M2 | HEIGHT: 61 IN | WEIGHT: 108 LBS

## 2020-12-02 DIAGNOSIS — Z30.09 ENCOUNTER FOR OTHER GENERAL COUNSELING OR ADVICE ON CONTRACEPTION: Primary | ICD-10-CM

## 2020-12-02 DIAGNOSIS — Z30.432 ENCOUNTER FOR IUD REMOVAL: ICD-10-CM

## 2020-12-02 PROCEDURE — 58301 REMOVE INTRAUTERINE DEVICE: CPT | Performed by: OBSTETRICS & GYNECOLOGY

## 2020-12-02 PROCEDURE — 99203 OFFICE O/P NEW LOW 30 MIN: CPT | Performed by: OBSTETRICS & GYNECOLOGY

## 2020-12-02 RX ORDER — NORETHINDRONE ACETATE AND ETHINYL ESTRADIOL AND FERROUS FUMARATE 1MG-20(24)
1 KIT ORAL DAILY
Qty: 28 TABLET | Refills: 12 | Status: SHIPPED | OUTPATIENT
Start: 2020-12-02 | End: 2020-12-08

## 2020-12-02 RX ORDER — LEVONORGESTREL 13.5 MG/1
1 INTRAUTERINE DEVICE INTRAUTERINE ONCE
COMMUNITY
End: 2021-01-04

## 2020-12-02 NOTE — PROGRESS NOTES
Subjective   Chief Complaint   Patient presents with   • Consult     hysterectomy Consult- pt has history of abnormal paps, currently has *IUD  in 2020      Radha Dixon is a 30 y.o. year old  ( x 3).  No LMP recorded. Patient has had an implant.  She presents to be seen because of recurrent ovarian cyst as well as menometrorrhagia-the latter occurring since the Judi has worn off..   Judi currently in-  6 months ago.   PAP this year WNL-- has had abnl's in the past but no LEEP  OTHER COMPLAINTS:  Nothing else    The following portions of the patient's history were reviewed and updated as appropriate:  She  has a past medical history of Abnormal Pap smear of cervix, Acid reflux, Anxiety, Anxiety and depression, Arthritis, Asthma, Bipolar 1 disorder (CMS/HCC), Body piercing, Dysphagia, Fracture, Goiter, Heart rate fast, History of migraine, exercise stress test (2020), Inappropriate sinus tachycardia, Injury of neck, Insomnia, Migraine, Ovarian cyst, Palpitations, PONV (postoperative nausea and vomiting), Scoliosis, Tachycardia, Tattoo, Toe fracture, and Wears glasses.  She does not have any pertinent problems on file.  She  has a past surgical history that includes Appendectomy (2016); Cholecystectomy; Hip surgery (Right); Cyst Removal; Muir tooth extraction; Esophagogastroduodenoscopy (N/A, 2017); Dilation and curettage of uterus; Shoulder arthroscopy (Right, 10/11/2018); Thyroid Biopsy; Esophagogastroduodenoscopy (N/A, 9/3/2020); Esophageal motility study (N/A, 11/10/2020); and Colposcopy.  Her family history includes Arthritis in her maternal grandmother and mother; Cancer in her maternal grandmother and paternal grandfather; Diabetes in her maternal grandmother and mother; Heart attack in her maternal grandmother and paternal grandmother; Heart disease in her sister; Migraines in her sister; No Known Problems in her father.  She  reports that she quit smoking  about 6 weeks ago. Her smoking use included cigarettes. She has a 6.50 pack-year smoking history. She has never used smokeless tobacco. She reports that she does not drink alcohol or use drugs.  Current Outpatient Medications   Medication Sig Dispense Refill   • Levonorgestrel (Judi) 13.5 MG intrauterine device IUD 1 each by Intrauterine route 1 (One) Time.     • atenolol (TENORMIN) 25 MG tablet Take 1 tablet by mouth Daily. 60 tablet 11   • buPROPion SR (Wellbutrin SR) 100 MG 12 hr tablet Take 1 tablet by mouth 2 (Two) Times a Day. 60 tablet 5   • midodrine (PROAMATINE) 10 MG tablet Take 1 tablet by mouth 2 (Two) Times a Day. 60 tablet 3   • ondansetron ODT (ZOFRAN-ODT) 4 MG disintegrating tablet Place 1 tablet on the tongue Every 8 (Eight) Hours As Needed for Nausea or Vomiting. 30 tablet 1     No current facility-administered medications for this visit.      Current Outpatient Medications on File Prior to Visit   Medication Sig   • Levonorgestrel (Judi) 13.5 MG intrauterine device IUD 1 each by Intrauterine route 1 (One) Time.   • atenolol (TENORMIN) 25 MG tablet Take 1 tablet by mouth Daily.   • buPROPion SR (Wellbutrin SR) 100 MG 12 hr tablet Take 1 tablet by mouth 2 (Two) Times a Day.   • midodrine (PROAMATINE) 10 MG tablet Take 1 tablet by mouth 2 (Two) Times a Day.   • ondansetron ODT (ZOFRAN-ODT) 4 MG disintegrating tablet Place 1 tablet on the tongue Every 8 (Eight) Hours As Needed for Nausea or Vomiting.   • [DISCONTINUED] levonorgestrel (MIRENA) 20 MCG/24HR IUD 1 each by Intrauterine route 1 (One) Time.   • [DISCONTINUED] nicotine (Nicoderm CQ) 14 MG/24HR patch Place 1 patch on the skin as directed by provider Daily.     No current facility-administered medications on file prior to visit.      She is allergic to onion; cabbage; msg [monosodium glutamate]; and sulfa antibiotics.    Social History    Tobacco Use      Smoking status: Former Smoker        Packs/day: 0.50        Years: 13.00        Pack  "years: 6.5        Types: Cigarettes        Quit date: 10/17/2020        Years since quittin.1      Smokeless tobacco: Never Used    Review of Systems  Consitutional POS: nothing reported    NEG: anorexia or night sweats   Gastointestinal POS: nothing reported    NEG: bloating, change in bowel habits, melena or reflux symptoms   Genitourinary POS: nothing reported    NEG: dysuria or hematuria   Integument POS: nothing reported    NEG: moles that are changing in size, shape, color or rashes   Breast POS: nothing reported    NEG: persistent breast lump, skin dimpling or nipple discharge         Pertinent items are noted in HPI.          Objective   Ht 154.9 cm (61\")   Wt 49 kg (108 lb)   BMI 20.41 kg/m²     General:  well developed; well nourished  no acute distress   Skin:  No suspicious lesions seen   Thyroid: not examined   Lungs:  breathing is unlabored   Heart:  Not performed.   Breasts:  Not performed.   Abdomen: soft, non-tender; no masses  no umbilical or inguinal hernias are present  no hepato-splenomegaly   Pelvis: Clinical staff was present for exam  External genitalia:  normal appearance of the external genitalia including Bartholin's and [place]'s glands.  :  urethral meatus normal;  Vaginal:  normal pink mucosa without prolapse or lesions.  Cervix:  normal appearance.  Uterus:  normal size, shape and consistency.  Adnexa:  normal bimanual exam of the adnexa.  Rectal:  digital rectal exam not performed; anus visually normal appearing.     Psychiatric: Alert and oriented ×3, mood and affect appropriate  HEENT: Atraumatic, normocephalic, normal scleral icterus  Extremities: 2+ pulses bilaterally, no edema      Lab Review   No data reviewed    Imaging   No data reviewed        Assessment   1. Contraceptive counseling with history of recurrent cyst.  2. IUD expiration with strings not visible     Plan   1. IUD strings were able to be grasped with bandage forceps and removed intact.  There is no " adherence.  2. An aspirin OCP has been called and patient to start this Sunday and give it 4 months.  3. Records requested from Gabe's office.    No orders of the defined types were placed in this encounter.         This note was electronically signed.      December 2, 2020

## 2020-12-08 ENCOUNTER — TELEPHONE (OUTPATIENT)
Dept: OBSTETRICS AND GYNECOLOGY | Facility: CLINIC | Age: 30
End: 2020-12-08

## 2020-12-08 RX ORDER — NORETHINDRONE ACETATE AND ETHINYL ESTRADIOL 1.5-30(21)
1 KIT ORAL DAILY
Qty: 28 TABLET | Refills: 12 | Status: SHIPPED | OUTPATIENT
Start: 2020-12-08 | End: 2021-08-03

## 2020-12-08 NOTE — TELEPHONE ENCOUNTER
----- Message from Alla Almanza sent at 12/8/2020  1:35 PM EST -----  Patient saw Dr. Moss, she wanted to let him know her insurance is not covering the chewable birth control he sent in, she is asking if something different can be sent in?

## 2021-01-04 ENCOUNTER — OFFICE VISIT (OUTPATIENT)
Dept: GASTROENTEROLOGY | Facility: CLINIC | Age: 31
End: 2021-01-04

## 2021-01-04 VITALS
BODY MASS INDEX: 21.52 KG/M2 | HEART RATE: 89 BPM | HEIGHT: 61 IN | SYSTOLIC BLOOD PRESSURE: 104 MMHG | TEMPERATURE: 97.7 F | DIASTOLIC BLOOD PRESSURE: 64 MMHG | WEIGHT: 114 LBS

## 2021-01-04 DIAGNOSIS — E80.4 GILBERT SYNDROME: Chronic | ICD-10-CM

## 2021-01-04 DIAGNOSIS — R11.0 NAUSEA: Chronic | ICD-10-CM

## 2021-01-04 DIAGNOSIS — E80.6 BILIRUBINEMIA: Chronic | ICD-10-CM

## 2021-01-04 DIAGNOSIS — F45.8 FUNCTIONAL DYSPHAGIA: Primary | Chronic | ICD-10-CM

## 2021-01-04 DIAGNOSIS — R63.4 WEIGHT LOSS: Chronic | ICD-10-CM

## 2021-01-04 PROCEDURE — 99214 OFFICE O/P EST MOD 30 MIN: CPT | Performed by: NURSE PRACTITIONER

## 2021-01-04 RX ORDER — PANTOPRAZOLE SODIUM 40 MG/1
TABLET, DELAYED RELEASE ORAL
Qty: 30 TABLET | Refills: 3 | Status: SHIPPED | OUTPATIENT
Start: 2021-01-04 | End: 2021-07-16

## 2021-01-04 NOTE — PROGRESS NOTES
Follow Up Note     Date: 2021   Patient Name: Radha Dixon  MRN: [id number]  : 1990     Primary Care Provider: Augustina Falk PA     Chief Complaint:    Chief Complaint   Patient presents with   • Follow-up   • Difficulty Swallowing     History of present illness:   2021  Radha Dixon is a 30 y.o. female who is here today for follow up for difficulty swallowing.    She has not had any change in her difficulty swallowing. She has had some improvement with nausea. She has not lost any weight, in fact she has gained 6 pounds in the past month since nausea is a little better.    Interval History:  10/5/2020  Radha Dixon is a 30 y.o. female who is here today for follow up for difficulty swallowing.     The patient has continued to have difficulty swallowing. Having EGD with dilation did not change her symptoms. She has nausea on a daily basis that is mild. No vomiting. The patient denies abdominal pain, constipation, diarrhea or rectal bleeding. She does not have much of an appetite. She has noticed she is constantly cold and has had to cut her hair very short due to losing significant amounts of hair. Her skin texture is different and she continues to lose weight unintentionally.     2020  The patient has difficulty swallowing off and on for the last 2 years. The symptom is moderate in severity, occurs 2-3 times a week and associated with both solid foods and liquids.  The symptoms are progressive.  Feeling food hanging in in the middle. The patient points towards the lower substernal area for liquids and upper sternal area for solid food. Associated mild odynophagia occasionally.  No prior history of collagen-vascular disease and no acid reflux. Deny any nausea or vomiting.  There is no associated weight loss. She has bilateral thyroid nodule followed with US. Largest nodule 1.5cm left side and biopsy benign in the past. Her GB removed  and no stones at that time.  "She had esophagogram done in 2019 was normal.   No associated abdominal pain or distension. Deny  any change in bowel habit, hematochezia or melena.   There is no history of anemia. Prior history of EGD 2017 by dr lopez mild esophagitis.  No prior colonoscopy. No family history of colon cancer or any GI malignancy.     Subjective      Past Medical History:   Diagnosis Date   • Abnormal Pap smear of cervix    • Acid reflux    • Anxiety    • Anxiety and depression    • Arthritis    • Asthma    • Bipolar 1 disorder (CMS/HCC)    • Body piercing     EARS, NOSE, BELLY BUTTON   • Dysphagia     REPORTS SOMETIMES FEELS LIKE \"FOOD GETS STUCK\"   • Fracture     HISTORY OF RIGHT WRIST AS A CHILD   • Goiter     x3   • Heart rate fast     states usually 120-130 (states on med for this)   • History of migraine    • Hx of exercise stress test 04/01/2020   • Inappropriate sinus tachycardia     per Dr. Dong's note on 8/12/20.     • Injury of neck    • Insomnia    • Migraine    • Ovarian cyst    • Palpitations    • PONV (postoperative nausea and vomiting)    • Scoliosis    • Tachycardia    • Tattoo    • Toe fracture     right great toe   • Wears glasses      Past Surgical History:   Procedure Laterality Date   • APPENDECTOMY  2016   • CHOLECYSTECTOMY     • COLPOSCOPY     • CYST REMOVAL      OVARIAN CYST   • DILATATION AND CURETTAGE     • ENDOSCOPY N/A 12/1/2017    Procedure: ESOPHAGOGASTRODUODENOSCOPY WITH COLD FORCEP BIOPSY;  Surgeon: Danilo Lopez MD;  Location: [facility] ENDOSCOPY;  Service:    • ENDOSCOPY N/A 9/3/2020    Procedure: ESOPHAGOGASTRODUODENOSCOPY WITH BIOPSIES AND DILATATION;  Surgeon: Bhupinder Montenegro MD;  Location: [facility] ENDOSCOPY;  Service: Gastroenterology;  Laterality: N/A;   • ESOPHAGEAL MOTILITY STUDY N/A 11/10/2020    Procedure: ESOPHAGEAL MANOMETRY;  Surgeon: Chivo Mcmahan MD;  Location: [facility] ENDOSCOPY;  Service: Gastroenterology;  Laterality: N/A;   • HIP SURGERY Right     RIGHT HIP, REPORTS " HAD BONES SCRAPED   • SHOULDER ARTHROSCOPY Right 10/11/2018    Procedure: Right shoulder diagnostic arthroscopy, limited rotator cuff debridement;  Surgeon: Tino Uribe MD;  Location: [facility];  Service: Orthopedics   • THYROID BIOPSY      goiter   • WISDOM TOOTH EXTRACTION       Family History   Problem Relation Age of Onset   • Diabetes Mother    • Arthritis Mother    • No Known Problems Father    • Cancer Maternal Grandmother    • Diabetes Maternal Grandmother    • Heart attack Maternal Grandmother    • Arthritis Maternal Grandmother    • Heart attack Paternal Grandmother    • Heart disease Sister    • Migraines Sister    • Cancer Paternal Grandfather         Lung   • Colon cancer Neg Hx    • Cirrhosis Neg Hx    • Liver cancer Neg Hx    • Liver disease Neg Hx      Social History     Socioeconomic History   • Marital status: Single     Spouse name: Not on file   • Number of children: 2   • Years of education: Not on file   • Highest education level: Not on file   Social Needs   • Financial resource strain: Not on file   • Food insecurity     Worry: Never true     Inability: Never true   • Transportation needs     Medical: No     Non-medical: No   Tobacco Use   • Smoking status: Former Smoker     Packs/day: 0.50     Years: 13.00     Pack years: 6.50     Types: Cigarettes     Quit date: 10/17/2020     Years since quittin.2   • Smokeless tobacco: Never Used   Substance and Sexual Activity   • Alcohol use: No   • Drug use: No   • Sexual activity: Defer   Lifestyle   • Physical activity     Days per week: 0 days     Minutes per session: 0 min   • Stress: Patient refused   Social History Narrative    Right hand dominant       Current Outpatient Medications:   •  atenolol (TENORMIN) 25 MG tablet, Take 1 tablet by mouth Daily., Disp: 60 tablet, Rfl: 11  •  buPROPion SR (Wellbutrin SR) 100 MG 12 hr tablet, Take 1 tablet by mouth 2 (Two) Times a Day., Disp: 60 tablet, Rfl: 5  •  midodrine (PROAMATINE) 10 MG  tablet, Take 1 tablet by mouth 2 (Two) Times a Day., Disp: 60 tablet, Rfl: 3  •  norethindrone-ethinyl estradiol-iron (MICROGESTIN FE1.5/30) 1.5-30 MG-MCG tablet, Take 1 tablet by mouth Daily., Disp: 28 tablet, Rfl: 12  •  ondansetron ODT (ZOFRAN-ODT) 4 MG disintegrating tablet, Place 1 tablet on the tongue Every 8 (Eight) Hours As Needed for Nausea or Vomiting., Disp: 30 tablet, Rfl: 1  •  pantoprazole (PROTONIX) 40 MG EC tablet, 1 po daily in the am 30 minutes before breakfast, Disp: 30 tablet, Rfl: 3     Allergies   Allergen Reactions   • Onion Itching     REPORTS CAUSES MIGRAINES and MAKES THROAT ITCHY     • Cabbage Headache   • Msg [Monosodium Glutamate] Headache   • Sulfa Antibiotics Hives     Review of Systems   Constitutional: Negative for appetite change and unexpected weight loss.   HENT: Negative for trouble swallowing.    Eyes: Negative for blurred vision.   Respiratory: Negative for choking and chest tightness.    Cardiovascular: Negative for leg swelling.   Gastrointestinal: Positive for nausea and GERD. Negative for abdominal distention, abdominal pain, anal bleeding, blood in stool, constipation, diarrhea, rectal pain, vomiting and indigestion.   Endocrine: Negative for polyphagia.   Genitourinary: Negative for hematuria.   Musculoskeletal: Negative for arthralgias and myalgias.   Skin: Negative for rash.   Allergic/Immunologic: Negative for food allergies.   Neurological: Negative for dizziness, syncope and confusion.   Hematological: Does not bruise/bleed easily.   Psychiatric/Behavioral: Negative for depressed mood.      The following portions of the patient's history were reviewed and updated as appropriate: allergies, current medications, past family history, past medical history, past social history, past surgical history and problem list.  Objective     Physical Exam  Vitals signs and nursing note reviewed.   Constitutional:       General: She is awake. She is not in acute distress.      Appearance: Normal appearance. She is well-developed. She is not diaphoretic.   HENT:      Head: Normocephalic and atraumatic.      Right Ear: Hearing and external ear normal.      Left Ear: Hearing and external ear normal.      Nose: Nose normal.      Mouth/Throat:      Lips: Pink.      Mouth: Mucous membranes are not pale, not dry and not cyanotic. No oral lesions.      Pharynx: No oropharyngeal exudate.   Eyes:      General: Lids are normal.         Right eye: No discharge.         Left eye: No discharge.      Conjunctiva/sclera: Conjunctivae normal.   Neck:      Musculoskeletal: Neck supple. No edema.      Thyroid: No thyroid mass or thyromegaly.      Vascular: No JVD.      Trachea: Trachea normal.   Cardiovascular:      Rate and Rhythm: Normal rate and regular rhythm.      Heart sounds: Normal heart sounds and S2 normal. No murmur. No friction rub. No gallop. No S3 sounds.    Pulmonary:      Effort: Pulmonary effort is normal. No respiratory distress.      Breath sounds: Normal breath sounds.   Chest:      Chest wall: No tenderness.   Abdominal:      General: Bowel sounds are normal. There is no distension.      Palpations: Abdomen is not rigid. There is no hepatomegaly, splenomegaly or mass.      Tenderness: There is no abdominal tenderness. There is no guarding or rebound.      Hernia: No hernia is present.   Musculoskeletal: Normal range of motion.   Lymphadenopathy:      Cervical: No cervical adenopathy.      Upper Body:      Left upper body: No supraclavicular adenopathy.   Skin:     General: Skin is warm and dry.      Coloration: Skin is not pale.      Findings: No rash.      Nails: There is no clubbing.     Neurological:      Mental Status: She is alert and oriented to person, place, and time.      Cranial Nerves: No cranial nerve deficit.      Sensory: No sensory deficit.   Psychiatric:         Mood and Affect: Mood normal.         Speech: Speech normal.         Behavior: Behavior is cooperative.  "      Vitals:    01/04/21 1149   BP: 104/64   Pulse: 89   Temp: 97.7 °F (36.5 °C)   Weight: 51.7 kg (114 lb)   Height: 154.9 cm (61\")     Results Review:   I reviewed the patient's new clinical results.    Lab on 11/06/2020   Component Date Value Ref Range Status   • SARS-CoV-2 LEATHA 11/06/2020 Not Detected  Not Detected Final   Office Visit on 10/19/2020   Component Date Value Ref Range Status   • TSH 10/20/2020 1.780  0.270 - 4.200 uIU/mL Final   • Free T4 10/20/2020 1.45  0.93 - 1.70 ng/dL Final    Results may be falsely increased if patient taking Biotin.   • Thyroid Peroxidase Antibody 10/20/2020 14  0 - 34 IU/mL Final   • Thyroglobulin Ab 10/20/2020 <1.0  0.0 - 0.9 IU/mL Final    Thyroglobulin Antibody measured by [facility] Methodology   • Thyroid Stimulating Immunoglobulin 10/20/2020 <0.10  0.00 - 0.55 IU/L Final   Lab on 10/14/2020   Component Date Value Ref Range Status   • Total Bilirubin 10/14/2020 1.2  0.0 - 1.2 mg/dL Final   • Bilirubin, Direct 10/14/2020 0.3  0.0 - 0.3 mg/dL Final   • Bilirubin, Indirect 10/14/2020 0.9  mg/dL Final   • Glucose 10/14/2020 92  65 - 99 mg/dL Final   • BUN 10/14/2020 11  6 - 20 mg/dL Final   • Creatinine 10/14/2020 0.76  0.57 - 1.00 mg/dL Final   • Sodium 10/14/2020 138  136 - 145 mmol/L Final   • Potassium 10/14/2020 4.4  3.5 - 5.2 mmol/L Final   • Chloride 10/14/2020 103  98 - 107 mmol/L Final   • CO2 10/14/2020 27.1  22.0 - 29.0 mmol/L Final   • Calcium 10/14/2020 9.1  8.6 - 10.5 mg/dL Final   • Total Protein 10/14/2020 7.1  6.0 - 8.5 g/dL Final   • Albumin 10/14/2020 4.70  3.50 - 5.20 g/dL Final   • ALT (SGPT) 10/14/2020 9  1 - 33 U/L Final   • AST (SGOT) 10/14/2020 14  1 - 32 U/L Final   • Alkaline Phosphatase 10/14/2020 53  39 - 117 U/L Final   • Total Bilirubin 10/14/2020 1.2  0.0 - 1.2 mg/dL Final   • eGFR Non African Amer 10/14/2020 90  >60 mL/min/1.73 Final   • Globulin 10/14/2020 2.4  gm/dL Final   • A/G Ratio 10/14/2020 2.0  g/dL Final   • BUN/Creatinine " Ratio 10/14/2020 14.5  7.0 - 25.0 Final   • Anion Gap 10/14/2020 7.9  5.0 - 15.0 mmol/L Final   • Tissue Transglutaminase IgA 10/14/2020 <2  0 - 3 U/mL Final                                  Negative        0 -  3                                Weak Positive   4 - 10                                Positive           >10   Tissue Transglutaminase (tTG) has been identified   as the endomysial antigen.  Studies have demonstr-   ated that endomysial IgA antibodies have over 99%   specificity for gluten sensitive enteropathy.   • IgA 10/14/2020 170  70 - 400 mg/dL Final      Us Liver   Result Date: 10/14/2020  Normal liver ultrasound.   This report was finalized on 10/14/2020 12:59 PM by Mary Cronin M.D..     Fluoroscopic esophagram complete dated 9/11/2019 the esophagus is normal. There is no hiatal hernia.  There is gastroesophageal reflux into the distal esophagus.  Peristalsis is normal.     EGD dated 9/3/2020 unremarkable.  No endoscopic esophageal abnormality to explain patient's dysphagia.  Esophagus dilated.  She may have functional dysphasia/globus sensation.  No endoscopic signs of EOE or achalasia.  Duodenum biopsy with nonspecific chronic duodenitis.  Stomach biopsy with oxyntic and antral type mucosa with reactive changes.  Negative for H. pylori, dysplasia or malignancy.  Esophagus biopsy proximal and distal squamous mucosa with reactive changes.  Negative for eosinophils, dysplasia or malignancy.    High-resolution impedance manometry report dated 11/10/2020 normal exam.  Normal peristalsis with normal DA of 96 mmHg.  Patient did not tolerate viscous swallows.  Normal bolus transit seen.    Assessment / Plan      1. Functional dysphagia  Difficulty swallowing has not changed. Esophageal manometry unremarkable.  Etiology unclear. She denies heartburn, has not noticed reflux.  Anti-reflux measures.  Advised to eat a softer diet, chew food very well, take sips of water between bites, and eat  slowly.  Pantoprazole 40 mg daily x 3 months to see if symptoms improve, then re-evaluate.    - pantoprazole (PROTONIX) 40 MG EC tablet; 1 po daily in the am 30 minutes before breakfast  Dispense: 30 tablet; Refill: 3    Previous History:  10/5/2020  The patient has continued to have difficulty swallowing. EGD with no esophageal abnormality noted that could explain her difficulty swallowing. Esophagus empirically dilated. Swallowing did not improve after dilation. No heartburn or reflux. Biopsies negative for EOE.  Referral for esophageal manometry at [facility].     2/26/2020  Patient gives a progressive history of dysphagia to both solids and liquids over 2 years.  No significant odynophagia.  No history of any collagen vascular disease.  No any current reflux symptoms.   EGD done in 2017 revealed mild esophagitis.  Her recent flu esophagogram was normal.  She does have a small thyroid nodules largest being 1.5 cm in the left lobe of the thyroid which was benign.    Differential diagnosis include the EOE, but if any benign strictures given her prior esophagitis, and some functional component of dysphagia due to her antianxiety issue.   I doubt this small thyroid nodule giving any external compression and dysphagia  We will schedule her for an EGD and as above esophageal dilatation  We will get a random esophageal biopsy for EOE  We will consider esophageal manometry if above tests are all normal    2. Weight loss  She has gained 6 pounds in the past month intentionally.   Will monitor for now.    10/5/2020  The patient has lost about 20 pounds over the past year unintentionally. She has not had much of an appetite and does not eat as much as she had in the past. TSH normal, although she has a history of thyroid nodules.    3. Nausea  Nausea gradually improved. Well controlled with Zofran as needed. Ultrasound unremarkable. Celiac panel negative. Labs unremarkable. Previous CTAP in 2019 unremarkable GI  tract.  Zofran as needed.  Pantoprazole 40 mg daily x 3 months, then re-evaluate.    10/5/2020  The patient has a history of nausea that is worse in the mornings for several months. EGD unremarkable. Biopsies negative for H. Pylori.  Abdominal ultrasound to rule out pancreatobiliary disease.  CMP, IgA, tTg IgA    4. Bilirubinemia  5. Gilbert syndrome  Liver enzymes normal. Mild elevation of bilirubin noted, but total/direct bilirubin normal. US unremarkable, normal CBD. No history of jaundice or pruritis. Likely secondary to Gilbert Syndrome, which is a benign finding.    10/5/2020  Labs with continued mild elevation of bilirubin. No history of other elevated liver enzymes. No history of jaundice.  CMP, direct/total bilirubin to rule out Gilbert syndrome.  Abdominal ultrasound to evaluate CBD size.     2/26/2020  Recent lab work revealed borderline elevated total bilirubin 1.5.  Enzymes normal.  Recent CT of the abdomen pelvis was normal reviewed.   She had a gallbladder removed in the past due to gallbladder dysmotility  We will repeat CMP in 4 weeks time, and assess further depending on the repeat result    Patient Instructions   1. Antireflux measures: Avoid fried, fatty foods, alcohol, chocolate, coffee, tea,  soft drinks, peppermint and spearmint, spicy foods, tomatoes and tomato based foods, onion based foods, and smoking. Other antireflux measures include weight reduction if overweight, avoiding tight clothing around the abdomen, elevating the head of the bed 6 inches with blocks under the head board, and don't drink or eat before going to bed and avoid lying down immediately after meals.  2. Pantoprazole 40 mg 1 by mouth in the am 30 minutes before breakfast x 3 months, then re-evaluate.  3. Zofran 4 mg 1 po every 8 hours as needed for nausea.  4. Dietary instructions.  The patient should eat relatively soft diet, and should eat in upright position and chew well.  The patient should drink water after 2-3  bites and take medications with liberal amounts of water. Furthermore after eating and taking medications, the patient should remain in upright position for 5-10 minutes.  5. Follow up: 3 months    Hailey Mason, APRN  1/4/2021    Please note that portions of this note may have been completed with a voice recognition program. Efforts were made to edit the dictations, but occasionally words are mistranscribed.

## 2021-01-04 NOTE — PATIENT INSTRUCTIONS
1. Antireflux measures: Avoid fried, fatty foods, alcohol, chocolate, coffee, tea,  soft drinks, peppermint and spearmint, spicy foods, tomatoes and tomato based foods, onion based foods, and smoking. Other antireflux measures include weight reduction if overweight, avoiding tight clothing around the abdomen, elevating the head of the bed 6 inches with blocks under the head board, and don't drink or eat before going to bed and avoid lying down immediately after meals.  2. Pantoprazole 40 mg 1 by mouth in the am 30 minutes before breakfast x 3 months, then re-evaluate.  3. Zofran 4 mg 1 po every 8 hours as needed for nausea.  4. Dietary instructions.  The patient should eat relatively soft diet, and should eat in upright position and chew well.  The patient should drink water after 2-3 bites and take medications with liberal amounts of water. Furthermore after eating and taking medications, the patient should remain in upright position for 5-10 minutes.  5. Follow up: 3 months

## 2021-01-05 ENCOUNTER — OFFICE VISIT (OUTPATIENT)
Dept: ENDOCRINOLOGY | Facility: CLINIC | Age: 31
End: 2021-01-05

## 2021-01-05 VITALS
HEIGHT: 61 IN | OXYGEN SATURATION: 99 % | TEMPERATURE: 97.8 F | WEIGHT: 114.2 LBS | DIASTOLIC BLOOD PRESSURE: 50 MMHG | BODY MASS INDEX: 21.56 KG/M2 | HEART RATE: 97 BPM | SYSTOLIC BLOOD PRESSURE: 84 MMHG

## 2021-01-05 DIAGNOSIS — E04.2 NONTOXIC MULTINODULAR GOITER: Primary | ICD-10-CM

## 2021-01-05 PROCEDURE — 99204 OFFICE O/P NEW MOD 45 MIN: CPT | Performed by: INTERNAL MEDICINE

## 2021-01-05 NOTE — ASSESSMENT & PLAN NOTE
She has a 6 mm nodule on the right and 2 nodules on the left- one is 1.5 cm and 1 is 1.3 cm.  She has hoarseness and dysphagia. A recent esophagram did not show any reflux other that at the distal esophagus.  I think there is a reasonable chance that the nodules on the left are causing the foreign body sensation when she swallows and contributing to the hoarseness. If she feels this is severe enough then a left hemithyroidectomy could be offered. She will consider this and get back with me

## 2021-01-05 NOTE — PROGRESS NOTES
"     Office Note      Date: 2021  Patient Name: Radha Dixon  MRN: [id number]  : 1990    Chief Complaint   Patient presents with   • Goiter       History of Present Illness:   Radha Dixon is a 30 y.o. female who presents for Goiter  .she is seen as a new pt.  She was first found to have thyroid nodules in 2017 and had a biopsy but no diagnosis was made. She has nodules in both lobes- 6 mm on right and 13 mm and 15 mm on left. Review of old records show serial ultrasounds in 2019 show nodules to be stable in size.  tft's to be normal.    She has hoarseness and dysphagia. She has not had significant reflux demonstrated on esophagram.  She has weight loss. She has had hair loss. She has painful swallowing.  Subjective      Patient was [place] where: [place] .  Facial radiation exposure: No.  High iodine intake: No  Family hx of thyroid disease: Yes, describe: father.    Review of Systems:   Review of Systems   Constitutional: Positive for fatigue and unexpected weight change.   HENT: Positive for trouble swallowing and voice change.    Respiratory: Negative.    Cardiovascular: Negative.    Gastrointestinal: Negative.    Endocrine: Negative.    Genitourinary: Negative.    Musculoskeletal: Negative.    Skin: Negative.    Allergic/Immunologic: Negative.    Neurological: Negative.    Hematological: Negative.    Psychiatric/Behavioral: Negative.        The following portions of the patient's history were reviewed and updated as appropriate: allergies, current medications, past family history, past medical history, past social history, past surgical history and problem list.    Objective     Visit Vitals  BP (!) 84/50   Pulse 97   Temp 97.8 °F (36.6 °C) (Infrared)   Ht 154.9 cm (61\")   Wt 51.8 kg (114 lb 3.2 oz)   SpO2 99%   BMI 21.58 kg/m²       Labs:    CBC w/DIFF  Lab Results   Component Value Date    WBC 8.42 2020    RBC 4.11 2020    HGB 12.5 2020    HCT 36.8 2020    MCV 89.5 " 09/27/2020    MCH 30.4 09/27/2020    MCHC 34.0 09/27/2020    RDW 11.4 (L) 09/27/2020    RDWSD 37.3 09/27/2020    MPV 12.6 (H) 09/27/2020     09/27/2020    NEUTRORELPCT 63.6 09/27/2020    LYMPHORELPCT 27.8 09/27/2020    MONORELPCT 5.3 09/27/2020    EOSRELPCT 2.4 09/27/2020    BASORELPCT 0.8 09/27/2020    AUTOIGPER 0.1 09/27/2020    NEUTROABS 5.35 09/27/2020    LYMPHSABS 2.34 09/27/2020    MONOSABS 0.45 09/27/2020    EOSABS 0.20 09/27/2020    BASOSABS 0.07 09/27/2020    AUTOIGNUM 0.01 09/27/2020    NRBC 0.0 09/27/2020       T4  Free T4   Date Value Ref Range Status   10/20/2020 1.45 0.93 - 1.70 ng/dL Final     Comment:     Results may be falsely increased if patient taking Biotin.       TSH  No results found for: TSHBASE     Physical Exam:  Physical Exam  Vitals signs reviewed.   Constitutional:       General: She is not in acute distress.     Appearance: Normal appearance. She is normal weight. She is not toxic-appearing.   HENT:      Head: Normocephalic and atraumatic.      Mouth/Throat:      Mouth: Mucous membranes are moist.   Neck:      Musculoskeletal: Normal range of motion.      Comments: Palpable nodules in left lobe  Cardiovascular:      Rate and Rhythm: Normal rate and regular rhythm.   Pulmonary:      Effort: Pulmonary effort is normal.      Breath sounds: Normal breath sounds.   Musculoskeletal: Normal range of motion.   Lymphadenopathy:      Cervical: No cervical adenopathy.   Skin:     General: Skin is warm and dry.   Neurological:      General: No focal deficit present.      Mental Status: She is alert.   Psychiatric:         Mood and Affect: Mood normal.         Thought Content: Thought content normal.         Judgment: Judgment normal.         Assessment / Plan      Assessment & Plan:  Problem List Items Addressed This Visit        Other    Nontoxic multinodular goiter - Primary    Current Assessment & Plan     She has a 6 mm nodule on the right and 2 nodules on the left- one is 1.5 cm and 1  is 1.3 cm.  She has hoarseness and dysphagia. A recent esophagram did not show any reflux other that at the distal esophagus.  I think there is a reasonable chance that the nodules on the left are causing the foreign body sensation when she swallows and contributing to the hoarseness. If she feels this is severe enough then a left hemithyroidectomy could be offered. She will consider this and get back with me          Relevant Medications    atenolol (TENORMIN) 25 MG tablet           Edgardo Palacios MD   01/05/2021

## 2021-01-06 ENCOUNTER — TELEPHONE (OUTPATIENT)
Dept: ENDOCRINOLOGY | Facility: CLINIC | Age: 31
End: 2021-01-06

## 2021-01-07 DIAGNOSIS — E04.2 NONTOXIC MULTINODULAR GOITER: Primary | ICD-10-CM

## 2021-01-14 DIAGNOSIS — U07.1 COVID-19: Primary | ICD-10-CM

## 2021-01-14 RX ORDER — BENZONATATE 200 MG/1
200 CAPSULE ORAL 3 TIMES DAILY PRN
Qty: 30 CAPSULE | Refills: 0 | Status: SHIPPED | OUTPATIENT
Start: 2021-01-14 | End: 2021-02-08

## 2021-01-14 RX ORDER — ALBUTEROL SULFATE 90 UG/1
2 AEROSOL, METERED RESPIRATORY (INHALATION) EVERY 4 HOURS PRN
Qty: 18 G | Refills: 1 | Status: SHIPPED | OUTPATIENT
Start: 2021-01-14 | End: 2021-07-16 | Stop reason: SDUPTHER

## 2021-01-14 RX ORDER — AZITHROMYCIN 250 MG/1
TABLET, FILM COATED ORAL
Qty: 6 TABLET | Refills: 0 | Status: SHIPPED | OUTPATIENT
Start: 2021-01-14 | End: 2021-02-08

## 2021-01-18 ENCOUNTER — TELEPHONE (OUTPATIENT)
Dept: INTERNAL MEDICINE | Facility: CLINIC | Age: 31
End: 2021-01-18

## 2021-01-18 NOTE — TELEPHONE ENCOUNTER
Caller: Radha Dixon    Relationship to patient: Self    Best call back number: [phone]    Concerns or Questions if Applicable: PATIENT TESTED POSITIVE FOR COVID ON 1-11-21  SHE WENT TO THE [facility]   THEY TOLD HER THE HEALTH DEPT WOULD CALL HER   SHE HAS NOT HEARD FROM THEM AND IS REQUESTING A CALL BACK  SHE WOULD LIKE TO KNOW HOW LONG SHE NEEDS TO BE IN QUARANTINE    SHE HAD A FEVER, VOMITING, SHORTNESS OF BREATH   PLEASE ADVISE

## 2021-01-18 NOTE — TELEPHONE ENCOUNTER
She needs to quarantine at home for at least 10 days from symptom onset AND until symptoms have improved overall AND fever free for 48 hours without use of fever reducing medications.  If symptoms began on 1/11 then she should quarantine until 1/21.

## 2021-02-08 ENCOUNTER — OFFICE VISIT (OUTPATIENT)
Dept: CARDIOLOGY | Facility: CLINIC | Age: 31
End: 2021-02-08

## 2021-02-08 VITALS
HEIGHT: 61 IN | WEIGHT: 110 LBS | HEART RATE: 110 BPM | DIASTOLIC BLOOD PRESSURE: 62 MMHG | OXYGEN SATURATION: 98 % | BODY MASS INDEX: 20.77 KG/M2 | RESPIRATION RATE: 18 BRPM | SYSTOLIC BLOOD PRESSURE: 72 MMHG

## 2021-02-08 DIAGNOSIS — R00.0 INAPPROPRIATE SINUS TACHYCARDIA: ICD-10-CM

## 2021-02-08 DIAGNOSIS — R00.2 PALPITATIONS: Primary | ICD-10-CM

## 2021-02-08 DIAGNOSIS — Z01.810 PRE-OPERATIVE CARDIOVASCULAR EXAMINATION: ICD-10-CM

## 2021-02-08 PROCEDURE — 99213 OFFICE O/P EST LOW 20 MIN: CPT | Performed by: INTERNAL MEDICINE

## 2021-02-08 PROCEDURE — 93000 ELECTROCARDIOGRAM COMPLETE: CPT | Performed by: INTERNAL MEDICINE

## 2021-02-08 NOTE — PROGRESS NOTES
Subjective:     Encounter Date:02/08/2021      Patient ID: Radha Dixon is a 30 y.o. female.    Chief Complaint: Tachycardia  HPI  This is a 30-year-old female patient with inappropriate sinus tachycardia and postural orthostatic hypotension.  The patient indicates she is doing better with a combination of midodrine and low-dose beta-blocker therapy.  She is not supplementing her fluid and salt intake to an appropriate degree.  She indicates that her symptoms have improved with medication therapy.  She has had no further syncopal episodes.  She experiences occasional postural dizziness.  Her palpitations have improved.  The patient indicates that she is due to have a partial thyroidectomy due to a large goiter.  Thyroid testing demonstrates normal function-euthyroid.  She is remained active with no exertional symptoms or limitations.  She has had no cardiovascular issues or hospitalizations since her last visit.  She reports compliance with her medications with no perceived side effects.  The following portions of the patient's history were reviewed and updated as appropriate: allergies, current medications, past family history, past medical history, past social history, past surgical history and problem  Review of Systems   Constitution: Negative for chills, diaphoresis, fever, malaise/fatigue, weight gain and weight loss.   HENT: Negative for ear discharge, hearing loss, hoarse voice and nosebleeds.    Eyes: Negative for discharge, double vision, pain and photophobia.   Cardiovascular: Negative for chest pain, claudication, cyanosis, dyspnea on exertion, irregular heartbeat, leg swelling, near-syncope, orthopnea, palpitations, paroxysmal nocturnal dyspnea and syncope.   Respiratory: Negative for cough, hemoptysis, shortness of breath, sleep disturbances due to breathing, sputum production and wheezing.    Endocrine: Negative for cold intolerance, heat intolerance, polydipsia, polyphagia and polyuria.    Hematologic/Lymphatic: Negative for adenopathy and bleeding problem. Does not bruise/bleed easily.   Skin: Negative for color change, flushing, itching and rash.   Musculoskeletal: Negative for muscle cramps, muscle weakness, myalgias and stiffness.   Gastrointestinal: Negative for abdominal pain, diarrhea, hematemesis, hematochezia, nausea and vomiting.   Genitourinary: Negative for dysuria, frequency and nocturia.   Neurological: Positive for dizziness. Negative for focal weakness, loss of balance, numbness, paresthesias and seizures.   Psychiatric/Behavioral: Negative for altered mental status, hallucinations and suicidal ideas.   Allergic/Immunologic: Negative for HIV exposure, hives and persistent infections.           Current Outpatient Medications:   •  albuterol sulfate  (90 Base) MCG/ACT inhaler, Inhale 2 puffs Every 4 (Four) Hours As Needed for Shortness of Air., Disp: 18 g, Rfl: 1  •  atenolol (TENORMIN) 25 MG tablet, Take 1 tablet by mouth Daily., Disp: 60 tablet, Rfl: 11  •  buPROPion SR (Wellbutrin SR) 100 MG 12 hr tablet, Take 1 tablet by mouth 2 (Two) Times a Day., Disp: 60 tablet, Rfl: 5  •  midodrine (PROAMATINE) 10 MG tablet, Take 1 tablet by mouth 2 (Two) Times a Day., Disp: 60 tablet, Rfl: 3  •  norethindrone-ethinyl estradiol-iron (MICROGESTIN FE1.5/30) 1.5-30 MG-MCG tablet, Take 1 tablet by mouth Daily., Disp: 28 tablet, Rfl: 12  •  ondansetron ODT (ZOFRAN-ODT) 4 MG disintegrating tablet, Place 1 tablet on the tongue Every 8 (Eight) Hours As Needed for Nausea or Vomiting., Disp: 30 tablet, Rfl: 1  •  pantoprazole (PROTONIX) 40 MG EC tablet, 1 po daily in the am 30 minutes before breakfast, Disp: 30 tablet, Rfl: 3    Objective:   Vitals signs and nursing note reviewed.   Constitutional:       Appearance: Healthy appearance. Not in distress.   Neck:      Vascular: No JVR. JVD normal.   Pulmonary:      Effort: Pulmonary effort is normal.      Breath sounds: Normal breath sounds. No  "wheezing. No rhonchi. No rales.   Chest:      Chest wall: Not tender to palpatation.   Cardiovascular:      PMI at left midclavicular line. Normal rate. Regular rhythm. Normal S1. Normal S2.      Murmurs: There is no murmur.      No gallop. No click. No rub.   Pulses:     Intact distal pulses.   Edema:     Peripheral edema absent.   Abdominal:      General: Bowel sounds are normal.      Palpations: Abdomen is soft.      Tenderness: There is no abdominal tenderness.   Musculoskeletal: Normal range of motion.         General: No tenderness.   Skin:     General: Skin is warm and dry.   Neurological:      General: No focal deficit present.      Mental Status: Alert and oriented to person, place and time.       Blood pressure (!) 72/62, pulse 110, resp. rate 18, height 154.9 cm (60.98\"), weight 49.9 kg (110 lb), last menstrual period 02/07/2021, SpO2 98 %, not currently breastfeeding.   Lab Review:     Assessment:       1. Palpitations  Improved symptoms.  Previous cardiac monitoring showed no evidence of arrhythmia or frequent/complex atrial or ventricular ectopy.    2. Inappropriate sinus tachycardia  Resting heart rates remain elevated despite beta-blocker therapy.    3. Pre-operative cardiovascular examination  From a cardiovascular perspective the patient is cleared at low risk of a cardiovascular complication from her planned thyroid surgery.      ECG 12 Lead    Date/Time: 2/8/2021 3:39 PM  Performed by: Ronal Dong MD  Authorized by: Ronal Dong MD   Comparison: compared with previous ECG   Similar to previous ECG  Rhythm: sinus rhythm  Rate: normal  QRS axis: normal    Clinical impression: normal ECG            Plan:     Attention should be given to large volume IV fluid administration in the perioperative timeframe.  The patient has again been educated as to the importance of fluid and sodium supplementation.  She has been given specific instructions on gauging her fluid intake based on her urine " volume, urine frequency and urine color.  She has been advised to supplement her fluid intake with electrolyte solutions on a 3-4:1 ratio.  Specifically, for every 3 to 4 quarts of water intake she should drink 1 quart of an electrolyte solution such as Gatorade, Powerade, smart water, vitamin water, etc.  The patient may proceed with thyroid surgery without further delay or testing.  No changes to her medications have been made at today's visit.

## 2021-02-23 ENCOUNTER — ANESTHESIA EVENT (OUTPATIENT)
Dept: PERIOP | Facility: HOSPITAL | Age: 31
End: 2021-02-23

## 2021-02-23 RX ORDER — FAMOTIDINE 10 MG/ML
20 INJECTION, SOLUTION INTRAVENOUS ONCE
Status: CANCELLED | OUTPATIENT
Start: 2021-02-23 | End: 2021-02-23

## 2021-02-24 ENCOUNTER — HOSPITAL ENCOUNTER (OUTPATIENT)
Facility: HOSPITAL | Age: 31
Discharge: HOME | End: 2021-02-25
Attending: SURGERY | Admitting: SURGERY

## 2021-02-24 ENCOUNTER — ANESTHESIA (OUTPATIENT)
Dept: PERIOP | Facility: HOSPITAL | Age: 31
End: 2021-02-24

## 2021-02-24 DIAGNOSIS — E04.1 THYROID NODULE: ICD-10-CM

## 2021-02-24 LAB
B-HCG UR QL: NEGATIVE
DEPRECATED RDW RBC AUTO: 38.4 FL (ref 37–54)
ERYTHROCYTE [DISTWIDTH] IN BLOOD BY AUTOMATED COUNT: 11.6 % (ref 12.3–15.4)
HCT VFR BLD AUTO: 38.4 % (ref 34–46.6)
HGB BLD-MCNC: 12.5 G/DL (ref 12–15.9)
INTERNAL NEGATIVE CONTROL: NORMAL
INTERNAL POSITIVE CONTROL: REACTIVE
Lab: NORMAL
MCH RBC QN AUTO: 29.6 PG (ref 26.6–33)
MCHC RBC AUTO-ENTMCNC: 32.6 G/DL (ref 31.5–35.7)
MCV RBC AUTO: 90.8 FL (ref 79–97)
PLATELET # BLD AUTO: 188 10*3/MM3 (ref 140–450)
PMV BLD AUTO: 12.2 FL (ref 6–12)
RBC # BLD AUTO: 4.23 10*6/MM3 (ref 3.77–5.28)
WBC # BLD AUTO: 7.38 10*3/MM3 (ref 3.4–10.8)

## 2021-02-24 PROCEDURE — 25010000002 HYDROMORPHONE PER 4 MG: Performed by: NURSE ANESTHETIST, CERTIFIED REGISTERED

## 2021-02-24 PROCEDURE — 25010000002 FENTANYL CITRATE (PF) 100 MCG/2ML SOLUTION: Performed by: NURSE ANESTHETIST, CERTIFIED REGISTERED

## 2021-02-24 PROCEDURE — 25010000002 MORPHINE PER 10 MG: Performed by: SURGERY

## 2021-02-24 PROCEDURE — 25010000002 PROPOFOL 10 MG/ML EMULSION: Performed by: NURSE ANESTHETIST, CERTIFIED REGISTERED

## 2021-02-24 PROCEDURE — 25010000002 NEOSTIGMINE 10 MG/10ML SOLUTION: Performed by: NURSE ANESTHETIST, CERTIFIED REGISTERED

## 2021-02-24 PROCEDURE — 25010000002 DEXAMETHASONE PER 1 MG: Performed by: NURSE ANESTHETIST, CERTIFIED REGISTERED

## 2021-02-24 PROCEDURE — 25010000002 MIDAZOLAM PER 1 MG: Performed by: ANESTHESIOLOGY

## 2021-02-24 PROCEDURE — 88331 PATH CONSLTJ SURG 1 BLK 1SPC: CPT | Performed by: PATHOLOGY

## 2021-02-24 PROCEDURE — 85027 COMPLETE CBC AUTOMATED: CPT | Performed by: ANESTHESIOLOGY

## 2021-02-24 PROCEDURE — 81025 URINE PREGNANCY TEST: CPT | Performed by: ANESTHESIOLOGY

## 2021-02-24 PROCEDURE — 25010000002 ONDANSETRON PER 1 MG: Performed by: NURSE ANESTHETIST, CERTIFIED REGISTERED

## 2021-02-24 PROCEDURE — 88307 TISSUE EXAM BY PATHOLOGIST: CPT | Performed by: SURGERY

## 2021-02-24 PROCEDURE — 60220 PARTIAL REMOVAL OF THYROID: CPT | Performed by: SPECIALIST/TECHNOLOGIST, OTHER

## 2021-02-24 PROCEDURE — G0378 HOSPITAL OBSERVATION PER HR: HCPCS

## 2021-02-24 DEVICE — LIGACLIP MCA MULTIPLE CLIP APPLIERS, 20 SMALL CLIPS
Type: IMPLANTABLE DEVICE | Site: NECK | Status: FUNCTIONAL
Brand: LIGACLIP

## 2021-02-24 DEVICE — LIGACLIP MCA MULTIPLE CLIP APPLIERS, 20 MEDIUM CLIPS
Type: IMPLANTABLE DEVICE | Site: NECK | Status: FUNCTIONAL
Brand: LIGACLIP

## 2021-02-24 DEVICE — KT HEMOST ABS SURGIFOAM PORCN 1GRAM: Type: IMPLANTABLE DEVICE | Site: NECK | Status: FUNCTIONAL

## 2021-02-24 RX ORDER — NALOXONE HCL 0.4 MG/ML
0.4 VIAL (ML) INJECTION
Status: DISCONTINUED | OUTPATIENT
Start: 2021-02-24 | End: 2021-02-25 | Stop reason: HOSPADM

## 2021-02-24 RX ORDER — LIDOCAINE HYDROCHLORIDE 10 MG/ML
INJECTION, SOLUTION EPIDURAL; INFILTRATION; INTRACAUDAL; PERINEURAL AS NEEDED
Status: DISCONTINUED | OUTPATIENT
Start: 2021-02-24 | End: 2021-02-24 | Stop reason: SURG

## 2021-02-24 RX ORDER — DEXAMETHASONE SODIUM PHOSPHATE 10 MG/ML
INJECTION INTRAMUSCULAR; INTRAVENOUS AS NEEDED
Status: DISCONTINUED | OUTPATIENT
Start: 2021-02-24 | End: 2021-02-24 | Stop reason: SURG

## 2021-02-24 RX ORDER — SCOLOPAMINE TRANSDERMAL SYSTEM 1 MG/1
1 PATCH, EXTENDED RELEASE TRANSDERMAL ONCE
Status: DISCONTINUED | OUTPATIENT
Start: 2021-02-24 | End: 2021-02-24 | Stop reason: HOSPADM

## 2021-02-24 RX ORDER — MORPHINE SULFATE 4 MG/ML
4 INJECTION, SOLUTION INTRAMUSCULAR; INTRAVENOUS
Status: DISCONTINUED | OUTPATIENT
Start: 2021-02-24 | End: 2021-02-25 | Stop reason: HOSPADM

## 2021-02-24 RX ORDER — MIDODRINE HYDROCHLORIDE 5 MG/1
10 TABLET ORAL
Status: DISCONTINUED | OUTPATIENT
Start: 2021-02-24 | End: 2021-02-24

## 2021-02-24 RX ORDER — ONDANSETRON 2 MG/ML
4 INJECTION INTRAMUSCULAR; INTRAVENOUS ONCE AS NEEDED
Status: DISCONTINUED | OUTPATIENT
Start: 2021-02-24 | End: 2021-02-24

## 2021-02-24 RX ORDER — MIDAZOLAM HYDROCHLORIDE 1 MG/ML
2 INJECTION INTRAMUSCULAR; INTRAVENOUS
Status: DISCONTINUED | OUTPATIENT
Start: 2021-02-24 | End: 2021-02-24 | Stop reason: HOSPADM

## 2021-02-24 RX ORDER — NEOSTIGMINE METHYLSULFATE 1 MG/ML
INJECTION, SOLUTION INTRAVENOUS AS NEEDED
Status: DISCONTINUED | OUTPATIENT
Start: 2021-02-24 | End: 2021-02-24 | Stop reason: SURG

## 2021-02-24 RX ORDER — PROMETHAZINE HYDROCHLORIDE 12.5 MG/1
12.5 TABLET ORAL EVERY 6 HOURS PRN
Status: DISCONTINUED | OUTPATIENT
Start: 2021-02-24 | End: 2021-02-25 | Stop reason: HOSPADM

## 2021-02-24 RX ORDER — SODIUM CHLORIDE, SODIUM LACTATE, POTASSIUM CHLORIDE, CALCIUM CHLORIDE 600; 310; 30; 20 MG/100ML; MG/100ML; MG/100ML; MG/100ML
50 INJECTION, SOLUTION INTRAVENOUS CONTINUOUS
Status: DISCONTINUED | OUTPATIENT
Start: 2021-02-24 | End: 2021-02-25 | Stop reason: HOSPADM

## 2021-02-24 RX ORDER — LIDOCAINE HYDROCHLORIDE 10 MG/ML
0.5 INJECTION, SOLUTION EPIDURAL; INFILTRATION; INTRACAUDAL; PERINEURAL ONCE AS NEEDED
Status: COMPLETED | OUTPATIENT
Start: 2021-02-24 | End: 2021-02-24

## 2021-02-24 RX ORDER — BUPIVACAINE HCL/0.9 % NACL/PF 0.125 %
PLASTIC BAG, INJECTION (ML) EPIDURAL AS NEEDED
Status: DISCONTINUED | OUTPATIENT
Start: 2021-02-24 | End: 2021-02-24 | Stop reason: SURG

## 2021-02-24 RX ORDER — GLYCOPYRROLATE 0.2 MG/ML
INJECTION INTRAMUSCULAR; INTRAVENOUS AS NEEDED
Status: DISCONTINUED | OUTPATIENT
Start: 2021-02-24 | End: 2021-02-24 | Stop reason: SURG

## 2021-02-24 RX ORDER — FENTANYL CITRATE 50 UG/ML
50 INJECTION, SOLUTION INTRAMUSCULAR; INTRAVENOUS
Status: DISCONTINUED | OUTPATIENT
Start: 2021-02-24 | End: 2021-02-24

## 2021-02-24 RX ORDER — PROMETHAZINE HYDROCHLORIDE 12.5 MG/1
12.5 SUPPOSITORY RECTAL EVERY 6 HOURS PRN
Status: DISCONTINUED | OUTPATIENT
Start: 2021-02-24 | End: 2021-02-25 | Stop reason: HOSPADM

## 2021-02-24 RX ORDER — SODIUM CHLORIDE, SODIUM LACTATE, POTASSIUM CHLORIDE, CALCIUM CHLORIDE 600; 310; 30; 20 MG/100ML; MG/100ML; MG/100ML; MG/100ML
9 INJECTION, SOLUTION INTRAVENOUS CONTINUOUS
Status: DISCONTINUED | OUTPATIENT
Start: 2021-02-24 | End: 2021-02-24

## 2021-02-24 RX ORDER — SODIUM CHLORIDE 0.9 % (FLUSH) 0.9 %
10 SYRINGE (ML) INJECTION EVERY 12 HOURS SCHEDULED
Status: DISCONTINUED | OUTPATIENT
Start: 2021-02-24 | End: 2021-02-24 | Stop reason: HOSPADM

## 2021-02-24 RX ORDER — ALBUTEROL SULFATE 2.5 MG/3ML
2.5 SOLUTION RESPIRATORY (INHALATION) EVERY 4 HOURS PRN
Status: DISCONTINUED | OUTPATIENT
Start: 2021-02-24 | End: 2021-02-25 | Stop reason: HOSPADM

## 2021-02-24 RX ORDER — MAGNESIUM HYDROXIDE 1200 MG/15ML
LIQUID ORAL AS NEEDED
Status: DISCONTINUED | OUTPATIENT
Start: 2021-02-24 | End: 2021-02-24 | Stop reason: HOSPADM

## 2021-02-24 RX ORDER — PANTOPRAZOLE SODIUM 40 MG/1
40 TABLET, DELAYED RELEASE ORAL
Status: DISCONTINUED | OUTPATIENT
Start: 2021-02-24 | End: 2021-02-25 | Stop reason: HOSPADM

## 2021-02-24 RX ORDER — ROCURONIUM BROMIDE 10 MG/ML
INJECTION, SOLUTION INTRAVENOUS AS NEEDED
Status: DISCONTINUED | OUTPATIENT
Start: 2021-02-24 | End: 2021-02-24 | Stop reason: SURG

## 2021-02-24 RX ORDER — FENTANYL CITRATE 50 UG/ML
INJECTION, SOLUTION INTRAMUSCULAR; INTRAVENOUS AS NEEDED
Status: DISCONTINUED | OUTPATIENT
Start: 2021-02-24 | End: 2021-02-24 | Stop reason: SURG

## 2021-02-24 RX ORDER — SODIUM CHLORIDE 0.9 % (FLUSH) 0.9 %
10 SYRINGE (ML) INJECTION AS NEEDED
Status: DISCONTINUED | OUTPATIENT
Start: 2021-02-24 | End: 2021-02-24 | Stop reason: HOSPADM

## 2021-02-24 RX ORDER — BUPROPION HYDROCHLORIDE 100 MG/1
100 TABLET, EXTENDED RELEASE ORAL 2 TIMES DAILY
Status: DISCONTINUED | OUTPATIENT
Start: 2021-02-24 | End: 2021-02-25 | Stop reason: HOSPADM

## 2021-02-24 RX ORDER — ACETAMINOPHEN 325 MG/1
650 TABLET ORAL EVERY 4 HOURS PRN
Status: DISCONTINUED | OUTPATIENT
Start: 2021-02-24 | End: 2021-02-25 | Stop reason: HOSPADM

## 2021-02-24 RX ORDER — OXYCODONE HYDROCHLORIDE AND ACETAMINOPHEN 5; 325 MG/1; MG/1
1 TABLET ORAL EVERY 4 HOURS PRN
Status: DISCONTINUED | OUTPATIENT
Start: 2021-02-24 | End: 2021-02-25 | Stop reason: HOSPADM

## 2021-02-24 RX ORDER — FAMOTIDINE 20 MG/1
20 TABLET, FILM COATED ORAL ONCE
Status: COMPLETED | OUTPATIENT
Start: 2021-02-24 | End: 2021-02-24

## 2021-02-24 RX ORDER — MIDAZOLAM HYDROCHLORIDE 1 MG/ML
1 INJECTION INTRAMUSCULAR; INTRAVENOUS
Status: DISCONTINUED | OUTPATIENT
Start: 2021-02-24 | End: 2021-02-24 | Stop reason: HOSPADM

## 2021-02-24 RX ORDER — ACETAMINOPHEN 650 MG/1
650 SUPPOSITORY RECTAL EVERY 4 HOURS PRN
Status: DISCONTINUED | OUTPATIENT
Start: 2021-02-24 | End: 2021-02-25 | Stop reason: HOSPADM

## 2021-02-24 RX ORDER — PROPOFOL 10 MG/ML
VIAL (ML) INTRAVENOUS AS NEEDED
Status: DISCONTINUED | OUTPATIENT
Start: 2021-02-24 | End: 2021-02-24 | Stop reason: SURG

## 2021-02-24 RX ORDER — PROMETHAZINE HYDROCHLORIDE 25 MG/1
25 TABLET ORAL ONCE AS NEEDED
Status: DISCONTINUED | OUTPATIENT
Start: 2021-02-24 | End: 2021-02-24

## 2021-02-24 RX ORDER — HYDROMORPHONE HYDROCHLORIDE 1 MG/ML
0.5 INJECTION, SOLUTION INTRAMUSCULAR; INTRAVENOUS; SUBCUTANEOUS
Status: DISCONTINUED | OUTPATIENT
Start: 2021-02-24 | End: 2021-02-24

## 2021-02-24 RX ORDER — ATENOLOL 25 MG/1
25 TABLET ORAL DAILY
Status: DISCONTINUED | OUTPATIENT
Start: 2021-02-24 | End: 2021-02-25 | Stop reason: HOSPADM

## 2021-02-24 RX ORDER — ONDANSETRON 2 MG/ML
INJECTION INTRAMUSCULAR; INTRAVENOUS AS NEEDED
Status: DISCONTINUED | OUTPATIENT
Start: 2021-02-24 | End: 2021-02-24 | Stop reason: SURG

## 2021-02-24 RX ORDER — ONDANSETRON 2 MG/ML
4 INJECTION INTRAMUSCULAR; INTRAVENOUS EVERY 6 HOURS PRN
Status: DISCONTINUED | OUTPATIENT
Start: 2021-02-24 | End: 2021-02-25 | Stop reason: HOSPADM

## 2021-02-24 RX ORDER — ONDANSETRON 4 MG/1
4 TABLET, FILM COATED ORAL EVERY 6 HOURS PRN
Status: DISCONTINUED | OUTPATIENT
Start: 2021-02-24 | End: 2021-02-25 | Stop reason: HOSPADM

## 2021-02-24 RX ORDER — MIDODRINE HYDROCHLORIDE 5 MG/1
10 TABLET ORAL
Status: DISCONTINUED | OUTPATIENT
Start: 2021-02-24 | End: 2021-02-25 | Stop reason: HOSPADM

## 2021-02-24 RX ORDER — EPHEDRINE SULFATE 50 MG/ML
INJECTION, SOLUTION INTRAVENOUS AS NEEDED
Status: DISCONTINUED | OUTPATIENT
Start: 2021-02-24 | End: 2021-02-24 | Stop reason: SURG

## 2021-02-24 RX ADMIN — EPHEDRINE SULFATE 10 MG: 50 INJECTION, SOLUTION INTRAVENOUS at 08:30

## 2021-02-24 RX ADMIN — ATENOLOL 25 MG: 25 TABLET ORAL at 12:24

## 2021-02-24 RX ADMIN — NEOSTIGMINE 2.5 MG: 1 INJECTION INTRAVENOUS at 08:46

## 2021-02-24 RX ADMIN — SUGAMMADEX 100 MG: 100 INJECTION, SOLUTION INTRAVENOUS at 09:06

## 2021-02-24 RX ADMIN — MIDAZOLAM 2 MG: 1 INJECTION INTRAMUSCULAR; INTRAVENOUS at 07:53

## 2021-02-24 RX ADMIN — FENTANYL CITRATE 100 MCG: 50 INJECTION, SOLUTION INTRAMUSCULAR; INTRAVENOUS at 08:01

## 2021-02-24 RX ADMIN — FENTANYL CITRATE 50 MCG: 50 INJECTION, SOLUTION INTRAMUSCULAR; INTRAVENOUS at 09:35

## 2021-02-24 RX ADMIN — FAMOTIDINE 20 MG: 20 TABLET, FILM COATED ORAL at 07:34

## 2021-02-24 RX ADMIN — FENTANYL CITRATE 50 MCG: 50 INJECTION, SOLUTION INTRAMUSCULAR; INTRAVENOUS at 09:10

## 2021-02-24 RX ADMIN — LIDOCAINE HYDROCHLORIDE 50 MG: 10 INJECTION, SOLUTION EPIDURAL; INFILTRATION; INTRACAUDAL; PERINEURAL at 08:01

## 2021-02-24 RX ADMIN — BUPROPION HYDROCHLORIDE 100 MG: 100 TABLET, EXTENDED RELEASE ORAL at 12:12

## 2021-02-24 RX ADMIN — Medication 80 MCG: at 08:29

## 2021-02-24 RX ADMIN — ROCURONIUM BROMIDE 40 MG: 10 INJECTION INTRAVENOUS at 08:01

## 2021-02-24 RX ADMIN — LIDOCAINE HYDROCHLORIDE 0.5 ML: 10 INJECTION, SOLUTION EPIDURAL; INFILTRATION; INTRACAUDAL; PERINEURAL at 07:34

## 2021-02-24 RX ADMIN — FENTANYL CITRATE 160 MCG: 50 INJECTION, SOLUTION INTRAMUSCULAR; INTRAVENOUS at 08:09

## 2021-02-24 RX ADMIN — SODIUM CHLORIDE, POTASSIUM CHLORIDE, SODIUM LACTATE AND CALCIUM CHLORIDE 9 ML/HR: 600; 310; 30; 20 INJECTION, SOLUTION INTRAVENOUS at 07:34

## 2021-02-24 RX ADMIN — MORPHINE SULFATE 4 MG: 4 INJECTION, SOLUTION INTRAMUSCULAR; INTRAVENOUS at 20:21

## 2021-02-24 RX ADMIN — PANTOPRAZOLE SODIUM 40 MG: 40 TABLET, DELAYED RELEASE ORAL at 12:13

## 2021-02-24 RX ADMIN — BUPROPION HYDROCHLORIDE 100 MG: 100 TABLET, EXTENDED RELEASE ORAL at 20:21

## 2021-02-24 RX ADMIN — GLYCOPYRROLATE 0.4 MG: 0.4 INJECTION INTRAMUSCULAR; INTRAVENOUS at 08:46

## 2021-02-24 RX ADMIN — MORPHINE SULFATE 4 MG: 4 INJECTION, SOLUTION INTRAMUSCULAR; INTRAVENOUS at 12:16

## 2021-02-24 RX ADMIN — PROPOFOL 200 MG: 10 INJECTION, EMULSION INTRAVENOUS at 08:01

## 2021-02-24 RX ADMIN — HYDROMORPHONE HYDROCHLORIDE 0.5 MG: 1 INJECTION, SOLUTION INTRAMUSCULAR; INTRAVENOUS; SUBCUTANEOUS at 09:52

## 2021-02-24 RX ADMIN — DEXAMETHASONE SODIUM PHOSPHATE 8 MG: 10 INJECTION INTRAMUSCULAR; INTRAVENOUS at 08:01

## 2021-02-24 RX ADMIN — MIDODRINE HYDROCHLORIDE 10 MG: 5 TABLET ORAL at 17:53

## 2021-02-24 RX ADMIN — Medication 80 MCG: at 08:27

## 2021-02-24 RX ADMIN — MIDODRINE HYDROCHLORIDE 10 MG: 5 TABLET ORAL at 12:13

## 2021-02-24 RX ADMIN — MORPHINE SULFATE 4 MG: 4 INJECTION, SOLUTION INTRAMUSCULAR; INTRAVENOUS at 16:35

## 2021-02-24 RX ADMIN — SODIUM CHLORIDE, POTASSIUM CHLORIDE, SODIUM LACTATE AND CALCIUM CHLORIDE 50 ML/HR: 600; 310; 30; 20 INJECTION, SOLUTION INTRAVENOUS at 12:14

## 2021-02-24 RX ADMIN — ONDANSETRON 4 MG: 2 INJECTION INTRAMUSCULAR; INTRAVENOUS at 08:46

## 2021-02-24 NOTE — ANESTHESIA PROCEDURE NOTES
Airway  Urgency: elective    Date/Time: 2/24/2021 8:02 AM  Airway not difficult    General Information and Staff    Patient location during procedure: OR  CRNA: Julee Chin CRNA    Indications and Patient Condition  Indications for airway management: airway protection    Preoxygenated: yes  MILS not maintained throughout  Mask difficulty assessment: 1 - vent by mask    Final Airway Details  Final airway type: endotracheal airway      Successful airway: ETT  Cuffed: yes   Successful intubation technique: direct laryngoscopy  Endotracheal tube insertion site: oral  Blade: Allyn  Blade size: 3  ETT size (mm): 6.5  Cormack-Lehane Classification: grade I - full view of glottis  Placement verified by: chest auscultation and capnometry   Measured from: lips  ETT/EBT  to lips (cm): 20  Number of attempts at approach: 1  Assessment: lips, teeth, and gum same as pre-op and atraumatic intubation    Additional Comments  Negative epigastric sounds, Breath sound equal bilaterally with symmetric chest rise and fall

## 2021-02-24 NOTE — ANESTHESIA PREPROCEDURE EVALUATION
Anesthesia Evaluation     Patient summary reviewed and Nursing notes reviewed   history of anesthetic complications: PONV  NPO Solid Status: > 8 hours             Airway   Mallampati: I  TM distance: >3 FB  Neck ROM: full  No difficulty expected  Dental      Pulmonary    (+) a smoker Former, asthma,  (-) shortness of breath, recent URI  Cardiovascular     ECG reviewed  Patient on routine beta blocker    (-) hypertension, dysrhythmias (B Blockers ), angina, hyperlipidemia    ROS comment: ECG NSR    ECHO EF64% normal     GXT No ECG evidence of myocardial ischemia.Negative clinical evidence of myocardial ischemia. Findings consistent with a normal ECG stress test.    Neuro/Psych  (+) headaches,     (-) seizures, CVA  GI/Hepatic/Renal/Endo    (+)  GERD,    (-) liver disease, no renal disease, diabetes, no thyroid disorder    Musculoskeletal     Abdominal    Substance History      OB/GYN          Other   arthritis,      ROS/Med Hx Other: On midodrine                Anesthesia Plan    ASA 2     general   (Propofol infusion as part of an anti PONV technique)  intravenous induction     Anesthetic plan, all risks, benefits, and alternatives have been provided, discussed and informed consent has been obtained with: patient.    Plan discussed with CRNA.

## 2021-02-24 NOTE — ANESTHESIA POSTPROCEDURE EVALUATION
Patient: Radha Dixon    Procedure Summary     Date: 02/24/21 Room / Location:  [facility]    Anesthesia Start: 0757 Anesthesia Stop:     Procedure: THYROID LOBECTOMY LEFT (Left Neck) Diagnosis:     Surgeon: Yves Contreras MD Provider: Kishan Baer MD    Anesthesia Type: general ASA Status: 2          Anesthesia Type: general    Vitals  Vitals Value Taken Time   BP     Temp     Pulse 67 02/24/21 0904   Resp     SpO2 99 % 02/24/21 0904   Vitals shown include unvalidated device data.        Post Anesthesia Care and Evaluation    Patient location during evaluation: PACU  Patient participation: complete - patient participated  Level of consciousness: awake  Pain management: adequate  Anesthetic complications: No anesthetic complications  PONV Status: none  Cardiovascular status: acceptable  Respiratory status: acceptable

## 2021-02-25 ENCOUNTER — HOSPITAL ENCOUNTER (EMERGENCY)
Facility: HOSPITAL | Age: 31
Discharge: HOME | End: 2021-02-25
Attending: EMERGENCY MEDICINE | Admitting: EMERGENCY MEDICINE

## 2021-02-25 ENCOUNTER — READMISSION MANAGEMENT (OUTPATIENT)
Dept: CALL CENTER | Facility: HOSPITAL | Age: 31
End: 2021-02-25

## 2021-02-25 VITALS
RESPIRATION RATE: 16 BRPM | SYSTOLIC BLOOD PRESSURE: 114 MMHG | HEART RATE: 62 BPM | DIASTOLIC BLOOD PRESSURE: 71 MMHG | TEMPERATURE: 98 F | HEIGHT: 61 IN | BODY MASS INDEX: 20.77 KG/M2 | OXYGEN SATURATION: 97 % | WEIGHT: 110 LBS

## 2021-02-25 VITALS
WEIGHT: 110 LBS | RESPIRATION RATE: 12 BRPM | HEART RATE: 54 BPM | HEIGHT: 61 IN | TEMPERATURE: 98.5 F | BODY MASS INDEX: 20.77 KG/M2 | DIASTOLIC BLOOD PRESSURE: 61 MMHG | SYSTOLIC BLOOD PRESSURE: 101 MMHG | OXYGEN SATURATION: 96 %

## 2021-02-25 DIAGNOSIS — Z98.890 POST-OPERATIVE NAUSEA AND VOMITING: Primary | ICD-10-CM

## 2021-02-25 DIAGNOSIS — R11.2 POST-OPERATIVE NAUSEA AND VOMITING: Primary | ICD-10-CM

## 2021-02-25 LAB
ALBUMIN SERPL-MCNC: 4.2 G/DL (ref 3.5–5.2)
ALBUMIN/GLOB SERPL: 1.6 G/DL
ALP SERPL-CCNC: 45 U/L (ref 39–117)
ALT SERPL W P-5'-P-CCNC: 38 U/L (ref 1–33)
ANION GAP SERPL CALCULATED.3IONS-SCNC: 8 MMOL/L (ref 5–15)
AST SERPL-CCNC: 28 U/L (ref 1–32)
B-HCG UR QL: NEGATIVE
BACTERIA UR QL AUTO: ABNORMAL /HPF
BASOPHILS # BLD AUTO: 0.05 10*3/MM3 (ref 0–0.2)
BASOPHILS NFR BLD AUTO: 0.4 % (ref 0–1.5)
BILIRUB SERPL-MCNC: 0.9 MG/DL (ref 0–1.2)
BILIRUB UR QL STRIP: NEGATIVE
BUN SERPL-MCNC: 10 MG/DL (ref 6–20)
BUN/CREAT SERPL: 11.4 (ref 7–25)
CA-I SERPL ISE-MCNC: 1.21 MMOL/L (ref 1.12–1.32)
CALCIUM SPEC-SCNC: 8.7 MG/DL (ref 8.6–10.5)
CHLORIDE SERPL-SCNC: 104 MMOL/L (ref 98–107)
CLARITY UR: CLEAR
CO2 SERPL-SCNC: 27 MMOL/L (ref 22–29)
COLOR UR: YELLOW
CREAT SERPL-MCNC: 0.88 MG/DL (ref 0.57–1)
DEPRECATED RDW RBC AUTO: 39.1 FL (ref 37–54)
EOSINOPHIL # BLD AUTO: 0.21 10*3/MM3 (ref 0–0.4)
EOSINOPHIL NFR BLD AUTO: 1.5 % (ref 0.3–6.2)
ERYTHROCYTE [DISTWIDTH] IN BLOOD BY AUTOMATED COUNT: 11.7 % (ref 12.3–15.4)
GFR SERPL CREATININE-BSD FRML MDRD: 76 ML/MIN/1.73
GLOBULIN UR ELPH-MCNC: 2.6 GM/DL
GLUCOSE SERPL-MCNC: 101 MG/DL (ref 65–99)
GLUCOSE UR STRIP-MCNC: NEGATIVE MG/DL
HCT VFR BLD AUTO: 35.9 % (ref 34–46.6)
HGB BLD-MCNC: 11.6 G/DL (ref 12–15.9)
HGB UR QL STRIP.AUTO: ABNORMAL
HOLD SPECIMEN: NORMAL
HYALINE CASTS UR QL AUTO: ABNORMAL /LPF
IMM GRANULOCYTES # BLD AUTO: 0.04 10*3/MM3 (ref 0–0.05)
IMM GRANULOCYTES NFR BLD AUTO: 0.3 % (ref 0–0.5)
INTERNAL NEGATIVE CONTROL: NEGATIVE
INTERNAL POSITIVE CONTROL: POSITIVE
KETONES UR QL STRIP: NEGATIVE
LEUKOCYTE ESTERASE UR QL STRIP.AUTO: ABNORMAL
LIPASE SERPL-CCNC: 23 U/L (ref 13–60)
LYMPHOCYTES # BLD AUTO: 3.45 10*3/MM3 (ref 0.7–3.1)
LYMPHOCYTES NFR BLD AUTO: 25.4 % (ref 19.6–45.3)
Lab: NORMAL
MCH RBC QN AUTO: 29.3 PG (ref 26.6–33)
MCHC RBC AUTO-ENTMCNC: 32.3 G/DL (ref 31.5–35.7)
MCV RBC AUTO: 90.7 FL (ref 79–97)
MONOCYTES # BLD AUTO: 0.7 10*3/MM3 (ref 0.1–0.9)
MONOCYTES NFR BLD AUTO: 5.2 % (ref 5–12)
NEUTROPHILS NFR BLD AUTO: 67.2 % (ref 42.7–76)
NEUTROPHILS NFR BLD AUTO: 9.12 10*3/MM3 (ref 1.7–7)
NITRITE UR QL STRIP: NEGATIVE
NRBC BLD AUTO-RTO: 0 /100 WBC (ref 0–0.2)
PH UR STRIP.AUTO: 6 [PH] (ref 5–8)
PLATELET # BLD AUTO: 196 10*3/MM3 (ref 140–450)
PMV BLD AUTO: 12.7 FL (ref 6–12)
POTASSIUM SERPL-SCNC: 3.7 MMOL/L (ref 3.5–5.2)
PROT SERPL-MCNC: 6.8 G/DL (ref 6–8.5)
PROT UR QL STRIP: NEGATIVE
RBC # BLD AUTO: 3.96 10*6/MM3 (ref 3.77–5.28)
RBC # UR: ABNORMAL /HPF
REF LAB TEST METHOD: ABNORMAL
SODIUM SERPL-SCNC: 139 MMOL/L (ref 136–145)
SP GR UR STRIP: 1.02 (ref 1–1.03)
SQUAMOUS #/AREA URNS HPF: ABNORMAL /HPF
UROBILINOGEN UR QL STRIP: ABNORMAL
WBC # BLD AUTO: 13.57 10*3/MM3 (ref 3.4–10.8)
WBC UR QL AUTO: ABNORMAL /HPF
WHOLE BLOOD HOLD SPECIMEN: NORMAL
WHOLE BLOOD HOLD SPECIMEN: NORMAL

## 2021-02-25 PROCEDURE — 83690 ASSAY OF LIPASE: CPT | Performed by: EMERGENCY MEDICINE

## 2021-02-25 PROCEDURE — 25010000002 HYDROMORPHONE PER 4 MG: Performed by: EMERGENCY MEDICINE

## 2021-02-25 PROCEDURE — G0378 HOSPITAL OBSERVATION PER HR: HCPCS

## 2021-02-25 PROCEDURE — 96374 THER/PROPH/DIAG INJ IV PUSH: CPT

## 2021-02-25 PROCEDURE — 25010000002 ONDANSETRON PER 1 MG: Performed by: EMERGENCY MEDICINE

## 2021-02-25 PROCEDURE — 99284 EMERGENCY DEPT VISIT MOD MDM: CPT

## 2021-02-25 PROCEDURE — 82330 ASSAY OF CALCIUM: CPT | Performed by: EMERGENCY MEDICINE

## 2021-02-25 PROCEDURE — 81001 URINALYSIS AUTO W/SCOPE: CPT | Performed by: EMERGENCY MEDICINE

## 2021-02-25 PROCEDURE — 85025 COMPLETE CBC W/AUTO DIFF WBC: CPT | Performed by: EMERGENCY MEDICINE

## 2021-02-25 PROCEDURE — 93005 ELECTROCARDIOGRAM TRACING: CPT | Performed by: EMERGENCY MEDICINE

## 2021-02-25 PROCEDURE — 96375 TX/PRO/DX INJ NEW DRUG ADDON: CPT

## 2021-02-25 PROCEDURE — 25010000002 DIPHENHYDRAMINE PER 50 MG: Performed by: EMERGENCY MEDICINE

## 2021-02-25 PROCEDURE — 25010000002 METOCLOPRAMIDE PER 10 MG: Performed by: EMERGENCY MEDICINE

## 2021-02-25 PROCEDURE — 80053 COMPREHEN METABOLIC PANEL: CPT | Performed by: EMERGENCY MEDICINE

## 2021-02-25 PROCEDURE — 81025 URINE PREGNANCY TEST: CPT | Performed by: EMERGENCY MEDICINE

## 2021-02-25 RX ORDER — ONDANSETRON 2 MG/ML
4 INJECTION INTRAMUSCULAR; INTRAVENOUS ONCE
Status: COMPLETED | OUTPATIENT
Start: 2021-02-25 | End: 2021-02-25

## 2021-02-25 RX ORDER — DIPHENHYDRAMINE HYDROCHLORIDE 50 MG/ML
25 INJECTION INTRAMUSCULAR; INTRAVENOUS ONCE
Status: COMPLETED | OUTPATIENT
Start: 2021-02-25 | End: 2021-02-25

## 2021-02-25 RX ORDER — METOCLOPRAMIDE 5 MG/1
TABLET ORAL
Qty: 40 TABLET | Refills: 0 | Status: SHIPPED | OUTPATIENT
Start: 2021-02-25 | End: 2021-03-15

## 2021-02-25 RX ORDER — SODIUM CHLORIDE 9 MG/ML
10 INJECTION INTRAVENOUS AS NEEDED
Status: DISCONTINUED | OUTPATIENT
Start: 2021-02-25 | End: 2021-02-26 | Stop reason: HOSPADM

## 2021-02-25 RX ORDER — HYDROMORPHONE HYDROCHLORIDE 1 MG/ML
0.5 INJECTION, SOLUTION INTRAMUSCULAR; INTRAVENOUS; SUBCUTANEOUS ONCE
Status: COMPLETED | OUTPATIENT
Start: 2021-02-25 | End: 2021-02-25

## 2021-02-25 RX ORDER — METOCLOPRAMIDE HYDROCHLORIDE 5 MG/ML
10 INJECTION INTRAMUSCULAR; INTRAVENOUS ONCE
Status: COMPLETED | OUTPATIENT
Start: 2021-02-25 | End: 2021-02-25

## 2021-02-25 RX ORDER — PROMETHAZINE HYDROCHLORIDE 25 MG/1
25 SUPPOSITORY RECTAL EVERY 6 HOURS PRN
Qty: 10 SUPPOSITORY | Refills: 0 | Status: SHIPPED | OUTPATIENT
Start: 2021-02-25 | End: 2021-03-15

## 2021-02-25 RX ORDER — PROMETHAZINE HYDROCHLORIDE 25 MG/1
25 TABLET ORAL EVERY 6 HOURS PRN
Qty: 15 TABLET | Refills: 0 | Status: SHIPPED | OUTPATIENT
Start: 2021-02-25 | End: 2021-06-23

## 2021-02-25 RX ORDER — OXYCODONE HYDROCHLORIDE AND ACETAMINOPHEN 5; 325 MG/1; MG/1
1 TABLET ORAL EVERY 6 HOURS PRN
Qty: 8 TABLET | Refills: 0 | Status: SHIPPED | OUTPATIENT
Start: 2021-02-25 | End: 2021-03-03

## 2021-02-25 RX ADMIN — OXYCODONE AND ACETAMINOPHEN 1 TABLET: 5; 325 TABLET ORAL at 02:02

## 2021-02-25 RX ADMIN — SODIUM CHLORIDE 1000 ML: 9 INJECTION, SOLUTION INTRAVENOUS at 21:24

## 2021-02-25 RX ADMIN — ONDANSETRON 4 MG: 2 INJECTION INTRAMUSCULAR; INTRAVENOUS at 21:25

## 2021-02-25 RX ADMIN — BUPROPION HYDROCHLORIDE 100 MG: 100 TABLET, EXTENDED RELEASE ORAL at 08:29

## 2021-02-25 RX ADMIN — PANTOPRAZOLE SODIUM 40 MG: 40 TABLET, DELAYED RELEASE ORAL at 06:37

## 2021-02-25 RX ADMIN — DIPHENHYDRAMINE HYDROCHLORIDE 25 MG: 50 INJECTION INTRAMUSCULAR; INTRAVENOUS at 21:56

## 2021-02-25 RX ADMIN — MIDODRINE HYDROCHLORIDE 10 MG: 5 TABLET ORAL at 06:37

## 2021-02-25 RX ADMIN — SODIUM CHLORIDE, POTASSIUM CHLORIDE, SODIUM LACTATE AND CALCIUM CHLORIDE 50 ML/HR: 600; 310; 30; 20 INJECTION, SOLUTION INTRAVENOUS at 02:00

## 2021-02-25 RX ADMIN — HYDROMORPHONE HYDROCHLORIDE 0.5 MG: 1 INJECTION, SOLUTION INTRAMUSCULAR; INTRAVENOUS; SUBCUTANEOUS at 21:25

## 2021-02-25 RX ADMIN — OXYCODONE AND ACETAMINOPHEN 1 TABLET: 5; 325 TABLET ORAL at 06:43

## 2021-02-25 RX ADMIN — ATENOLOL 25 MG: 25 TABLET ORAL at 08:29

## 2021-02-25 RX ADMIN — METOCLOPRAMIDE 10 MG: 5 INJECTION, SOLUTION INTRAMUSCULAR; INTRAVENOUS at 21:55

## 2021-02-25 NOTE — OUTREACH NOTE
Prep Survey      Responses   [facility] patient discharged from?  [facility]   Is LACE score < 7 ?  Yes   Emergency Room discharge w/ pulse ox?  No   Eligibility  [facility]   Date of Admission  02/24/21   Date of Discharge  02/25/21   Discharge Disposition  Home or Self Care   Discharge diagnosis  left thyroid lobectomy for nodule   Does the patient have one of the following disease processes/diagnoses(primary or secondary)?  General Surgery   Does the patient have Home health ordered?  No   Is there a DME ordered?  No   Prep survey completed?  Yes          Catalina Norwood RN

## 2021-02-26 ENCOUNTER — TRANSITIONAL CARE MANAGEMENT TELEPHONE ENCOUNTER (OUTPATIENT)
Dept: CALL CENTER | Facility: HOSPITAL | Age: 31
End: 2021-02-26

## 2021-02-26 RX ORDER — HYDROXYZINE HYDROCHLORIDE 25 MG/1
25 TABLET, FILM COATED ORAL 3 TIMES DAILY PRN
Qty: 60 TABLET | Refills: 0 | Status: SHIPPED | OUTPATIENT
Start: 2021-02-26 | End: 2021-09-07

## 2021-02-26 NOTE — OUTREACH NOTE
Call Center TCM Note      Responses   [facility] patient discharged from?  [place]   Does the patient have one of the following disease processes/diagnoses(primary or secondary)?  General Surgery   TCM attempt successful?  Yes   Call start time  1509   Call end time  1514   Discharge diagnosis  left thyroid lobectomy for nodule   Meds reviewed with patient/caregiver?  Yes   Is the patient having any side effects they believe may be caused by any medication additions or changes?  Yes   Side effects comments   itchy skin-routed to pcp office   Does the patient have all medications related to this admission filled (includes all antibiotics, pain medications, etc.)  Yes   Is the patient taking all medications as directed (includes completed medication regime)?  Yes   Does the patient have a follow up appointment scheduled with their surgeon?  Yes [3/9/21]   Has the patient kept scheduled appointments due by today?  N/A   Comments  There was no hospital d/c f/u appt within the next 2 weeks. Routed appt request to PCP office.    Psychosocial issues?  No   Did the patient receive a copy of their discharge instructions?  Yes   Nursing interventions  Reviewed instructions with patient   What is the patient's perception of their health status since discharge?  Same [pain is the same]   Nursing interventions  Nurse provided patient education   Is the patient /caregiver able to teach back basic post-op care?  Drive as instructed by MD in discharge instructions, Lifting as instructed by MD in discharge instructions   Is the patient/caregiver able to teach back signs and symptoms of incisional infection?  Fever   Is the patient/caregiver able to teach back steps to recovery at home?  Rest and rebuild strength, gradually increase activity   If the patient is a current smoker, are they able to teach back resources for cessation?  Not a smoker [Pt quit smoking in 10/2020 as her nephew was born ]   Is the patient/caregiver  able to teach back the hierarchy of who to call/visit for symptoms/problems? PCP, Specialist, Home health nurse, Urgent Care, ED, 911  Yes   TCM call completed?  Yes          Linda Worley RN    2/26/2021, 15:16 EST

## 2021-02-26 NOTE — PROGRESS NOTES
I sent a prescription for hydroxyzine that she can take every 8 hours as needed for itching but she also needs to contact the surgeon to report side effect to the medication to see if they will prescribe an alternative.

## 2021-03-01 LAB
CYTO UR: NORMAL
LAB AP CASE REPORT: NORMAL
LAB AP CLINICAL INFORMATION: NORMAL
Lab: NORMAL
PATH REPORT.FINAL DX SPEC: NORMAL
PATH REPORT.GROSS SPEC: NORMAL

## 2021-03-04 LAB
QT INTERVAL: 446 MS
QTC INTERVAL: 418 MS

## 2021-03-11 ENCOUNTER — OFFICE VISIT (OUTPATIENT)
Dept: ENDOCRINOLOGY | Facility: CLINIC | Age: 31
End: 2021-03-11

## 2021-03-11 ENCOUNTER — LAB (OUTPATIENT)
Dept: LAB | Facility: HOSPITAL | Age: 31
End: 2021-03-11

## 2021-03-11 VITALS
DIASTOLIC BLOOD PRESSURE: 64 MMHG | OXYGEN SATURATION: 99 % | HEIGHT: 61 IN | SYSTOLIC BLOOD PRESSURE: 102 MMHG | BODY MASS INDEX: 20.62 KG/M2 | TEMPERATURE: 98.2 F | HEART RATE: 87 BPM | WEIGHT: 109.2 LBS

## 2021-03-11 DIAGNOSIS — E04.2 NONTOXIC MULTINODULAR GOITER: Primary | ICD-10-CM

## 2021-03-11 DIAGNOSIS — E04.2 NONTOXIC MULTINODULAR GOITER: ICD-10-CM

## 2021-03-11 LAB
ANION GAP SERPL CALCULATED.3IONS-SCNC: 9.6 MMOL/L (ref 5–15)
BUN SERPL-MCNC: 12 MG/DL (ref 6–20)
BUN/CREAT SERPL: 16.9 (ref 7–25)
CALCIUM SPEC-SCNC: 8.7 MG/DL (ref 8.6–10.5)
CHLORIDE SERPL-SCNC: 103 MMOL/L (ref 98–107)
CO2 SERPL-SCNC: 25.4 MMOL/L (ref 22–29)
CREAT SERPL-MCNC: 0.71 MG/DL (ref 0.57–1)
GFR SERPL CREATININE-BSD FRML MDRD: 97 ML/MIN/1.73
GLUCOSE SERPL-MCNC: 82 MG/DL (ref 65–99)
POTASSIUM SERPL-SCNC: 4.1 MMOL/L (ref 3.5–5.2)
SODIUM SERPL-SCNC: 138 MMOL/L (ref 136–145)
T4 FREE SERPL-MCNC: 1.02 NG/DL (ref 0.93–1.7)
TSH SERPL DL<=0.05 MIU/L-ACNC: 2.02 UIU/ML (ref 0.27–4.2)

## 2021-03-11 PROCEDURE — 84439 ASSAY OF FREE THYROXINE: CPT

## 2021-03-11 PROCEDURE — 80048 BASIC METABOLIC PNL TOTAL CA: CPT

## 2021-03-11 PROCEDURE — 84443 ASSAY THYROID STIM HORMONE: CPT

## 2021-03-11 PROCEDURE — 99213 OFFICE O/P EST LOW 20 MIN: CPT | Performed by: PHYSICIAN ASSISTANT

## 2021-03-11 NOTE — PROGRESS NOTES
"     Office Note      Date: 2021  Patient Name: Radha Dixon  MRN: [id number]  : 1990    Chief Complaint   Patient presents with   • Thyroid Problem       History of Present Illness:   Radha Dixon is a 30 y.o. female who presents today for follow up after thyroid surgery.  She had a Lt lobectomy and ithmusectomy  2021  The pathology was benign and the surgery went well.  She did have some trouble with vomiting once she got home and had to go tot he ER, but she is doing well now.  She reports she feels much better since the surgery.  Her neck is still a little tender, but her voice is better and she is not having trouble swallowing.  She reports she feels her energy is better and her BP has not been dropping since the surgery.  She reports she has noted some heat intolerance, but otherwise feels well.       Subjective      Review of Systems:  Review of Systems   Constitutional: Negative.    Cardiovascular: Negative.    Gastrointestinal: Negative.    Endocrine: Positive for heat intolerance. Negative for cold intolerance, polydipsia, polyphagia and polyuria.   Neurological: Negative.        The following portions of the patient's history were reviewed and updated as appropriate: allergies, current medications, past family history, past medical history, past social history, past surgical history and problem list.    Objective     Vitals:    21 0929   BP: 102/64   Pulse: 87   Temp: 98.2 °F (36.8 °C)   TempSrc: Infrared   SpO2: 99%   Weight: 49.5 kg (109 lb 3.2 oz)   Height: 154.9 cm (61\")   PainSc: 0-No pain     Body mass index is 20.63 kg/m².    Physical Exam  Vitals reviewed.   Constitutional:       General: She is not in acute distress.     Appearance: Normal appearance.   Neck:      Comments: Healing thyroid surgery scar.    Neurological:      Mental Status: She is alert.           Assessment / Plan      Assessment & Plan:  1. Nontoxic multinodular goiter  Thyroid labs and BMP " pending will send note with results and plan.  Discussed thyroid hormone tx if needed.  Pt will follow up in 3 mos for monitoring.    - Basic Metabolic Panel; Future  - TSH; Future  - T4, Free; Future       Return in about 3 months (around 6/11/2021) for Recheck, sees Dr. Edgardo Palacios.    BRITNI Elam   03/11/2021

## 2021-03-15 ENCOUNTER — OFFICE VISIT (OUTPATIENT)
Dept: INTERNAL MEDICINE | Facility: CLINIC | Age: 31
End: 2021-03-15

## 2021-03-15 VITALS
WEIGHT: 109 LBS | BODY MASS INDEX: 20.58 KG/M2 | SYSTOLIC BLOOD PRESSURE: 110 MMHG | OXYGEN SATURATION: 98 % | HEIGHT: 61 IN | DIASTOLIC BLOOD PRESSURE: 72 MMHG | HEART RATE: 105 BPM | TEMPERATURE: 97.5 F

## 2021-03-15 DIAGNOSIS — J30.1 SEASONAL ALLERGIC RHINITIS DUE TO POLLEN: ICD-10-CM

## 2021-03-15 DIAGNOSIS — E89.0 S/P PARTIAL THYROIDECTOMY: ICD-10-CM

## 2021-03-15 DIAGNOSIS — E04.2 NONTOXIC MULTINODULAR GOITER: Primary | ICD-10-CM

## 2021-03-15 PROBLEM — R22.1 MASS OF RIGHT SIDE OF NECK: Status: RESOLVED | Noted: 2017-10-27 | Resolved: 2021-03-15

## 2021-03-15 PROBLEM — S60.00XA CONTUSION OF FINGER OF LEFT HAND: Status: RESOLVED | Noted: 2018-11-26 | Resolved: 2021-03-15

## 2021-03-15 PROBLEM — R63.4 WEIGHT LOSS: Status: RESOLVED | Noted: 2020-10-05 | Resolved: 2021-03-15

## 2021-03-15 PROCEDURE — 99213 OFFICE O/P EST LOW 20 MIN: CPT | Performed by: PHYSICIAN ASSISTANT

## 2021-03-15 RX ORDER — MONTELUKAST SODIUM 10 MG/1
10 TABLET ORAL NIGHTLY
Qty: 90 TABLET | Refills: 1 | Status: SHIPPED | OUTPATIENT
Start: 2021-03-15 | End: 2022-05-24 | Stop reason: HOSPADM

## 2021-03-15 NOTE — PROGRESS NOTES
"Chief Complaint  Follow-up (ER/surgery, thyroid)    Subjective          Radha Dixon presents to [facility]  History of Present Illness  On 2/24/2021 she underwent left thyroid lobectomy and isthmusectomy due to symptomatic nodule.  She was discharged home on postoperative day 1, however she returned to the ED later that day with persistent vomiting.  She was given IV fluids in the ED.  Zofran was ineffective but she did respond well to Reglan. She was able to discontinue pain medication after 2 days and reports that overall she feels she is doing well.  She has had some pain and tenderness in the neck when sneezing which has been bothersome for the last week or so as she has had increased trouble with seasonal allergies.  She has had no further nausea or vomiting since leaving the emergency department. She still feels a lump in her throat with little numbness on the left when swallowing which is not very bothersome.  She followed up with Endocrinology on 3/11/2021 at which time thyroid function and BMP were normal.  She will follow-up with endocrinology again in 3 months for continued monitoring.        Objective   Vital Signs:   /72   Pulse 105   Temp 97.5 °F (36.4 °C)   Ht 154.9 cm (60.98\")   Wt 49.4 kg (109 lb)   SpO2 98%   BMI 20.61 kg/m²     Physical Exam  Vitals and nursing note reviewed.   Constitutional:       General: She is not in acute distress.     Appearance: Normal appearance. She is well-developed and normal weight. She is not ill-appearing, toxic-appearing or diaphoretic.   HENT:      Head: Normocephalic and atraumatic.      Right Ear: External ear normal.      Left Ear: External ear normal.   Eyes:      General: No scleral icterus.     Extraocular Movements: Extraocular movements intact.      Conjunctiva/sclera: Conjunctivae normal.      Pupils: Pupils are equal, round, and reactive to light.   Neck:      Thyroid: Thyroid tenderness present.      " Trachea: Trachea and phonation normal.     Cardiovascular:      Rate and Rhythm: Normal rate and regular rhythm.      Heart sounds: Normal heart sounds. No murmur. No friction rub. No gallop.    Pulmonary:      Effort: Pulmonary effort is normal. No respiratory distress.      Breath sounds: Normal breath sounds. No wheezing, rhonchi or rales.   Chest:      Chest wall: No tenderness.   Abdominal:      General: Bowel sounds are normal.      Palpations: Abdomen is soft.      Tenderness: There is no abdominal tenderness. There is no right CVA tenderness or left CVA tenderness.   Musculoskeletal:         General: No tenderness or deformity. Normal range of motion.      Cervical back: Normal range of motion and neck supple. No erythema or rigidity. No pain with movement, spinous process tenderness or muscular tenderness.      Right lower leg: No edema.      Left lower leg: No edema.   Lymphadenopathy:      Cervical: No cervical adenopathy.      Right cervical: No superficial, deep or posterior cervical adenopathy.     Left cervical: No superficial, deep or posterior cervical adenopathy.   Skin:     General: Skin is warm and dry.      Capillary Refill: Capillary refill takes less than 2 seconds.      Coloration: Skin is not pale.      Findings: Laceration ( Surgical scar) present. No erythema or rash.   Neurological:      General: No focal deficit present.      Mental Status: She is alert and oriented to person, place, and time.      Cranial Nerves: No cranial nerve deficit.      Sensory: No sensory deficit.      Motor: No abnormal muscle tone.      Coordination: Coordination normal.      Gait: Gait normal.      Deep Tendon Reflexes: Reflexes normal.   Psychiatric:         Mood and Affect: Mood normal.         Behavior: Behavior normal.         Thought Content: Thought content normal.         Judgment: Judgment normal.        Result Review :   The following data was reviewed by: BRITNI Victoria on 03/15/2021:  TSH     TSH 10/20/20 3/11/21   TSH 1.780 2.020           BMP    BMP 10/14/20 2/25/21 3/11/21   BUN 11 10 12   Creatinine 0.76 0.88 0.71   Sodium 138 139 138   Potassium 4.4 3.7 4.1   Chloride 103 104 103   CO2 27.1 27.0 25.4   Calcium 9.1 8.7 8.7           Data reviewed: Recent hospitalization notes Operative note and endocrinology follow-up note          Assessment and Plan    Diagnoses and all orders for this visit:    1. Nontoxic multinodular goiter (Primary)  2. S/P partial thyroidectomy  Doing well postoperatively.  She was advised not to pull the suture protruding from the surgical scar but that if it has not dissolved within the next 1 to 2 weeks to send the patient advised request to Endocrinology for guidance.    3. Seasonal allergic rhinitis due to pollen  -     Begin: Montelukast (Singulair) 10 MG tablet; Take 1 tablet by mouth Every Night.  Dispense: 90 tablet; Refill: 1      Current outpatient and discharge medications have been reconciled for the patient.  Reviewed by: BRITNI Victoria      I spent 28 minutes caring for Radha on this date of service. This time includes time spent by me in the following activities:preparing for the visit, reviewing tests, obtaining and/or reviewing a separately obtained history, performing a medically appropriate examination and/or evaluation , counseling and educating the patient/family/caregiver, ordering medications, tests, or procedures and documenting information in the medical record  Follow Up   Return if symptoms worsen or fail to improve.  Patient was given instructions and counseling regarding her condition or for health maintenance advice. Please see specific information pulled into the AVS if appropriate.

## 2021-03-29 ENCOUNTER — IMMUNIZATION (OUTPATIENT)
Dept: VACCINE CLINIC | Facility: HOSPITAL | Age: 31
End: 2021-03-29

## 2021-03-29 PROCEDURE — 91300 HC SARSCOV02 VAC 30MCG/0.3ML IM: CPT | Performed by: INTERNAL MEDICINE

## 2021-03-29 PROCEDURE — 0001A: CPT | Performed by: INTERNAL MEDICINE

## 2021-03-31 ENCOUNTER — OFFICE VISIT (OUTPATIENT)
Dept: OBSTETRICS AND GYNECOLOGY | Facility: CLINIC | Age: 31
End: 2021-03-31

## 2021-03-31 VITALS
BODY MASS INDEX: 20.39 KG/M2 | HEIGHT: 61 IN | SYSTOLIC BLOOD PRESSURE: 128 MMHG | DIASTOLIC BLOOD PRESSURE: 78 MMHG | WEIGHT: 108 LBS

## 2021-03-31 DIAGNOSIS — N93.9 ABNORMAL UTERINE BLEEDING (AUB): Primary | ICD-10-CM

## 2021-03-31 PROCEDURE — 99213 OFFICE O/P EST LOW 20 MIN: CPT | Performed by: OBSTETRICS & GYNECOLOGY

## 2021-03-31 NOTE — PROGRESS NOTES
Subjective   Chief Complaint   Patient presents with   • Menstrual Problem     Irregular bleeding since having thyroid removed     Radha Dixon is a 31 y.o. year old .  No LMP recorded. (Menstrual status: Oral contraceptives).  She presents to be seen because of AUB with microgestin for the past 1 month.  Patient had a partial thyroidectomy last month for multinodular goiter goiters that were obstructing swallowing.  Pathology was benign.  Patient had only a 2-day interruption in her birth control pills when the thyroidectomy was done-this was late February.  This bleeding that has been at times very heavy with clots has been going on for couple of weeks..     OTHER COMPLAINTS:  Nothing else    The following portions of the patient's history were reviewed and updated as appropriate:  She  has a past medical history of Abnormal Pap smear of cervix, Acid reflux, Anxiety, Anxiety and depression, Arthritis, Asthma, Bipolar 1 disorder (CMS/HCC), Body piercing, Dysphagia, Fracture, Goiter, Heart rate fast, History of migraine, exercise stress test (2020), Inappropriate sinus tachycardia, Injury of neck, Insomnia, Migraine, Ovarian cyst, Palpitations, PONV (postoperative nausea and vomiting), Scoliosis, Tachycardia, Tattoo, Thyroid nodule, Toe fracture, and Wears glasses.  She does not have any pertinent problems on file.  She  has a past surgical history that includes Appendectomy (2016); Cholecystectomy; Hip surgery (Right); Cyst Removal; Winger tooth extraction; Esophagogastroduodenoscopy (N/A, 2017); Dilation and curettage of uterus; Shoulder arthroscopy (Right, 10/11/2018); Thyroid Biopsy; Esophagogastroduodenoscopy (N/A, 9/3/2020); Esophageal motility study (N/A, 11/10/2020); Colposcopy; Thyroidectomy (Left, 2021); and Thyroidectomy, partial (Left).  Her family history includes Arthritis in her maternal grandmother and mother; Cancer in her maternal grandmother and paternal grandfather;  Diabetes in her maternal grandmother and mother; Heart attack in her maternal grandmother and paternal grandmother; Heart disease in her sister; Migraines in her sister; No Known Problems in her father.  She  reports that she quit smoking about 5 months ago. Her smoking use included cigarettes. She has a 6.50 pack-year smoking history. She has never used smokeless tobacco. She reports that she does not drink alcohol and does not use drugs.  Current Outpatient Medications   Medication Sig Dispense Refill   • albuterol sulfate  (90 Base) MCG/ACT inhaler Inhale 2 puffs Every 4 (Four) Hours As Needed for Shortness of Air. 18 g 1   • atenolol (TENORMIN) 25 MG tablet Take 1 tablet by mouth Daily. 60 tablet 11   • buPROPion SR (Wellbutrin SR) 100 MG 12 hr tablet Take 1 tablet by mouth 2 (Two) Times a Day. 60 tablet 5   • hydrOXYzine (ATARAX) 25 MG tablet Take 1 tablet by mouth 3 (Three) Times a Day As Needed for Itching. 60 tablet 0   • midodrine (PROAMATINE) 10 MG tablet Take 1 tablet by mouth 2 (Two) Times a Day. 60 tablet 3   • montelukast (Singulair) 10 MG tablet Take 1 tablet by mouth Every Night. 90 tablet 1   • norethindrone-ethinyl estradiol-iron (MICROGESTIN FE1.5/30) 1.5-30 MG-MCG tablet Take 1 tablet by mouth Daily. 28 tablet 12   • ondansetron ODT (ZOFRAN-ODT) 4 MG disintegrating tablet Place 1 tablet on the tongue Every 8 (Eight) Hours As Needed for Nausea or Vomiting. 30 tablet 1   • pantoprazole (PROTONIX) 40 MG EC tablet 1 po daily in the am 30 minutes before breakfast 30 tablet 3   • promethazine (PHENERGAN) 25 MG tablet Take 1 tablet by mouth Every 6 (Six) Hours As Needed for Nausea or Vomiting. 15 tablet 0     No current facility-administered medications for this visit.     Current Outpatient Medications on File Prior to Visit   Medication Sig   • albuterol sulfate  (90 Base) MCG/ACT inhaler Inhale 2 puffs Every 4 (Four) Hours As Needed for Shortness of Air.   • atenolol (TENORMIN) 25 MG  "tablet Take 1 tablet by mouth Daily.   • buPROPion SR (Wellbutrin SR) 100 MG 12 hr tablet Take 1 tablet by mouth 2 (Two) Times a Day.   • hydrOXYzine (ATARAX) 25 MG tablet Take 1 tablet by mouth 3 (Three) Times a Day As Needed for Itching.   • midodrine (PROAMATINE) 10 MG tablet Take 1 tablet by mouth 2 (Two) Times a Day.   • montelukast (Singulair) 10 MG tablet Take 1 tablet by mouth Every Night.   • norethindrone-ethinyl estradiol-iron (MICROGESTIN FE1.5) 1.5-30 MG-MCG tablet Take 1 tablet by mouth Daily.   • ondansetron ODT (ZOFRAN-ODT) 4 MG disintegrating tablet Place 1 tablet on the tongue Every 8 (Eight) Hours As Needed for Nausea or Vomiting.   • pantoprazole (PROTONIX) 40 MG EC tablet 1 po daily in the am 30 minutes before breakfast   • promethazine (PHENERGAN) 25 MG tablet Take 1 tablet by mouth Every 6 (Six) Hours As Needed for Nausea or Vomiting.     No current facility-administered medications on file prior to visit.     She is allergic to onion, cabbage, msg [monosodium glutamate], and sulfa antibiotics.    Social History    Tobacco Use      Smoking status: Former Smoker        Packs/day: 0.50        Years: 13.00        Pack years: 6.5        Types: Cigarettes        Quit date: 10/17/2020        Years since quittin.4      Smokeless tobacco: Never Used    Review of Systems  Consitutional POS: nothing reported    NEG: anorexia or night sweats   Gastointestinal POS: nothing reported    NEG: bloating, change in bowel habits, melena or reflux symptoms   Genitourinary POS: nothing reported    NEG: dysuria or hematuria   Integument POS: nothing reported    NEG: moles that are changing in size, shape, color or rashes   Breast POS: nothing reported    NEG: persistent breast lump, skin dimpling or nipple discharge         Pertinent items are noted in HPI.          Objective   /78   Ht 154.9 cm (61\")   Wt 49 kg (108 lb)   BMI 20.41 kg/m²     General:  well developed; well nourished  no acute " distress   Skin:  Not performed.   Thyroid: not examined   Lungs:  breathing is unlabored   Heart:  Not performed.   Breasts:  Not performed.   Abdomen: soft, non-tender; no masses  no umbilical or inguinal hernias are present  no hepato-splenomegaly   Pelvis: Not performed.     Psychiatric: Alert and oriented ×3, mood and affect appropriate  HEENT: Atraumatic, normocephalic, normal scleral icterus  Extremities: 2+ pulses bilaterally, no edema      Lab Review   No data reviewed    Imaging   No data reviewed        Assessment   1. I think is expected a UB status post thyroidectomy.  Encourage patient continue taking her pills give it through mid May and if symptoms still persist then return to clinic for ultrasound.     Plan   1. As above  2. Small retained suture removed from prior thyroidectomy.    No orders of the defined types were placed in this encounter.         This note was electronically signed.      March 31, 2021

## 2021-04-14 ENCOUNTER — HOSPITAL ENCOUNTER (EMERGENCY)
Facility: HOSPITAL | Age: 31
Discharge: HOME | End: 2021-04-14
Attending: STUDENT IN AN ORGANIZED HEALTH CARE EDUCATION/TRAINING PROGRAM | Admitting: STUDENT IN AN ORGANIZED HEALTH CARE EDUCATION/TRAINING PROGRAM

## 2021-04-14 ENCOUNTER — APPOINTMENT (OUTPATIENT)
Dept: CT IMAGING | Facility: HOSPITAL | Age: 31
End: 2021-04-14

## 2021-04-14 VITALS
DIASTOLIC BLOOD PRESSURE: 71 MMHG | OXYGEN SATURATION: 97 % | HEIGHT: 62 IN | TEMPERATURE: 98.9 F | RESPIRATION RATE: 16 BRPM | SYSTOLIC BLOOD PRESSURE: 112 MMHG | BODY MASS INDEX: 20.2 KG/M2 | HEART RATE: 75 BPM | WEIGHT: 109.8 LBS

## 2021-04-14 DIAGNOSIS — N30.00 ACUTE CYSTITIS WITHOUT HEMATURIA: Primary | ICD-10-CM

## 2021-04-14 DIAGNOSIS — N83.201 CYST OF RIGHT OVARY: ICD-10-CM

## 2021-04-14 LAB
ALBUMIN SERPL-MCNC: 4.8 G/DL (ref 3.5–5.2)
ALBUMIN/GLOB SERPL: 1.7 G/DL
ALP SERPL-CCNC: 52 U/L (ref 39–117)
ALT SERPL W P-5'-P-CCNC: 11 U/L (ref 1–33)
AMORPH URATE CRY URNS QL MICRO: ABNORMAL /HPF
ANION GAP SERPL CALCULATED.3IONS-SCNC: 9 MMOL/L (ref 5–15)
AST SERPL-CCNC: 21 U/L (ref 1–32)
B-HCG UR QL: NEGATIVE
BACTERIA UR QL AUTO: ABNORMAL /HPF
BASOPHILS # BLD AUTO: 0.05 10*3/MM3 (ref 0–0.2)
BASOPHILS NFR BLD AUTO: 0.5 % (ref 0–1.5)
BILIRUB SERPL-MCNC: 1.1 MG/DL (ref 0–1.2)
BILIRUB UR QL STRIP: NEGATIVE
BUN SERPL-MCNC: 10 MG/DL (ref 6–20)
BUN/CREAT SERPL: 14.3 (ref 7–25)
CALCIUM SPEC-SCNC: 9.3 MG/DL (ref 8.6–10.5)
CHLORIDE SERPL-SCNC: 103 MMOL/L (ref 98–107)
CLARITY UR: ABNORMAL
CO2 SERPL-SCNC: 28 MMOL/L (ref 22–29)
COLOR UR: YELLOW
CREAT SERPL-MCNC: 0.7 MG/DL (ref 0.57–1)
DEPRECATED RDW RBC AUTO: 39.2 FL (ref 37–54)
EOSINOPHIL # BLD AUTO: 0.21 10*3/MM3 (ref 0–0.4)
EOSINOPHIL NFR BLD AUTO: 2.2 % (ref 0.3–6.2)
ERYTHROCYTE [DISTWIDTH] IN BLOOD BY AUTOMATED COUNT: 12 % (ref 12.3–15.4)
GFR SERPL CREATININE-BSD FRML MDRD: 98 ML/MIN/1.73
GLOBULIN UR ELPH-MCNC: 2.9 GM/DL
GLUCOSE SERPL-MCNC: 142 MG/DL (ref 65–99)
GLUCOSE UR STRIP-MCNC: NEGATIVE MG/DL
HCT VFR BLD AUTO: 41.8 % (ref 34–46.6)
HGB BLD-MCNC: 13.7 G/DL (ref 12–15.9)
HGB UR QL STRIP.AUTO: NEGATIVE
HOLD SPECIMEN: NORMAL
HOLD SPECIMEN: NORMAL
HYALINE CASTS UR QL AUTO: ABNORMAL /LPF
IMM GRANULOCYTES # BLD AUTO: 0.02 10*3/MM3 (ref 0–0.05)
IMM GRANULOCYTES NFR BLD AUTO: 0.2 % (ref 0–0.5)
KETONES UR QL STRIP: NEGATIVE
LEUKOCYTE ESTERASE UR QL STRIP.AUTO: ABNORMAL
LIPASE SERPL-CCNC: 20 U/L (ref 13–60)
LYMPHOCYTES # BLD AUTO: 2.12 10*3/MM3 (ref 0.7–3.1)
LYMPHOCYTES NFR BLD AUTO: 21.9 % (ref 19.6–45.3)
MCH RBC QN AUTO: 29.3 PG (ref 26.6–33)
MCHC RBC AUTO-ENTMCNC: 32.8 G/DL (ref 31.5–35.7)
MCV RBC AUTO: 89.3 FL (ref 79–97)
MONOCYTES # BLD AUTO: 0.4 10*3/MM3 (ref 0.1–0.9)
MONOCYTES NFR BLD AUTO: 4.1 % (ref 5–12)
NEUTROPHILS NFR BLD AUTO: 6.9 10*3/MM3 (ref 1.7–7)
NEUTROPHILS NFR BLD AUTO: 71.1 % (ref 42.7–76)
NITRITE UR QL STRIP: NEGATIVE
NRBC BLD AUTO-RTO: 0 /100 WBC (ref 0–0.2)
PH UR STRIP.AUTO: 8.5 [PH] (ref 5–8)
PLATELET # BLD AUTO: 226 10*3/MM3 (ref 140–450)
PMV BLD AUTO: 11.8 FL (ref 6–12)
POTASSIUM SERPL-SCNC: 4.2 MMOL/L (ref 3.5–5.2)
PROT SERPL-MCNC: 7.7 G/DL (ref 6–8.5)
PROT UR QL STRIP: ABNORMAL
RBC # BLD AUTO: 4.68 10*6/MM3 (ref 3.77–5.28)
RBC # UR: ABNORMAL /HPF
REF LAB TEST METHOD: ABNORMAL
SODIUM SERPL-SCNC: 140 MMOL/L (ref 136–145)
SP GR UR STRIP: 1.02 (ref 1–1.03)
SQUAMOUS #/AREA URNS HPF: ABNORMAL /HPF
UROBILINOGEN UR QL STRIP: ABNORMAL
WBC # BLD AUTO: 9.7 10*3/MM3 (ref 3.4–10.8)
WBC UR QL AUTO: ABNORMAL /HPF
WHOLE BLOOD HOLD SPECIMEN: NORMAL
WHOLE BLOOD HOLD SPECIMEN: NORMAL

## 2021-04-14 PROCEDURE — 96374 THER/PROPH/DIAG INJ IV PUSH: CPT

## 2021-04-14 PROCEDURE — 80053 COMPREHEN METABOLIC PANEL: CPT | Performed by: STUDENT IN AN ORGANIZED HEALTH CARE EDUCATION/TRAINING PROGRAM

## 2021-04-14 PROCEDURE — 96361 HYDRATE IV INFUSION ADD-ON: CPT

## 2021-04-14 PROCEDURE — 25010000002 IOPAMIDOL 61 % SOLUTION: Performed by: STUDENT IN AN ORGANIZED HEALTH CARE EDUCATION/TRAINING PROGRAM

## 2021-04-14 PROCEDURE — 74177 CT ABD & PELVIS W/CONTRAST: CPT

## 2021-04-14 PROCEDURE — 81001 URINALYSIS AUTO W/SCOPE: CPT | Performed by: PHYSICIAN ASSISTANT

## 2021-04-14 PROCEDURE — 81025 URINE PREGNANCY TEST: CPT | Performed by: PHYSICIAN ASSISTANT

## 2021-04-14 PROCEDURE — 85025 COMPLETE CBC W/AUTO DIFF WBC: CPT | Performed by: STUDENT IN AN ORGANIZED HEALTH CARE EDUCATION/TRAINING PROGRAM

## 2021-04-14 PROCEDURE — 99283 EMERGENCY DEPT VISIT LOW MDM: CPT

## 2021-04-14 PROCEDURE — 83690 ASSAY OF LIPASE: CPT | Performed by: STUDENT IN AN ORGANIZED HEALTH CARE EDUCATION/TRAINING PROGRAM

## 2021-04-14 PROCEDURE — 25010000002 ONDANSETRON PER 1 MG: Performed by: PHYSICIAN ASSISTANT

## 2021-04-14 RX ORDER — ONDANSETRON 2 MG/ML
4 INJECTION INTRAMUSCULAR; INTRAVENOUS ONCE
Status: COMPLETED | OUTPATIENT
Start: 2021-04-14 | End: 2021-04-14

## 2021-04-14 RX ORDER — SODIUM CHLORIDE 0.9 % (FLUSH) 0.9 %
10 SYRINGE (ML) INJECTION AS NEEDED
Status: DISCONTINUED | OUTPATIENT
Start: 2021-04-14 | End: 2021-04-14 | Stop reason: HOSPADM

## 2021-04-14 RX ORDER — CEFUROXIME AXETIL 500 MG/1
500 TABLET ORAL 2 TIMES DAILY
Qty: 10 TABLET | Refills: 0 | Status: SHIPPED | OUTPATIENT
Start: 2021-04-14 | End: 2021-04-19

## 2021-04-14 RX ADMIN — ONDANSETRON 4 MG: 2 INJECTION INTRAMUSCULAR; INTRAVENOUS at 11:32

## 2021-04-14 RX ADMIN — SODIUM CHLORIDE 1000 ML: 9 INJECTION, SOLUTION INTRAVENOUS at 11:39

## 2021-04-14 RX ADMIN — IOPAMIDOL 100 ML: 612 INJECTION, SOLUTION INTRAVENOUS at 12:21

## 2021-04-14 NOTE — ED PROVIDER NOTES
"Subjective   31-year-old female presents with right flank pain, she states she is been hurting for 1 week, tender right back area and radiates down into her lower abdomen, it sharp in nature.  No urinary symptoms, she states she has had some nausea no vomiting.  The pain is intermittent but when it comes is very sharp and painful.  She is never had pain like before, but she states family members have kidney stones and they think this is similar.      History provided by:  Patient   used: No        Review of Systems   Gastrointestinal: Positive for nausea.        Right flank pain   All other systems reviewed and are negative.      Past Medical History:   Diagnosis Date   • Abnormal Pap smear of cervix    • Acid reflux    • Anxiety    • Anxiety and depression    • Arthritis    • Asthma    • Bipolar 1 disorder (CMS/HCC)    • Body piercing     EARS, NOSE, BELLY BUTTON   • Dysphagia     REPORTS SOMETIMES FEELS LIKE \"FOOD GETS STUCK\"   • Fracture     HISTORY OF RIGHT WRIST AS A CHILD   • Goiter     x3   • Heart rate fast     states usually 120-130 (states on med for this)   • History of migraine    • Hx of exercise stress test 04/01/2020   • Inappropriate sinus tachycardia     per Dr. Dong's note on 8/12/20.     • Injury of neck    • Insomnia    • Migraine    • Ovarian cyst    • Palpitations    • PONV (postoperative nausea and vomiting)    • Scoliosis    • Tachycardia    • Tattoo    • Thyroid nodule    • Toe fracture     right great toe   • Wears glasses        Allergies   Allergen Reactions   • Onion Itching     REPORTS CAUSES MIGRAINES and MAKES THROAT ITCHY     • Cabbage Headache   • Msg [Monosodium Glutamate] Headache   • Sulfa Antibiotics Hives       Past Surgical History:   Procedure Laterality Date   • APPENDECTOMY  2016   • CHOLECYSTECTOMY     • COLPOSCOPY     • CYST REMOVAL      OVARIAN CYST   • DILATATION AND CURETTAGE     • ENDOSCOPY N/A 12/1/2017    Procedure: ESOPHAGOGASTRODUODENOSCOPY " WITH COLD FORCEP BIOPSY;  Surgeon: Danilo Shabazz MD;  Location:  [facility];  Service:    • ENDOSCOPY N/A 9/3/2020    Procedure: ESOPHAGOGASTRODUODENOSCOPY WITH BIOPSIES AND DILATATION;  Surgeon: Bhupinder Montenegro MD;  Location:  [facility];  Service: Gastroenterology;  Laterality: N/A;   • ESOPHAGEAL MOTILITY STUDY N/A 11/10/2020    Procedure: ESOPHAGEAL MANOMETRY;  Surgeon: Chivo Mcmahan MD;  Location:  [facility];  Service: Gastroenterology;  Laterality: N/A;   • HIP SURGERY Right     RIGHT HIP, REPORTS HAD BONES SCRAPED   • SHOULDER ARTHROSCOPY Right 10/11/2018    Procedure: Right shoulder diagnostic arthroscopy, limited rotator cuff debridement;  Surgeon: Tino Uribe MD;  Location:  [facility];  Service: Orthopedics   • THYROID BIOPSY      goiter   • THYROIDECTOMY Left 2021    Procedure: THYROID LOBECTOMY LEFT;  Surgeon: Yves Contreras MD;  Location:  [facility];  Service: General;  Laterality: Left;   • THYROIDECTOMY, PARTIAL Left    • WISDOM TOOTH EXTRACTION         Family History   Problem Relation Age of Onset   • Diabetes Mother    • Arthritis Mother    • No Known Problems Father    • Cancer Maternal Grandmother    • Diabetes Maternal Grandmother    • Heart attack Maternal Grandmother    • Arthritis Maternal Grandmother    • Heart attack Paternal Grandmother    • Heart disease Sister    • Migraines Sister    • Cancer Paternal Grandfather         Lung   • Colon cancer Neg Hx    • Cirrhosis Neg Hx    • Liver cancer Neg Hx    • Liver disease Neg Hx        Social History     Socioeconomic History   • Marital status: Single     Spouse name: Not on file   • Number of children: 2   • Years of education: Not on file   • Highest education level: Not on file   Tobacco Use   • Smoking status: Former Smoker     Packs/day: 0.50     Years: 13.00     Pack years: 6.50     Types: Cigarettes     Quit date: 10/17/2020     Years since quittin.4   • Smokeless tobacco: Never Used    Vaping Use   • Vaping Use: Never used   Substance and Sexual Activity   • Alcohol use: No   • Drug use: No   • Sexual activity: Defer           Objective   Physical Exam  Vitals and nursing note reviewed.   Constitutional:       Appearance: She is well-developed.   HENT:      Head: Normocephalic.   Cardiovascular:      Rate and Rhythm: Normal rate and regular rhythm.   Pulmonary:      Effort: Pulmonary effort is normal.      Breath sounds: Normal breath sounds.   Abdominal:       Musculoskeletal:         General: Normal range of motion.      Cervical back: Normal range of motion and neck supple.   Skin:     General: Skin is warm and dry.   Neurological:      Mental Status: She is alert and oriented to person, place, and time.      Deep Tendon Reflexes: Reflexes are normal and symmetric.         Procedures           ED Course                                           MDM  Number of Diagnoses or Management Options  Acute cystitis without hematuria: new and requires workup  Cyst of right ovary: new and requires workup  Diagnosis management comments: 31-year-old female with right flank pain, pain for 1 week.  Differential could include pyelonephritis however less likely no fever chills, possibly kidney stone, versus ascending urinary tract infection.       Amount and/or Complexity of Data Reviewed  Clinical lab tests: reviewed  Tests in the radiology section of CPT®: reviewed    Risk of Complications, Morbidity, and/or Mortality  Presenting problems: minimal  Diagnostic procedures: minimal  Management options: minimal    Patient Progress  Patient progress: stable      Final diagnoses:   Acute cystitis without hematuria   Cyst of right ovary       ED Disposition  ED Disposition     ED Disposition Condition Comment    Discharge Stable           Augustina Falk PA  [address] [place] [id number]  [phone]    Schedule an appointment as soon as possible for a visit       [facility]  Emergency Department  [address] [facility] [address] [phone]    If symptoms worsen         Medication List      New Prescriptions    cefuroxime 500 MG tablet  Commonly known as: CEFTIN  Take 1 tablet by mouth 2 (Two) Times a Day for 5 days.           Where to Get Your Medications      These medications were sent to [id number] - [place], [place] - [address] - [phone]  - [phone] FX  [address] [facility] [address]    Phone: [phone]   · cefuroxime 500 MG tablet          Brent Peterson Jr., PA-C  04/14/21 1257

## 2021-04-20 ENCOUNTER — APPOINTMENT (OUTPATIENT)
Dept: VACCINE CLINIC | Facility: HOSPITAL | Age: 31
End: 2021-04-20

## 2021-04-27 ENCOUNTER — IMMUNIZATION (OUTPATIENT)
Dept: VACCINE CLINIC | Facility: HOSPITAL | Age: 31
End: 2021-04-27

## 2021-04-27 PROCEDURE — 91300 HC SARSCOV02 VAC 30MCG/0.3ML IM: CPT | Performed by: INTERNAL MEDICINE

## 2021-04-27 PROCEDURE — 0002A: CPT | Performed by: INTERNAL MEDICINE

## 2021-05-04 DIAGNOSIS — E89.0 S/P PARTIAL THYROIDECTOMY: ICD-10-CM

## 2021-05-04 DIAGNOSIS — R53.82 CHRONIC FATIGUE: Primary | ICD-10-CM

## 2021-05-04 DIAGNOSIS — Z11.59 NEED FOR HEPATITIS C SCREENING TEST: ICD-10-CM

## 2021-05-05 PROBLEM — E53.8 B12 DEFICIENCY: Status: ACTIVE | Noted: 2021-05-05

## 2021-05-05 LAB
25(OH)D3+25(OH)D2 SERPL-MCNC: 31.4 NG/ML (ref 30–100)
ALBUMIN SERPL-MCNC: 4.5 G/DL (ref 3.5–5.2)
ALBUMIN/GLOB SERPL: 2 G/DL
ALP SERPL-CCNC: 41 U/L (ref 39–117)
ALT SERPL-CCNC: 9 U/L (ref 1–33)
AST SERPL-CCNC: 12 U/L (ref 1–32)
BASOPHILS # BLD AUTO: 0.06 10*3/MM3 (ref 0–0.2)
BASOPHILS NFR BLD AUTO: 0.8 % (ref 0–1.5)
BILIRUB SERPL-MCNC: 1 MG/DL (ref 0–1.2)
BUN SERPL-MCNC: 11 MG/DL (ref 6–20)
BUN/CREAT SERPL: 15.9 (ref 7–25)
CALCIUM SERPL-MCNC: 9.2 MG/DL (ref 8.6–10.5)
CHLORIDE SERPL-SCNC: 102 MMOL/L (ref 98–107)
CO2 SERPL-SCNC: 25.6 MMOL/L (ref 22–29)
CREAT SERPL-MCNC: 0.69 MG/DL (ref 0.57–1)
EOSINOPHIL # BLD AUTO: 0.31 10*3/MM3 (ref 0–0.4)
EOSINOPHIL NFR BLD AUTO: 3.9 % (ref 0.3–6.2)
ERYTHROCYTE [DISTWIDTH] IN BLOOD BY AUTOMATED COUNT: 12.2 % (ref 12.3–15.4)
FERRITIN SERPL-MCNC: 51.9 NG/ML (ref 13–150)
GLOBULIN SER CALC-MCNC: 2.3 GM/DL
GLUCOSE SERPL-MCNC: 124 MG/DL (ref 65–99)
HCT VFR BLD AUTO: 37.3 % (ref 34–46.6)
HCV AB S/CO SERPL IA: <0.1 S/CO RATIO (ref 0–0.9)
HGB BLD-MCNC: 12.2 G/DL (ref 12–15.9)
IMM GRANULOCYTES # BLD AUTO: 0.02 10*3/MM3 (ref 0–0.05)
IMM GRANULOCYTES NFR BLD AUTO: 0.3 % (ref 0–0.5)
LYMPHOCYTES # BLD AUTO: 2.82 10*3/MM3 (ref 0.7–3.1)
LYMPHOCYTES NFR BLD AUTO: 35.8 % (ref 19.6–45.3)
MCH RBC QN AUTO: 29.5 PG (ref 26.6–33)
MCHC RBC AUTO-ENTMCNC: 32.7 G/DL (ref 31.5–35.7)
MCV RBC AUTO: 90.3 FL (ref 79–97)
MONOCYTES # BLD AUTO: 0.35 10*3/MM3 (ref 0.1–0.9)
MONOCYTES NFR BLD AUTO: 4.4 % (ref 5–12)
NEUTROPHILS # BLD AUTO: 4.32 10*3/MM3 (ref 1.7–7)
NEUTROPHILS NFR BLD AUTO: 54.8 % (ref 42.7–76)
NRBC BLD AUTO-RTO: 0 /100 WBC (ref 0–0.2)
PLATELET # BLD AUTO: 253 10*3/MM3 (ref 140–450)
POTASSIUM SERPL-SCNC: 4.4 MMOL/L (ref 3.5–5.2)
PROT SERPL-MCNC: 6.8 G/DL (ref 6–8.5)
RBC # BLD AUTO: 4.13 10*6/MM3 (ref 3.77–5.28)
SODIUM SERPL-SCNC: 137 MMOL/L (ref 136–145)
T4 FREE SERPL-MCNC: 1.15 NG/DL (ref 0.93–1.7)
TSH SERPL DL<=0.005 MIU/L-ACNC: 1.23 UIU/ML (ref 0.27–4.2)
VIT B12 SERPL-MCNC: 284 PG/ML (ref 211–946)
WBC # BLD AUTO: 7.88 10*3/MM3 (ref 3.4–10.8)

## 2021-06-09 ENCOUNTER — TELEPHONE (OUTPATIENT)
Dept: INTERNAL MEDICINE | Facility: CLINIC | Age: 31
End: 2021-06-09

## 2021-06-09 NOTE — TELEPHONE ENCOUNTER
Caller: Radha Dixon    Relationship to patient: Self    Best call back number: [phone]    Patient is needing: PATIENT WAS TOLD BY DR. WALKER TO GET SCHEDULED FOR B-12 SHOT.  PATIENT SCHEDULED TO SEE DR. WALKER 6/10/21.

## 2021-06-10 ENCOUNTER — OFFICE VISIT (OUTPATIENT)
Dept: INTERNAL MEDICINE | Facility: CLINIC | Age: 31
End: 2021-06-10

## 2021-06-10 VITALS
WEIGHT: 110 LBS | BODY MASS INDEX: 20.77 KG/M2 | DIASTOLIC BLOOD PRESSURE: 70 MMHG | HEIGHT: 61 IN | OXYGEN SATURATION: 99 % | HEART RATE: 90 BPM | TEMPERATURE: 97.7 F | SYSTOLIC BLOOD PRESSURE: 110 MMHG

## 2021-06-10 DIAGNOSIS — H65.91 OME (OTITIS MEDIA WITH EFFUSION), RIGHT: Primary | ICD-10-CM

## 2021-06-10 DIAGNOSIS — N92.6 IRREGULAR MENSES: ICD-10-CM

## 2021-06-10 LAB
B-HCG UR QL: NEGATIVE
INTERNAL NEGATIVE CONTROL: NORMAL
INTERNAL POSITIVE CONTROL: NORMAL
Lab: NORMAL

## 2021-06-10 PROCEDURE — 99213 OFFICE O/P EST LOW 20 MIN: CPT | Performed by: PHYSICIAN ASSISTANT

## 2021-06-10 PROCEDURE — 81025 URINE PREGNANCY TEST: CPT | Performed by: PHYSICIAN ASSISTANT

## 2021-06-10 RX ORDER — FLUTICASONE PROPIONATE 50 MCG
2 SPRAY, SUSPENSION (ML) NASAL DAILY
Qty: 18.2 ML | Refills: 11 | Status: SHIPPED | OUTPATIENT
Start: 2021-06-10 | End: 2022-11-04 | Stop reason: HOSPADM

## 2021-06-10 RX ORDER — CETIRIZINE HYDROCHLORIDE 10 MG/1
10 TABLET ORAL NIGHTLY
Qty: 30 TABLET | Refills: 11 | Status: SHIPPED | OUTPATIENT
Start: 2021-06-10 | End: 2021-09-07

## 2021-06-10 RX ORDER — MEDROXYPROGESTERONE ACETATE 10 MG/1
10 TABLET ORAL DAILY
Qty: 10 TABLET | Refills: 0 | Status: SHIPPED | OUTPATIENT
Start: 2021-06-10 | End: 2021-06-20

## 2021-06-10 NOTE — PROGRESS NOTES
Follow Up Office Visit      Patient Name: Radha Dixon  : 1990   MRN: [id number]     Chief Complaint:    Chief Complaint   Patient presents with   • Earache     R ear pain, unstable feeling       History of Present Illness: Radha Dixon is a 31 y.o. female who is here today with concern of around 3 days of right ear pain. The ear pain is intermittent. No fever, chills, ear discharge, bleeding, sore throat, rhinorrhea or cough. She has experienced some postnasal drip. She sometimes feels a little wobbly when she first stands up which is new in the last few days since onset of ear pain. She is taking Singulair nightly.    She changed to a new OCP around 6 months ago but in the last 2 months she has had frequent vaginal bleeding.       Subjective      I have reviewed and the following portions of the patient's history were updated as appropriate: past family history, past medical history, past social history, past surgical history and problem list.      Current Outpatient Medications:   •  albuterol sulfate  (90 Base) MCG/ACT inhaler, Inhale 2 puffs Every 4 (Four) Hours As Needed for Shortness of Air., Disp: 18 g, Rfl: 1  •  atenolol (TENORMIN) 25 MG tablet, Take 1 tablet by mouth Daily., Disp: 60 tablet, Rfl: 11  •  buPROPion SR (Wellbutrin SR) 100 MG 12 hr tablet, Take 1 tablet by mouth 2 (Two) Times a Day., Disp: 60 tablet, Rfl: 5  •  hydrOXYzine (ATARAX) 25 MG tablet, Take 1 tablet by mouth 3 (Three) Times a Day As Needed for Itching., Disp: 60 tablet, Rfl: 0  •  midodrine (PROAMATINE) 10 MG tablet, Take 1 tablet by mouth 2 (Two) Times a Day., Disp: 60 tablet, Rfl: 3  •  montelukast (Singulair) 10 MG tablet, Take 1 tablet by mouth Every Night., Disp: 90 tablet, Rfl: 1  •  norethindrone-ethinyl estradiol-iron (MICROGESTIN FE1.5/30) 1.5-30 MG-MCG tablet, Take 1 tablet by mouth Daily., Disp: 28 tablet, Rfl: 12  •  ondansetron ODT (ZOFRAN-ODT) 4 MG disintegrating tablet, Place 1 tablet on  "the tongue Every 8 (Eight) Hours As Needed for Nausea or Vomiting., Disp: 30 tablet, Rfl: 1  •  pantoprazole (PROTONIX) 40 MG EC tablet, 1 po daily in the am 30 minutes before breakfast, Disp: 30 tablet, Rfl: 3  •  promethazine (PHENERGAN) 25 MG tablet, Take 1 tablet by mouth Every 6 (Six) Hours As Needed for Nausea or Vomiting., Disp: 15 tablet, Rfl: 0  •  cetirizine (zyrTEC) 10 MG tablet, Take 1 tablet by mouth Every Night., Disp: 30 tablet, Rfl: 11  •  fluticasone (Flonase) 50 MCG/ACT nasal spray, 2 sprays into the nostril(s) as directed by provider Daily., Disp: 18.2 mL, Rfl: 11  •  medroxyPROGESTERone (Provera) 10 MG tablet, Take 1 tablet by mouth Daily for 10 days., Disp: 10 tablet, Rfl: 0    Allergies   Allergen Reactions   • Onion Itching     REPORTS CAUSES MIGRAINES and MAKES THROAT ITCHY     • Cabbage Headache   • Msg [Monosodium Glutamate] Headache   • Sulfa Antibiotics Hives       Objective     Physical Exam:  Vital Signs:   Vitals:    06/10/21 0932   BP: 110/70   Pulse: 90   Temp: 97.7 °F (36.5 °C)   SpO2: 99%   Weight: 49.9 kg (110 lb)   Height: 154.9 cm (60.98\")     Body mass index is 20.8 kg/m².    Physical Exam  Vitals and nursing note reviewed.   Constitutional:       General: She is not in acute distress.     Appearance: She is well-developed and normal weight. She is not ill-appearing, toxic-appearing or diaphoretic.   HENT:      Head: Normocephalic and atraumatic.      Right Ear: Ear canal and external ear normal. There is no impacted cerumen.      Left Ear: Ear canal and external ear normal. There is no impacted cerumen.      Ears:      Comments: Right TM effusion.      Nose: No congestion or rhinorrhea.      Comments: Mildly erythematous and boggy nasal turbinates bilaterally.      Mouth/Throat:      Mouth: Mucous membranes are moist.      Pharynx: No posterior oropharyngeal erythema.   Eyes:      General: No scleral icterus.     Extraocular Movements: Extraocular movements intact.      " Conjunctiva/sclera: Conjunctivae normal.      Pupils: Pupils are equal, round, and reactive to light.   Cardiovascular:      Rate and Rhythm: Normal rate and regular rhythm.      Heart sounds: Normal heart sounds. No murmur heard.   No friction rub. No gallop.    Pulmonary:      Effort: Pulmonary effort is normal. No respiratory distress.      Breath sounds: Normal breath sounds. No wheezing, rhonchi or rales.   Chest:      Chest wall: No tenderness.   Abdominal:      General: Bowel sounds are normal.      Palpations: Abdomen is soft.      Tenderness: There is no abdominal tenderness. There is no right CVA tenderness or left CVA tenderness.   Musculoskeletal:         General: No tenderness or deformity. Normal range of motion.      Cervical back: Normal range of motion and neck supple.      Right lower leg: No edema.      Left lower leg: No edema.   Skin:     General: Skin is warm and dry.      Capillary Refill: Capillary refill takes less than 2 seconds.      Coloration: Skin is not jaundiced or pale.      Findings: No erythema or rash.   Neurological:      Mental Status: She is alert and oriented to person, place, and time.      Cranial Nerves: No cranial nerve deficit.      Sensory: No sensory deficit.      Motor: No abnormal muscle tone.      Coordination: Coordination normal.      Gait: Gait normal.      Deep Tendon Reflexes: Reflexes normal.   Psychiatric:         Mood and Affect: Mood normal.         Behavior: Behavior normal.         Thought Content: Thought content normal.         Judgment: Judgment normal.         Common labs    Common Labsle 3/11/21 4/14/21 4/14/21 5/4/21 5/4/21     1105 1105 1318 1318   Glucose 82 142 (A)      Glucose     124 (A)   BUN 12 10   11   Creatinine 0.71 0.70   0.69   eGFR Non  Am 97 98   100   eGFR African Am     121   Sodium 138 140   137   Potassium 4.1 4.2   4.4   Chloride 103 103   102   Calcium 8.7 9.3   9.2   Total Protein     6.8   Albumin  4.80   4.50   Total  Bilirubin  1.1   1.0   Alkaline Phosphatase  52   41   AST (SGOT)  21   12   ALT (SGPT)  11   9   WBC   9.70 7.88    Hemoglobin   13.7 12.2    Hematocrit   41.8 37.3    Platelets   226 253    (A) Abnormal value       Comments are available for some flowsheets but are not being displayed.               Assessment / Plan      Assessment/Plan:   Diagnoses and all orders for this visit:    1. OME (otitis media with effusion), right (Primary)  -     Begin: fluticasone (Flonase) 50 MCG/ACT nasal spray; 2 sprays into the nostril(s) as directed by provider Daily.  Dispense: 18.2 mL; Refill: 11  -     Begin: cetirizine (zyrTEC) 10 MG tablet; Take 1 tablet by mouth Every Night.  Dispense: 30 tablet; Refill: 11  Continue Singulair nightly.     2. Irregular menses  -     POCT pregnancy, urine 0 negative  -     Begin: medroxyPROGESTERone (Provera) 10 MG tablet; Take 1 tablet by mouth Daily for 10 days.  Dispense: 10 tablet; Refill: 0  Follow up with gynecology.              Follow Up:   Return if symptoms worsen or fail to improve.    Patient was given instructions and counseling regarding her condition or for health maintenance advice. Please see specific information pulled into the AVS if appropriate.     Augustina Falk PA-C  [facility]

## 2021-06-23 ENCOUNTER — OFFICE VISIT (OUTPATIENT)
Dept: ORTHOPEDIC SURGERY | Facility: CLINIC | Age: 31
End: 2021-06-23

## 2021-06-23 VITALS — RESPIRATION RATE: 18 BRPM | WEIGHT: 113 LBS | HEIGHT: 61 IN | BODY MASS INDEX: 21.34 KG/M2

## 2021-06-23 DIAGNOSIS — S99.921A INJURY, FOOT, RIGHT, INITIAL ENCOUNTER: Primary | ICD-10-CM

## 2021-06-23 DIAGNOSIS — S99.921A TOE INJURY, RIGHT, INITIAL ENCOUNTER: ICD-10-CM

## 2021-06-23 PROCEDURE — 99213 OFFICE O/P EST LOW 20 MIN: CPT | Performed by: PHYSICIAN ASSISTANT

## 2021-06-23 RX ORDER — NAPROXEN 500 MG/1
500 TABLET ORAL 2 TIMES DAILY PRN
COMMUNITY
Start: 2021-06-14 | End: 2021-07-16

## 2021-06-23 RX ORDER — CYCLOBENZAPRINE HCL 10 MG
TABLET ORAL
COMMUNITY
Start: 2021-06-14 | End: 2021-09-07

## 2021-06-23 NOTE — PROGRESS NOTES
"Subjective   Patient ID: Radha Dixon is a 31 y.o. right hand dominant female  Injury of the Right Foot (Reports kicking a door frame accidentally when she took off running in a hurry on 6/12/21, seen at [facility] same day, given pneumatic boot)         History of Present Illness    Patient presents with complaints of right forefoot/toe pain that occurred after she accidentally hit her forefoot on a door jam.  Date of injury 6/12/2021.  She went to the emergency room after the incident had x-rays which she was told was negative for acute fracture.  She was supplied a pneumatic boot.  She has taken the boot off and bared some weight outside of the pneumatic boot.  Denies numbness or tingling.      Past Medical History:   Diagnosis Date   • Abnormal Pap smear of cervix    • Acid reflux    • Anxiety    • Anxiety and depression    • Arthritis    • Asthma    • B12 deficiency 5/5/2021   • Bipolar 1 disorder (CMS/HCC)    • Body piercing     EARS, NOSE, BELLY BUTTON   • Dysphagia     REPORTS SOMETIMES FEELS LIKE \"FOOD GETS STUCK\"   • Fracture     HISTORY OF RIGHT WRIST AS A CHILD   • Goiter     x3   • Heart rate fast     states usually 120-130 (states on med for this)   • History of migraine    • Hx of exercise stress test 04/01/2020   • Inappropriate sinus tachycardia     per Dr. Dong's note on 8/12/20.     • Injury of neck    • Insomnia    • Migraine    • Ovarian cyst    • Palpitations    • PONV (postoperative nausea and vomiting)    • Scoliosis    • Tachycardia    • Tattoo    • Thyroid nodule    • Toe fracture     right great toe   • Wears glasses         Past Surgical History:   Procedure Laterality Date   • APPENDECTOMY  2016   • CHOLECYSTECTOMY     • COLPOSCOPY     • CYST REMOVAL      OVARIAN CYST   • DILATATION AND CURETTAGE     • ENDOSCOPY N/A 12/1/2017    Procedure: ESOPHAGOGASTRODUODENOSCOPY WITH COLD FORCEP BIOPSY;  Surgeon: Danilo Shabazz MD;  Location: [facility] ENDOSCOPY;  Service:    • ENDOSCOPY N/A 9/3/2020 "    Procedure: ESOPHAGOGASTRODUODENOSCOPY WITH BIOPSIES AND DILATATION;  Surgeon: Bhupinder Montenegro MD;  Location:  [facility] ENDOSCOPY;  Service: Gastroenterology;  Laterality: N/A;   • ESOPHAGEAL MOTILITY STUDY N/A 11/10/2020    Procedure: ESOPHAGEAL MANOMETRY;  Surgeon: Chivo Mcmahan MD;  Location:  [facility];  Service: Gastroenterology;  Laterality: N/A;   • HIP SURGERY Right     RIGHT HIP, REPORTS HAD BONES SCRAPED   • SHOULDER ARTHROSCOPY Right 10/11/2018    Procedure: Right shoulder diagnostic arthroscopy, limited rotator cuff debridement;  Surgeon: Tino Uribe MD;  Location:  [facility] OR;  Service: Orthopedics   • THYROID BIOPSY      goiter   • THYROIDECTOMY Left 2021    Procedure: THYROID LOBECTOMY LEFT;  Surgeon: Yves Contreras MD;  Location:  [facility] OR;  Service: General;  Laterality: Left;   • THYROIDECTOMY, PARTIAL Left    • WISDOM TOOTH EXTRACTION         Family History   Problem Relation Age of Onset   • Diabetes Mother    • Arthritis Mother    • No Known Problems Father    • Cancer Maternal Grandmother    • Diabetes Maternal Grandmother    • Heart attack Maternal Grandmother    • Arthritis Maternal Grandmother    • Heart attack Paternal Grandmother    • Heart disease Sister    • Migraines Sister    • Cancer Paternal Grandfather         Lung   • Colon cancer Neg Hx    • Cirrhosis Neg Hx    • Liver cancer Neg Hx    • Liver disease Neg Hx        Social History     Socioeconomic History   • Marital status: Single     Spouse name: Not on file   • Number of children: 2   • Years of education: Not on file   • Highest education level: Not on file   Tobacco Use   • Smoking status: Former Smoker     Packs/day: 0.50     Years: 13.00     Pack years: 6.50     Types: Cigarettes     Quit date: 10/17/2020     Years since quittin.6   • Smokeless tobacco: Never Used   Vaping Use   • Vaping Use: Never used   Substance and Sexual Activity   • Alcohol use: No   • Drug use: No   • Sexual activity:  Defer         Current Outpatient Medications:   •  albuterol sulfate  (90 Base) MCG/ACT inhaler, Inhale 2 puffs Every 4 (Four) Hours As Needed for Shortness of Air., Disp: 18 g, Rfl: 1  •  atenolol (TENORMIN) 25 MG tablet, Take 1 tablet by mouth Daily., Disp: 60 tablet, Rfl: 11  •  buPROPion SR (Wellbutrin SR) 100 MG 12 hr tablet, Take 1 tablet by mouth 2 (Two) Times a Day., Disp: 60 tablet, Rfl: 5  •  cetirizine (zyrTEC) 10 MG tablet, Take 1 tablet by mouth Every Night., Disp: 30 tablet, Rfl: 11  •  cyclobenzaprine (FLEXERIL) 10 MG tablet, , Disp: , Rfl:   •  fluticasone (Flonase) 50 MCG/ACT nasal spray, 2 sprays into the nostril(s) as directed by provider Daily., Disp: 18.2 mL, Rfl: 11  •  hydrOXYzine (ATARAX) 25 MG tablet, Take 1 tablet by mouth 3 (Three) Times a Day As Needed for Itching., Disp: 60 tablet, Rfl: 0  •  midodrine (PROAMATINE) 10 MG tablet, Take 1 tablet by mouth 2 (Two) Times a Day., Disp: 60 tablet, Rfl: 3  •  montelukast (Singulair) 10 MG tablet, Take 1 tablet by mouth Every Night., Disp: 90 tablet, Rfl: 1  •  naproxen (NAPROSYN) 500 MG tablet, Take 500 mg by mouth 2 (Two) Times a Day As Needed. for pain, Disp: , Rfl:   •  norethindrone-ethinyl estradiol-iron (MICROGESTIN FE1.5/30) 1.5-30 MG-MCG tablet, Take 1 tablet by mouth Daily., Disp: 28 tablet, Rfl: 12  •  ondansetron ODT (ZOFRAN-ODT) 4 MG disintegrating tablet, Place 1 tablet on the tongue Every 8 (Eight) Hours As Needed for Nausea or Vomiting., Disp: 30 tablet, Rfl: 1  •  pantoprazole (PROTONIX) 40 MG EC tablet, 1 po daily in the am 30 minutes before breakfast, Disp: 30 tablet, Rfl: 3    Allergies   Allergen Reactions   • Onion Itching     REPORTS CAUSES MIGRAINES and MAKES THROAT ITCHY     • Cabbage Headache   • Msg [Monosodium Glutamate] Headache   • Sulfa Antibiotics Hives       Review of Systems   Constitutional: Negative for diaphoresis, fever and unexpected weight change.   HENT: Negative for dental problem and sore throat.   "  Eyes: Negative for visual disturbance.   Respiratory: Negative for shortness of breath.    Cardiovascular: Negative for chest pain.   Gastrointestinal: Negative for abdominal pain, constipation, diarrhea, nausea and vomiting.   Genitourinary: Negative for difficulty urinating and frequency.   Musculoskeletal: Positive for arthralgias (right foot 2nd -5th toes) and joint swelling.   Neurological: Negative for headaches.   Hematological: Does not bruise/bleed easily.       I have reviewed the medical and surgical history, family history, social history, medications, and/or allergies, and the review of systems of this report.    Objective   Resp 18   Ht 154.7 cm (60.9\")   Wt 51.3 kg (113 lb)   BMI 21.42 kg/m²    Physical Exam  Vitals and nursing note reviewed.   Constitutional:       Appearance: Normal appearance.   Pulmonary:      Effort: Pulmonary effort is normal.   Musculoskeletal:      Right ankle: No deformity or ecchymosis. No tenderness. Normal range of motion. Anterior drawer test negative.      Right Achilles Tendon: No tenderness or defects. Martinez's test negative.      Right foot: Normal capillary refill. Tenderness and bony tenderness (right forefoot, 2nd, 3rd, 4th and 5th toes) present. No deformity, foot drop or crepitus. Normal pulse.   Neurological:      Mental Status: She is alert and oriented to person, place, and time.   Psychiatric:         Behavior: Behavior normal.       Ortho Exam   Extremity DVT signs are negative on physical exam with negative Zohaib sign, no calf pain, no palpable cords and no skin tone change   Neurologic Exam     Mental Status   Oriented to person, place, and time.                Assessment/Plan   Independent Review of Radiographic Studies:    I did review x-ray imaging of the right foot from [facility] June 12, 2021.  No acute fracture proceeded on x-ray imaging    We did repeat x-ray images of the right foot 3 view in the office to rule out subacute fracture.  " Comparison films are available and reviewed.  There is no obvious acute fracture.  Only on the oblique view to the second proximal phalanx there was a slight buckling affect which could represent a torus fracture of the proximal second phalanx  Procedures       Diagnoses and all orders for this visit:    1. Injury, foot, right, initial encounter (Primary)  -     XR Foot 3+ View Right; Future    2. Toe injury, right, initial encounter       Orthopedic activities reviewed and patient expressed appreciation  Discussion of orthopedic goals  Risk, benefits, and merits of treatment alternatives reviewed with the patient and questions answered  Reduced physical activity as appropriate  Weight bearing parameters reviewed  Ice, heat, and/or modalities as beneficial    Recommendations/Plan:  Exercise, medications, injections, other patient advice, and return appointment as noted.  Patient is encouraged to call or return for any issues or concerns.  Patient was provided a fracture shoe to be worn at all times.  No weightbearing outside of the boot or the fracture shoe.  She may choose to wear the boot if more comfortable  Follow-up in 2 weeks repeat exam if no improvement will order MRI    Patient agreeable to call or return sooner for any concerns.               EMR Dragon-transcription disclaimer:  This encounter note is an electronic transcription of spoken language to printed text.  Electronic transcription of spoken language may permit erroneous or at times nonsensical words or phrases to be inadvertently transcribed.  Although I have reviewed the note for such errors, some may still exist

## 2021-07-07 ENCOUNTER — OFFICE VISIT (OUTPATIENT)
Dept: ORTHOPEDIC SURGERY | Facility: CLINIC | Age: 31
End: 2021-07-07

## 2021-07-07 VITALS
WEIGHT: 113 LBS | SYSTOLIC BLOOD PRESSURE: 118 MMHG | HEIGHT: 61 IN | RESPIRATION RATE: 18 BRPM | BODY MASS INDEX: 21.34 KG/M2 | DIASTOLIC BLOOD PRESSURE: 58 MMHG | TEMPERATURE: 97.7 F

## 2021-07-07 DIAGNOSIS — S99.921A TOE INJURY, RIGHT, INITIAL ENCOUNTER: ICD-10-CM

## 2021-07-07 DIAGNOSIS — M79.671 ACUTE FOOT PAIN, RIGHT: ICD-10-CM

## 2021-07-07 DIAGNOSIS — S99.921A INJURY, FOOT, RIGHT, INITIAL ENCOUNTER: Primary | ICD-10-CM

## 2021-07-07 PROCEDURE — 99213 OFFICE O/P EST LOW 20 MIN: CPT | Performed by: PHYSICIAN ASSISTANT

## 2021-07-07 NOTE — PROGRESS NOTES
"Subjective   Patient ID: Radha Dixon is a 31 y.o. right hand dominant female  Follow-up of the Right Foot (Proximal phalanx torus fracture. DOI: 6/12/21 states she is still in pain and can't bend or separate her toes.)         History of Present Illness  Patient presents for scheduled follow-up visit regarding right foot pain.  She initially kicked a door frame on accident 6/12/2021.  Patient has been immobilized using either a pneumatic boot or a fracture shoe.  She is still experiencing pain to the second and third digits as well as the lateral aspect of the right foot.  She states when she takes certain steps even in the shoe or boot she does develop tingling to the lateral aspect of the right foot.  She denies ankle pain.  Denies redness or warmth.                                                 Past Medical History:   Diagnosis Date   • Abnormal Pap smear of cervix    • Acid reflux    • Anxiety    • Anxiety and depression    • Arthritis    • Asthma    • B12 deficiency 5/5/2021   • Bipolar 1 disorder (CMS/HCC)    • Body piercing     EARS, NOSE, BELLY BUTTON   • Dysphagia     REPORTS SOMETIMES FEELS LIKE \"FOOD GETS STUCK\"   • Fracture     HISTORY OF RIGHT WRIST AS A CHILD   • Goiter     x3   • Heart rate fast     states usually 120-130 (states on med for this)   • History of migraine    • Hx of exercise stress test 04/01/2020   • Inappropriate sinus tachycardia     per Dr. Dong's note on 8/12/20.     • Injury of neck    • Insomnia    • Migraine    • Ovarian cyst    • Palpitations    • PONV (postoperative nausea and vomiting)    • Scoliosis    • Tachycardia    • Tattoo    • Thyroid nodule    • Toe fracture     right great toe   • Wears glasses         Past Surgical History:   Procedure Laterality Date   • APPENDECTOMY  2016   • CHOLECYSTECTOMY     • COLPOSCOPY     • CYST REMOVAL      OVARIAN CYST   • DILATATION AND CURETTAGE     • ENDOSCOPY N/A 12/1/2017    Procedure: ESOPHAGOGASTRODUODENOSCOPY WITH COLD " FORCEP BIOPSY;  Surgeon: Danilo Shabazz MD;  Location:  [facility];  Service:    • ENDOSCOPY N/A 9/3/2020    Procedure: ESOPHAGOGASTRODUODENOSCOPY WITH BIOPSIES AND DILATATION;  Surgeon: Bhupinder Montenegro MD;  Location:  [facility];  Service: Gastroenterology;  Laterality: N/A;   • ESOPHAGEAL MOTILITY STUDY N/A 11/10/2020    Procedure: ESOPHAGEAL MANOMETRY;  Surgeon: Chivo Mcmahan MD;  Location:  [facility];  Service: Gastroenterology;  Laterality: N/A;   • HIP SURGERY Right     RIGHT HIP, REPORTS HAD BONES SCRAPED   • SHOULDER ARTHROSCOPY Right 10/11/2018    Procedure: Right shoulder diagnostic arthroscopy, limited rotator cuff debridement;  Surgeon: Tino Uribe MD;  Location:  [facility] OR;  Service: Orthopedics   • THYROID BIOPSY      goiter   • THYROIDECTOMY Left 2021    Procedure: THYROID LOBECTOMY LEFT;  Surgeon: Yves Contreras MD;  Location:  [facility];  Service: General;  Laterality: Left;   • THYROIDECTOMY, PARTIAL Left    • WISDOM TOOTH EXTRACTION         Family History   Problem Relation Age of Onset   • Diabetes Mother    • Arthritis Mother    • No Known Problems Father    • Cancer Maternal Grandmother    • Diabetes Maternal Grandmother    • Heart attack Maternal Grandmother    • Arthritis Maternal Grandmother    • Heart attack Paternal Grandmother    • Heart disease Sister    • Migraines Sister    • Cancer Paternal Grandfather         Lung   • Colon cancer Neg Hx    • Cirrhosis Neg Hx    • Liver cancer Neg Hx    • Liver disease Neg Hx        Social History     Socioeconomic History   • Marital status: Single     Spouse name: Not on file   • Number of children: 2   • Years of education: Not on file   • Highest education level: Not on file   Tobacco Use   • Smoking status: Former Smoker     Packs/day: 0.50     Years: 13.00     Pack years: 6.50     Types: Cigarettes     Quit date: 10/17/2020     Years since quittin.7   • Smokeless tobacco: Never Used   Vaping Use   •  Vaping Use: Never used   Substance and Sexual Activity   • Alcohol use: No   • Drug use: No   • Sexual activity: Defer         Current Outpatient Medications:   •  albuterol sulfate  (90 Base) MCG/ACT inhaler, Inhale 2 puffs Every 4 (Four) Hours As Needed for Shortness of Air., Disp: 18 g, Rfl: 1  •  atenolol (TENORMIN) 25 MG tablet, Take 1 tablet by mouth Daily., Disp: 60 tablet, Rfl: 11  •  buPROPion SR (Wellbutrin SR) 100 MG 12 hr tablet, Take 1 tablet by mouth 2 (Two) Times a Day., Disp: 60 tablet, Rfl: 5  •  cetirizine (zyrTEC) 10 MG tablet, Take 1 tablet by mouth Every Night., Disp: 30 tablet, Rfl: 11  •  cyclobenzaprine (FLEXERIL) 10 MG tablet, , Disp: , Rfl:   •  fluticasone (Flonase) 50 MCG/ACT nasal spray, 2 sprays into the nostril(s) as directed by provider Daily., Disp: 18.2 mL, Rfl: 11  •  hydrOXYzine (ATARAX) 25 MG tablet, Take 1 tablet by mouth 3 (Three) Times a Day As Needed for Itching., Disp: 60 tablet, Rfl: 0  •  midodrine (PROAMATINE) 10 MG tablet, Take 1 tablet by mouth 2 (Two) Times a Day., Disp: 60 tablet, Rfl: 3  •  montelukast (Singulair) 10 MG tablet, Take 1 tablet by mouth Every Night., Disp: 90 tablet, Rfl: 1  •  naproxen (NAPROSYN) 500 MG tablet, Take 500 mg by mouth 2 (Two) Times a Day As Needed. for pain, Disp: , Rfl:   •  norethindrone-ethinyl estradiol-iron (MICROGESTIN FE1.5/30) 1.5-30 MG-MCG tablet, Take 1 tablet by mouth Daily., Disp: 28 tablet, Rfl: 12  •  ondansetron ODT (ZOFRAN-ODT) 4 MG disintegrating tablet, Place 1 tablet on the tongue Every 8 (Eight) Hours As Needed for Nausea or Vomiting., Disp: 30 tablet, Rfl: 1  •  pantoprazole (PROTONIX) 40 MG EC tablet, 1 po daily in the am 30 minutes before breakfast, Disp: 30 tablet, Rfl: 3    Allergies   Allergen Reactions   • Onion Itching     REPORTS CAUSES MIGRAINES and MAKES THROAT ITCHY     • Cabbage Headache   • Msg [Monosodium Glutamate] Headache   • Sulfa Antibiotics Hives       Review of Systems   Constitutional:  "Negative for fever.   HENT: Negative for dental problem and voice change.    Eyes: Negative for visual disturbance.   Respiratory: Negative for shortness of breath.    Cardiovascular: Negative for chest pain.   Gastrointestinal: Negative for abdominal pain.   Genitourinary: Negative for dysuria.   Musculoskeletal: Positive for arthralgias (right foot). Negative for gait problem and joint swelling.   Skin: Positive for color change (some bruising right foot). Negative for rash.   Neurological: Negative for speech difficulty.   Hematological: Does not bruise/bleed easily.   Psychiatric/Behavioral: Negative for confusion.       I have reviewed the medical and surgical history, family history, social history, medications, and/or allergies, and the review of systems of this report.    Objective   /58 (BP Location: Left arm, Patient Position: Sitting, Cuff Size: Adult)   Temp 97.7 °F (36.5 °C)   Resp 18   Ht 154.7 cm (60.91\")   Wt 51.3 kg (113 lb)   BMI 21.42 kg/m²    Physical Exam  Vitals and nursing note reviewed.   Constitutional:       Appearance: Normal appearance.   Pulmonary:      Effort: Pulmonary effort is normal.   Musculoskeletal:      Right ankle: Normal.      Right Achilles Tendon: Normal.      Right foot: Decreased range of motion. Normal capillary refill. Tenderness and bony tenderness present. No crepitus. Normal pulse.   Neurological:      Mental Status: She is alert and oriented to person, place, and time.   Psychiatric:         Behavior: Behavior normal.       Ortho Exam   Extremity DVT signs are negative on physical exam with negative Zohaib sign, no calf pain, no palpable cords and no skin tone change   Neurologic Exam     Mental Status   Oriented to person, place, and time.        Patient is tender to palpation along the second and third metatarsophalangeal joint as well as the right lateral foot.  She is able to medially and laterally rotate the ankle.  She is unable to flex the second " third fourth and fifth digits of the right toe.  There is no sensory deficit    Assessment/Plan   Independent Review of Radiographic Studies:    The repeat imaging x-rays of the right foot 3 view performed in the office for the evaluation of continued foot pain.  Comparison films are available reviewed.  No evidence of acute or subacute fracture healing    Procedures       Diagnoses and all orders for this visit:    1. Injury, foot, right, initial encounter (Primary)  -     XR Foot 3+ View Right; Future  -     MRI Foot Right Without Contrast    2. Toe injury, right, initial encounter  -     MRI Foot Right Without Contrast    3. Acute foot pain, right  -     MRI Foot Right Without Contrast       Orthopedic activities reviewed and patient expressed appreciation  Discussion of orthopedic goals  Risk, benefits, and merits of treatment alternatives reviewed with the patient and questions answered    Recommendations/Plan:  Exercise, medications, injections, other patient advice, and return appointment as noted.  Patient is encouraged to call or return for any issues or concerns.    Follow-up after MRI.  Continue using Epsom salt soaks as well as the fracture shoe when weightbearing  Patient agreeable to call or return sooner for any concerns.               EMR Dragon-transcription disclaimer:  This encounter note is an electronic transcription of spoken language to printed text.  Electronic transcription of spoken language may permit erroneous or at times nonsensical words or phrases to be inadvertently transcribed.  Although I have reviewed the note for such errors, some may still exist

## 2021-07-16 ENCOUNTER — OFFICE VISIT (OUTPATIENT)
Dept: INTERNAL MEDICINE | Facility: CLINIC | Age: 31
End: 2021-07-16

## 2021-07-16 VITALS
OXYGEN SATURATION: 98 % | SYSTOLIC BLOOD PRESSURE: 110 MMHG | HEART RATE: 99 BPM | BODY MASS INDEX: 20.8 KG/M2 | DIASTOLIC BLOOD PRESSURE: 64 MMHG | HEIGHT: 62 IN | WEIGHT: 113 LBS | TEMPERATURE: 98 F

## 2021-07-16 DIAGNOSIS — J45.21 MILD INTERMITTENT ASTHMATIC BRONCHITIS WITH ACUTE EXACERBATION: Primary | ICD-10-CM

## 2021-07-16 PROCEDURE — 99213 OFFICE O/P EST LOW 20 MIN: CPT | Performed by: NURSE PRACTITIONER

## 2021-07-16 RX ORDER — METHYLPREDNISOLONE 4 MG/1
TABLET ORAL
Qty: 21 EACH | Refills: 0 | Status: SHIPPED | OUTPATIENT
Start: 2021-07-16 | End: 2021-08-04

## 2021-07-16 RX ORDER — DOXYCYCLINE HYCLATE 100 MG/1
100 CAPSULE ORAL 2 TIMES DAILY
Qty: 20 CAPSULE | Refills: 0 | Status: SHIPPED | OUTPATIENT
Start: 2021-07-16 | End: 2021-07-26

## 2021-07-16 RX ORDER — ALBUTEROL SULFATE 90 UG/1
2 AEROSOL, METERED RESPIRATORY (INHALATION) EVERY 4 HOURS PRN
Qty: 18 G | Refills: 1 | Status: SHIPPED | OUTPATIENT
Start: 2021-07-16 | End: 2022-07-26 | Stop reason: SDUPTHER

## 2021-07-16 NOTE — PROGRESS NOTES
"Date: 2021    Name: Radha Dixon  : 1990    Chief Complaint:   Chief Complaint   Patient presents with   • URI     sx started last Friday, cough,wheezing, runny nose       HPI:  Radha Dixon is a 31 y.o. female presents with cough that has been present for a week.  Initially had rhinorrhea.  Has been taking singulair, allegra as she normally does for allergy.  Nasal congestion is now clear, rarely blows her nose.  She is now coughing, wheezing frequently.  Reports history of mild asthma.  Cough is non-productive.  Laughing, eating causes coughing spell.  No close contacts with similar symptoms.  Denies fever, chills, excessive fatigue, loss of smell or taste, sore throat, earache, dizziness, headache, nausea, vomiting, diarrhea, rash, myalgia.  She has been using an old albuterol inhaler at least 3 times a day.  It is effective when needed.  Normally, not needed.    History:  The following portions of the patient's history were reviewed and updated as appropriate: allergies, current medications, past medical history, family history, surgical history, social history and problem list.     VS:  Vitals:    21 0958   BP: 110/64   Pulse: 99   Temp: 98 °F (36.7 °C)   TempSrc: Infrared   SpO2: 98%   Weight: 51.3 kg (113 lb)   Height: 156.2 cm (61.5\")     Body mass index is 21.01 kg/m².    PE:  Physical Exam  Constitutional:       Appearance: She is not ill-appearing.   HENT:      Head: Normocephalic.      Right Ear: Tympanic membrane, ear canal and external ear normal.      Left Ear: Tympanic membrane, ear canal and external ear normal.      Nose: Mucosal edema present. No nasal tenderness.      Right Turbinates: Swollen and pale.      Left Turbinates: Swollen and pale.   Eyes:      Conjunctiva/sclera: Conjunctivae normal.      Pupils: Pupils are equal, round, and reactive to light.   Cardiovascular:      Rate and Rhythm: Normal rate and regular rhythm.      Pulses: Normal pulses.      Heart " sounds: Normal heart sounds.   Pulmonary:      Effort: Pulmonary effort is normal.      Breath sounds: Examination of the right-lower field reveals rhonchi. Examination of the left-lower field reveals rhonchi. Wheezing (expiratory) and rhonchi (clear slightly after deliberate cough) present.   Musculoskeletal:      Cervical back: Normal range of motion and neck supple.   Skin:     General: Skin is warm.      Capillary Refill: Capillary refill takes less than 2 seconds.   Neurological:      Mental Status: She is alert and oriented to person, place, and time.      Coordination: Coordination normal.      Gait: Gait normal.   Psychiatric:         Mood and Affect: Mood normal.         Behavior: Behavior normal.         Thought Content: Thought content normal.         Assessment/Plan:  Diagnoses and all orders for this visit:    1. Mild intermittent asthmatic bronchitis with acute exacerbation (Primary)  -     doxycycline (VIBRAMYCIN) 100 MG capsule; Take 1 capsule by mouth 2 (Two) Times a Day for 10 days.  Dispense: 20 capsule; Refill: 0  -     methylPREDNISolone (MEDROL) 4 MG dose pack; Take as directed on package instructions.  Dispense: 21 each; Refill: 0  -     albuterol sulfate  (90 Base) MCG/ACT inhaler; Inhale 2 puffs Every 4 (Four) Hours As Needed for Wheezing or Shortness of Air.  Dispense: 18 g; Refill: 1   - Rest, as needed    Return if symptoms worsen or fail to improve.

## 2021-08-02 ENCOUNTER — HOSPITAL ENCOUNTER (OUTPATIENT)
Dept: MRI IMAGING | Facility: HOSPITAL | Age: 31
Discharge: HOME | End: 2021-08-02
Admitting: PHYSICIAN ASSISTANT

## 2021-08-02 PROCEDURE — 73718 MRI LOWER EXTREMITY W/O DYE: CPT

## 2021-08-03 ENCOUNTER — OFFICE VISIT (OUTPATIENT)
Dept: OBSTETRICS AND GYNECOLOGY | Facility: CLINIC | Age: 31
End: 2021-08-03

## 2021-08-03 VITALS
DIASTOLIC BLOOD PRESSURE: 66 MMHG | HEIGHT: 61 IN | BODY MASS INDEX: 21.14 KG/M2 | SYSTOLIC BLOOD PRESSURE: 110 MMHG | WEIGHT: 112 LBS

## 2021-08-03 DIAGNOSIS — N92.6 IRREGULAR BLEEDING: Primary | ICD-10-CM

## 2021-08-03 PROCEDURE — 99213 OFFICE O/P EST LOW 20 MIN: CPT | Performed by: OBSTETRICS & GYNECOLOGY

## 2021-08-03 RX ORDER — LEVONORGESTREL AND ETHINYL ESTRADIOL 0.1-0.02MG
1 KIT ORAL DAILY
Qty: 28 TABLET | Refills: 12 | Status: SHIPPED | OUTPATIENT
Start: 2021-08-03 | End: 2021-12-23

## 2021-08-03 NOTE — PROGRESS NOTES
Subjective   Chief Complaint   Patient presents with   • Follow-up     4 month follow up HEB     Radha Dixon is a 31 y.o. year old .  Patient's last menstrual period was 2021.  She presents to be seen because of  AUB. HAs been on microgestin since -- thyroidectomy --TSH WNL May-- having 2 menses a month-- full blown    OTHER COMPLAINTS:  Nothing else    The following portions of the patient's history were reviewed and updated as appropriate:  She  has a past medical history of Abnormal Pap smear of cervix, Acid reflux, Anxiety, Anxiety and depression, Arthritis, Asthma, B12 deficiency (2021), Bipolar 1 disorder (CMS/[facility]), Body piercing, Dysphagia, Fracture, Goiter, Heart rate fast, History of migraine, exercise stress test (2020), Inappropriate sinus tachycardia, Injury of neck, Insomnia, Migraine, Ovarian cyst, Palpitations, PONV (postoperative nausea and vomiting), Scoliosis, Tachycardia, Tattoo, Thyroid nodule, Toe fracture, and Wears glasses.  She does not have any pertinent problems on file.  She  has a past surgical history that includes Appendectomy (); Cholecystectomy; Hip surgery (Right); Cyst Removal; Howe tooth extraction; Esophagogastroduodenoscopy (N/A, 2017); Dilation and curettage of uterus; Shoulder arthroscopy (Right, 10/11/2018); Thyroid Biopsy; Esophagogastroduodenoscopy (N/A, 9/3/2020); Esophageal motility study (N/A, 11/10/2020); Colposcopy; Thyroidectomy (Left, 2021); and Thyroidectomy, partial (Left).  Her family history includes Arthritis in her maternal grandmother and mother; Cancer in her maternal grandmother and paternal grandfather; Diabetes in her maternal grandmother and mother; Heart attack in her maternal grandmother and paternal grandmother; Heart disease in her sister; Migraines in her sister; No Known Problems in her father.  She  reports that she quit smoking about 9 months ago. Her smoking use included cigarettes. She has a  6.50 pack-year smoking history. She has never used smokeless tobacco. She reports that she does not drink alcohol and does not use drugs.  Current Outpatient Medications   Medication Sig Dispense Refill   • albuterol sulfate  (90 Base) MCG/ACT inhaler Inhale 2 puffs Every 4 (Four) Hours As Needed for Wheezing or Shortness of Air. 18 g 1   • atenolol (TENORMIN) 25 MG tablet Take 1 tablet by mouth Daily. 60 tablet 11   • buPROPion SR (Wellbutrin SR) 100 MG 12 hr tablet Take 1 tablet by mouth 2 (Two) Times a Day. 60 tablet 5   • cetirizine (zyrTEC) 10 MG tablet Take 1 tablet by mouth Every Night. 30 tablet 11   • cyclobenzaprine (FLEXERIL) 10 MG tablet      • fluticasone (Flonase) 50 MCG/ACT nasal spray 2 sprays into the nostril(s) as directed by provider Daily. 18.2 mL 11   • hydrOXYzine (ATARAX) 25 MG tablet Take 1 tablet by mouth 3 (Three) Times a Day As Needed for Itching. 60 tablet 0   • methylPREDNISolone (MEDROL) 4 MG dose pack Take as directed on package instructions. 21 each 0   • midodrine (PROAMATINE) 10 MG tablet Take 1 tablet by mouth 2 (Two) Times a Day. 60 tablet 3   • montelukast (Singulair) 10 MG tablet Take 1 tablet by mouth Every Night. 90 tablet 1   • norethindrone-ethinyl estradiol-iron (MICROGESTIN FE1.5/30) 1.5-30 MG-MCG tablet Take 1 tablet by mouth Daily. 28 tablet 12   • ondansetron ODT (ZOFRAN-ODT) 4 MG disintegrating tablet Place 1 tablet on the tongue Every 8 (Eight) Hours As Needed for Nausea or Vomiting. 30 tablet 1     No current facility-administered medications for this visit.     Current Outpatient Medications on File Prior to Visit   Medication Sig   • albuterol sulfate  (90 Base) MCG/ACT inhaler Inhale 2 puffs Every 4 (Four) Hours As Needed for Wheezing or Shortness of Air.   • atenolol (TENORMIN) 25 MG tablet Take 1 tablet by mouth Daily.   • buPROPion SR (Wellbutrin SR) 100 MG 12 hr tablet Take 1 tablet by mouth 2 (Two) Times a Day.   • cetirizine (zyrTEC) 10 MG  "tablet Take 1 tablet by mouth Every Night.   • cyclobenzaprine (FLEXERIL) 10 MG tablet    • fluticasone (Flonase) 50 MCG/ACT nasal spray 2 sprays into the nostril(s) as directed by provider Daily.   • hydrOXYzine (ATARAX) 25 MG tablet Take 1 tablet by mouth 3 (Three) Times a Day As Needed for Itching.   • methylPREDNISolone (MEDROL) 4 MG dose pack Take as directed on package instructions.   • midodrine (PROAMATINE) 10 MG tablet Take 1 tablet by mouth 2 (Two) Times a Day.   • montelukast (Singulair) 10 MG tablet Take 1 tablet by mouth Every Night.   • norethindrone-ethinyl estradiol-iron (MICROGESTIN FE1.5/30) 1.5-30 MG-MCG tablet Take 1 tablet by mouth Daily.   • ondansetron ODT (ZOFRAN-ODT) 4 MG disintegrating tablet Place 1 tablet on the tongue Every 8 (Eight) Hours As Needed for Nausea or Vomiting.     No current facility-administered medications on file prior to visit.     She is allergic to onion, cabbage, msg [monosodium glutamate], and sulfa antibiotics.    Social History    Tobacco Use      Smoking status: Former Smoker        Packs/day: 0.50        Years: 13.00        Pack years: 6.5        Types: Cigarettes        Quit date: 10/17/2020        Years since quittin.7      Smokeless tobacco: Never Used    Review of Systems  Consitutional POS: nothing reported    NEG: anorexia or night sweats   Gastointestinal POS: nothing reported    NEG: bloating, change in bowel habits, melena or reflux symptoms   Genitourinary POS: nothing reported    NEG: dysuria or hematuria   Integument POS: nothing reported    NEG: moles that are changing in size, shape, color or rashes   Breast POS: nothing reported    NEG: persistent breast lump, skin dimpling or nipple discharge         Respiratory: negative  Cardiovascular: negative          Objective   /66   Ht 154.9 cm (61\")   Wt 50.8 kg (112 lb)   LMP 2021   BMI 21.16 kg/m²     General:  well developed; well nourished  no acute distress   Skin:  No suspicious " lesions seen   Thyroid: normal to inspection and palpation   Lungs:  breathing is unlabored  clear to auscultation bilaterally   Heart:  regular rate and rhythm, S1, S2 normal, no murmur, click, rub or gallop   Breasts:  Not performed.   Abdomen: soft, non-tender; no masses  no umbilical or inguinal hernias are present  no hepato-splenomegaly   Pelvis: Not performed.     Psychiatric: Alert and oriented ×3, mood and affect appropriate  HEENT: Atraumatic, normocephalic, normal scleral icterus  Extremities: 2+ pulses bilaterally, no edema      Lab Review   TSH    Imaging   Pelvic ultrasound report        Assessment   1. BTB with Microgestin     Plan   1. Change over to Lutera OCP for lower estrogen dosing.  If irregularity persist after 3 pill packs will come back in for ultrasound  2.     No orders of the defined types were placed in this encounter.         This note was electronically signed.      August 3, 2021

## 2021-08-04 ENCOUNTER — OFFICE VISIT (OUTPATIENT)
Dept: GASTROENTEROLOGY | Facility: CLINIC | Age: 31
End: 2021-08-04

## 2021-08-04 VITALS
HEART RATE: 86 BPM | HEIGHT: 61 IN | DIASTOLIC BLOOD PRESSURE: 84 MMHG | TEMPERATURE: 98.4 F | BODY MASS INDEX: 21.34 KG/M2 | SYSTOLIC BLOOD PRESSURE: 115 MMHG | RESPIRATION RATE: 16 BRPM | WEIGHT: 113 LBS

## 2021-08-04 DIAGNOSIS — K21.9 GASTROESOPHAGEAL REFLUX DISEASE WITHOUT ESOPHAGITIS: Primary | ICD-10-CM

## 2021-08-04 DIAGNOSIS — E80.4 GILBERT SYNDROME: ICD-10-CM

## 2021-08-04 DIAGNOSIS — F45.8 FUNCTIONAL DYSPHAGIA: ICD-10-CM

## 2021-08-04 DIAGNOSIS — R63.4 WEIGHT LOSS: ICD-10-CM

## 2021-08-04 DIAGNOSIS — E80.6 BILIRUBINEMIA: ICD-10-CM

## 2021-08-04 DIAGNOSIS — R11.0 NAUSEA: ICD-10-CM

## 2021-08-04 PROCEDURE — 99214 OFFICE O/P EST MOD 30 MIN: CPT | Performed by: NURSE PRACTITIONER

## 2021-08-04 RX ORDER — PANTOPRAZOLE SODIUM 40 MG/1
TABLET, DELAYED RELEASE ORAL
Qty: 30 TABLET | Refills: 2 | Status: SHIPPED | OUTPATIENT
Start: 2021-08-04 | End: 2021-12-23

## 2021-08-04 RX ORDER — ONDANSETRON 4 MG/1
4 TABLET, ORALLY DISINTEGRATING ORAL EVERY 8 HOURS PRN
Qty: 30 TABLET | Refills: 1 | Status: SHIPPED | OUTPATIENT
Start: 2021-08-04 | End: 2022-05-24 | Stop reason: HOSPADM

## 2021-08-04 NOTE — PROGRESS NOTES
Follow Up Note     Date: 2021   Patient Name: Radha Dixon  MRN: [id number]  : 1990     Primary Care Provider: Augustina Falk PA     Chief Complaint   Patient presents with   • Heartburn     History of present illness:   2021  Radha Dixon is a 31 y.o. female who is here today for follow up for heartburn.    For the past 1-2 weeks she has had severe reflux. She has not had reflux in the past. She had taken the Pantoprazole daily for 3 months then when she ran out she stopped it. She has tried taking OTC antacids without improvement of reflux. Difficulty swallowing has improved. She has not lost any further weight, it has been stable. Nausea is doing well, no vomiting. No history of melena or rectal bleeding.     Interval History:  2021  She has not had any change in her difficulty swallowing. She has had some improvement with nausea. She has not lost any weight, in fact she has gained 6 pounds in the past month since nausea is a little better.     10/5/2020  The patient has continued to have difficulty swallowing. Having EGD with dilation did not change her symptoms. She has nausea on a daily basis that is mild. No vomiting. The patient denies abdominal pain, constipation, diarrhea or rectal bleeding. She does not have much of an appetite. She has noticed she is constantly cold and has had to cut her hair very short due to losing significant amounts of hair. Her skin texture is different and she continues to lose weight unintentionally.     2020  The patient has difficulty swallowing off and on for the last 2 years. The symptom is moderate in severity, occurs 2-3 times a week and associated with both solid foods and liquids.  The symptoms are progressive.  Feeling food hanging in in the middle. The patient points towards the lower substernal area for liquids and upper sternal area for solid food. Associated mild odynophagia occasionally.  No prior history of  "collagen-vascular disease and no acid reflux. Deny any nausea or vomiting.  There is no associated weight loss. She has bilateral thyroid nodule followed with US. Largest nodule 1.5cm left side and biopsy benign in the past. Her GB removed 2011 and no stones at that time. She had esophagogram done in 2019 was normal.   No associated abdominal pain or distension. Deny  any change in bowel habit, hematochezia or melena.   There is no history of anemia. Prior history of EGD 2017 by dr shabazz mild esophagitis.  No prior colonoscopy. No family history of colon cancer or any GI malignancy.     Subjective      Past Medical History:   Diagnosis Date   • Abnormal Pap smear of cervix    • Acid reflux    • Anxiety    • Anxiety and depression    • Arthritis    • Asthma    • B12 deficiency 5/5/2021   • Bipolar 1 disorder (CMS/HCC)    • Body piercing     EARS, NOSE, BELLY BUTTON   • Dysphagia     REPORTS SOMETIMES FEELS LIKE \"FOOD GETS STUCK\"   • Fracture     HISTORY OF RIGHT WRIST AS A CHILD   • Goiter     x3   • Heart rate fast     states usually 120-130 (states on med for this)   • History of migraine    • Hx of exercise stress test 04/01/2020   • Inappropriate sinus tachycardia     per Dr. Dong's note on 8/12/20.     • Injury of neck    • Insomnia    • Migraine    • Ovarian cyst    • Palpitations    • PONV (postoperative nausea and vomiting)    • Scoliosis    • Tachycardia    • Tattoo    • Thyroid nodule    • Toe fracture     right great toe   • Wears glasses      Past Surgical History:   Procedure Laterality Date   • APPENDECTOMY  2016   • CHOLECYSTECTOMY     • COLPOSCOPY     • CYST REMOVAL      OVARIAN CYST   • DILATATION AND CURETTAGE     • ENDOSCOPY N/A 12/1/2017    Procedure: ESOPHAGOGASTRODUODENOSCOPY WITH COLD FORCEP BIOPSY;  Surgeon: Danilo Shabazz MD;  Location: [facility] ENDOSCOPY;  Service:    • ENDOSCOPY N/A 9/3/2020    Procedure: ESOPHAGOGASTRODUODENOSCOPY WITH BIOPSIES AND DILATATION;  Surgeon: Lan" Bhupinder MONTENEGRO MD;  Location:  [facility];  Service: Gastroenterology;  Laterality: N/A;   • ESOPHAGEAL MOTILITY STUDY N/A 11/10/2020    Procedure: ESOPHAGEAL MANOMETRY;  Surgeon: Chivo Mcmahan MD;  Location:  [facility];  Service: Gastroenterology;  Laterality: N/A;   • HIP SURGERY Right     RIGHT HIP, REPORTS HAD BONES SCRAPED   • SHOULDER ARTHROSCOPY Right 10/11/2018    Procedure: Right shoulder diagnostic arthroscopy, limited rotator cuff debridement;  Surgeon: Tino Uribe MD;  Location:  [facility] OR;  Service: Orthopedics   • THYROID BIOPSY      goiter   • THYROIDECTOMY Left 2021    Procedure: THYROID LOBECTOMY LEFT;  Surgeon: Yves Contreras MD;  Location:  [facility];  Service: General;  Laterality: Left;   • THYROIDECTOMY, PARTIAL Left    • WISDOM TOOTH EXTRACTION       Family History   Problem Relation Age of Onset   • Diabetes Mother    • Arthritis Mother    • No Known Problems Father    • Cancer Maternal Grandmother    • Diabetes Maternal Grandmother    • Heart attack Maternal Grandmother    • Arthritis Maternal Grandmother    • Heart attack Paternal Grandmother    • Heart disease Sister    • Migraines Sister    • Cancer Paternal Grandfather         Lung   • Colon cancer Neg Hx    • Cirrhosis Neg Hx    • Liver cancer Neg Hx    • Liver disease Neg Hx      Social History     Socioeconomic History   • Marital status: Single     Spouse name: Not on file   • Number of children: 2   • Years of education: Not on file   • Highest education level: Not on file   Tobacco Use   • Smoking status: Former Smoker     Packs/day: 0.50     Years: 13.00     Pack years: 6.50     Types: Cigarettes     Quit date: 10/17/2020     Years since quittin.7   • Smokeless tobacco: Never Used   Vaping Use   • Vaping Use: Never used   Substance and Sexual Activity   • Alcohol use: No   • Drug use: No   • Sexual activity: Defer       Current Outpatient Medications:   •  albuterol sulfate  (90 Base) MCG/ACT  inhaler, Inhale 2 puffs Every 4 (Four) Hours As Needed for Wheezing or Shortness of Air., Disp: 18 g, Rfl: 1  •  atenolol (TENORMIN) 25 MG tablet, Take 1 tablet by mouth Daily., Disp: 60 tablet, Rfl: 11  •  buPROPion SR (Wellbutrin SR) 100 MG 12 hr tablet, Take 1 tablet by mouth 2 (Two) Times a Day., Disp: 60 tablet, Rfl: 5  •  cetirizine (zyrTEC) 10 MG tablet, Take 1 tablet by mouth Every Night., Disp: 30 tablet, Rfl: 11  •  cyclobenzaprine (FLEXERIL) 10 MG tablet, , Disp: , Rfl:   •  fluticasone (Flonase) 50 MCG/ACT nasal spray, 2 sprays into the nostril(s) as directed by provider Daily., Disp: 18.2 mL, Rfl: 11  •  hydrOXYzine (ATARAX) 25 MG tablet, Take 1 tablet by mouth 3 (Three) Times a Day As Needed for Itching., Disp: 60 tablet, Rfl: 0  •  levonorgestrel-ethinyl estradiol (AVIANE,ALESSE,LESSINA) 0.1-20 MG-MCG per tablet, Take 1 tablet by mouth Daily., Disp: 28 tablet, Rfl: 12  •  midodrine (PROAMATINE) 10 MG tablet, Take 1 tablet by mouth 2 (Two) Times a Day., Disp: 60 tablet, Rfl: 3  •  montelukast (Singulair) 10 MG tablet, Take 1 tablet by mouth Every Night., Disp: 90 tablet, Rfl: 1  •  ondansetron ODT (ZOFRAN-ODT) 4 MG disintegrating tablet, Place 1 tablet on the tongue Every 8 (Eight) Hours As Needed for Nausea or Vomiting., Disp: 30 tablet, Rfl: 1  •  pantoprazole (PROTONIX) 40 MG EC tablet, 1 po daily in the am 30 minutes before breakfast, Disp: 30 tablet, Rfl: 2     Allergies   Allergen Reactions   • Onion Itching     REPORTS CAUSES MIGRAINES and MAKES THROAT ITCHY     • Cabbage Headache   • Msg [Monosodium Glutamate] Headache   • Sulfa Antibiotics Hives     The following portions of the patient's history were reviewed and updated as appropriate: allergies, current medications, past family history, past medical history, past social history, past surgical history and problem list.  Objective     Physical Exam  Vitals and nursing note reviewed.   Constitutional:       General: She is not in acute  "distress.     Appearance: Normal appearance. She is well-developed. She is not diaphoretic.   HENT:      Head: Normocephalic and atraumatic.      Right Ear: Hearing and external ear normal.      Left Ear: Hearing and external ear normal.      Nose: Nose normal.      Mouth/Throat:      Mouth: Mucous membranes are not pale, not dry and not cyanotic.   Eyes:      General: Lids are normal.         Right eye: No discharge.         Left eye: No discharge.      Conjunctiva/sclera: Conjunctivae normal.   Neck:      Trachea: Trachea normal.   Cardiovascular:      Rate and Rhythm: Normal rate and regular rhythm.      Heart sounds: Normal heart sounds.   Pulmonary:      Effort: Pulmonary effort is normal. No respiratory distress.      Breath sounds: Normal breath sounds.   Chest:      Chest wall: No tenderness.   Abdominal:      General: Bowel sounds are normal. There is no distension.      Palpations: Abdomen is soft. There is no mass.      Tenderness: There is no abdominal tenderness. There is no guarding or rebound.      Hernia: No hernia is present.   Musculoskeletal:      Cervical back: No edema.   Skin:     General: Skin is warm and dry.      Coloration: Skin is not pale.      Findings: No rash.   Neurological:      Mental Status: She is alert and oriented to person, place, and time.      Cranial Nerves: No cranial nerve deficit.   Psychiatric:         Mood and Affect: Mood normal.         Speech: Speech normal.         Behavior: Behavior is cooperative.       Vitals:    08/04/21 1412   BP: 115/84   Pulse: 86   Resp: 16   Temp: 98.4 °F (36.9 °C)   Weight: 51.3 kg (113 lb)   Height: 154.9 cm (61\")     Results Review:   I reviewed the patient's new clinical results.    Office Visit on 06/10/2021   Component Date Value Ref Range Status   • HCG, Urine, QL 06/10/2021 Negative  Negative Final   • Lot Number 06/10/2021 [id number]   Final   • Internal Positive Control 06/10/2021 Passed  Passed Final   • Internal Negative Control " 06/10/2021 Passed  Passed Final      Us Liver   Result Date: 10/14/2020  Normal liver ultrasound.   This report was finalized on 10/14/2020 12:59 PM by Mary Cronin M.D..     Fluoroscopic esophagram complete dated 9/11/2019 the esophagus is normal. There is no hiatal hernia.  There is gastroesophageal reflux into the distal esophagus.  Peristalsis is normal.     EGD dated 9/3/2020 unremarkable.  No endoscopic esophageal abnormality to explain patient's dysphagia.  Esophagus dilated.  She may have functional dysphasia/globus sensation.  No endoscopic signs of EOE or achalasia.  Duodenum biopsy with nonspecific chronic duodenitis.  Stomach biopsy with oxyntic and antral type mucosa with reactive changes.  Negative for H. pylori, dysplasia or malignancy.  Esophagus biopsy proximal and distal squamous mucosa with reactive changes.  Negative for eosinophils, dysplasia or malignancy.     High-resolution impedance manometry report dated 11/10/2020 normal exam.  Normal peristalsis with normal DA of 96 mmHg.  Patient did not tolerate viscous swallows.  Normal bolus transit seen.    Assessment / Plan      1. Gastroesophageal reflux disease without esophagitis  Patient has had severe reflux for the past 1 to 2 weeks not improved with over-the-counter antacids. She had been taking pantoprazole 40 mg daily in January 2021, but when refills ran out, she stopped taking PPI. No abdominal pain. No black stools.  Pantoprazole 40 mg 1 p.o. daily x 3 months. Will consider decreasing dose to 20 mg daily in the future.  Antireflux measures.    - pantoprazole (PROTONIX) 40 MG EC tablet; 1 po daily in the am 30 minutes before breakfast  Dispense: 30 tablet; Refill: 2    2. Functional dysphagia  She has not had any further episodes of difficulty swallowing since having thyroid surgery. Esophageal manometry unremarkable. Will monitor for now.    1/4/2021  Difficulty swallowing has not changed. Esophageal manometry unremarkable.   Etiology unclear. She denies heartburn, has not noticed reflux.  Anti-reflux measures.  Advised to eat a softer diet, chew food very well, take sips of water between bites, and eat slowly.  Pantoprazole 40 mg daily x 3 months to see if symptoms improve, then re-evaluate.     10/5/2020  The patient has continued to have difficulty swallowing. EGD with no esophageal abnormality noted that could explain her difficulty swallowing. Esophagus empirically dilated. Swallowing did not improve after dilation. No heartburn or reflux. Biopsies negative for EOE.  Referral for esophageal [facility]ometry at [facility].     2/26/2020  Patient gives a progressive history of dysphagia to both solids and liquids over 2 years.  No significant odynophagia.  No history of any collagen vascular disease.  No any current reflux symptoms.   EGD done in 2017 revealed mild esophagitis.  Her recent flu esophagogram was normal.  She does have a small thyroid nodules largest being 1.5 cm in the left lobe of the thyroid which was benign.    Differential diagnosis include the EOE, but if any benign strictures given her prior esophagitis, and some functional component of dysphagia due to her antianxiety issue.   I doubt this small thyroid nodule giving any external compression and dysphagia  We will schedule her for an EGD and as above esophageal dilatation  We will get a random esophageal biopsy for EOE  We will consider esophageal manometry if above tests are all normal    3. Nausea  Nausea doing well at this time. No vomiting. Takes Zofran as needed with reasonable control. Continue same.    - ondansetron ODT (ZOFRAN-ODT) 4 MG disintegrating tablet; Place 1 tablet on the tongue Every 8 (Eight) Hours As Needed for Nausea or Vomiting.  Dispense: 30 tablet; Refill: 1    1/4/2021  Nausea gradually improved. Well controlled with Zofran as needed. Ultrasound unremarkable. Celiac panel negative. Labs unremarkable. Previous CTAP in 2019 unremarkable GI  tract.  Zofran as needed.  Pantoprazole 40 mg daily x 3 months, then re-evaluate.     10/5/2020  The patient has a history of nausea that is worse in the mornings for several months. EGD unremarkable. Biopsies negative for H. Pylori.  Abdominal ultrasound to rule out pancreatobiliary disease.  CMP, IgA, tTg IgA    4. Weight loss  She has not lost any further weight. No loss of appetite. Monitor for now.    1/4/2021  She has gained 6 pounds in the past month intentionally.   Will monitor for now.     10/5/2020  The patient has lost about 20 pounds over the past year unintentionally. She has not had much of an appetite and does not eat as much as she had in the past. TSH normal, although she has a history of thyroid nodules.    5. Bilirubinemia  6. ?Gilbert syndrome  Recent labs with normal bilirubin.    1/4/2021  Liver enzymes normal. Mild elevation of bilirubin noted, but total/direct bilirubin normal. US unremarkable, normal CBD. No history of jaundice or pruritis. Suspect secondary to Gilbert Syndrome, which is a benign finding.     10/5/2020  Labs with continued mild elevation of bilirubin. No history of other elevated liver enzymes. No history of jaundice.  CMP, direct/total bilirubin to rule out Gilbert syndrome.  Abdominal ultrasound to evaluate CBD size.     2/26/2020  Recent lab work revealed borderline elevated total bilirubin 1.5.  Enzymes normal.  Recent CT of the abdomen pelvis was normal reviewed.   She had a gallbladder removed in the past due to gallbladder dysmotility  We will repeat CMP in 4 weeks time, and assess further depending on the repeat result    Patient Instructions   Antireflux measures: Avoid fried, fatty foods, alcohol, chocolate, coffee, tea,  soft drinks, peppermint and spearmint, spicy foods, tomatoes and tomato based foods, onions, peppers, and smoking.   Other antireflux measures include weight reduction if overweight, avoiding tight clothing around the abdomen, elevating the head  of the bed 6 inches with blocks under the head board, and don't drink or eat before going to bed and avoid lying down immediately after meals.  Pantoprazole 40 mg 1 by mouth in the am 30 minutes before breakfast.  Zofran 4 mg 1 po every 8 hours as needed for nausea.   Follow up: 3 months or sooner if symptoms worsen    Hailey Mason, APRN  8/4/2021    Please note that portions of this note may have been completed with a voice recognition program. Efforts were made to edit the dictations, but occasionally words are mistranscribed.

## 2021-08-04 NOTE — PATIENT INSTRUCTIONS
Antireflux measures: Avoid fried, fatty foods, alcohol, chocolate, coffee, tea,  soft drinks, peppermint and spearmint, spicy foods, tomatoes and tomato based foods, onions, peppers, and smoking.   Other antireflux measures include weight reduction if overweight, avoiding tight clothing around the abdomen, elevating the head of the bed 6 inches with blocks under the head board, and don't drink or eat before going to bed and avoid lying down immediately after meals.  Pantoprazole 40 mg 1 by mouth in the am 30 minutes before breakfast.  Zofran 4 mg 1 po every 8 hours as needed for nausea.   Follow up: 3 months or sooner if symptoms worsen

## 2021-08-10 ENCOUNTER — OFFICE VISIT (OUTPATIENT)
Dept: ORTHOPEDIC SURGERY | Facility: CLINIC | Age: 31
End: 2021-08-10

## 2021-08-10 VITALS — TEMPERATURE: 97.2 F | BODY MASS INDEX: 21.34 KG/M2 | WEIGHT: 113 LBS | HEIGHT: 61 IN

## 2021-08-10 DIAGNOSIS — S99.921A INJURY, FOOT, RIGHT, INITIAL ENCOUNTER: Primary | ICD-10-CM

## 2021-08-10 DIAGNOSIS — M84.374A STRESS FRACTURE OF RIGHT FOOT, INITIAL ENCOUNTER: ICD-10-CM

## 2021-08-10 PROCEDURE — 99213 OFFICE O/P EST LOW 20 MIN: CPT | Performed by: PHYSICIAN ASSISTANT

## 2021-08-10 NOTE — PROGRESS NOTES
"Subjective   Patient ID: Radha Dixon is a 31 y.o. female  Follow-up and Results of the Right Foot (Go over MRI results. She is still having pain, has noticed some numbness in toes, bottom of foot is still bruised, has some pain lateral foot. DOI: 6/12/21)         History of Present Illness    X-ray of patient presents to review MRI results of the right foot.  She is still experiencing right foot pain worse when weightbearing.  She has tried using a pneumatic boot as well as a fracture shoe without improvement.    Past Medical History:   Diagnosis Date   • Abnormal Pap smear of cervix    • Acid reflux    • Anxiety    • Anxiety and depression    • Arthritis    • Asthma    • B12 deficiency 5/5/2021   • Bipolar 1 disorder (CMS/HCC)    • Body piercing     EARS, NOSE, BELLY BUTTON   • Dysphagia     REPORTS SOMETIMES FEELS LIKE \"FOOD GETS STUCK\"   • Fracture     HISTORY OF RIGHT WRIST AS A CHILD   • Goiter     x3   • Heart rate fast     states usually 120-130 (states on med for this)   • History of migraine    • Hx of exercise stress test 04/01/2020   • Inappropriate sinus tachycardia     per Dr. Dong's note on 8/12/20.     • Injury of neck    • Insomnia    • Migraine    • Ovarian cyst    • Palpitations    • PONV (postoperative nausea and vomiting)    • Scoliosis    • Tachycardia    • Tattoo    • Thyroid nodule    • Toe fracture     right great toe   • Wears glasses         Past Surgical History:   Procedure Laterality Date   • APPENDECTOMY  2016   • CHOLECYSTECTOMY     • COLPOSCOPY     • CYST REMOVAL      OVARIAN CYST   • DILATATION AND CURETTAGE     • ENDOSCOPY N/A 12/1/2017    Procedure: ESOPHAGOGASTRODUODENOSCOPY WITH COLD FORCEP BIOPSY;  Surgeon: Danilo Shabazz MD;  Location: [facility] ENDOSCOPY;  Service:    • ENDOSCOPY N/A 9/3/2020    Procedure: ESOPHAGOGASTRODUODENOSCOPY WITH BIOPSIES AND DILATATION;  Surgeon: Bhupinder Montenegro MD;  Location: [facility] ENDOSCOPY;  Service: Gastroenterology;  Laterality: " N/A;   • ESOPHAGEAL MOTILITY STUDY N/A 11/10/2020    Procedure: ESOPHAGEAL MANOMETRY;  Surgeon: Chivo Mcmahan MD;  Location:  [facility] ENDOSCOPY;  Service: Gastroenterology;  Laterality: N/A;   • HIP SURGERY Right     RIGHT HIP, REPORTS HAD BONES SCRAPED   • SHOULDER ARTHROSCOPY Right 10/11/2018    Procedure: Right shoulder diagnostic arthroscopy, limited rotator cuff debridement;  Surgeon: Tino Uribe MD;  Location:  [facility] OR;  Service: Orthopedics   • THYROID BIOPSY      goiter   • THYROIDECTOMY Left 2021    Procedure: THYROID LOBECTOMY LEFT;  Surgeon: Yves Contreras MD;  Location: [facility] OR;  Service: General;  Laterality: Left;   • THYROIDECTOMY, PARTIAL Left    • WISDOM TOOTH EXTRACTION         Family History   Problem Relation Age of Onset   • Diabetes Mother    • Arthritis Mother    • No Known Problems Father    • Cancer Maternal Grandmother    • Diabetes Maternal Grandmother    • Heart attack Maternal Grandmother    • Arthritis Maternal Grandmother    • Heart attack Paternal Grandmother    • Heart disease Sister    • Migraines Sister    • Cancer Paternal Grandfather         Lung   • Colon cancer Neg Hx    • Cirrhosis Neg Hx    • Liver cancer Neg Hx    • Liver disease Neg Hx        Social History     Socioeconomic History   • Marital status: Single     Spouse name: Not on file   • Number of children: 2   • Years of education: Not on file   • Highest education level: Not on file   Tobacco Use   • Smoking status: Former Smoker     Packs/day: 0.50     Years: 13.00     Pack years: 6.50     Types: Cigarettes     Quit date: 10/17/2020     Years since quittin.8   • Smokeless tobacco: Never Used   Vaping Use   • Vaping Use: Never used   Substance and Sexual Activity   • Alcohol use: No   • Drug use: No   • Sexual activity: Defer         Current Outpatient Medications:   •  albuterol sulfate  (90 Base) MCG/ACT inhaler, Inhale 2 puffs Every 4 (Four) Hours As Needed for Wheezing or Shortness  of Air., Disp: 18 g, Rfl: 1  •  atenolol (TENORMIN) 25 MG tablet, Take 1 tablet by mouth Daily., Disp: 60 tablet, Rfl: 11  •  buPROPion SR (Wellbutrin SR) 100 MG 12 hr tablet, Take 1 tablet by mouth 2 (Two) Times a Day., Disp: 60 tablet, Rfl: 5  •  cetirizine (zyrTEC) 10 MG tablet, Take 1 tablet by mouth Every Night., Disp: 30 tablet, Rfl: 11  •  cyclobenzaprine (FLEXERIL) 10 MG tablet, , Disp: , Rfl:   •  fluticasone (Flonase) 50 MCG/ACT nasal spray, 2 sprays into the nostril(s) as directed by provider Daily., Disp: 18.2 mL, Rfl: 11  •  hydrOXYzine (ATARAX) 25 MG tablet, Take 1 tablet by mouth 3 (Three) Times a Day As Needed for Itching., Disp: 60 tablet, Rfl: 0  •  levonorgestrel-ethinyl estradiol (AVIANE,ALESSE,LESSINA) 0.1-20 MG-MCG per tablet, Take 1 tablet by mouth Daily., Disp: 28 tablet, Rfl: 12  •  midodrine (PROAMATINE) 10 MG tablet, Take 1 tablet by mouth 2 (Two) Times a Day., Disp: 60 tablet, Rfl: 3  •  montelukast (Singulair) 10 MG tablet, Take 1 tablet by mouth Every Night., Disp: 90 tablet, Rfl: 1  •  ondansetron ODT (ZOFRAN-ODT) 4 MG disintegrating tablet, Place 1 tablet on the tongue Every 8 (Eight) Hours As Needed for Nausea or Vomiting., Disp: 30 tablet, Rfl: 1  •  pantoprazole (PROTONIX) 40 MG EC tablet, 1 po daily in the am 30 minutes before breakfast, Disp: 30 tablet, Rfl: 2    Allergies   Allergen Reactions   • Onion Itching     REPORTS CAUSES MIGRAINES and MAKES THROAT ITCHY     • Cabbage Headache   • Msg [Monosodium Glutamate] Headache   • Sulfa Antibiotics Hives       Review of Systems   Constitutional: Negative for fever.   HENT: Negative for dental problem and voice change.    Eyes: Negative for visual disturbance.   Respiratory: Negative for shortness of breath.    Cardiovascular: Negative for chest pain.   Gastrointestinal: Negative for abdominal pain.   Genitourinary: Negative for dysuria.   Musculoskeletal: Positive for arthralgias and joint swelling. Negative for gait problem.  "  Skin: Positive for color change (bruising bottom of right foot). Negative for rash.   Neurological: Positive for numbness. Negative for speech difficulty.   Hematological: Does not bruise/bleed easily.   Psychiatric/Behavioral: Negative for confusion.       I have reviewed the medical and surgical history, family history, social history, medications, and/or allergies, and the review of systems of this report.    Objective   Temp 97.2 °F (36.2 °C)   Ht 154.9 cm (61\")   Wt 51.3 kg (113 lb)   LMP 07/27/2021   BMI 21.35 kg/m²    Physical Exam  Vitals and nursing note reviewed.   Constitutional:       Appearance: Normal appearance.   Pulmonary:      Effort: Pulmonary effort is normal.   Musculoskeletal:      Right ankle: Normal.      Right Achilles Tendon: Normal.      Right foot: Normal capillary refill. Tenderness and bony tenderness present. No deformity or crepitus. Normal pulse.   Neurological:      Mental Status: She is alert and oriented to person, place, and time.       Ortho Exam   Extremity DVT signs are negative on physical exam with negative Zohaib sign, no calf pain, no palpable cords and no skin tone change   Neurologic Exam     Mental Status   Oriented to person, place, and time.               Assessment/Plan   Independent Review of Radiographic Studies:    No new imaging done today.  Study Result    Narrative & Impression   PROCEDURE: MRI FOOT RIGHT WO CONTRAST-     HISTORY: right foot pain; S99.921A-Unspecified injury of right foot,  initial encounter; S99.921A-Unspecified injury of right foot, initial  encounter; M79.671-Pain in right foot     TECHNIQUE: Multiplanar multisequence imaging of the foot was performed  without the use of intravenous contrast.     FINDINGS: There is abnormal marrow edema in the distal fourth metatarsal  likely reflecting a stress fracture. Bone marrow signal is otherwise  homogeneous The flexor and extensor tendons are intact. The Lisfranc  ligament is intact. The " plantar fascia is normal. The articular  cartilage is preserved.     IMPRESSION:  Findings consistent with a stress fracture involving the  distal fourth metatarsal.     This report was finalized on 8/2/2021 11:36 AM by Mary Cronin M.D..         Procedures       Diagnoses and all orders for this visit:    1. Injury, foot, right, initial encounter (Primary)    2. Stress fracture of right foot, initial encounter       Orthopedic activities reviewed and patient expressed appreciation  Discussion of orthopedic goals  Risk, benefits, and merits of treatment alternatives reviewed with the patient and questions answered  Reduced physical activity as appropriate  Weight bearing parameters reviewed  Avoid offending activity  Cast care instructions given    Recommendations/Plan:  Exercise, medications, injections, other patient advice, and return appointment as noted.  Patient is encouraged to call or return for any issues or concerns.    Patient was placed in a short leg fiberglass cast.  Follow-up in 4 weeks x-ray on arrival.  She was also provided crutches for nonweightbearing parameters  Patient agreeable to call or return sooner for any concerns.               EMR Dragon-transcription disclaimer:  This encounter note is an electronic transcription of spoken language to printed text.  Electronic transcription of spoken language may permit erroneous or at times nonsensical words or phrases to be inadvertently transcribed.  Although I have reviewed the note for such errors, some may still exist

## 2021-09-07 ENCOUNTER — OFFICE VISIT (OUTPATIENT)
Dept: ORTHOPEDIC SURGERY | Facility: CLINIC | Age: 31
End: 2021-09-07

## 2021-09-07 VITALS — TEMPERATURE: 97.1 F | HEIGHT: 61 IN | WEIGHT: 113 LBS | RESPIRATION RATE: 18 BRPM | BODY MASS INDEX: 21.34 KG/M2

## 2021-09-07 DIAGNOSIS — M84.374A STRESS FRACTURE OF RIGHT FOOT, INITIAL ENCOUNTER: Primary | ICD-10-CM

## 2021-09-07 PROCEDURE — 99212 OFFICE O/P EST SF 10 MIN: CPT | Performed by: PHYSICIAN ASSISTANT

## 2021-09-07 NOTE — PROGRESS NOTES
"Subjective   Patient ID: Radha Dixon is a 31 y.o. right hand dominant female  Follow-up of the Right Foot (Following up from injury on 6/12/21, presents in cast applied at 8/10/21 visit, denies pain)         History of Present Illness    Patient is following up for scheduled appointment regarding right foot metatarsal stress fracture.  Patient has been immobilized in a short leg cast that was applied during her last office visit.  She denies having pain.  She states after having the cast removed in the clinic she does have some stiffness and soreness to the ankle.      Past Medical History:   Diagnosis Date   • Abnormal Pap smear of cervix    • Acid reflux    • Anxiety    • Anxiety and depression    • Arthritis    • Asthma    • B12 deficiency 5/5/2021   • Bipolar 1 disorder (CMS/HCC)    • Body piercing     EARS, NOSE, BELLY BUTTON   • Dysphagia     REPORTS SOMETIMES FEELS LIKE \"FOOD GETS STUCK\"   • Fracture     HISTORY OF RIGHT WRIST AS A CHILD   • Goiter     x3   • Heart rate fast     states usually 120-130 (states on med for this)   • History of migraine    • Hx of exercise stress test 04/01/2020   • Inappropriate sinus tachycardia     per Dr. Dong's note on 8/12/20.     • Injury of neck    • Insomnia    • Migraine    • Ovarian cyst    • Palpitations    • PONV (postoperative nausea and vomiting)    • Scoliosis    • Tachycardia    • Tattoo    • Thyroid nodule    • Toe fracture     right great toe   • Wears glasses         Past Surgical History:   Procedure Laterality Date   • APPENDECTOMY  2016   • CHOLECYSTECTOMY     • COLPOSCOPY     • CYST REMOVAL      OVARIAN CYST   • DILATATION AND CURETTAGE     • ENDOSCOPY N/A 12/1/2017    Procedure: ESOPHAGOGASTRODUODENOSCOPY WITH COLD FORCEP BIOPSY;  Surgeon: Danilo Shabazz MD;  Location: [facility] ENDOSCOPY;  Service:    • ENDOSCOPY N/A 9/3/2020    Procedure: ESOPHAGOGASTRODUODENOSCOPY WITH BIOPSIES AND DILATATION;  Surgeon: Bhupinder Montenegro MD;  Location: [facility]" [facility] ENDOSCOPY;  Service: Gastroenterology;  Laterality: N/A;   • ESOPHAGEAL MOTILITY STUDY N/A 11/10/2020    Procedure: ESOPHAGEAL MANOMETRY;  Surgeon: Chivo Mcmahan MD;  Location:  [facility]OSCOPY;  Service: Gastroenterology;  Laterality: N/A;   • HIP SURGERY Right     RIGHT HIP, REPORTS HAD BONES SCRAPED   • SHOULDER ARTHROSCOPY Right 10/11/2018    Procedure: Right shoulder diagnostic arthroscopy, limited rotator cuff debridement;  Surgeon: Tino Uribe MD;  Location:  [facility] OR;  Service: Orthopedics   • THYROID BIOPSY      goiter   • THYROIDECTOMY Left 2021    Procedure: THYROID LOBECTOMY LEFT;  Surgeon: Yves Contreras MD;  Location:  [facility] OR;  Service: General;  Laterality: Left;   • THYROIDECTOMY, PARTIAL Left    • WISDOM TOOTH EXTRACTION         Family History   Problem Relation Age of Onset   • Diabetes Mother    • Arthritis Mother    • No Known Problems Father    • Cancer Maternal Grandmother    • Diabetes Maternal Grandmother    • Heart attack Maternal Grandmother    • Arthritis Maternal Grandmother    • Heart attack Paternal Grandmother    • Heart disease Sister    • Migraines Sister    • Cancer Paternal Grandfather         Lung   • Colon cancer Neg Hx    • Cirrhosis Neg Hx    • Liver cancer Neg Hx    • Liver disease Neg Hx        Social History     Socioeconomic History   • Marital status: Single     Spouse name: Not on file   • Number of children: 2   • Years of education: Not on file   • Highest education level: Not on file   Tobacco Use   • Smoking status: Former Smoker     Packs/day: 0.50     Years: 13.00     Pack years: 6.50     Types: Cigarettes     Quit date: 10/17/2020     Years since quittin.8   • Smokeless tobacco: Never Used   Vaping Use   • Vaping Use: Never used   Substance and Sexual Activity   • Alcohol use: No   • Drug use: No   • Sexual activity: Defer         Current Outpatient Medications:   •  albuterol sulfate  (90 Base) MCG/ACT inhaler, Inhale 2 puffs  Every 4 (Four) Hours As Needed for Wheezing or Shortness of Air., Disp: 18 g, Rfl: 1  •  atenolol (TENORMIN) 25 MG tablet, Take 1 tablet by mouth Daily., Disp: 60 tablet, Rfl: 11  •  buPROPion SR (Wellbutrin SR) 100 MG 12 hr tablet, Take 1 tablet by mouth 2 (Two) Times a Day., Disp: 60 tablet, Rfl: 5  •  fluticasone (Flonase) 50 MCG/ACT nasal spray, 2 sprays into the nostril(s) as directed by provider Daily., Disp: 18.2 mL, Rfl: 11  •  levonorgestrel-ethinyl estradiol (AVIANE,ALESSE,LESSINA) 0.1-20 MG-MCG per tablet, Take 1 tablet by mouth Daily., Disp: 28 tablet, Rfl: 12  •  midodrine (PROAMATINE) 10 MG tablet, Take 1 tablet by mouth 2 (Two) Times a Day., Disp: 60 tablet, Rfl: 3  •  montelukast (Singulair) 10 MG tablet, Take 1 tablet by mouth Every Night., Disp: 90 tablet, Rfl: 1  •  ondansetron ODT (ZOFRAN-ODT) 4 MG disintegrating tablet, Place 1 tablet on the tongue Every 8 (Eight) Hours As Needed for Nausea or Vomiting., Disp: 30 tablet, Rfl: 1  •  pantoprazole (PROTONIX) 40 MG EC tablet, 1 po daily in the am 30 minutes before breakfast, Disp: 30 tablet, Rfl: 2    Allergies   Allergen Reactions   • Onion Itching     REPORTS CAUSES MIGRAINES and MAKES THROAT ITCHY     • Cabbage Headache   • Msg [Monosodium Glutamate] Headache   • Sulfa Antibiotics Hives       Review of Systems   Constitutional: Negative for diaphoresis, fever and unexpected weight change.   HENT: Negative for dental problem and sore throat.    Eyes: Negative for visual disturbance.   Respiratory: Negative for shortness of breath.    Cardiovascular: Negative for chest pain.   Gastrointestinal: Negative for abdominal pain, constipation, diarrhea, nausea and vomiting.   Genitourinary: Negative for difficulty urinating and frequency.   Neurological: Negative for headaches.   Hematological: Does not bruise/bleed easily.       I have reviewed the medical and surgical history, family history, social history, medications, and/or allergies, and the review  "of systems of this report.    Objective   Temp 97.1 °F (36.2 °C) (Skin)   Resp 18   Ht 154.9 cm (61\")   Wt 51.3 kg (113 lb)   BMI 21.35 kg/m²    Physical Exam  Vitals and nursing note reviewed.   Constitutional:       Appearance: Normal appearance.   Pulmonary:      Effort: Pulmonary effort is normal.   Musculoskeletal:      Right ankle: No deformity or ecchymosis. No tenderness. Decreased range of motion (from recent immobilization). Anterior drawer test negative.      Right Achilles Tendon: Normal.      Right foot: Normal capillary refill. No deformity, bunion, tenderness, bony tenderness or crepitus. Normal pulse.   Neurological:      Mental Status: She is alert and oriented to person, place, and time.   Psychiatric:         Behavior: Behavior normal.       Ortho Exam   Extremity DVT signs are negative on physical exam with negative Zohaib sign, no calf pain, no palpable cords and no skin tone change   Neurologic Exam     Mental Status   Oriented to person, place, and time.              Assessment/Plan   Independent Review of Radiographic Studies:    X-ray of the right foot 3 view independently reviewed in the office for the evaluation of foot pain.  Comparison films are available reviewed.  No evidence of interval displacement.      Procedures       Diagnoses and all orders for this visit:    1. Stress fracture of right foot, initial encounter (Primary)  -     XR Foot 3+ View Right; Future  -     Ambulatory Referral to Physical Therapy Evaluate and treat, Ortho; Strengthening, Stretching, ROM       Orthopedic activities reviewed and patient expressed appreciation  Discussion of orthopedic goals  Risk, benefits, and merits of treatment alternatives reviewed with the patient and questions answered  Physical therapy referral given  Reduced physical activity as appropriate  Weight bearing parameters reviewed  Ice, heat, and/or modalities as beneficial    Recommendations/Plan:  Exercise, medications, injections, " other patient advice, and return appointment as noted.  Patient is encouraged to call or return for any issues or concerns.    Patient does have her pneumatic walking boot that she agrees to use for the next 2 weeks.  I instructed her to try and transition out of the boot over the next 2 weeks once therapy has begun.  Patient agreeable to call or return sooner for any concerns.               EMR Dragon-transcription disclaimer:  This encounter note is an electronic transcription of spoken language to printed text.  Electronic transcription of spoken language may permit erroneous or at times nonsensical words or phrases to be inadvertently transcribed.  Although I have reviewed the note for such errors, some may still exist

## 2021-10-07 ENCOUNTER — TREATMENT (OUTPATIENT)
Dept: PHYSICAL THERAPY | Facility: CLINIC | Age: 31
End: 2021-10-07

## 2021-10-07 DIAGNOSIS — M84.374A STRESS FRACTURE OF METATARSAL BONE OF RIGHT FOOT, INITIAL ENCOUNTER: Primary | ICD-10-CM

## 2021-10-07 PROCEDURE — 97161 PT EVAL LOW COMPLEX 20 MIN: CPT | Performed by: PHYSICAL THERAPIST

## 2021-10-07 NOTE — PROGRESS NOTES
Physical Therapy Initial Evaluation and Plan of Care    TOTAL TIME: 45 MINUTES    Subjective Evaluation    History of Present Illness  Mechanism of injury: Injured in ; was running through the house and 'kicked' the door jamb; 'broke all 4 little toes and stress fx my 4th metatarsal'    Was in a walking boot initially, then MRI revealed stress fracture; then was casted for 4 weeks, came out in September    Walked with crutches and fx boot for awhile    Now ambulating without crutches    Tried jumping rope - that was painful; also ambulating barefoot, walking down stairs, being on the foot too long    Works in school cafeteria, 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; wants to get to work asap     PMH: right shoulder surgery (acromioplasty?) , thyroid taken out, 2 bulging discs in neck, OA in knees and fingers, right hip surgery ; scoliosis       Patient Occupation: school [id number]  Quality of life: good    Pain  Current pain ratin  At worst pain ratin  Quality: discomfort and sharp  Relieving factors: medications (ibuprofen prn )  Aggravating factors: movement, stairs, standing, ambulation and squatting  Progression: improved    Patient Goals  Patient goals for therapy: increased strength, independence with ADLs/IADLs, return to sport/leisure activities, return to work, increased motion, improved balance and decreased pain             Objective          Observations     Additional Ankle/Foot Observation Details  Decreased AROM of 4 lesser toes, difficulty flexing toes    No bruising or edema noted     Difficulty balancing on right foot; complains of tingling in foot occasionally     Tenderness     Right Ankle/Foot   Tenderness in the dorsum foot, metatarsal head and plantar fascia.     Neurological Testing     Sensation     Ankle/Foot   Left Ankle/Foot   Intact: light touch    Right Ankle/Foot   Intact: light touch     Active Range of Motion     Right Ankle/Foot   Dorsiflexion (ke): 0 degrees   Plantar  flexion: 52 degrees   Inversion: WFL  Eversion: WFL  Lesser toes: with pain    Strength/Myotome Testing     Left Ankle/Foot   Dorsiflexion: 5  Plantar flexion: 5  Inversion: 5  Eversion: 5    Right Ankle/Foot   Dorsiflexion: 4  Plantar flexion: 4  Inversion: 4-  Eversion: 4    Tests     Right Ankle/Foot   Negative for Homans' sign and Martinez.           Assessment & Plan     Assessment  Impairments: abnormal or restricted ROM, activity intolerance, impaired balance, impaired physical strength, lacks appropriate home exercise program, pain with function and weight-bearing intolerance  Assessment details: Very pleasant 32 yo presents s/p stress fx of 4th metatarsal after accidentally kicking the door jamb as she was running through; she has decreased functional mobility and strength ; she is hoping to return to work ASAP as a ; she should benefit from PT to restore full functional mobility and strength  Prognosis: good  Functional Limitations: carrying objects, walking, pulling, pushing, uncomfortable because of pain and standing  Goals  Plan Goals: 4 weeks:  1. IND with HEP  2. Patient to display full, pain-free AROM of right foot  3. Patient to display 5/5 MMT of right LE  4. Patient to score > 65 on LEFS  5. Patient to participate in up to 60 minutes of  strength/balance/proprioception/endurance without a significant increase in pain in order to RTW    Plan  Therapy options: will be seen for skilled physical therapy services  Planned modality interventions: TENS, dry needling, cryotherapy, high voltage pulsed current (pain management) and thermotherapy (hydrocollator packs)  Planned therapy interventions: manual therapy, neuromuscular re-education, soft tissue mobilization, strengthening, stretching, therapeutic activities, joint mobilization, home exercise program, gait training, functional ROM exercises, flexibility, body mechanics training and balance/weight-bearing training  Frequency: 2x  week  Duration in visits: 8  Treatment plan discussed with: patient         Access Code: [email or web address]  URL: [email or web address]  Date: 10/07/2021  Prepared by: Wayne Saab    Exercises  Supine Ankle Pumps - 3 x daily - 7 x weekly - 1 sets - 30 reps  Ankle Circles in Elevation - 3 x daily - 7 x weekly - 1 sets - 30 reps  Seated Heel Raise - 3 x daily - 7 x weekly - 1 sets - 30 reps  Seated Toe Raise - 3 x daily - 7 x weekly - 1 sets - 30 reps  Ankle Inversion Eversion Towel Slide - 3 x daily - 7 x weekly - 1 sets - 30 reps  Seated Calf Stretch with Strap - 3 x daily - 7 x weekly - 1 sets - 5 reps - 10 hold  Seated Great Toe Extension - 3 x daily - 7 x weekly - 1 sets - 30 reps  Seated Lesser Toes Extension - 3 x daily - 7 x weekly - 1 sets - 30 reps  Seated Toe Curl - 3 x daily - 7 x weekly - 1 sets - 30 reps  Towel Scrunches - 3 x daily - 7 x weekly - 1 sets - 30 reps    Manual Therapy:         mins  79828;  Therapeutic Exercise:    15     mins  78094;     Neuromuscular Joi:    15    mins  09119;    Therapeutic Activity:          mins  63390;     Gait Training:           mins  12271;     Ultrasound:          mins  41501;    Electrical Stimulation:         mins  05587 ( );  Dry Needling          mins self-pay    Timed Treatment:   30 (nc)  mins   Total Treatment:     45   mins    PT SIGNATURE: SURI Saab, PT   DATE TREATMENT INITIATED: 10/7/2021    Initial Certification  Certification Period: 1/5/2022  I certify that the therapy services are furnished while this patient is under my care.  The services outlined above are required by this patient, and will be reviewed every 90 days.     PHYSICIAN: Shane Uriarte PA-C      DATE:     Please sign and return via fax to  .. Thank you, [facility].

## 2021-10-12 ENCOUNTER — TELEPHONE (OUTPATIENT)
Dept: PHYSICAL THERAPY | Facility: CLINIC | Age: 31
End: 2021-10-12

## 2021-10-15 ENCOUNTER — TREATMENT (OUTPATIENT)
Dept: PHYSICAL THERAPY | Facility: CLINIC | Age: 31
End: 2021-10-15

## 2021-10-15 DIAGNOSIS — M84.374A STRESS FRACTURE OF METATARSAL BONE OF RIGHT FOOT, INITIAL ENCOUNTER: Primary | ICD-10-CM

## 2021-10-15 PROCEDURE — 97110 THERAPEUTIC EXERCISES: CPT | Performed by: PHYSICAL THERAPIST

## 2021-10-15 PROCEDURE — 97140 MANUAL THERAPY 1/> REGIONS: CPT | Performed by: PHYSICAL THERAPIST

## 2021-10-15 PROCEDURE — 97112 NEUROMUSCULAR REEDUCATION: CPT | Performed by: PHYSICAL THERAPIST

## 2021-10-15 NOTE — PROGRESS NOTES
Physical Therapy Daily Progress Note    TOTAL TIME: 60 MINUTES    Radha Dixon reports: feels good ; my second toe popped during the towel scrunch exercises and felt much better after that       Objective   See Exercise, Manual, and Modality Logs for complete treatment.     THERAPEUTIC EXERCISES/ACTIVITIES ADDED TODAY: see flow sheets and previous hep  Exercises  Supine Ankle Pumps - 3 x daily - 7 x weekly - 1 sets - 30 reps  Ankle Circles in Elevation - 3 x daily - 7 x weekly - 1 sets - 30 reps  Seated Heel Raise - 3 x daily - 7 x weekly - 1 sets - 30 reps  Seated Toe Raise - 3 x daily - 7 x weekly - 1 sets - 30 reps  Ankle Inversion Eversion Towel Slide - 3 x daily - 7 x weekly - 1 sets - 30 reps  Seated Calf Stretch with Strap - 3 x daily - 7 x weekly - 1 sets - 5 reps - 10 hold  Seated Great Toe Extension - 3 x daily - 7 x weekly - 1 sets - 30 reps  Seated Lesser Toes Extension - 3 x daily - 7 x weekly - 1 sets - 30 reps  Seated Toe Curl - 3 x daily - 7 x weekly - 1 sets - 30 reps  Towel Scrunches - 3 x daily - 7 x weekly - 1 sets - 30 reps    Manual: right ankle/foot mobilizations    Assessment/Plan  Improved ROM, improved gait; continue with PT towards functional mobility and strength     Progress per Plan of Care           Manual Therapy:    15     mins  79340;  Therapeutic Exercise:    30     mins  31463;     Neuromuscular Joi:    15    mins  34518;    Therapeutic Activity:          mins  36671;     Gait Training:           mins  66165;     Ultrasound:          mins  14236;    Electrical Stimulation:         mins  69421 ( );  Dry Needling          mins self-pay    Timed Treatment:   60   mins   Total Treatment:     60   mins    SURI Saab, PT  Physical Therapist

## 2021-10-20 ENCOUNTER — TREATMENT (OUTPATIENT)
Dept: PHYSICAL THERAPY | Facility: CLINIC | Age: 31
End: 2021-10-20

## 2021-10-20 DIAGNOSIS — M84.374A STRESS FRACTURE OF METATARSAL BONE OF RIGHT FOOT, INITIAL ENCOUNTER: Primary | ICD-10-CM

## 2021-10-20 PROCEDURE — 97140 MANUAL THERAPY 1/> REGIONS: CPT | Performed by: PHYSICAL THERAPIST

## 2021-10-20 PROCEDURE — 97112 NEUROMUSCULAR REEDUCATION: CPT | Performed by: PHYSICAL THERAPIST

## 2021-10-20 PROCEDURE — 97530 THERAPEUTIC ACTIVITIES: CPT | Performed by: PHYSICAL THERAPIST

## 2021-10-20 PROCEDURE — 97110 THERAPEUTIC EXERCISES: CPT | Performed by: PHYSICAL THERAPIST

## 2021-10-20 NOTE — PROGRESS NOTES
Physical Therapy Daily Progress Note    TOTAL TIME: 55 MINUTES    Radha Dixon reports: foot has been a little more sore today than normal; have been doing a lot of errands, running around, etc; feels fine while I'm doing it, but gets a little sore towards the end of the day       Objective   See Exercise, Manual, and Modality Logs for complete treatment.     THERAPEUTIC EXERCISES/ACTIVITIES ADDED TODAY: air ex balance x 3, green tband 4 ways     Manual: right foot mobilizations , ankle mobilizations     Assessment/Plan  Patient is making excellent progress with functional mobility of foot and toes; continue PT towards improved functional mobility and strength     Progress per Plan of Care and Progress strengthening /stabilization /functional activity           Manual Therapy:    15     mins  91318;  Therapeutic Exercise:    15     mins  36480;     Neuromuscular Joi:    15    mins  71862;    Therapeutic Activity:     10     mins  82093;     Gait Training:           mins  98564;     Ultrasound:          mins  89244;    Electrical Stimulation:         mins  17586 ( );  Dry Needling          mins self-pay    Timed Treatment:   55   mins   Total Treatment:     55   mins    SURI Saab, PT  Physical Therapist

## 2021-10-22 ENCOUNTER — TREATMENT (OUTPATIENT)
Dept: PHYSICAL THERAPY | Facility: CLINIC | Age: 31
End: 2021-10-22

## 2021-10-22 DIAGNOSIS — M84.374A STRESS FRACTURE OF METATARSAL BONE OF RIGHT FOOT, INITIAL ENCOUNTER: Primary | ICD-10-CM

## 2021-10-22 PROCEDURE — 97530 THERAPEUTIC ACTIVITIES: CPT | Performed by: PHYSICAL THERAPIST

## 2021-10-22 PROCEDURE — 97112 NEUROMUSCULAR REEDUCATION: CPT | Performed by: PHYSICAL THERAPIST

## 2021-10-22 PROCEDURE — 97110 THERAPEUTIC EXERCISES: CPT | Performed by: PHYSICAL THERAPIST

## 2021-10-22 NOTE — PROGRESS NOTES
Physical Therapy Daily Progress Note    TOTAL TIME: 50 MINUTES    Radha Dixon reports: foot has been feeling great, continuing to improve       Objective   See Exercise, Manual, and Modality Logs for complete treatment.     THERAPEUTIC EXERCISES/ACTIVITIES ADDED TODAY: air ex balance, wobble board, line walking , calf raises, toe walking; air ex balance with arm challenges, eyes closed etc    Assessment/Plan  PATIENT NEEDS CONTINUED PHYSICAL THERAPY IN ORDER TO ACHIEVE FULL ROM, STRENGTH AND FUNCTION WITH NO REPORTS OF PAIN IN ORDER TO RTW WITHOUT RESTRICTIONS AND WITHOUT PAIN OR DYSFUNCTION       Progress per Plan of Care           Manual Therapy:         mins  95960;  Therapeutic Exercise:    15     mins  82280;     Neuromuscular Joi:    15    mins  77191;    Therapeutic Activity:     15     mins  01569;     Gait Training:           mins  76904;     Ultrasound:          mins  46955;    Electrical Stimulation:         mins  24494 ( );  Dry Needling          mins self-pay    Timed Treatment:   45   mins   Total Treatment:     45   mins    SURI Saab, PT  Physical Therapist

## 2021-10-27 ENCOUNTER — TREATMENT (OUTPATIENT)
Dept: PHYSICAL THERAPY | Facility: CLINIC | Age: 31
End: 2021-10-27

## 2021-10-27 DIAGNOSIS — M84.374A STRESS FRACTURE OF METATARSAL BONE OF RIGHT FOOT, INITIAL ENCOUNTER: Primary | ICD-10-CM

## 2021-10-27 PROCEDURE — 97530 THERAPEUTIC ACTIVITIES: CPT | Performed by: PHYSICAL THERAPIST

## 2021-10-27 PROCEDURE — 97112 NEUROMUSCULAR REEDUCATION: CPT | Performed by: PHYSICAL THERAPIST

## 2021-10-27 NOTE — PROGRESS NOTES
Physical Therapy Daily Progress Note    TOTAL TIME: 60 MINUTES    Radha Dixon reports: foot is feeling much better overall      Objective   See Exercise, Manual, and Modality Logs for complete treatment.     THERAPEUTIC EXERCISES/ACTIVITIES ADDED TODAY: wobble board fwd/bkd and side to side; 1 leg balance on air ex 3 ways; see previous hep and flow sheets     Manual: ankle / foot mobilizations     Assessment/Plan  PATIENT NEEDS CONTINUED PHYSICAL THERAPY IN ORDER TO ACHIEVE FULL ROM, STRENGTH AND FUNCTION WITH NO REPORTS OF PAIN IN ORDER TO RTW WITHOUT RESTRICTIONS AND WITHOUT PAIN OR DYSFUNCTION       Progress per Plan of Care and Progress strengthening /stabilization /functional activity           Manual Therapy:    5     mins  06306;  Therapeutic Exercise:    5     mins  39572;     Neuromuscular Joi:    15    mins  33830;    Therapeutic Activity:     15     mins  67115;     Gait Training:           mins  79663;     Ultrasound:          mins  19388;    Electrical Stimulation:         mins  38640 ( );  Dry Needling          mins self-pay    Timed Treatment:   40   mins   Total Treatment:     60   mins    SURI Saab PT  Physical Therapist

## 2021-10-28 ENCOUNTER — OFFICE VISIT (OUTPATIENT)
Dept: OBSTETRICS AND GYNECOLOGY | Facility: CLINIC | Age: 31
End: 2021-10-28

## 2021-10-28 VITALS
WEIGHT: 113 LBS | SYSTOLIC BLOOD PRESSURE: 108 MMHG | BODY MASS INDEX: 21.34 KG/M2 | HEIGHT: 61 IN | DIASTOLIC BLOOD PRESSURE: 60 MMHG

## 2021-10-28 DIAGNOSIS — N92.0 MENORRHAGIA WITH REGULAR CYCLE: Primary | ICD-10-CM

## 2021-10-28 PROCEDURE — 99213 OFFICE O/P EST LOW 20 MIN: CPT | Performed by: OBSTETRICS & GYNECOLOGY

## 2021-10-28 NOTE — PROGRESS NOTES
Subjective   Chief Complaint   Patient presents with   • Menorrhagia     patient c/o of heavy bleeding for almost 4 weeks     Radha Dixon is a 31 y.o. year old .  Patient's last menstrual period was 10/07/2021.  She presents to be seen because of persistent BTB'ing. Currently on Lutera - this was changed from Microgestin.  Patient had sub-total thyroidectomy this past winter and while her thyroid levels been normal this a UB has been persistent around this time and going forward.    OTHER COMPLAINTS:  Nothing else    The following portions of the patient's history were reviewed and updated as appropriate:  She  has a past medical history of Abnormal Pap smear of cervix, Acid reflux, Anxiety, Anxiety and depression, Arthritis, Asthma, B12 deficiency (2021), Bipolar 1 disorder (HCC), Body piercing, Dysphagia, Fracture, Goiter, Heart rate fast, History of migraine, exercise stress test (2020), Inappropriate sinus tachycardia, Injury of neck, Insomnia, Migraine, Ovarian cyst, Palpitations, PONV (postoperative nausea and vomiting), Scoliosis, Tachycardia, Tattoo, Thyroid nodule, Toe fracture, and Wears glasses.  She does not have any pertinent problems on file.  She  has a past surgical history that includes Appendectomy (); Cholecystectomy; Hip surgery (Right); Cyst Removal; [place] tooth extraction; Esophagogastroduodenoscopy (N/A, 2017); Dilation and curettage of uterus; Shoulder arthroscopy (Right, 10/11/2018); Thyroid Biopsy; Esophagogastroduodenoscopy (N/A, 9/3/2020); Esophageal motility study (N/A, 11/10/2020); Colposcopy; Thyroidectomy (Left, 2021); and Thyroidectomy, partial (Left).  Her family history includes Arthritis in her maternal grandmother and mother; Cancer in her maternal grandmother and paternal grandfather; Diabetes in her maternal grandmother and mother; Heart attack in her maternal grandmother and paternal grandmother; Heart disease in her sister; Migraines in  her sister; No Known Problems in her father.  She  reports that she quit smoking about a year ago. Her smoking use included cigarettes. She has a 6.50 pack-year smoking history. She has never used smokeless tobacco. She reports that she does not drink alcohol and does not use drugs.  Current Outpatient Medications   Medication Sig Dispense Refill   • albuterol sulfate  (90 Base) MCG/ACT inhaler Inhale 2 puffs Every 4 (Four) Hours As Needed for Wheezing or Shortness of Air. 18 g 1   • atenolol (TENORMIN) 25 MG tablet Take 1 tablet by mouth Daily. 60 tablet 11   • buPROPion SR (Wellbutrin SR) 100 MG 12 hr tablet Take 1 tablet by mouth 2 (Two) Times a Day. 60 tablet 5   • fluticasone (Flonase) 50 MCG/ACT nasal spray 2 sprays into the nostril(s) as directed by provider Daily. 18.2 mL 11   • levonorgestrel-ethinyl estradiol (AVIANE,ALESSE,LESSINA) 0.1-20 MG-MCG per tablet Take 1 tablet by mouth Daily. 28 tablet 12   • midodrine (PROAMATINE) 10 MG tablet Take 1 tablet by mouth 2 (Two) Times a Day. 60 tablet 3   • montelukast (Singulair) 10 MG tablet Take 1 tablet by mouth Every Night. 90 tablet 1   • ondansetron ODT (ZOFRAN-ODT) 4 MG disintegrating tablet Place 1 tablet on the tongue Every 8 (Eight) Hours As Needed for Nausea or Vomiting. 30 tablet 1   • pantoprazole (PROTONIX) 40 MG EC tablet 1 po daily in the am 30 minutes before breakfast 30 tablet 2     No current facility-administered medications for this visit.     Current Outpatient Medications on File Prior to Visit   Medication Sig   • albuterol sulfate  (90 Base) MCG/ACT inhaler Inhale 2 puffs Every 4 (Four) Hours As Needed for Wheezing or Shortness of Air.   • atenolol (TENORMIN) 25 MG tablet Take 1 tablet by mouth Daily.   • buPROPion SR (Wellbutrin SR) 100 MG 12 hr tablet Take 1 tablet by mouth 2 (Two) Times a Day.   • fluticasone (Flonase) 50 MCG/ACT nasal spray 2 sprays into the nostril(s) as directed by provider Daily.   •  "levonorgestrel-ethinyl estradiol (AVIANE,ALESSE,LESSINA) 0.1-20 MG-MCG per tablet Take 1 tablet by mouth Daily.   • midodrine (PROAMATINE) 10 MG tablet Take 1 tablet by mouth 2 (Two) Times a Day.   • montelukast (Singulair) 10 MG tablet Take 1 tablet by mouth Every Night.   • ondansetron ODT (ZOFRAN-ODT) 4 MG disintegrating tablet Place 1 tablet on the tongue Every 8 (Eight) Hours As Needed for Nausea or Vomiting.   • pantoprazole (PROTONIX) 40 MG EC tablet 1 po daily in the am 30 minutes before breakfast     No current facility-administered medications on file prior to visit.     She is allergic to onion, cabbage, msg [monosodium glutamate], and sulfa antibiotics.    Social History    Tobacco Use      Smoking status: Former Smoker        Packs/day: 0.50        Years: 13.00        Pack years: 6.5        Types: Cigarettes        Quit date: 10/17/2020        Years since quittin.0      Smokeless tobacco: Never Used    Review of Systems  Consitutional POS: nothing reported    NEG: anorexia or night sweats   Gastointestinal POS: nothing reported    NEG: bloating, change in bowel habits, melena or reflux symptoms   Genitourinary POS: nothing reported    NEG: dysuria or hematuria   Integument POS: nothing reported    NEG: moles that are changing in size, shape, color or rashes   Breast POS: nothing reported    NEG: persistent breast lump, skin dimpling or nipple discharge         Respiratory: negative  Cardiovascular: negative          Objective   /60   Ht 154.9 cm (61\")   Wt 51.3 kg (113 lb)   LMP 10/07/2021   BMI 21.35 kg/m²     General:  well developed; well nourished  no acute distress   Skin:  No suspicious lesions seen   Thyroid: not examined   Lungs:  breathing is unlabored   Heart:  Not performed.   Breasts:  Not performed.   Abdomen: soft, non-tender; no masses  no umbilical or inguinal hernias are present  no hepato-splenomegaly   Pelvis: Not performed.     Psychiatric: Alert and oriented ×3, mood " and affect appropriate  HEENT: Atraumatic, normocephalic, normal scleral icterus  Extremities: 2+ pulses bilaterally, no edema      Lab Review   No data reviewed    Imaging   Pelvic ultrasound images independantly reviewed - Uterus is anteverted normal size and shape.  Endometrium is thin 4.4 mm.  Adnexa are normal small follicles.  Trace free fluid.  No solid masses        Assessment   1. Break through bleeding with OCP.  Patient currently on Lutera.  Her endometrium is thin and I suspect this is the culprit     Plan   1. This point I would have her double up on her pills for 3 days and then I would encourage her to discontinue for a while and let her body normalize.  Other options were discussed.  Birth control precautions were discussed.  2.     No orders of the defined types were placed in this encounter.         This note was electronically signed.      October 28, 2021

## 2021-10-29 ENCOUNTER — TELEPHONE (OUTPATIENT)
Dept: PHYSICAL THERAPY | Facility: CLINIC | Age: 31
End: 2021-10-29

## 2021-11-02 ENCOUNTER — TELEPHONE (OUTPATIENT)
Dept: PHYSICAL THERAPY | Facility: CLINIC | Age: 31
End: 2021-11-02

## 2021-11-05 ENCOUNTER — TREATMENT (OUTPATIENT)
Dept: PHYSICAL THERAPY | Facility: CLINIC | Age: 31
End: 2021-11-05

## 2021-11-05 DIAGNOSIS — M84.374A STRESS FRACTURE OF METATARSAL BONE OF RIGHT FOOT, INITIAL ENCOUNTER: Primary | ICD-10-CM

## 2021-11-05 PROCEDURE — 97112 NEUROMUSCULAR REEDUCATION: CPT | Performed by: PHYSICAL THERAPIST

## 2021-11-05 PROCEDURE — 97110 THERAPEUTIC EXERCISES: CPT | Performed by: PHYSICAL THERAPIST

## 2021-11-05 PROCEDURE — 97530 THERAPEUTIC ACTIVITIES: CPT | Performed by: PHYSICAL THERAPIST

## 2021-11-05 NOTE — PROGRESS NOTES
Physical Therapy Daily Progress Note    TOTAL TIME: 50 MINUTES    Radha Dixon reports: foot is feeling much better; occasional tingling feeling in foot; had to miss some PT visits due to daughter being sick; pleased with progress       Objective   See Exercise, Manual, and Modality Logs for complete treatment.     THERAPEUTIC EXERCISES/ACTIVITIES ADDED TODAY: weighted step outs fwd/retro and side to side with Rocktape strap; TG squats and 1 leg presses ; wobble board    Manual: foot/ankle mobilizations    Assessment/Plan  Patient is making excellent progress with functional mobility and strength    Progress per Plan of Care and Progress strengthening /stabilization /functional activity           Manual Therapy:    5     mins  32553;  Therapeutic Exercise:    15     mins  29826;     Neuromuscular Joi:    15    mins  14408;    Therapeutic Activity:     15     mins  93310;     Gait Training:           mins  35028;     Ultrasound:          mins  74393;    Electrical Stimulation:         mins  43153 ( );  Dry Needling          mins self-pay    Timed Treatment:   50   mins   Total Treatment:     50   mins    SURI Saab, PT  Physical Therapist

## 2021-11-10 ENCOUNTER — TREATMENT (OUTPATIENT)
Dept: PHYSICAL THERAPY | Facility: CLINIC | Age: 31
End: 2021-11-10

## 2021-11-10 DIAGNOSIS — M84.374A STRESS FRACTURE OF METATARSAL BONE OF RIGHT FOOT, INITIAL ENCOUNTER: Primary | ICD-10-CM

## 2021-11-10 PROCEDURE — 97110 THERAPEUTIC EXERCISES: CPT | Performed by: PHYSICAL THERAPIST

## 2021-11-10 PROCEDURE — 97112 NEUROMUSCULAR REEDUCATION: CPT | Performed by: PHYSICAL THERAPIST

## 2021-11-12 ENCOUNTER — TELEPHONE (OUTPATIENT)
Dept: PHYSICAL THERAPY | Facility: CLINIC | Age: 31
End: 2021-11-12

## 2021-11-12 NOTE — TELEPHONE ENCOUNTER
DAUGHTER HAS A FEAVER   Pt states she was brought by her lived in boyfriend to ER After they had a verbal argument.

## 2021-11-14 NOTE — PROGRESS NOTES
Physical Therapy Daily Progress Note    TOTAL TIME: 60 MINUTES    Radha Dixon reports: foot is feeling much better, still tingles occasionally      Objective   See Exercise, Manual, and Modality Logs for complete treatment.     THERAPEUTIC EXERCISES/ACTIVITIES ADDED TODAY: see previous hep and flow sheets   weighted step outs fwd/retro and side to side with Rocktape strap; TG squats and 1 leg presses ; wobble board    Assessment/Plan  Patient has made excellent progress; ambulates without antalgia     Progress per Plan of Care and Progress strengthening /stabilization /functional activity           Manual Therapy:         mins  63745;  Therapeutic Exercise:    15   mins  05939;     Neuromuscular Joi:    15    mins  54407;    Therapeutic Activity:          mins  39833;     Gait Training:           mins  75513;     Ultrasound:          mins  34205;    Electrical Stimulation:         mins  21937 ( );  Dry Needling          mins self-pay    Timed Treatment:   30   mins   Total Treatment:     60   mins    SURI Saab PT  Physical Therapist

## 2021-11-15 ENCOUNTER — TREATMENT (OUTPATIENT)
Dept: PHYSICAL THERAPY | Facility: CLINIC | Age: 31
End: 2021-11-15

## 2021-11-15 DIAGNOSIS — M84.374A STRESS FRACTURE OF METATARSAL BONE OF RIGHT FOOT, INITIAL ENCOUNTER: Primary | ICD-10-CM

## 2021-11-15 PROCEDURE — 97112 NEUROMUSCULAR REEDUCATION: CPT | Performed by: PHYSICAL THERAPIST

## 2021-11-15 PROCEDURE — 97140 MANUAL THERAPY 1/> REGIONS: CPT | Performed by: PHYSICAL THERAPIST

## 2021-11-15 PROCEDURE — 97110 THERAPEUTIC EXERCISES: CPT | Performed by: PHYSICAL THERAPIST

## 2021-11-15 NOTE — PROGRESS NOTES
Physical Therapy Daily Progress Note/ Discharge Summary     TOTAL TIME: 45 MINUTES    Radha Dixon reports: ankle/foot are feeling good; occasional tingling in great toe; able to shop and be on feet for prolonged periods of time       Objective   See Exercise, Manual, and Modality Logs for complete treatment.     THERAPEUTIC EXERCISES/ACTIVITIES ADDED TODAY: see flow sheets, hep    Manual: foot/ankle mobilizations x 10 minutes     Assessment/Plan  Patient has made excellent progress; has met all of her goals below; able to SL balance on uneven surfaces without difficulty; strength and ROM are excellent    Goals  Plan Goals: 4 weeks:  1. IND with HEP  2. Patient to display full, pain-free AROM of right foot  3. Patient to display 5/5 MMT of right LE  4. Patient to score > 65 on LEFS  5. Patient to participate in up to 60 minutes of  strength/balance/proprioception/endurance without a significant increase in pain in order to RTW    Other discharged with all goals met            Manual Therapy:    10     mins  45059;  Therapeutic Exercise:    20     mins  02638;     Neuromuscular Joi:    15    mins  68074;    Therapeutic Activity:          mins  86038;     Gait Training:           mins  36070;     Ultrasound:          mins  37228;    Electrical Stimulation:         mins  63765 ( );  Dry Needling          mins self-pay    Timed Treatment:   45   mins   Total Treatment:     45   mins    SURI Saab PT  Physical Therapist

## 2021-11-16 ENCOUNTER — OFFICE VISIT (OUTPATIENT)
Dept: ORTHOPEDIC SURGERY | Facility: CLINIC | Age: 31
End: 2021-11-16

## 2021-11-16 DIAGNOSIS — M84.374A STRESS FRACTURE OF RIGHT FOOT, INITIAL ENCOUNTER: Primary | ICD-10-CM

## 2021-11-16 NOTE — PROGRESS NOTES
This visit has been rescheduled as a phone visit to comply with patient safety concerns in accordance with CDC recommendations. Total time of discussion was 4 minutes.      Patient request a return to work without restrictions form effective 11/17/2021. She was recently treated for a right foot fracture.    An updated work status form was written today she states she will stop by sometime before closing to  the form.

## 2021-11-16 NOTE — PROGRESS NOTES
..You have chosen to receive care through a telephone visit. Do you consent to use a telephone visit for your medical care today? yes

## 2021-12-16 ENCOUNTER — TELEPHONE (OUTPATIENT)
Dept: OBSTETRICS AND GYNECOLOGY | Facility: CLINIC | Age: 31
End: 2021-12-16

## 2021-12-16 DIAGNOSIS — Z32.00 POSSIBLE PREGNANCY, NOT CONFIRMED: Primary | ICD-10-CM

## 2021-12-16 NOTE — TELEPHONE ENCOUNTER
Routed to Dr. Moss    Patient is late on her cycle and had negative UPT but is wanting to have blood drawn

## 2021-12-16 NOTE — TELEPHONE ENCOUNTER
----- Message from Radha Dixon sent at 12/16/2021  3:39 PM EST -----  Regarding: Pregnancy symptoms   Just wondering if I could get blood work for pregnancy test

## 2021-12-17 ENCOUNTER — LAB (OUTPATIENT)
Dept: LAB | Facility: HOSPITAL | Age: 31
End: 2021-12-17

## 2021-12-17 LAB — HCG INTACT+B SERPL-ACNC: <0.5 MIU/ML

## 2021-12-17 PROCEDURE — 36415 COLL VENOUS BLD VENIPUNCTURE: CPT | Performed by: OBSTETRICS & GYNECOLOGY

## 2021-12-17 PROCEDURE — 84702 CHORIONIC GONADOTROPIN TEST: CPT | Performed by: OBSTETRICS & GYNECOLOGY

## 2021-12-23 ENCOUNTER — OFFICE VISIT (OUTPATIENT)
Dept: INTERNAL MEDICINE | Facility: CLINIC | Age: 31
End: 2021-12-23

## 2021-12-23 VITALS
DIASTOLIC BLOOD PRESSURE: 78 MMHG | HEIGHT: 61 IN | OXYGEN SATURATION: 98 % | WEIGHT: 113.12 LBS | HEART RATE: 104 BPM | SYSTOLIC BLOOD PRESSURE: 118 MMHG | BODY MASS INDEX: 21.36 KG/M2 | TEMPERATURE: 97.8 F

## 2021-12-23 DIAGNOSIS — E04.1 THYROID NODULE: Primary | ICD-10-CM

## 2021-12-23 DIAGNOSIS — R42 DIZZINESS: ICD-10-CM

## 2021-12-23 DIAGNOSIS — E89.0 S/P PARTIAL THYROIDECTOMY: ICD-10-CM

## 2021-12-23 DIAGNOSIS — R00.0 INAPPROPRIATE SINUS TACHYCARDIA: ICD-10-CM

## 2021-12-23 DIAGNOSIS — E53.8 VITAMIN B12 DEFICIENCY: ICD-10-CM

## 2021-12-23 DIAGNOSIS — R00.2 PALPITATIONS: ICD-10-CM

## 2021-12-23 DIAGNOSIS — R53.83 FATIGUE, UNSPECIFIED TYPE: ICD-10-CM

## 2021-12-23 PROCEDURE — 99214 OFFICE O/P EST MOD 30 MIN: CPT | Performed by: NURSE PRACTITIONER

## 2021-12-23 PROCEDURE — 93000 ELECTROCARDIOGRAM COMPLETE: CPT | Performed by: NURSE PRACTITIONER

## 2021-12-23 NOTE — PROGRESS NOTES
Office Visit      Patient Name: Radha Dixon  : 1990   MRN: [id number]   Care Team: Patient Care Team:  Veronika Falcon APRN as PCP - General (Family Medicine)  Danilo Shabazz MD as Consulting Physician (General Surgery)    Chief Complaint  Establish Care (Off boarding Augustina Falk, blood work, declined influenza vaccination )    Subjective     Subjective      Radha Dixon presents to [facility] for fatigue and tachycardia. History of non-toxic goiter, s/p left thyroidectomy. Recently she was taken off of her birth control, uterine lying was too thin and was having frequent bleeding. Now trying to get pregnant. Recently HCG was drawn and negative then she started her period. She was late and thought the symptoms she is currently experiencing were due to that. Endorsing major fatigue, tachycardia- can feel her heartbeat in her ear at night, headaches, palpitations, brittle hair and nails,  dysphagia (liquids and solids), dizziness, and feels like the right lobe of the thyroid is more swollen. These symptoms are all similar to what she experienced prior to partial thyroidectomy. She is hoping to update her blood work today.   Denies constipation, heat/cold intolerance, chest pain, shortness of breath, and lower extremity swelling.   Seeing Dr. Dong for inappropriate sinus tachycardia- She stopped both her atenolol and midodrine after her left thyroidectomy in March. She has a follow-up with Dr. Dong in February.       ECG 12 Lead    Date/Time: 2021 3:47 PM  Performed by: Veronika Falcon APRN  Authorized by: Veronika Falcon APRN   Comparison: not compared with previous ECG   Rhythm: sinus rhythm  Rate: normal  BPM: 65  Conduction: conduction normal  ST Segments: ST segments normal  T Waves: T waves normal  QRS axis: normal    Clinical impression: normal ECG          Review of Systems   Constitutional: Positive for fatigue. Negative for appetite  "change and fever.   HENT: Negative for congestion, sore throat and trouble swallowing.    Eyes: Negative for blurred vision and visual disturbance.   Respiratory: Negative for cough, shortness of breath and wheezing.    Cardiovascular: Positive for palpitations. Negative for chest pain and leg swelling.   Gastrointestinal: Negative for abdominal pain, blood in stool, constipation, diarrhea and nausea.   Endocrine: Negative for polydipsia, polyphagia and polyuria.   Genitourinary: Negative for dysuria.   Musculoskeletal: Negative for arthralgias, back pain and myalgias.   Skin: Negative for rash.   Neurological: Positive for dizziness and light-headedness. Negative for weakness and headache.   Psychiatric/Behavioral: Negative for sleep disturbance and depressed mood. The patient is not nervous/anxious.      Objective     Objective   Vital Signs:   /78   Pulse 104   Temp 97.8 °F (36.6 °C) (Temporal)   Ht 154.9 cm (61\")   Wt 51.3 kg (113 lb 1.9 oz)   SpO2 98%   BMI 21.37 kg/m²     Physical Exam  Vitals and nursing note reviewed.   Constitutional:       General: She is not in acute distress.     Appearance: Normal appearance. She is not toxic-appearing.      Comments: Thin appearing     Eyes:      Pupils: Pupils are equal, round, and reactive to light.   Neck:      Thyroid: Thyromegaly (right lobe diffusely enlarged. Left lobe absent surgically) present.      Vascular: No carotid bruit.   Cardiovascular:      Rate and Rhythm: Normal rate and regular rhythm.      Heart sounds: Normal heart sounds. No murmur heard.      Pulmonary:      Effort: Pulmonary effort is normal. No respiratory distress.      Breath sounds: Normal breath sounds. No wheezing.   Abdominal:      General: Bowel sounds are normal. There is no distension.      Palpations: Abdomen is soft.      Tenderness: There is no abdominal tenderness.   Musculoskeletal:      Cervical back: Neck supple. No tenderness.   Skin:     General: Skin is warm and " dry.      Findings: No rash.   Neurological:      General: No focal deficit present.      Mental Status: She is alert.   Psychiatric:         Mood and Affect: Mood normal.         Behavior: Behavior normal.            Assessment / Plan      Assessment/Plan   Problem List Items Addressed This Visit        Cardiac and Vasculature    Inappropriate sinus tachycardia    Relevant Orders    CBC No Differential    Comprehensive metabolic panel    TSH Rfx On Abnormal To Free T4    Vitamin D 25 hydroxy    Vitamin B12    US Thyroid    Palpitations    Relevant Orders    CBC No Differential    Comprehensive metabolic panel    TSH Rfx On Abnormal To Free T4    Vitamin D 25 hydroxy    Vitamin B12    US Thyroid    EKG with normal findings today.  Update labs as above today.  Discussed reinitiation of atenolol, however would like for her to monitor her heart rate at home first to see if appropriate.  She is been given instruction on how to take her radial pulse and will bring findings to her next visit in 1 month.  May need to follow-up with Dr. Dong sooner than February if symptoms persist.  Be sure to stay hydrated with plenty of clear decaffeinated fluids, avoid caffeine, stress management, sleep hygiene recommended.       Endocrine and Metabolic    Thyroid nodule - Primary    Relevant Orders    CBC No Differential    Comprehensive metabolic panel    TSH Rfx On Abnormal To Free T4    Vitamin D 25 hydroxy    Vitamin B12    US Thyroid    Thyroid ultrasound ordered today to ensure stability of thyroid nodule of the right lobe.  Most recent imaging found in the chart is from 2019.  She will reschedule her follow-up with endocrinology.    S/P partial thyroidectomy    Relevant Orders    CBC No Differential    Comprehensive metabolic panel    TSH Rfx On Abnormal To Free T4    Vitamin D 25 hydroxy    Vitamin B12    US Thyroid      Other Visit Diagnoses     Dizziness        Relevant Orders    CBC No Differential    Comprehensive  metabolic panel    TSH Rfx On Abnormal To Free T4    Vitamin D 25 hydroxy    Vitamin B12    US Thyroid    Labs as above to rule out underlying cause.  Suspect is from underlying inappropriate tachycardia.  See above plan.  Advised to move slowly from sitting to standing.    Fatigue  Vitamin B12 deficiency        Relevant Orders    CBC No Differential    Comprehensive metabolic panel    TSH Rfx On Abnormal To Free T4    Vitamin D 25 hydroxy    Vitamin B12    US Thyroid    Check B12 and vitamin D today.           Follow Up    Return in about 1 month (around 1/23/2022) for Next scheduled follow up.  Patient was given instructions and counseling regarding her condition or for health maintenance advice. Please see specific information pulled into the AVS if appropriate.     ELISEO Friedman  [facility] - [place]

## 2021-12-24 LAB
25(OH)D3+25(OH)D2 SERPL-MCNC: 20.6 NG/ML (ref 30–100)
ALBUMIN SERPL-MCNC: 4.7 G/DL (ref 3.9–5)
ALBUMIN/GLOB SERPL: 2 {RATIO} (ref 1.2–2.2)
ALP SERPL-CCNC: 56 IU/L (ref 44–121)
ALT SERPL-CCNC: 11 IU/L (ref 0–32)
AST SERPL-CCNC: 15 IU/L (ref 0–40)
BILIRUB SERPL-MCNC: 0.4 MG/DL (ref 0–1.2)
BUN SERPL-MCNC: 8 MG/DL (ref 6–20)
BUN/CREAT SERPL: 11 (ref 9–23)
CALCIUM SERPL-MCNC: 9.3 MG/DL (ref 8.7–10.2)
CHLORIDE SERPL-SCNC: 107 MMOL/L (ref 96–106)
CO2 SERPL-SCNC: 25 MMOL/L (ref 20–29)
CREAT SERPL-MCNC: 0.76 MG/DL (ref 0.57–1)
ERYTHROCYTE [DISTWIDTH] IN BLOOD BY AUTOMATED COUNT: 11.8 % (ref 11.7–15.4)
GLOBULIN SER CALC-MCNC: 2.4 G/DL (ref 1.5–4.5)
GLUCOSE SERPL-MCNC: 83 MG/DL (ref 65–99)
HCT VFR BLD AUTO: 37.7 % (ref 34–46.6)
HGB BLD-MCNC: 12.6 G/DL (ref 11.1–15.9)
MCH RBC QN AUTO: 30.1 PG (ref 26.6–33)
MCHC RBC AUTO-ENTMCNC: 33.4 G/DL (ref 31.5–35.7)
MCV RBC AUTO: 90 FL (ref 79–97)
PLATELET # BLD AUTO: 201 X10E3/UL (ref 150–450)
POTASSIUM SERPL-SCNC: 4.2 MMOL/L (ref 3.5–5.2)
PROT SERPL-MCNC: 7.1 G/DL (ref 6–8.5)
RBC # BLD AUTO: 4.19 X10E6/UL (ref 3.77–5.28)
SODIUM SERPL-SCNC: 146 MMOL/L (ref 134–144)
T4 FREE SERPL-MCNC: 1 NG/DL (ref 0.82–1.77)
TSH SERPL DL<=0.005 MIU/L-ACNC: 4.82 UIU/ML (ref 0.45–4.5)
VIT B12 SERPL-MCNC: 656 PG/ML (ref 232–1245)
WBC # BLD AUTO: 8.8 X10E3/UL (ref 3.4–10.8)

## 2021-12-28 ENCOUNTER — TELEPHONE (OUTPATIENT)
Dept: INTERNAL MEDICINE | Facility: CLINIC | Age: 31
End: 2021-12-28

## 2021-12-28 DIAGNOSIS — E03.8 SUBCLINICAL HYPOTHYROIDISM: Primary | ICD-10-CM

## 2021-12-28 DIAGNOSIS — E55.9 VITAMIN D DEFICIENCY: ICD-10-CM

## 2021-12-28 RX ORDER — ERGOCALCIFEROL 1.25 MG/1
50000 CAPSULE ORAL WEEKLY
Qty: 6 CAPSULE | Refills: 0 | Status: SHIPPED | OUTPATIENT
Start: 2021-12-28 | End: 2022-03-10 | Stop reason: DRUGHIGH

## 2021-12-28 RX ORDER — LEVOTHYROXINE SODIUM 0.05 MG/1
50 TABLET ORAL
Qty: 30 TABLET | Refills: 0 | Status: SHIPPED | OUTPATIENT
Start: 2021-12-28 | End: 2022-01-25 | Stop reason: SINTOL

## 2021-12-28 NOTE — TELEPHONE ENCOUNTER
Caller: Radha Dixon    Relationship: Self    Best call back number: [phone]    Caller requesting test results: SELF    What test was performed: LABS    When was the test performed: 12/23    Where was the test performed: LAB    Additional notes: PATIENT WOULD LIKE TO DISCUSS RESULTS

## 2022-01-13 ENCOUNTER — APPOINTMENT (OUTPATIENT)
Dept: ULTRASOUND IMAGING | Facility: HOSPITAL | Age: 32
End: 2022-01-13

## 2022-01-25 ENCOUNTER — OFFICE VISIT (OUTPATIENT)
Dept: INTERNAL MEDICINE | Facility: CLINIC | Age: 32
End: 2022-01-25

## 2022-01-25 VITALS
SYSTOLIC BLOOD PRESSURE: 112 MMHG | OXYGEN SATURATION: 98 % | BODY MASS INDEX: 21.49 KG/M2 | TEMPERATURE: 97.1 F | DIASTOLIC BLOOD PRESSURE: 78 MMHG | HEART RATE: 112 BPM | WEIGHT: 113.8 LBS | HEIGHT: 61 IN

## 2022-01-25 DIAGNOSIS — E55.9 VITAMIN D DEFICIENCY: ICD-10-CM

## 2022-01-25 DIAGNOSIS — F51.04 PSYCHOPHYSIOLOGICAL INSOMNIA: ICD-10-CM

## 2022-01-25 DIAGNOSIS — F60.3 BORDERLINE PERSONALITY DISORDER: ICD-10-CM

## 2022-01-25 DIAGNOSIS — E03.8 SUBCLINICAL HYPOTHYROIDISM: Primary | ICD-10-CM

## 2022-01-25 DIAGNOSIS — G43.709 CHRONIC MIGRAINE WITHOUT AURA WITHOUT STATUS MIGRAINOSUS, NOT INTRACTABLE: ICD-10-CM

## 2022-01-25 PROCEDURE — 99214 OFFICE O/P EST MOD 30 MIN: CPT | Performed by: NURSE PRACTITIONER

## 2022-01-25 RX ORDER — AMITRIPTYLINE HYDROCHLORIDE 25 MG/1
25 TABLET, FILM COATED ORAL NIGHTLY
Qty: 30 TABLET | Refills: 1 | Status: SHIPPED | OUTPATIENT
Start: 2022-01-25 | End: 2022-03-16

## 2022-01-25 NOTE — PROGRESS NOTES
Office Visit      Patient Name: Radha Dixon  : 1990   MRN: [id number]   Care Team: Patient Care Team:  Veronika Falcon APRN as PCP - General (Family Medicine)  Danilo Shabazz MD as Consulting Physician (General Surgery)    Chief Complaint  Thyroid (1 month follow up )    Subjective     Subjective      Radha Dixon presents to [facility] for follow-up of hypothyroidism. Started on levothyroxine last visit for subclinical hypothyroidism with symptoms. She was started on levothyroxine 50mcg, she is taking as prescribed. She does endorse headaches with the medication. She has also been feeling foggy headed. Does have more energy now that she is on her medication.   Has a history of migraines, now happening daily. Not associated with vision changes, nausea, or confusion. Tylenol and ibuprofen are being used for abortive therapy but haven't been helping much lately since starting thyroid replacement. Were controlled prior to levothyroxine initiation. Describes as a throb over the right eye.   Vitamin D was also low, she is taking weekly supplement currently and tolerating OK.   Does have a borderline personality disorder, not medicated currently. Was on medication prior to pregnancy and did help her symptoms. She does complain she is more galvan. She denies any suicidal or homicidal ideations. Has been on celexa in the past and it did help her symptoms, tried to get back on and had some GI upset several years ago so stopped taking. Reports she does not sleep well, maybe getting 3 hours of good sleep at night. Trazodone has helped in the past.     Review of Systems   Constitutional: Positive for fatigue. Negative for appetite change and fever.   HENT: Negative for congestion, sore throat and trouble swallowing.    Eyes: Negative for blurred vision and visual disturbance.   Respiratory: Negative for cough, shortness of breath and wheezing.    Cardiovascular: Negative  "for chest pain, palpitations and leg swelling.   Gastrointestinal: Negative for abdominal pain, blood in stool, constipation, diarrhea and nausea.   Endocrine: Negative for polydipsia, polyphagia and polyuria.   Genitourinary: Negative for dysuria.   Musculoskeletal: Negative for arthralgias, back pain and myalgias.   Skin: Negative for rash.   Neurological: Positive for headache. Negative for dizziness, weakness and light-headedness.   Psychiatric/Behavioral: Positive for decreased concentration and depressed mood. Negative for sleep disturbance. The patient is not nervous/anxious.        Objective     Objective   Vital Signs:   /78   Pulse 112   Temp 97.1 °F (36.2 °C) (Temporal)   Ht 154.9 cm (61\")   Wt 51.6 kg (113 lb 12.8 oz)   SpO2 98%   BMI 21.50 kg/m²     Physical Exam  Vitals and nursing note reviewed.   Constitutional:       General: She is not in acute distress.     Appearance: Normal appearance. She is not toxic-appearing.   Eyes:      Extraocular Movements: Extraocular movements intact.      Pupils: Pupils are equal, round, and reactive to light.   Neck:      Vascular: No carotid bruit.   Cardiovascular:      Rate and Rhythm: Regular rhythm. Tachycardia present.      Heart sounds: Normal heart sounds. No murmur heard.      Pulmonary:      Effort: Pulmonary effort is normal. No respiratory distress.      Breath sounds: Normal breath sounds. No wheezing.   Abdominal:      General: Bowel sounds are normal. There is no distension.      Palpations: Abdomen is soft.      Tenderness: There is no abdominal tenderness.   Musculoskeletal:      Cervical back: Neck supple. No tenderness.   Skin:     General: Skin is warm and dry.      Findings: No rash.   Neurological:      General: No focal deficit present.      Mental Status: She is alert and oriented to person, place, and time.      Motor: No weakness.   Psychiatric:         Mood and Affect: Mood normal.         Behavior: Behavior normal.      "     Assessment / Plan      Assessment/Plan   Problem List Items Addressed This Visit        Endocrine and Metabolic    Vitamin D deficiency    Continue prescribed weekly supplement, follow with OTC replacement of at least 2,000 IU and recommend daily sun exposure on the arms and face at least 15 minutes/day when able.        Mental Health    Borderline personality disorder (HCC)    Relevant Medications    amitriptyline (ELAVIL) 25 MG tablet    Recommend counseling, doesn't have time at the moment. I would like for her to start amitryptiline nightly to help with mood, migraines, and sleep. Advised any suicidal thoughts to go to the ED or [facility]. Recommend healthy diet, exercise as tolerated, sleep hygiene, and stress management. Follow-up in 4 weeks.       Other Visit Diagnoses     Subclinical hypothyroidism    -  Primary    Relevant Orders    TSH Rfx On Abnormal To Free T4    Repeat TSH, may need to change levothyroxine to armour thyroid since headaches have worsened. Discussed we will wait on results prior to change and she is agreeable. Reiterated proper administration of thyroid replacement.     Psychophysiological insomnia        Begin amitryptiline nightly. Discussed side effects of the medication. Sleep hygiene encouraged. Follow-up in 4 weeks.       Migraines    Struggling with daily migraine. Begin amitryptiline nightly. Adivsed on side effects. Keep headache journal and bring to next visit. As discussed will change levothyroxine to armour thyroid pending TSH. Avoid triggers and stay hydrated with clear decaffeinated fluids.            Follow Up   Return in about 4 weeks (around 2/22/2022), or if symptoms worsen or fail to improve, for Next scheduled follow up.  Patient was given instructions and counseling regarding her condition or for health maintenance advice. Please see specific information pulled into the AVS if appropriate.     ELISEO Friedman  [facility] -  [place]

## 2022-01-26 LAB — TSH SERPL DL<=0.005 MIU/L-ACNC: 0.89 UIU/ML (ref 0.27–4.2)

## 2022-02-01 ENCOUNTER — APPOINTMENT (OUTPATIENT)
Dept: ULTRASOUND IMAGING | Facility: HOSPITAL | Age: 32
End: 2022-02-01

## 2022-02-10 ENCOUNTER — OFFICE VISIT (OUTPATIENT)
Dept: INTERNAL MEDICINE | Facility: CLINIC | Age: 32
End: 2022-02-10

## 2022-02-10 VITALS
SYSTOLIC BLOOD PRESSURE: 118 MMHG | BODY MASS INDEX: 21.71 KG/M2 | OXYGEN SATURATION: 96 % | DIASTOLIC BLOOD PRESSURE: 78 MMHG | WEIGHT: 115 LBS | HEART RATE: 104 BPM | TEMPERATURE: 98 F | HEIGHT: 61 IN

## 2022-02-10 DIAGNOSIS — R00.0 INAPPROPRIATE SINUS TACHYCARDIA: ICD-10-CM

## 2022-02-10 DIAGNOSIS — H81.11 BENIGN PAROXYSMAL POSITIONAL VERTIGO OF RIGHT EAR: ICD-10-CM

## 2022-02-10 DIAGNOSIS — G43.709 CHRONIC MIGRAINE WITHOUT AURA WITHOUT STATUS MIGRAINOSUS, NOT INTRACTABLE: Primary | ICD-10-CM

## 2022-02-10 DIAGNOSIS — R42 DIZZINESS: ICD-10-CM

## 2022-02-10 DIAGNOSIS — E03.8 SUBCLINICAL HYPOTHYROIDISM: ICD-10-CM

## 2022-02-10 PROCEDURE — 99214 OFFICE O/P EST MOD 30 MIN: CPT | Performed by: NURSE PRACTITIONER

## 2022-02-10 RX ORDER — SUMATRIPTAN 50 MG/1
TABLET, FILM COATED ORAL
Qty: 10 TABLET | Refills: 1 | Status: SHIPPED | OUTPATIENT
Start: 2022-02-10 | End: 2022-03-10

## 2022-02-10 RX ORDER — ATENOLOL 25 MG/1
25 TABLET ORAL DAILY
Qty: 60 TABLET | Refills: 11 | Status: SHIPPED | OUTPATIENT
Start: 2022-02-10 | End: 2022-03-16

## 2022-02-10 NOTE — PROGRESS NOTES
Office Visit      Patient Name: Radha Dixon  : 1990   MRN: [id number]   Care Team: Patient Care Team:  Veronika Falcon APRN as PCP - General (Family Medicine)  Danilo Shabazz MD as Consulting Physician (General Surgery)    Chief Complaint  Headache (with another episode of vertigo, was seen in 2022 for this and prescribed Amitriptyline which is not helping her HA at all )    Subjective     Subjective      Radha Dixon presents to [facility] for headache and dizziness. Started on amitriptyline last visit and has not helped headache at all. Does relax her enough to go to sleep. She is waking up with a headache every morning and it progressively worsens as the day goes on. She does think amitriptyline eases it but the pressure of the headache is still there. If she sits for a while and stands too quickly she gets dizzy and tunnel visioned.   Denies syncope, chest pain, shortness of breath, palpitations, vision changes, nausea, vomiting, and confusion.   Does get sensitive to light and sound when they happen and feels like she has brain fog. Nothing else makes the symptoms worse.   Due to have thyroid US on - keeps having to reschedule. Thyroid studies were abnormal at one point, tried levothyroxine and didn't tolerate. T4 was always normal and considered subclinical. Plans to follow-up with Dr. Hancock with endocrine once results are available.   She has never seen a neurologist for this problem.   She endorses pain above the right eye at times and changes to the left eye other days. When it gets worse it involves the full head.   Had normal CT head in 2018 for her chronic headaches.     Does have a history of inappropriate tachycardia, see's Dr. Dong. Hasn't been able to see due to scheduling conflicts. Has stopped both her atenolol and her midodrine. Heart rate consistently .    Review of Systems   Constitutional: Positive for fatigue.  "Negative for appetite change and fever.   HENT: Negative for congestion, sore throat and trouble swallowing.    Eyes: Negative for blurred vision and visual disturbance.   Respiratory: Negative for cough, shortness of breath and wheezing.    Cardiovascular: Negative for chest pain, palpitations and leg swelling.   Gastrointestinal: Negative for abdominal pain, blood in stool, constipation, diarrhea and nausea.   Endocrine: Negative for polydipsia, polyphagia and polyuria.   Genitourinary: Negative for dysuria.   Musculoskeletal: Negative for arthralgias, back pain and myalgias.   Skin: Negative for rash.   Neurological: Positive for dizziness and headache. Negative for speech difficulty, weakness and light-headedness.   Psychiatric/Behavioral: Negative for sleep disturbance and depressed mood. The patient is not nervous/anxious.        Objective     Objective   Vital Signs:   /78   Pulse 104   Temp 98 °F (36.7 °C) (Temporal)   Ht 154.9 cm (61\")   Wt 52.2 kg (115 lb)   SpO2 96%   BMI 21.73 kg/m²     Physical Exam  Vitals and nursing note reviewed.   Constitutional:       General: She is not in acute distress.     Appearance: Normal appearance. She is not ill-appearing or toxic-appearing.   Eyes:      Extraocular Movements: Extraocular movements intact.      Pupils: Pupils are equal, round, and reactive to light.   Neck:      Vascular: No carotid bruit.   Cardiovascular:      Rate and Rhythm: Regular rhythm. Tachycardia present.      Heart sounds: Normal heart sounds. No murmur heard.      Pulmonary:      Effort: Pulmonary effort is normal. No respiratory distress.      Breath sounds: Normal breath sounds. No wheezing.   Abdominal:      General: Bowel sounds are normal. There is no distension.      Palpations: Abdomen is soft.      Tenderness: There is no abdominal tenderness.   Musculoskeletal:         General: Normal range of motion.      Cervical back: Neck supple. No tenderness.   Skin:     General: " Skin is warm and dry.      Findings: No rash.   Neurological:      General: No focal deficit present.      Mental Status: She is alert and oriented to person, place, and time.      GCS: GCS eye subscore is 4. GCS verbal subscore is 5. GCS motor subscore is 6.      Motor: No weakness.      Coordination: Coordination normal.      Gait: Gait normal.      Deep Tendon Reflexes: Reflexes are normal and symmetric.      Comments: + nathaniel-halpike right     Psychiatric:         Mood and Affect: Mood normal.         Behavior: Behavior normal.        Assessment / Plan      Assessment/Plan   Problem List Items Addressed This Visit        Cardiac and Vasculature    Inappropriate sinus tachycardia    Relevant Medications    atenolol (TENORMIN) 25 MG tablet    Suspect most of her symptoms are likely related to her underlying tachycardia and cessation of beta blocker. Recommend reinitiating atenolol at previous dose. Discussed heart rate monitoring with her today. Stay hydrated with plenty of clear decaffeinated fluids and keep follow-up with cardiology.       Other Visit Diagnoses     Chronic migraine without aura without status migrainosus, not intractable    -  Primary    Relevant Medications    SUMAtriptan (Imitrex) 50 MG tablet    atenolol (TENORMIN) 25 MG tablet    Other Relevant Orders    CT head w wo contrast    Restart atenolol, see above plan and add imitrex for abortive therapy. Due to severity and lack of recent imaging will perform CT, if symptoms are not improved with the above measures will refer to neurology. Recommend staying hydrated with clear decaffeinated fluids, avoid triggers, stress management, get plenty of sleep, and compliance with atenolol. Discussed red flags and instructed to go to the ER should she develop any of these.     Dizziness        Relevant Medications    atenolol (TENORMIN) 25 MG tablet    Other Relevant Orders    CT head w wo contrast    See above plan. Restart atenolol and move slowly from  sitting to standing.     Benign paroxysmal positional vertigo of right ear    '    Discussed epley maneuvers and recommend steroid nasal spray for TELMA.    Subclinical hypothyroidism        Relevant Medications    atenolol (TENORMIN) 25 MG tablet    Other Relevant Orders    TSH Rfx On Abnormal To Free T4    Will update TSH at 6 weeks off of levothyroxine. I do not suspect her symptoms are endocrinology or hypothyroid related. Will wait on further T4 replacement . She is agreeable to this plan. Get thyroid US performed and follow-up with endocrine- she is already established.            Follow Up   Return in about 4 weeks (around 3/10/2022) for Next scheduled follow up.  Patient was given instructions and counseling regarding her condition or for health maintenance advice. Please see specific information pulled into the AVS if appropriate.     ELISEO Friedman  [facility] - [place]

## 2022-02-10 NOTE — PATIENT INSTRUCTIONS
How to Perform the Epley Maneuver  The Epley maneuver is an exercise that relieves symptoms of vertigo. Vertigo is the feeling that you or your surroundings are moving when they are not. When you feel vertigo, you may feel like the room is spinning and may have trouble walking. The Epley maneuver is used for a type of vertigo caused by a calcium deposit in a part of the inner ear. The maneuver involves changing head positions to help the deposit move out of the area.  You can do this maneuver at home whenever you have symptoms of vertigo. You can repeat it in 24 hours if your vertigo has not gone away.  Even though the Epley maneuver may relieve your vertigo for a few weeks, it is possible that your symptoms will return. This maneuver relieves vertigo, but it does not relieve dizziness.  What are the risks?  If it is done correctly, the Epley maneuver is considered safe. Sometimes it can lead to dizziness or nausea that goes away after a short time. If you develop other symptoms--such as changes in vision, weakness, or numbness--stop doing the maneuver and call your health care provider.  Supplies needed:  · A bed or table.  · A pillow.  How to do the Epley maneuver         1. Sit on the edge of a bed or table with your back straight and your legs extended or hanging over the edge of the bed or table.  2. Turn your head jail toward the affected ear or side as told by your health care provider.  3. Lie backward quickly with your head turned until you are lying flat on your back. You may want to position a pillow under your shoulders.  4. Hold this position for at least 30 seconds. If you feel dizzy or have symptoms of vertigo, continue to hold the position until the symptoms stop.  5. Turn your head to the opposite direction until your unaffected ear is facing the floor.  6. Hold this position for at least 30 seconds. If you feel dizzy or have symptoms of vertigo, continue to hold the position until the symptoms  stop.  7. Turn your whole body to the same side as your head so that you are positioned on your side. Your head will now be nearly facedown. Hold for at least 30 seconds. If you feel dizzy or have symptoms of vertigo, continue to hold the position until the symptoms stop.  8. Sit back up.  You can repeat the maneuver in 24 hours if your vertigo does not go away.  Follow these instructions at home:  For 24 hours after doing the Epley maneuver:  · Keep your head in an upright position.  · When lying down to sleep or rest, keep your head raised (elevated) with two or more pillows.  · Avoid excessive neck movements.  Activity  · Do not drive or use machinery if you feel dizzy.  · After doing the Epley maneuver, return to your normal activities as told by your health care provider. Ask your health care provider what activities are safe for you.  General instructions  · Drink enough fluid to keep your urine pale yellow.  · Do not drink alcohol.  · Take over-the-counter and prescription medicines only as told by your health care provider.  · Keep all follow-up visits as told by your health care provider. This is important.  Preventing vertigo symptoms  Ask your health care provider if there is anything you should do at home to prevent vertigo. He or she may recommend that you:  · Keep your head elevated with two or more pillows while you sleep.  · Do not sleep on the side of your affected ear.  · Get up slowly from bed.  · Avoid sudden movements during the day.  · Avoid extreme head positions or movement, such as looking up or bending over.  Contact a health care provider if:  · Your vertigo gets worse.  · You have other symptoms, including:  ? Nausea.  ? Vomiting.  ? Headache.  Get help right away if you:  · Have vision changes.  · Have a headache or neck pain that is severe or getting worse.  · Cannot stop vomiting.  · Have new numbness or weakness in any part of your body.  Summary  · Vertigo is the feeling that you or  your surroundings are moving when they are not.  · The Epley maneuver is an exercise that relieves symptoms of vertigo.  · If the Epley maneuver is done correctly, it is considered safe and relieves vertigo quickly.  This information is not intended to replace advice given to you by your health care provider. Make sure you discuss any questions you have with your health care provider.  Document Revised: 10/14/2020 Document Reviewed: 10/14/2020  Elsevier Patient Education © 2021 [employer]

## 2022-02-14 ENCOUNTER — HOSPITAL ENCOUNTER (OUTPATIENT)
Dept: ULTRASOUND IMAGING | Facility: HOSPITAL | Age: 32
Discharge: HOME | End: 2022-02-14
Admitting: NURSE PRACTITIONER

## 2022-02-14 PROCEDURE — 76536 US EXAM OF HEAD AND NECK: CPT

## 2022-02-16 ENCOUNTER — HOSPITAL ENCOUNTER (OUTPATIENT)
Dept: CT IMAGING | Facility: HOSPITAL | Age: 32
Discharge: HOME | End: 2022-02-16
Admitting: NURSE PRACTITIONER

## 2022-02-16 DIAGNOSIS — R42 DIZZINESS: ICD-10-CM

## 2022-02-16 DIAGNOSIS — G43.709 CHRONIC MIGRAINE WITHOUT AURA WITHOUT STATUS MIGRAINOSUS, NOT INTRACTABLE: ICD-10-CM

## 2022-02-16 PROCEDURE — 70470 CT HEAD/BRAIN W/O & W/DYE: CPT

## 2022-02-16 PROCEDURE — 25010000002 IOPAMIDOL 61 % SOLUTION: Performed by: NURSE PRACTITIONER

## 2022-02-16 RX ADMIN — IOPAMIDOL 100 ML: 612 INJECTION, SOLUTION INTRAVENOUS at 16:02

## 2022-03-10 ENCOUNTER — OFFICE VISIT (OUTPATIENT)
Dept: INTERNAL MEDICINE | Facility: CLINIC | Age: 32
End: 2022-03-10

## 2022-03-10 VITALS
TEMPERATURE: 97.5 F | BODY MASS INDEX: 21.92 KG/M2 | HEIGHT: 61 IN | WEIGHT: 116.12 LBS | OXYGEN SATURATION: 97 % | HEART RATE: 63 BPM | SYSTOLIC BLOOD PRESSURE: 122 MMHG | DIASTOLIC BLOOD PRESSURE: 78 MMHG

## 2022-03-10 DIAGNOSIS — G43.709 CHRONIC MIGRAINE WITHOUT AURA WITHOUT STATUS MIGRAINOSUS, NOT INTRACTABLE: Primary | ICD-10-CM

## 2022-03-10 DIAGNOSIS — R00.0 INAPPROPRIATE SINUS TACHYCARDIA: ICD-10-CM

## 2022-03-10 PROCEDURE — 99214 OFFICE O/P EST MOD 30 MIN: CPT | Performed by: NURSE PRACTITIONER

## 2022-03-10 RX ORDER — MELATONIN
1000 DAILY
COMMUNITY
End: 2022-03-16

## 2022-03-10 NOTE — PROGRESS NOTES
Office Visit      Patient Name: Radha Medrano  : 1990   MRN: [id number]   Care Team: Patient Care Team:  Veronika Falcon APRN as PCP - General (Family Medicine)  Danilo Shabazz MD as Consulting Physician (General Surgery)    Chief Complaint  Thyroid Problem (4 week f/u, restarted betablocker) and Migraine (The medication that was given helped however, she still gets them occasionally )    Subjective     Subjective      Radha Medrano presents to [facility] for migraine and tachycardia follow-up.   CT head was unremarkable.   She was started on atenolol at her last visit. Heart rate is fluctuating less now and her migraines have began to decrease in frequency. Migraines happening about 3 times/week now rather than everyday. She has been using imitrex for abortive therapy and that does help relief her migraines. Usually migraines are one sided behind her eyes and accompanied by dizziness. Needing to reschedule with cardiology. She is noticing less palpitations since being on atenolol, has been more fatigued with less energy since starting but no other obvious side effects. Sister has been diagnosed with POTS and she is concerned she may have this as well, has very similar symptoms to sister. She has had negative tilt table testing in the past. Established patient with Dr. Dong.     Denies confusion, nausea, vomiting, vision changes, unilateral weakness, and changes in speech.   Now trying to conceive. Last period was around 2022. About due to start her period.     Review of Systems   Constitutional: Positive for fatigue. Negative for appetite change and fever.   HENT: Negative for congestion, sore throat and trouble swallowing.    Eyes: Negative for blurred vision and visual disturbance.   Respiratory: Negative for cough, shortness of breath and wheezing.    Cardiovascular: Negative for chest pain, palpitations and leg swelling.   Gastrointestinal: Negative  "for abdominal pain, blood in stool, constipation, diarrhea and nausea.   Endocrine: Negative for polydipsia, polyphagia and polyuria.   Genitourinary: Negative for dysuria.   Musculoskeletal: Negative for arthralgias, back pain and myalgias.   Skin: Negative for rash.   Neurological: Positive for dizziness and headache. Negative for weakness and light-headedness.        Brain fog     Psychiatric/Behavioral: Negative for sleep disturbance and depressed mood. The patient is not nervous/anxious.        Objective     Objective   Vital Signs:   /78   Pulse 63   Temp 97.5 °F (36.4 °C) (Temporal)   Ht 154.9 cm (61\")   Wt 52.7 kg (116 lb 1.9 oz)   SpO2 97%   BMI 21.94 kg/m²     Physical Exam  Vitals and nursing note reviewed.   Constitutional:       General: She is not in acute distress.     Appearance: Normal appearance. She is not ill-appearing or toxic-appearing.   Eyes:      Extraocular Movements: Extraocular movements intact.      Pupils: Pupils are equal, round, and reactive to light.   Neck:      Vascular: No carotid bruit.   Cardiovascular:      Rate and Rhythm: Normal rate and regular rhythm.      Heart sounds: Normal heart sounds. No murmur heard.  Pulmonary:      Effort: Pulmonary effort is normal. No respiratory distress.      Breath sounds: Normal breath sounds. No wheezing.   Abdominal:      General: Bowel sounds are normal. There is no distension.      Palpations: Abdomen is soft.      Tenderness: There is no abdominal tenderness.   Musculoskeletal:      Cervical back: Neck supple. No tenderness.   Skin:     General: Skin is warm and dry.      Findings: No rash.   Neurological:      General: No focal deficit present.      Mental Status: She is alert and oriented to person, place, and time.      Motor: No weakness.      Gait: Gait normal.      Deep Tendon Reflexes: Reflexes normal.   Psychiatric:         Mood and Affect: Mood normal.         Behavior: Behavior normal.       Assessment / Plan  "     Assessment/Plan   Problem List Items Addressed This Visit        Cardiac and Vasculature    Inappropriate sinus tachycardia    Palpitations have improved with re-initiation of atenolol. Recommend continuing at current dose. Reschedule with cardiology to discuss POTS concern, has had normal tilt table in the past. Recommend increasing salt in the diet, stay hydrated with clear decaffeinated fluids, and compliance with beta blockade.       Other Visit Diagnoses     Chronic migraine without aura without status migrainosus, not intractable    -  Primary    Relevant Orders    Ambulatory Referral to Neurology    Still having several migraines/week despite preventative treatment with beta blocker and amitriptyline. Recommend neurology referral and she is agreeable. Avoid triggers, schedule with neurology, stop sumatriptan since she is trying to conceive, and continue preventative medications for now.            Follow Up   Return in about 3 months (around 6/10/2022), or if symptoms worsen or fail to improve, for Annual with pap.  Patient was given instructions and counseling regarding her condition or for health maintenance advice. Please see specific information pulled into the AVS if appropriate.     ELISEO Friedman  [facility] - [place]

## 2022-03-16 ENCOUNTER — OFFICE VISIT (OUTPATIENT)
Dept: INTERNAL MEDICINE | Facility: CLINIC | Age: 32
End: 2022-03-16

## 2022-03-16 VITALS
WEIGHT: 114.8 LBS | BODY MASS INDEX: 21.67 KG/M2 | TEMPERATURE: 98 F | HEART RATE: 107 BPM | OXYGEN SATURATION: 99 % | DIASTOLIC BLOOD PRESSURE: 78 MMHG | SYSTOLIC BLOOD PRESSURE: 118 MMHG | HEIGHT: 61 IN

## 2022-03-16 DIAGNOSIS — G43.709 CHRONIC MIGRAINE WITHOUT AURA WITHOUT STATUS MIGRAINOSUS, NOT INTRACTABLE: ICD-10-CM

## 2022-03-16 DIAGNOSIS — F41.8 DEPRESSION WITH ANXIETY: ICD-10-CM

## 2022-03-16 DIAGNOSIS — R00.0 INAPPROPRIATE SINUS TACHYCARDIA: ICD-10-CM

## 2022-03-16 DIAGNOSIS — N92.6 MISSED MENSES: Primary | ICD-10-CM

## 2022-03-16 DIAGNOSIS — Z32.01 POSITIVE PREGNANCY TEST: ICD-10-CM

## 2022-03-16 LAB
B-HCG UR QL: POSITIVE
EXPIRATION DATE: ABNORMAL
HCG INTACT+B SERPL-ACNC: 841 MIU/ML
INTERNAL NEGATIVE CONTROL: ABNORMAL
INTERNAL POSITIVE CONTROL: ABNORMAL
Lab: ABNORMAL

## 2022-03-16 PROCEDURE — 99214 OFFICE O/P EST MOD 30 MIN: CPT | Performed by: NURSE PRACTITIONER

## 2022-03-16 PROCEDURE — 81025 URINE PREGNANCY TEST: CPT | Performed by: NURSE PRACTITIONER

## 2022-03-16 RX ORDER — SERTRALINE HYDROCHLORIDE 25 MG/1
25 TABLET, FILM COATED ORAL DAILY
Qty: 30 TABLET | Refills: 2 | Status: SHIPPED | OUTPATIENT
Start: 2022-03-16 | End: 2022-06-27 | Stop reason: SDUPTHER

## 2022-03-16 NOTE — PROGRESS NOTES
"  Office Visit      Patient Name: Radha Medrano  : 1990   MRN: [id number]   Care Team: Patient Care Team:  Veronika Falcon APRN as PCP - General (Family Medicine)  Danilo Shabazz MD as Consulting Physician (General Surgery)    Chief Complaint  Pregnancy     Subjective     Subjective      Radha Medrano presents to [facility] for positive pregnancy test.   LMP around  and had positive home pregnancy test this week.   Called and OB can't get her in until May.   She had questions about medications she is taking so wanted an appointment today.   She has been taking atenolol, amitriptyline, and Wellbutrin for migraines, tachycardia, and mood. She states she doesn't take her Wellbutrin consistently. She has tried Zoloft in the past and tolerated it well. Her mood feels well controlled currently and she denies suicidal/homicidal ideations.   Recently started both amitriptyline and atenolol due to palpitations and migraines, these have helped some. She has not followed up with cardiology for her tachycardia.   Denies vaginal bleeding, abdominal cramping, vomiting, and severe low back pain.   Has had some slight nausea, breast tenderness, and headaches which are not out of her normal.   Is not taking a daily prenatal. She stopped all of her above medications after she found out she was pregnant. She has had 3 prior pregnancies, 1 was pre-term, 2nd was uncomplicated, and 3rd ended in early miscarriage.     Objective     Objective   Vital Signs:   /78   Pulse 107   Temp 98 °F (36.7 °C) (Temporal)   Ht 154.9 cm (61\")   Wt 52.1 kg (114 lb 12.8 oz)   SpO2 99%   BMI 21.69 kg/m²     Physical Exam  Vitals and nursing note reviewed.   Constitutional:       General: She is not in acute distress.     Appearance: Normal appearance. She is not toxic-appearing.   Eyes:      Pupils: Pupils are equal, round, and reactive to light.   Cardiovascular:      Rate and Rhythm: " Regular rhythm. Tachycardia present.      Heart sounds: Normal heart sounds. No murmur heard.  Pulmonary:      Effort: Pulmonary effort is normal. No respiratory distress.      Breath sounds: Normal breath sounds. No wheezing.   Abdominal:      General: Bowel sounds are normal. There is no distension.      Palpations: Abdomen is soft.      Tenderness: There is no abdominal tenderness.   Skin:     General: Skin is warm and dry.      Findings: No rash.   Neurological:      General: No focal deficit present.      Mental Status: She is alert.   Psychiatric:         Mood and Affect: Mood normal.         Behavior: Behavior normal.          Assessment / Plan      Assessment/Plan   Problem List Items Addressed This Visit        Cardiac and Vasculature    Inappropriate sinus tachycardia    Discouraged atenolol usage during pregnancy, slightly tachycardic today but has been off of medication. If tachycardia is accompanied by symptoms would recommend following up with cardiology as she is established to discuss medication safe for pregnancy.        Mental Health    Depression with anxiety    Relevant Medications    sertraline (Zoloft) 25 MG tablet      Has not consistently been taking wellbutrin but does feel like she needs something for mood, recommend zoloft as this is the most studied and safest option during pregnancy and she is agreeable. Stop Wellbutrin. Discussed SE, ER with any suicidal thoughts. Recommend healthy diet, exercise, sleep hygiene, and stress management. Follow-up with OB for further management during pregnancy.       Other Visit Diagnoses     Missed menses    -  Primary    Relevant Orders    POCT pregnancy, urine (Completed)    HCG, B-subunit, Quantitative    Positive pregnancy test     Would like beta HCG drawn today. Positive UPT, discussed pregnant until proven otherwise. Keep follow-up with OBGYN. Start daily pre- vitamin with folic acid and iron. Stop all OTC medications, amitryptiline, and  wellbutrin. ER with any vaginal bleeding or severe abdominal cramping. If nausea is bothersome can use OTC unisom with vitamin b6.        Chronic migraine without aura without status migrainosus, not intractable        Relevant Medications    sertraline (Zoloft) 25 MG tablet    Stop amitryptiline and atenolol. Keep appointment with neurology tomorrow. Recommend avoiding triggers, stress management, sleep hygiene, and stay hydrated with plenty of clear decaffeinated fluids.            Follow Up   Return if symptoms worsen or fail to improve.  Patient was given instructions and counseling regarding her condition or for health maintenance advice. Please see specific information pulled into the AVS if appropriate.     ELISEO Friedman  [facility]

## 2022-03-16 NOTE — PROGRESS NOTES
"  Office Visit      Patient Name: Radha Medrano  : 1990   MRN: [id number]   Care Team: Patient Care Team:  Veronika Falcon APRN as PCP - General (Family Medicine)  Danilo Shabazz MD as Consulting Physician (General Surgery)    Chief Complaint  Pregnancy     Subjective     Subjective      Radha Medrano presents to [facility] for     Review of Systems    Objective     Objective   Vital Signs:   /78   Pulse 107   Temp 98 °F (36.7 °C) (Temporal)   Ht 154.9 cm (61\")   Wt 52.1 kg (114 lb 12.8 oz)   SpO2 99%   BMI 21.69 kg/m²     Physical Exam     Assessment / Plan      Assessment/Plan   Problem List Items Addressed This Visit    None     Visit Diagnoses     Missed menses    -  Primary    Relevant Orders    POCT pregnancy, urine (Completed)                 Follow Up   No follow-ups on file.  Patient was given instructions and counseling regarding her condition or for health maintenance advice. Please see specific information pulled into the AVS if appropriate.     ELISEO Friedman  [facility]      "

## 2022-03-17 ENCOUNTER — OFFICE VISIT (OUTPATIENT)
Dept: NEUROLOGY | Facility: CLINIC | Age: 32
End: 2022-03-17

## 2022-03-17 VITALS
SYSTOLIC BLOOD PRESSURE: 120 MMHG | BODY MASS INDEX: 21.45 KG/M2 | HEIGHT: 61 IN | DIASTOLIC BLOOD PRESSURE: 80 MMHG | WEIGHT: 113.6 LBS | OXYGEN SATURATION: 99 % | HEART RATE: 115 BPM | TEMPERATURE: 97.7 F

## 2022-03-17 DIAGNOSIS — G43.719 INTRACTABLE CHRONIC MIGRAINE WITHOUT AURA AND WITHOUT STATUS MIGRAINOSUS: Primary | ICD-10-CM

## 2022-03-17 DIAGNOSIS — Z3A.01 LESS THAN 8 WEEKS GESTATION OF PREGNANCY: ICD-10-CM

## 2022-03-17 PROCEDURE — 99213 OFFICE O/P EST LOW 20 MIN: CPT | Performed by: NURSE PRACTITIONER

## 2022-03-17 NOTE — PROGRESS NOTES
New Patient Office Visit      Patient Name: Radha Medrano  : 1990   MRN: [id number]     Referring Physician: Veronika Falcon APRN    Chief Complaint:    Chief Complaint   Patient presents with   • Consult     NP, in office to establish care for migraines.    • Migraine     Patient states she has migraines daily behind her right eye.       History of Present Illness: Radha Medrano is a 32 y.o. female who is here today to establish care with Neurology for migraines.  She has had chronic headaches since she was a teenager.  She had identified MSG as a trigger, when she was younger.  Her migraines involve the entire head, described as throbbing, accompanied by nausea/vomiting/phonophobia/photophobia, lasting more than 4 hours.  She was taking Imitrex and Amitriptyline for her migraines but has stopped those.  She is taking Zofran ODT PRN nausea and vomiting.  She recently found out she is 6 weeks pregnant.  She goes to [facility], [place], [place], for her first OB appointment next week.  History of concussions.  Additional risk factors- left thyroid lobectomy, depressive disorder, personality disorder, PTSD, anxiety disorder, neck pain and scoliosis.   *CT head with and without contrast showed no acute intracranial abnormality- 2022.      Subjective      Review of Systems:   Review of Systems   Constitutional: Negative for fatigue, fever, unexpected weight gain and unexpected weight loss.   HENT: Negative for hearing loss, sore throat, swollen glands, tinnitus and trouble swallowing.    Eyes: Negative for blurred vision, double vision, photophobia and visual disturbance.   Respiratory: Negative for cough, chest tightness and shortness of breath.    Cardiovascular: Negative for chest pain, palpitations and leg swelling.   Gastrointestinal: Negative for constipation, diarrhea and nausea.   Endocrine: Negative for cold intolerance and heat intolerance.   Musculoskeletal: Negative for gait  "problem, neck pain and neck stiffness.   Skin: Negative for color change and rash.   Allergic/Immunologic: Negative for environmental allergies and food allergies.   Neurological: Positive for headache. Negative for dizziness, syncope, facial asymmetry, speech difficulty, weakness, memory problem and confusion.   Psychiatric/Behavioral: Negative for agitation, behavioral problems and depressed mood. The patient is not nervous/anxious.        Past Medical History:   Past Medical History:   Diagnosis Date   • Abnormal Pap smear of cervix    • Acid reflux    • Anxiety    • Anxiety and depression    • Arthritis    • Asthma    • B12 deficiency 5/5/2021   • Bipolar 1 disorder (HCC)    • Body piercing     EARS, NOSE, BELLY BUTTON   • Dysphagia     REPORTS SOMETIMES FEELS LIKE \"FOOD GETS STUCK\"   • Fracture     HISTORY OF RIGHT WRIST AS A CHILD   • Goiter     x3   • Heart rate fast     states usually 120-130 (states on med for this)   • History of migraine    • Hx of exercise stress test 04/01/2020   • Inappropriate sinus tachycardia     per Dr. Dong's note on 8/12/20.     • Injury of neck    • Insomnia    • Migraine    • Ovarian cyst    • Palpitations    • PONV (postoperative nausea and vomiting)    • Scoliosis    • Tachycardia    • Tattoo    • Thyroid nodule    • Toe fracture     right great toe   • Wears glasses        Past Surgical History:   Past Surgical History:   Procedure Laterality Date   • APPENDECTOMY  2016   • CHOLECYSTECTOMY     • COLPOSCOPY     • CYST REMOVAL      OVARIAN CYST   • DILATATION AND CURETTAGE     • ENDOSCOPY N/A 12/1/2017    Procedure: ESOPHAGOGASTRODUODENOSCOPY WITH COLD FORCEP BIOPSY;  Surgeon: Danilo Shabazz MD;  Location: [facility] ENDOSCOPY;  Service:    • ENDOSCOPY N/A 9/3/2020    Procedure: ESOPHAGOGASTRODUODENOSCOPY WITH BIOPSIES AND DILATATION;  Surgeon: Bhupinder Montenegro MD;  Location: [facility] ENDOSCOPY;  Service: Gastroenterology;  Laterality: N/A;   • ESOPHAGEAL MOTILITY STUDY " N/A 11/10/2020    Procedure: ESOPHAGEAL MANOMETRY;  Surgeon: Chivo Mcmahan MD;  Location:  [facility];  Service: Gastroenterology;  Laterality: N/A;   • HIP SURGERY Right     RIGHT HIP, REPORTS HAD BONES SCRAPED   • SHOULDER ARTHROSCOPY Right 10/11/2018    Procedure: Right shoulder diagnostic arthroscopy, limited rotator cuff debridement;  Surgeon: Tino Uribe MD;  Location:  [facility];  Service: Orthopedics   • THYROID BIOPSY      goiter   • THYROIDECTOMY Left 2021    Procedure: THYROID LOBECTOMY LEFT;  Surgeon: Yves Contreras MD;  Location:  [facility];  Service: General;  Laterality: Left;   • THYROIDECTOMY, PARTIAL Left    • WISDOM TOOTH EXTRACTION         Family History:   Family History   Problem Relation Age of Onset   • Diabetes Mother    • Arthritis Mother    • No Known Problems Father    • Cancer Maternal Grandmother    • Diabetes Maternal Grandmother    • Heart attack Maternal Grandmother    • Arthritis Maternal Grandmother    • Heart attack Paternal Grandmother    • Heart disease Sister    • Migraines Sister    • Cancer Paternal Grandfather         Lung   • Colon cancer Neg Hx    • Cirrhosis Neg Hx    • Liver cancer Neg Hx    • Liver disease Neg Hx        Social History:   Social History     Socioeconomic History   • Marital status: Single   • Number of children: 2   Tobacco Use   • Smoking status: Former Smoker     Packs/day: 0.50     Years: 13.00     Pack years: 6.50     Types: Cigarettes     Quit date: 10/17/2020     Years since quittin.4   • Smokeless tobacco: Never Used   Vaping Use   • Vaping Use: Never used   Substance and Sexual Activity   • Alcohol use: No   • Drug use: No   • Sexual activity: Defer       Medications:     Current Outpatient Medications:   •  albuterol sulfate  (90 Base) MCG/ACT inhaler, Inhale 2 puffs Every 4 (Four) Hours As Needed for Wheezing or Shortness of Air., Disp: 18 g, Rfl: 1  •  fluticasone (Flonase) 50 MCG/ACT nasal spray, 2 sprays into  "the nostril(s) as directed by provider Daily., Disp: 18.2 mL, Rfl: 11  •  montelukast (Singulair) 10 MG tablet, Take 1 tablet by mouth Every Night., Disp: 90 tablet, Rfl: 1  •  ondansetron ODT (ZOFRAN-ODT) 4 MG disintegrating tablet, Place 1 tablet on the tongue Every 8 (Eight) Hours As Needed for Nausea or Vomiting., Disp: 30 tablet, Rfl: 1  •  sertraline (Zoloft) 25 MG tablet, Take 1 tablet by mouth Daily., Disp: 30 tablet, Rfl: 2    Allergies:   Allergies   Allergen Reactions   • Onion Itching     REPORTS CAUSES MIGRAINES and MAKES THROAT ITCHY     • Cabbage Headache   • Msg [Monosodium Glutamate] Headache   • Sulfa Antibiotics Hives       Objective     Physical Exam:  Vital Signs:   Vitals:    03/17/22 1509   BP: 120/80   BP Location: Right arm   Patient Position: Sitting   Cuff Size: Adult   Pulse: 115   Temp: 97.7 °F (36.5 °C)   SpO2: 99%   Weight: 51.5 kg (113 lb 9.6 oz)   Height: 154.9 cm (61\")   PainSc: 0-No pain     Body mass index is 21.46 kg/m².     Physical Exam  Vitals and nursing note reviewed.   Constitutional:       General: She is not in acute distress.     Appearance: She is well-developed. She is not diaphoretic.   HENT:      Head: Normocephalic and atraumatic.   Eyes:      Extraocular Movements: Extraocular movements intact.      Conjunctiva/sclera: Conjunctivae normal.      Pupils: Pupils are equal, round, and reactive to light.   Cardiovascular:      Rate and Rhythm: Regular rhythm. Tachycardia present.      Heart sounds: Normal heart sounds. No murmur heard.    No friction rub. No gallop.   Pulmonary:      Effort: Pulmonary effort is normal. No respiratory distress.      Breath sounds: Normal breath sounds. No wheezing or rales.   Musculoskeletal:         General: Normal range of motion.   Skin:     General: Skin is warm and dry.      Findings: No rash.   Neurological:      Mental Status: She is alert and oriented to person, place, and time.      Gait: Gait is intact.   Psychiatric:         " Mood and Affect: Mood normal.         Behavior: Behavior normal.         Thought Content: Thought content normal.         Judgment: Judgment normal.         Neurologic Exam     Mental Status   Oriented to person, place, and time.     Cranial Nerves   Cranial nerves II through XII intact.     CN III, IV, VI   Pupils are equal, round, and reactive to light.    Gait, Coordination, and Reflexes     Gait  Gait: normal    Tremor   Resting tremor: absent  Intention tremor: absent  Action tremor: absent      Assessment / Plan      Assessment/Plan:   Diagnoses and all orders for this visit:    1. Intractable chronic migraine without aura and without status migrainosus (Primary)    2. Less than 8 weeks gestation of pregnancy    3. BMI 21.0-21.9, adult    *Patient education on migraines during pregnancy provided.   *Advised patient to follow up with her OB/GYN for her migraines.     Follow Up:   Return if symptoms worsen or fail to improve.    ELISEO Tamayo, FNP-C  [facility]       Please note that portions of this note may have been completed with a voice recognition program. Efforts were made to edit the dictations, but occasionally words are mistranscribed.

## 2022-04-04 ENCOUNTER — TELEPHONE (OUTPATIENT)
Dept: INTERNAL MEDICINE | Facility: CLINIC | Age: 32
End: 2022-04-04

## 2022-04-22 ENCOUNTER — TELEPHONE (OUTPATIENT)
Dept: INTERNAL MEDICINE | Facility: CLINIC | Age: 32
End: 2022-04-22

## 2022-04-22 NOTE — TELEPHONE ENCOUNTER
Caller: Radha Medrano    Relationship: Self    Best call back number: [phone]    What is the best time to reach you: ANYTIME    Who are you requesting to speak with (clinical staff, provider,  specific staff member): CLINICAL STAFF    Do you know the name of the person who called: SELF    What was the call regarding: PATIENT STATES THAT SHE WOULD LIKE A CALL ABOUT HER MEDICATION CHANGE.     Do you require a callback: YES

## 2022-04-29 ENCOUNTER — OFFICE VISIT (OUTPATIENT)
Dept: ENDOCRINOLOGY | Facility: CLINIC | Age: 32
End: 2022-04-29

## 2022-04-29 VITALS
HEART RATE: 92 BPM | OXYGEN SATURATION: 99 % | BODY MASS INDEX: 22.47 KG/M2 | HEIGHT: 61 IN | SYSTOLIC BLOOD PRESSURE: 118 MMHG | WEIGHT: 119 LBS | DIASTOLIC BLOOD PRESSURE: 68 MMHG

## 2022-04-29 DIAGNOSIS — R94.6 BORDERLINE ABNORMAL TFTS: Primary | ICD-10-CM

## 2022-04-29 PROBLEM — E04.1 THYROID NODULE: Status: RESOLVED | Noted: 2021-02-24 | Resolved: 2022-04-29

## 2022-04-29 PROCEDURE — 99214 OFFICE O/P EST MOD 30 MIN: CPT | Performed by: INTERNAL MEDICINE

## 2022-04-29 RX ORDER — LEVOTHYROXINE SODIUM 0.03 MG/1
12.5 TABLET ORAL DAILY
Qty: 45 TABLET | Refills: 3 | Status: SHIPPED | OUTPATIENT
Start: 2022-04-29 | End: 2022-05-09

## 2022-04-29 RX ORDER — ATENOLOL 25 MG/1
25 TABLET ORAL DAILY
COMMUNITY
End: 2022-05-03

## 2022-04-29 NOTE — PROGRESS NOTES
"     Office Note      Date: 2022  Patient Name: Radha Medrano  MRN: [id number]  : 1990    Chief Complaint   Patient presents with   • Goiter       History of Present Illness:   Radha Medrano is a 32 y.o. female who presents for Goiter  she had left hemithyroidectomy in past for benign disease and has not required thyroid medication. At one point her tsh went to 4.8 and she was put on thyroid meds but even a low dose made her hyperthyroid.   She is now about 10 weeks pregnant and last week her tsh was 4.5      Subjective          Review of Systems:   Review of Systems   Constitutional: Positive for fatigue and unexpected weight change.   Neurological: Positive for headaches.       The following portions of the patient's history were reviewed and updated as appropriate: allergies, current medications, past family history, past medical history, past social history, past surgical history and problem list.    Objective     Visit Vitals  /68   Pulse 92   Ht 154.9 cm (61\")   Wt 54 kg (119 lb)   SpO2 99%   BMI 22.48 kg/m²       Labs:    CBC w/DIFF  Lab Results   Component Value Date    WBC 8.8 2021    RBC 4.19 2021    HGB 12.6 2021    HCT 37.7 2021    MCV 90 2021    MCH 30.1 2021    MCHC 33.4 2021    RDW 11.8 2021    RDWSD 39.2 2021    MPV 11.8 2021     2021    NEUTRORELPCT 54.8 2021    LYMPHORELPCT 35.8 2021    MONORELPCT 4.4 (L) 2021    EOSRELPCT 3.9 2021    BASORELPCT 0.8 2021    AUTOIGPER 0.2 2021    NEUTROABS 4.32 2021    LYMPHSABS 2.82 2021    MONOSABS 0.35 2021    EOSABS 0.31 2021    BASOSABS 0.06 2021    AUTOIGNUM 0.02 2021    NRBC 0.0 2021       T4  Free T4   Date Value Ref Range Status   2021 1.00 0.82 - 1.77 ng/dL Final   2021 1.02 0.93 - 1.70 ng/dL Final       TSH  No results found for: TSHBASE     Physical Exam:  Physical " Exam  Vitals reviewed.   Constitutional:       Appearance: Normal appearance.   Eyes:      Extraocular Movements: Extraocular movements intact.   Neck:      Comments: Left lobe is absent. Right lobe slightly larger than a normal lobe   Lymphadenopathy:      Cervical: No cervical adenopathy.   Neurological:      Mental Status: She is alert.   Psychiatric:         Mood and Affect: Mood normal.         Thought Content: Thought content normal.         Judgment: Judgment normal.         Assessment / Plan      Assessment & Plan:  Problem List Items Addressed This Visit        Other    Borderline abnormal TFTs - Primary    Current Assessment & Plan     During pregnancy we aim for : normal free t4 and tsh <2.5 . Her current levels show tsh of 4.5 so she will need a low dose of levothyroxine  Then recheck levels in one month.,   After delivery, she will likely be able to come off of the medication           Relevant Orders    T4, Free    TSH           Edgardo Palacios MD   04/29/2022

## 2022-04-29 NOTE — ASSESSMENT & PLAN NOTE
During pregnancy we aim for : normal free t4 and tsh <2.5 . Her current levels show tsh of 4.5 so she will need a low dose of levothyroxine  Then recheck levels in one month.,   After delivery, she will likely be able to come off of the medication

## 2022-05-03 ENCOUNTER — OFFICE VISIT (OUTPATIENT)
Dept: CARDIOLOGY | Facility: CLINIC | Age: 32
End: 2022-05-03

## 2022-05-03 VITALS
WEIGHT: 119 LBS | HEIGHT: 61 IN | HEART RATE: 85 BPM | OXYGEN SATURATION: 98 % | BODY MASS INDEX: 22.47 KG/M2 | SYSTOLIC BLOOD PRESSURE: 90 MMHG | DIASTOLIC BLOOD PRESSURE: 58 MMHG

## 2022-05-03 DIAGNOSIS — R00.0 INAPPROPRIATE SINUS TACHYCARDIA: Primary | ICD-10-CM

## 2022-05-03 PROCEDURE — 99243 OFF/OP CNSLTJ NEW/EST LOW 30: CPT | Performed by: INTERNAL MEDICINE

## 2022-05-03 PROCEDURE — 93000 ELECTROCARDIOGRAM COMPLETE: CPT | Performed by: INTERNAL MEDICINE

## 2022-05-03 RX ORDER — ATENOLOL 25 MG/1
25 TABLET ORAL 2 TIMES DAILY
Qty: 60 TABLET | Refills: 11 | Status: SHIPPED | OUTPATIENT
Start: 2022-05-03 | End: 2022-05-12

## 2022-05-03 NOTE — PROGRESS NOTES
Subjective:     Encounter Date:05/03/2022      Patient ID: Radha Medrano is a 32 y.o. female.    Chief Complaint: Tachycardia  HPI  This is a 32-year-old female patient who presents to cardiology clinic on referral from her obstetrician for tachycardia.  The patient has a history of inappropriate sinus tachycardia diagnosed 3 years ago.  She has been maintained on atenolol 25 mg once daily therapy with good heart rate control.  The patient has a history of multinodular goiter with subsequent subtotal thyroid gland removal and is on thyroid supplementation therapy.  The patient is now pregnant and has noticed her heart rates have been elevated.  She has tracked her heart rates with a wrist based monitor with maximum noted heart rate of 140 bpm.  She indicates that she has an uncomfortable awareness that her heart is racing mostly at night.  She is a non-smoker.  Thyroid function studies have been ordered.  The patient had an extensive work-up 3 years ago with her initial diagnosis including a normal treadmill stress test, normal echocardiogram, normal head upright tilt table test and normal cardiac monitor.  The patient indicates that her obstetrician is comfortable with the use of atenolol.  Midodrine therapy has been discontinued during pregnancy.  The following portions of the patient's history were reviewed and updated as appropriate: allergies, current medications, past family history, past medical history, past social history, past surgical history and problem  Review of Systems   Constitutional: Negative for chills, diaphoresis, fever, malaise/fatigue, weight gain and weight loss.   HENT: Negative for ear discharge, hearing loss, hoarse voice and nosebleeds.    Eyes: Negative for discharge, double vision, pain and photophobia.   Cardiovascular: Positive for palpitations. Negative for chest pain, claudication, cyanosis, dyspnea on exertion, irregular heartbeat, leg swelling, near-syncope, orthopnea,  paroxysmal nocturnal dyspnea and syncope.   Respiratory: Negative for cough, hemoptysis, shortness of breath, sputum production and wheezing.    Endocrine: Negative for cold intolerance, heat intolerance, polydipsia, polyphagia and polyuria.   Hematologic/Lymphatic: Negative for adenopathy and bleeding problem. Does not bruise/bleed easily.   Skin: Negative for color change, flushing, itching and rash.   Musculoskeletal: Negative for muscle cramps, muscle weakness, myalgias and stiffness.   Gastrointestinal: Negative for abdominal pain, diarrhea, hematemesis, hematochezia, nausea and vomiting.   Genitourinary: Negative for dysuria, frequency and nocturia.   Neurological: Negative for focal weakness, loss of balance, numbness, paresthesias and seizures.   Psychiatric/Behavioral: Negative for altered mental status, hallucinations and suicidal ideas.   Allergic/Immunologic: Negative for HIV exposure, hives and persistent infections.           Current Outpatient Medications:   •  albuterol sulfate  (90 Base) MCG/ACT inhaler, Inhale 2 puffs Every 4 (Four) Hours As Needed for Wheezing or Shortness of Air., Disp: 18 g, Rfl: 1  •  fluticasone (Flonase) 50 MCG/ACT nasal spray, 2 sprays into the nostril(s) as directed by provider Daily., Disp: 18.2 mL, Rfl: 11  •  levothyroxine (SYNTHROID, LEVOTHROID) 25 MCG tablet, Take 0.5 tablets by mouth Daily., Disp: 45 tablet, Rfl: 3  •  montelukast (Singulair) 10 MG tablet, Take 1 tablet by mouth Every Night., Disp: 90 tablet, Rfl: 1  •  ondansetron ODT (ZOFRAN-ODT) 4 MG disintegrating tablet, Place 1 tablet on the tongue Every 8 (Eight) Hours As Needed for Nausea or Vomiting., Disp: 30 tablet, Rfl: 1  •  sertraline (Zoloft) 25 MG tablet, Take 1 tablet by mouth Daily., Disp: 30 tablet, Rfl: 2  •  atenolol (TENORMIN) 25 MG tablet, Take 1 tablet by mouth 2 (Two) Times a Day., Disp: 60 tablet, Rfl: 11    Objective:   Vitals and nursing note reviewed.   Constitutional:        "Appearance: Healthy appearance. Not in distress.   Neck:      Vascular: No JVR. JVD normal.   Pulmonary:      Effort: Pulmonary effort is normal.      Breath sounds: Normal breath sounds. No wheezing. No rhonchi. No rales.   Chest:      Chest wall: Not tender to palpatation.   Cardiovascular:      PMI at left midclavicular line. Normal rate. Regular rhythm. Normal S1. Normal S2.      Murmurs: There is no murmur.      No gallop. No click. No rub.   Pulses:     Intact distal pulses.   Edema:     Peripheral edema absent.   Abdominal:      General: Bowel sounds are normal.      Palpations: Abdomen is soft.      Tenderness: There is no abdominal tenderness.   Musculoskeletal: Normal range of motion.         General: No tenderness. Skin:     General: Skin is warm and dry.   Neurological:      General: No focal deficit present.      Mental Status: Alert and oriented to person, place and time.       Blood pressure 90/58, pulse 85, height 154.9 cm (61\"), weight 54 kg (119 lb), SpO2 98 %, not currently breastfeeding.   Lab Review:     Assessment:       1. Inappropriate sinus tachycardia  Increased tachycardia during pregnancy.      ECG 12 Lead    Date/Time: 5/3/2022 11:55 AM  Performed by: Ronal Dong MD  Authorized by: Ronal Dong MD   Comparison: compared with previous ECG   Similar to previous ECG  Rhythm: sinus rhythm  Rate: normal  QRS axis: normal    Clinical impression: normal ECG            Plan:     I have recommended increasing her atenolol to 25 mg orally twice per day.  The patient has been instructed that atenolol is not a long-acting medication and will not provide 24 hours of heart rate control coverage.  In addition, the patient has been advised that she can take \"an extra dose\" of atenolol as needed for heart rates over 150 bpm that sustained for at least an hour.  I have recommended a repeat outpatient cardiac monitor, since it has been 3 years since her last.      "

## 2022-05-09 ENCOUNTER — TELEPHONE (OUTPATIENT)
Dept: ENDOCRINOLOGY | Facility: CLINIC | Age: 32
End: 2022-05-09

## 2022-05-09 RX ORDER — LEVOTHYROXINE SODIUM 25 MCG
25 TABLET ORAL DAILY
Qty: 30 TABLET | Refills: 5 | Status: SHIPPED | OUTPATIENT
Start: 2022-05-09 | End: 2022-12-15

## 2022-05-12 ENCOUNTER — TELEPHONE (OUTPATIENT)
Dept: CARDIOLOGY | Facility: CLINIC | Age: 32
End: 2022-05-12

## 2022-05-12 NOTE — TELEPHONE ENCOUNTER
Terry AN called stating that atenolol is not recommended for Pts who are pregnant and was asking if she can be switched to Metoprolol or Coreg. Please advise

## 2022-05-21 ENCOUNTER — HOSPITAL ENCOUNTER (EMERGENCY)
Facility: HOSPITAL | Age: 32
Discharge: HOME | End: 2022-05-21
Attending: EMERGENCY MEDICINE | Admitting: EMERGENCY MEDICINE

## 2022-05-21 VITALS
RESPIRATION RATE: 19 BRPM | WEIGHT: 122 LBS | OXYGEN SATURATION: 98 % | TEMPERATURE: 98.4 F | HEART RATE: 77 BPM | DIASTOLIC BLOOD PRESSURE: 74 MMHG | SYSTOLIC BLOOD PRESSURE: 121 MMHG | BODY MASS INDEX: 23.03 KG/M2 | HEIGHT: 61 IN

## 2022-05-21 DIAGNOSIS — R00.2 PALPITATIONS: Primary | ICD-10-CM

## 2022-05-21 DIAGNOSIS — O99.891 BACTERIURIA IN PREGNANCY: ICD-10-CM

## 2022-05-21 DIAGNOSIS — R82.71 BACTERIURIA IN PREGNANCY: ICD-10-CM

## 2022-05-21 LAB
ALBUMIN SERPL-MCNC: 4 G/DL (ref 3.5–5.2)
ALBUMIN/GLOB SERPL: 1.6 G/DL
ALP SERPL-CCNC: 54 U/L (ref 39–117)
ALT SERPL W P-5'-P-CCNC: 13 U/L (ref 1–33)
ANION GAP SERPL CALCULATED.3IONS-SCNC: 13.3 MMOL/L (ref 5–15)
AST SERPL-CCNC: 16 U/L (ref 1–32)
BACTERIA UR QL AUTO: ABNORMAL /HPF
BASOPHILS # BLD AUTO: 0.04 10*3/MM3 (ref 0–0.2)
BASOPHILS NFR BLD AUTO: 0.3 % (ref 0–1.5)
BILIRUB SERPL-MCNC: 0.3 MG/DL (ref 0–1.2)
BILIRUB UR QL STRIP: NEGATIVE
BUN SERPL-MCNC: 9 MG/DL (ref 6–20)
BUN/CREAT SERPL: 16.1 (ref 7–25)
CALCIUM SPEC-SCNC: 8.8 MG/DL (ref 8.6–10.5)
CHLORIDE SERPL-SCNC: 98 MMOL/L (ref 98–107)
CLARITY UR: ABNORMAL
CO2 SERPL-SCNC: 20.7 MMOL/L (ref 22–29)
COLOR UR: YELLOW
CREAT SERPL-MCNC: 0.56 MG/DL (ref 0.57–1)
DEPRECATED RDW RBC AUTO: 39.1 FL (ref 37–54)
EGFRCR SERPLBLD CKD-EPI 2021: 125.3 ML/MIN/1.73
EOSINOPHIL # BLD AUTO: 0.29 10*3/MM3 (ref 0–0.4)
EOSINOPHIL NFR BLD AUTO: 2 % (ref 0.3–6.2)
ERYTHROCYTE [DISTWIDTH] IN BLOOD BY AUTOMATED COUNT: 12.1 % (ref 12.3–15.4)
GLOBULIN UR ELPH-MCNC: 2.5 GM/DL
GLUCOSE SERPL-MCNC: 91 MG/DL (ref 65–99)
GLUCOSE UR STRIP-MCNC: NEGATIVE MG/DL
HCT VFR BLD AUTO: 33.3 % (ref 34–46.6)
HGB BLD-MCNC: 11.6 G/DL (ref 12–15.9)
HGB UR QL STRIP.AUTO: NEGATIVE
HOLD SPECIMEN: NORMAL
HOLD SPECIMEN: NORMAL
HYALINE CASTS UR QL AUTO: ABNORMAL /LPF
IMM GRANULOCYTES # BLD AUTO: 0.05 10*3/MM3 (ref 0–0.05)
IMM GRANULOCYTES NFR BLD AUTO: 0.3 % (ref 0–0.5)
KETONES UR QL STRIP: NEGATIVE
LEUKOCYTE ESTERASE UR QL STRIP.AUTO: ABNORMAL
LYMPHOCYTES # BLD AUTO: 1.66 10*3/MM3 (ref 0.7–3.1)
LYMPHOCYTES NFR BLD AUTO: 11.6 % (ref 19.6–45.3)
MAGNESIUM SERPL-MCNC: 2.1 MG/DL (ref 1.6–2.6)
MCH RBC QN AUTO: 30.7 PG (ref 26.6–33)
MCHC RBC AUTO-ENTMCNC: 34.8 G/DL (ref 31.5–35.7)
MCV RBC AUTO: 88.1 FL (ref 79–97)
MONOCYTES # BLD AUTO: 0.65 10*3/MM3 (ref 0.1–0.9)
MONOCYTES NFR BLD AUTO: 4.5 % (ref 5–12)
NEUTROPHILS NFR BLD AUTO: 11.63 10*3/MM3 (ref 1.7–7)
NEUTROPHILS NFR BLD AUTO: 81.3 % (ref 42.7–76)
NITRITE UR QL STRIP: NEGATIVE
NRBC BLD AUTO-RTO: 0 /100 WBC (ref 0–0.2)
PH UR STRIP.AUTO: 6.5 [PH] (ref 5–8)
PLATELET # BLD AUTO: 229 10*3/MM3 (ref 140–450)
PMV BLD AUTO: 11.4 FL (ref 6–12)
POTASSIUM SERPL-SCNC: 3.8 MMOL/L (ref 3.5–5.2)
PROT SERPL-MCNC: 6.5 G/DL (ref 6–8.5)
PROT UR QL STRIP: NEGATIVE
RBC # BLD AUTO: 3.78 10*6/MM3 (ref 3.77–5.28)
RBC # UR STRIP: ABNORMAL /HPF
REF LAB TEST METHOD: ABNORMAL
SODIUM SERPL-SCNC: 132 MMOL/L (ref 136–145)
SP GR UR STRIP: 1.02 (ref 1–1.03)
SQUAMOUS #/AREA URNS HPF: ABNORMAL /HPF
TRANS CELLS #/AREA URNS HPF: ABNORMAL /HPF
TROPONIN T SERPL-MCNC: <0.01 NG/ML (ref 0–0.03)
TSH SERPL DL<=0.05 MIU/L-ACNC: 3.48 UIU/ML (ref 0.27–4.2)
UROBILINOGEN UR QL STRIP: ABNORMAL
WBC # UR STRIP: ABNORMAL /HPF
WBC NRBC COR # BLD: 14.32 10*3/MM3 (ref 3.4–10.8)
WHOLE BLOOD HOLD COAG: NORMAL
WHOLE BLOOD HOLD SPECIMEN: NORMAL

## 2022-05-21 PROCEDURE — 93005 ELECTROCARDIOGRAM TRACING: CPT

## 2022-05-21 PROCEDURE — 99283 EMERGENCY DEPT VISIT LOW MDM: CPT

## 2022-05-21 PROCEDURE — 80053 COMPREHEN METABOLIC PANEL: CPT | Performed by: PHYSICIAN ASSISTANT

## 2022-05-21 PROCEDURE — 85025 COMPLETE CBC W/AUTO DIFF WBC: CPT | Performed by: PHYSICIAN ASSISTANT

## 2022-05-21 PROCEDURE — 84484 ASSAY OF TROPONIN QUANT: CPT | Performed by: PHYSICIAN ASSISTANT

## 2022-05-21 PROCEDURE — 81001 URINALYSIS AUTO W/SCOPE: CPT | Performed by: PHYSICIAN ASSISTANT

## 2022-05-21 PROCEDURE — 84443 ASSAY THYROID STIM HORMONE: CPT | Performed by: PHYSICIAN ASSISTANT

## 2022-05-21 PROCEDURE — 87086 URINE CULTURE/COLONY COUNT: CPT | Performed by: PHYSICIAN ASSISTANT

## 2022-05-21 PROCEDURE — 83735 ASSAY OF MAGNESIUM: CPT | Performed by: PHYSICIAN ASSISTANT

## 2022-05-21 RX ORDER — SODIUM CHLORIDE 0.9 % (FLUSH) 0.9 %
10 SYRINGE (ML) INJECTION AS NEEDED
Status: DISCONTINUED | OUTPATIENT
Start: 2022-05-21 | End: 2022-05-21 | Stop reason: HOSPADM

## 2022-05-21 RX ORDER — CEPHALEXIN 500 MG/1
500 CAPSULE ORAL 3 TIMES DAILY
Qty: 15 CAPSULE | Refills: 0 | Status: SHIPPED | OUTPATIENT
Start: 2022-05-21 | End: 2022-05-26

## 2022-05-21 RX ORDER — CEPHALEXIN 250 MG/1
500 CAPSULE ORAL ONCE
Status: COMPLETED | OUTPATIENT
Start: 2022-05-21 | End: 2022-05-21

## 2022-05-21 RX ADMIN — SODIUM CHLORIDE 1000 ML: 9 INJECTION, SOLUTION INTRAVENOUS at 15:03

## 2022-05-21 RX ADMIN — CEPHALEXIN 500 MG: 250 CAPSULE ORAL at 16:24

## 2022-05-21 NOTE — ED PROVIDER NOTES
Subjective   Patient is a 32-year-old female with a history of abnormal Pap smears, acid reflux, anxiety and depression, asthma, B12 deficiency, tachycardia, palpitations, migraines, ovarian cyst, and thyroid nodule presenting to the ER for evaluation palpitations.  Patient is reportedly 14 weeks pregnant.  She follows with [facility], states she had an ultrasound at 9 weeks gestation that revealed an intrauterine pregnancy.  Patient states she was diagnosed with tachycardia in 2019 and has been taking medications.  She follows with Dr. Dong and currently has heart monitor in place.  She states for the past week she has had episodes where she feels as if her heart will begin racing and she will feel lightheaded.  She states she has had some tingling in her fingers.  She denies any syncope with these episodes.  She denies any chest pain, shortness of breath, fever, chills, cough.  She states she feels like symptoms her hands and feet swell, denies any calf pain or tenderness.  Denies any history of DVT or PE.  Denies any vaginal bleeding, dysuria, hematuria, or any other symptoms.  Per chart review, patient had been on atenolol but recently switched to metoprolol due to her pregnancy.  She has been taking this medication as directed.          Review of Systems   Constitutional: Negative for chills and fever.   HENT: Negative.    Eyes: Negative.    Respiratory: Negative for cough and shortness of breath.    Cardiovascular: Positive for palpitations. Negative for chest pain.   Gastrointestinal: Negative.    Genitourinary: Negative.    Musculoskeletal: Negative.    Skin: Negative.    Allergic/Immunologic: Negative for immunocompromised state.   Neurological: Positive for light-headedness. Negative for dizziness and syncope.   Psychiatric/Behavioral: Negative.        Past Medical History:   Diagnosis Date   • Abnormal Pap smear of cervix    • Acid reflux    • Anxiety    • Anxiety and depression    •  "Arthritis    • Asthma    • B12 deficiency 5/5/2021   • Bipolar 1 disorder (HCC)    • Body piercing     EARS, NOSE, BELLY BUTTON   • Dysphagia     REPORTS SOMETIMES FEELS LIKE \"FOOD GETS STUCK\"   • Fracture     HISTORY OF RIGHT WRIST AS A CHILD   • Goiter     x3   • Heart rate fast     states usually 120-130 (states on med for this)   • History of migraine    • Hx of exercise stress test 04/01/2020   • Inappropriate sinus tachycardia     per Dr. Dong's note on 8/12/20.     • Injury of neck    • Insomnia    • Migraine    • Ovarian cyst    • Palpitations    • PONV (postoperative nausea and vomiting)    • Scoliosis    • Tachycardia    • Tattoo    • Thyroid nodule    • Toe fracture     right great toe   • Wears glasses        Allergies   Allergen Reactions   • Onion Itching     REPORTS CAUSES MIGRAINES and MAKES THROAT ITCHY     • Cabbage Headache   • Msg [Monosodium Glutamate] Headache   • Sulfa Antibiotics Hives       Past Surgical History:   Procedure Laterality Date   • APPENDECTOMY  2016   • CHOLECYSTECTOMY     • COLPOSCOPY     • CYST REMOVAL      OVARIAN CYST   • DILATATION AND CURETTAGE     • ENDOSCOPY N/A 12/1/2017    Procedure: ESOPHAGOGASTRODUODENOSCOPY WITH COLD FORCEP BIOPSY;  Surgeon: Danilo Shabazz MD;  Location: [facility] ENDOSCOPY;  Service:    • ENDOSCOPY N/A 9/3/2020    Procedure: ESOPHAGOGASTRODUODENOSCOPY WITH BIOPSIES AND DILATATION;  Surgeon: Bhupinder Montenegro MD;  Location: [facility] ENDOSCOPY;  Service: Gastroenterology;  Laterality: N/A;   • ESOPHAGEAL MOTILITY STUDY N/A 11/10/2020    Procedure: ESOPHAGEAL MANOMETRY;  Surgeon: Chivo Mcmahan MD;  Location: [facility] ENDOSCOPY;  Service: Gastroenterology;  Laterality: N/A;   • HIP SURGERY Right     RIGHT HIP, REPORTS HAD BONES SCRAPED   • SHOULDER ARTHROSCOPY Right 10/11/2018    Procedure: Right shoulder diagnostic arthroscopy, limited rotator cuff debridement;  Surgeon: Tino Uribe MD;  Location: [facility] OR;  Service: Orthopedics   • " "THYROID BIOPSY      goiter   • THYROIDECTOMY Left 2021    Procedure: THYROID LOBECTOMY LEFT;  Surgeon: Yves Contreras MD;  Location: [facility];  Service: General;  Laterality: Left;   • THYROIDECTOMY, PARTIAL Left    • WISDOM TOOTH EXTRACTION         Family History   Problem Relation Age of Onset   • Diabetes Mother    • Arthritis Mother    • No Known Problems Father    • Cancer Maternal Grandmother    • Diabetes Maternal Grandmother    • Heart attack Maternal Grandmother    • Arthritis Maternal Grandmother    • Heart attack Paternal Grandmother    • Heart disease Sister    • Migraines Sister    • Cancer Paternal Grandfather         Lung   • Colon cancer Neg Hx    • Cirrhosis Neg Hx    • Liver cancer Neg Hx    • Liver disease Neg Hx        Social History     Socioeconomic History   • Marital status: Single   • Number of children: 2   Tobacco Use   • Smoking status: Former Smoker     Packs/day: 0.50     Years: 13.00     Pack years: 6.50     Types: Cigarettes     Quit date: 10/17/2020     Years since quittin.5   • Smokeless tobacco: Never Used   Vaping Use   • Vaping Use: Never used   Substance and Sexual Activity   • Alcohol use: No   • Drug use: No   • Sexual activity: Defer           Objective   Physical Exam  Vitals and nursing note reviewed.     /74   Pulse 77   Temp 98.4 °F (36.9 °C) (Oral)   Resp 19   Ht 154.9 cm (61\")   Wt 55.3 kg (122 lb)   LMP 2022   SpO2 98%   BMI 23.05 kg/m²     GEN: No acute distress, sitting upright in stretcher.  Awake and alert, does not appear septic or toxic.  She is answering questions appropriately.  Head: Normocephalic, atraumatic  Eyes: EOM intact  ENT: Mask in place per protocol  Chest: Nontender to palpation  Cardiovascular: Regular rate and rhythm on auscultation  Lungs: Clear to auscultation bilaterally without adventitious sounds  Abdomen: Soft, nontender, nondistended, no peritoneal signs, no guarding  Extremities: No edema, normal appearance, " full range of motion without deficits, distal pulses intact, no calf tenderness  Neuro: GCS 15  Psych: Mood and affect are appropriate    Procedures           ED Course  ED Course as of 05/22/22 0025   Sat May 21, 2022   1452 WBC(!): 14.32 [LA]   1452 Hemoglobin(!): 11.6 [LA]   1452 Platelets: 229 [LA]   1452 Neutrophil Rel %(!): 81.3 [LA]   1452 Lymphocyte Rel %(!): 11.6 [LA]   1452 Monocyte Rel %(!): 4.5 [LA]   1452 Eosinophil Rel %: 2.0 [LA]   1452 Basophil Rel %: 0.3 [LA]   1452 Immature Granulocyte Rel %: 0.3 [LA]   1452 Neutrophils Absolute(!): 11.63 [LA]   1452 Lymphocytes Absolute: 1.66 [LA]   1453 Monocytes Absolute: 0.65 [LA]   1453 Eosinophils Absolute: 0.29 [LA]   1453 Basophils Absolute: 0.04 [LA]   1453 Immature Grans, Absolute: 0.05 [LA]   1453 nRBC: 0.0 [LA]   1506 EKG interpreted by me.  Sinus rhythm.  Rate of 91.  Shortened CT interval.  No ST segment or T wave changes.  Abnormal EKG [CG]   1508 Magnesium: 2.1 [LA]   1508 Glucose: 91 [LA]   1508 BUN: 9 [LA]   1508 Creatinine(!): 0.56 [LA]   1508 Sodium(!): 132 [LA]   1508 Potassium: 3.8 [LA]   1508 Chloride: 98 [LA]   1508 CO2(!): 20.7 [LA]   1508 Calcium: 8.8 [LA]   1509 Total Protein: 6.5 [LA]   1509 Albumin: 4.00 [LA]   1509 ALT (SGPT): 13 [LA]   1509 AST (SGOT): 16 [LA]   1509 Alkaline Phosphatase: 54 [LA]   1509 Total Bilirubin: 0.3 [LA]   1509 Globulin: 2.5 [LA]   1509 A/G Ratio: 1.6 [LA]   1509 BUN/Creatinine Ratio: 16.1 [LA]   1509 Anion Gap: 13.3 [LA]   1509 eGFR: 125.3 [LA]   1509 RBC, UA: None Seen [LA]   1517 WBC, UA(!): 13-20 [LA]   1517 Bacteria, UA(!): 2+ [LA]   1518 Squamous Epithelial Cells, UA(!): 7-12 [LA]   1518 Transitional Epithelial Cells: 0-2 [LA]   1518 Hyaline Casts, UA: None Seen [LA]   1518 Methodology:: Manual Light Microscopy [LA]   1518 Color, UA: Yellow [LA]   1518 Appearance, UA(!): Turbid [LA]   1518 pH, UA: 6.5 [LA]   1518 Specific Gravity, UA: 1.016 [LA]   1518 Glucose: Negative [LA]   1518 Ketones, UA: Negative  [LA]   1518 Bilirubin, UA: Negative [LA]   1518 Blood, UA: Negative [LA]   1518 Protein, UA: Negative [LA]   1518 Leukocytes, UA(!): Large (3+) [LA]   1518 Nitrite, UA: Negative [LA]   1518 Urobilinogen, UA: 0.2 E.U./dL [LA]   1518 Troponin T: <0.010 [LA]   1518 TSH Baseline: 3.480 [LA]   1548 Discussed findings the patient.  We are going to treat the bacteriuria with antibiotic.  Discussed the case with Dr. Raines as well, believe she can be discharged with very close follow-up.  Discussed continuing her medications, follow-up with cardiology and OB/GYN. [LA]      ED Course User Index  [CG] Nando Raines,   [LA] Sonia Sherwood PA-C                                                 MDM  Number of Diagnoses or Management Options  Bacteriuria in pregnancy  Palpitations  Diagnosis management comments: On arrival, patient stable.  She is normotensive, afebrile, no acute distress.  Her heart rate on the monitor is between the 80s and 90s.  She is saturating 98% on room air.  Differential could include dehydration, electrolyte abnormalities, anemia, cardiac dysrhythmia, and other concerns.  She denies any syncope, chest pain or shortness of breath.  Will obtain basic labs, troponin, magnesium, TSH, EKG, urinalysis.  Will give IV fluids.  Will obtain fetal heart tones.  Will defer x-ray at this time given clear lung sounds, no hypoxia and patient being pregnant.      Patient's labs reveal leukocytosis, mild anemia with no significant change from previous.  There is no elevation of troponin, magnesium and TSH were within normal limits.  EKG was interpreted by the attending.  Urinalysis did reveal bacteriuria and white blood cells with some squamous cells.  Fetal heart tones were detected.  Patient's heart rate was within normal limits here.  X-ray was deferred due to pregnancy.  She did receive IV fluids.  We will go ahead and treat the bacteria in her urine with an antibiotic given the pregnancy, will send for  culture.  She does have a Holter monitor in place at this time.  Discussed with Dr. Raines, believes it is appropriate for discharge at this time with close follow-up.  I discussed continuing her metoprolol, staying hydrated and following up with OB/GYN and cardiology as scheduled.  Discussed very strict return precautions to the ER.  She verbalized understanding and was in agreement with this plan of care.       Amount and/or Complexity of Data Reviewed  Clinical lab tests: reviewed and ordered  Decide to obtain previous medical records or to obtain history from someone other than the patient: yes  Review and summarize past medical records: yes  Discuss the patient with other providers: yes    Risk of Complications, Morbidity, and/or Mortality  Presenting problems: moderate  Diagnostic procedures: moderate  Management options: low    Patient Progress  Patient progress: stable      Final diagnoses:   Palpitations   Bacteriuria in pregnancy       ED Disposition  ED Disposition     ED Disposition   Discharge    Condition   Stable    Comment   --             Veronika Falcon N, APRN  [address] [facility] [address]  [place] [id number] [phone]    Schedule an appointment as soon as possible for a visit            Medication List      New Prescriptions    cephalexin 500 MG capsule  Commonly known as: KEFLEX  Take 1 capsule by mouth 3 (Three) Times a Day for 5 days.           Where to Get Your Medications      These medications were sent to [id number] - [place], [place] - [address] - [phone]  - [phone] FX  [address] [facility] [address]    Phone: [phone]   · cephalexin 500 MG capsule          Sonia Sherwood PA-C  05/22/22 0025

## 2022-05-21 NOTE — DISCHARGE INSTRUCTIONS
You had bacteria in your urine today that we will treat with an antibiotic.  Take Keflex as directed until medication is complete unless directed by another provider.  Drink plenty of fluids to stay well-hydrated.  Continue to wear the heart monitor as directed by cardiology.  Continue the metoprolol as directed.  Follow-up with your OB/GYN and cardiologist as directed.  Follow through PCP as needed.  Return to the ER for any change, worsening symptoms, or any additional concerns including but not limited to severe or worsening palpitations, syncope, chest pain, difficulty breathing

## 2022-05-22 LAB — BACTERIA SPEC AEROBE CULT: NORMAL

## 2022-05-24 ENCOUNTER — OFFICE VISIT (OUTPATIENT)
Dept: CARDIOLOGY | Facility: CLINIC | Age: 32
End: 2022-05-24

## 2022-05-24 VITALS
SYSTOLIC BLOOD PRESSURE: 92 MMHG | HEART RATE: 129 BPM | BODY MASS INDEX: 22.84 KG/M2 | HEIGHT: 61 IN | RESPIRATION RATE: 18 BRPM | OXYGEN SATURATION: 98 % | WEIGHT: 121 LBS | DIASTOLIC BLOOD PRESSURE: 64 MMHG

## 2022-05-24 DIAGNOSIS — R00.2 PALPITATIONS: ICD-10-CM

## 2022-05-24 DIAGNOSIS — R00.0 INAPPROPRIATE SINUS TACHYCARDIA: Primary | ICD-10-CM

## 2022-05-24 PROCEDURE — 99213 OFFICE O/P EST LOW 20 MIN: CPT | Performed by: NURSE PRACTITIONER

## 2022-05-24 NOTE — PROGRESS NOTES
"             [facility] Office Follow Up Note    Radha Medrano  [id number]  05/24/2022    Primary Care Provider: Veronika Falcon APRN   Referring Provider: No ref. provider found    Chief Complaint: ED follow-up for palpitations    History of Present Illness:   Mrs. Radha Medrano is a 32 y.o. female who presents to the Cardiology Clinic for ED follow-up of palpitations.  The patient was recently seen by her primary cardiologist earlier this month after having been referred from her obstetrician.  The patient has a history of inappropriate sinus tachycardia diagnosed 3 years ago.  She has been maintained on atenolol 25 mg once daily therapy with good heart rate control, but due to her pregnancy, was recently changed to metoprolol tartrate 25 mg twice daily.  She recently presented to the ED with complaints of the sensation of her heart racing, lightheadedness, and tingling of her fingers.  She presents today for ED follow-up of palpitations.  The patient reports that Saturday she was at [employer] with her family and her mother noticed she was pale.  She states she looked down at her Apple watch and noticed a heart rate of 160 bpm.  She sat down because she was dizzy.  Furthermore, she reports \"blurry vision\" and \"tunnel vision\" with this episode.  She states that she now has 3-4 similar episodes a day (\"I feel like it's getting worse.\")  Within a couple minutes of standing her HR goes to 110s to 120s bpm.  Sitting and laying it stays in the 70-90 bpm range.   She reports the color of her urine is dark, but states she drinks nothing but water, propel electrolyte supplemented water, and Gatorade.  She states that her urine is dark because of her prenatal vitamin.  In addition, she may experience numbness and tingling of her fingers as well as swelling of her fingertips.  She is currently 14 weeks gestation.  There are no other complaints or concerns at this time.    Past Cardiac Testing: " "  1. Last Coronary Angio: None  2. Prior Stress Testin2020   1. Normal treadmill exercise stress test  3. Last Echo: 2020   1. Estimated EF = 64%.   2. Left ventricular systolic function is normal.  4. Prior Holter Monitor: 2020, 30-day MCOT   1. A normal monitor study.   2. No correlation between patient's symptoms and underlying arrhythmia and/or ectopy.   3. Tendency towards mild sinus tachycardia.  5. Tilt Table Test: 2020   1. The baseline blood pressure was 110/70 with a heart rate of 67 bpm.   2. During 45 minutes of upright positioning 80 degrees the lowest blood pressure noted was 98/80. The highest heart rate noted was 101 bpm. Slight dizziness and nausea occurred when she was initially tilted upright but no additional symptoms occurred.   3. Following the tilt table in recovery when the patient sat up on the side of the bed her blood pressure fell from 110/80 to 86/60 and this was associated with some mild dizziness.   4. Negative tilt table test for inducible syncope or presyncope.   5. An orthostatic blood pressure response was noted in recovery when the patient sat up on the side of the bed.        Review of Systems:   Review of Systems   Constitutional: Positive for diaphoresis and fatigue. Negative for activity change, chills, fever and unexpected weight gain.   Eyes: Positive for blurred vision. Negative for visual disturbance.        Tunnel vision   Respiratory: Positive for shortness of breath. Negative for apnea, cough, chest tightness and wheezing.         \"sometimes\" like walking from parking lot to here.   Cardiovascular: Positive for palpitations and leg swelling. Negative for chest pain.   Gastrointestinal: Positive for nausea. Negative for abdominal distention, blood in stool, GERD and indigestion.   Endocrine: Positive for cold intolerance. Negative for heat intolerance.   Genitourinary: Negative for hematuria.   Musculoskeletal: Negative for gait problem, joint " "swelling and myalgias.        Shoulder/arm pain pain with \"high heart rate\"   Skin: Negative for color change, pallor and wound.   Neurological: Positive for dizziness, light-headedness, numbness and headache. Negative for seizures, syncope, weakness and confusion.        \"brain fog\", numbness/tingling in fingers   Hematological: Does not bruise/bleed easily.   Psychiatric/Behavioral: Negative for behavioral problems, sleep disturbance, suicidal ideas and depressed mood.     I have reviewed and confirmed the accuracy of the ROS as documented by the MA/LPN/RN ELISEO Rogers    I have reviewed and/or updated the patient's past medical, past surgical, family, social history, problem list and allergies as appropriate.     Medications:     Current Outpatient Medications:   •  albuterol sulfate  (90 Base) MCG/ACT inhaler, Inhale 2 puffs Every 4 (Four) Hours As Needed for Wheezing or Shortness of Air., Disp: 18 g, Rfl: 1  •  cephalexin (KEFLEX) 500 MG capsule, Take 1 capsule by mouth 3 (Three) Times a Day for 5 days., Disp: 15 capsule, Rfl: 0  •  fluticasone (Flonase) 50 MCG/ACT nasal spray, 2 sprays into the nostril(s) as directed by provider Daily., Disp: 18.2 mL, Rfl: 11  •  metoprolol tartrate (LOPRESSOR) 25 MG tablet, Take 1 tablet by mouth 2 (Two) Times a Day., Disp: 180 tablet, Rfl: 3  •  promethazine (PHENERGAN) 12.5 MG half tablet, Take 6.25 mg by mouth Every 6 (Six) Hours As Needed for Nausea or Vomiting., Disp: , Rfl:   •  sertraline (Zoloft) 25 MG tablet, Take 1 tablet by mouth Daily., Disp: 30 tablet, Rfl: 2  •  Synthroid 25 MCG tablet, Take 1 tablet by mouth Daily., Disp: 30 tablet, Rfl: 5    Physical Exam:  Vital Signs:   Vitals:    05/24/22 1351 05/24/22 1355 05/24/22 1356   BP: (!) 92/38 110/66 92/64   BP Location: Left arm Left arm Left arm   Patient Position: Lying Sitting Standing   Pulse: 103 115 (!) 129   Resp: 18     SpO2: 98%     Weight: 54.9 kg (121 lb)     Height: 154.9 cm (61\")  "      Body mass index is 22.86 kg/m².    Physical Exam  Vitals and nursing note reviewed.   Constitutional:       General: She is not in acute distress.     Appearance: Normal appearance. She is well-developed.   HENT:      Head: Normocephalic and atraumatic.      Mouth/Throat:      Mouth: Mucous membranes are moist.   Eyes:      General: No scleral icterus.     Extraocular Movements: Extraocular movements intact.   Neck:      Trachea: Trachea normal.   Cardiovascular:      Rate and Rhythm: Regular rhythm. Tachycardia present.      Pulses: Normal pulses.      Heart sounds: Normal heart sounds. No murmur heard.    No friction rub. No gallop.   Pulmonary:      Effort: Pulmonary effort is normal.      Breath sounds: Normal breath sounds.   Abdominal:      Palpations: Abdomen is soft.      Tenderness: There is no abdominal tenderness.   Musculoskeletal:         General: Normal range of motion.      Cervical back: Neck supple.      Right lower leg: No edema.      Left lower leg: No edema.   Skin:     General: Skin is warm and dry.      Findings: No bruising, lesion or rash.   Neurological:      Mental Status: She is alert and oriented to person, place, and time.      Motor: No weakness.      Gait: Gait normal.   Psychiatric:         Mood and Affect: Mood normal.         Behavior: Behavior normal. Behavior is cooperative.         Thought Content: Thought content does not include suicidal ideation.         Results Review:   I reviewed the patient's new clinical results.        Date/Time: 5/24/2022 12:47 PM  Performed by: Belinda Stewart APRN  Authorized by: Nando Raines DO           Assessment / Plan:     1. Inappropriate sinus tachycardia (Primary)  --2020, 30-day MCOT showed normal study, no correlation to symptoms, tendency toward mild sinus tachycardia    2. Palpitations  --Patient is currently wearing a 30-day outpatient cardiac monitor  --Recent EKG shows sinus rhythm with short KS interval (118  ms)  --Previous EKG this month showed NSR  --5/21, Normal TSH  --Wireless monitoring report shows sinus tachycardia with rates as high as 152 bpm (on 5/20 @ 0807 CDT)    Patient Instructions   Stay hydrated to where you're urinating every hour.    Your patch has been changed out today.    Mail back your device when your prescribed time is finished.    Follow-up with Dr. Dong as scheduled.      Preventative Cardiology:   Tobacco Cessation: N/A   Advance Care Planning: ACP discussion was declined by the patient. Patient does not have an advance directive, declines further assistance.     Follow Up:   Follow-up with Dr. Dong as scheduled    Thank you for allowing me to participate in the care of your patient. Please to not hesitate to contact me with additional questions or concerns.     ELISEO Peterson

## 2022-05-24 NOTE — PATIENT INSTRUCTIONS
Stay hydrated to where you're urinating every hour.    Your patch has been changed out today.    Mail back your device when your prescribed time is finished.    Follow-up with Dr. Dong as scheduled.

## 2022-06-09 LAB
Lab: 32
MAXIMAL PREDICTED HEART RATE: 189 BPM
STRESS TARGET HR: 161 BPM
TOAL ENROLLMENT DAYS: 30

## 2022-06-10 ENCOUNTER — TELEPHONE (OUTPATIENT)
Dept: CARDIOLOGY | Facility: CLINIC | Age: 32
End: 2022-06-10

## 2022-06-10 NOTE — TELEPHONE ENCOUNTER
Patient called stating at her appointment yesterday with her OBGYN, she was discussing her ongoing symptoms of tachycardia (up to 185 BPM), and the doctor suggested to increase her Metoprolol. Patient is currently taking Metoprolol 25 mg one tablet bid. Patient is 17 weeks pregnant and her OB wanted to consult with you before making any cardiac medication changes. Please advise.

## 2022-06-10 NOTE — TELEPHONE ENCOUNTER
Spoke with patient. Information was given and understood. Sent patient a prescription to [facility] here in [place].

## 2022-06-27 DIAGNOSIS — F41.8 DEPRESSION WITH ANXIETY: ICD-10-CM

## 2022-06-27 RX ORDER — SERTRALINE HYDROCHLORIDE 25 MG/1
25 TABLET, FILM COATED ORAL DAILY
Qty: 30 TABLET | Refills: 2 | Status: SHIPPED | OUTPATIENT
Start: 2022-06-27 | End: 2022-08-23 | Stop reason: SDUPTHER

## 2022-06-27 NOTE — TELEPHONE ENCOUNTER
Caller: Otoniel Medrano    Relationship: Self    Best call back number: [phone]    Requested Prescriptions:   Requested Prescriptions     Pending Prescriptions Disp Refills   • sertraline (Zoloft) 25 MG tablet 30 tablet 2     Sig: Take 1 tablet by mouth Daily.        Pharmacy where request should be sent: [id number] [place], [place] - [address] - [phone]  - [phone] FX     Additional details provided by patient: OTONIEL SAYS SHE HAS 1 PILL LEFT.  Does the patient have less than a 3 day supply:  [x] Yes  [] No    Kandis Murphy Rep   06/27/22 10:07 EDT

## 2022-07-01 ENCOUNTER — OFFICE VISIT (OUTPATIENT)
Dept: CARDIOLOGY | Facility: CLINIC | Age: 32
End: 2022-07-01

## 2022-07-01 VITALS
OXYGEN SATURATION: 99 % | RESPIRATION RATE: 18 BRPM | SYSTOLIC BLOOD PRESSURE: 104 MMHG | DIASTOLIC BLOOD PRESSURE: 58 MMHG | HEIGHT: 61 IN | BODY MASS INDEX: 24.24 KG/M2 | WEIGHT: 128.4 LBS | HEART RATE: 95 BPM

## 2022-07-01 DIAGNOSIS — Z72.0 TOBACCO ABUSE: ICD-10-CM

## 2022-07-01 DIAGNOSIS — R00.2 PALPITATIONS: ICD-10-CM

## 2022-07-01 DIAGNOSIS — R00.0 INAPPROPRIATE SINUS TACHYCARDIA: Primary | ICD-10-CM

## 2022-07-01 PROCEDURE — 99213 OFFICE O/P EST LOW 20 MIN: CPT | Performed by: INTERNAL MEDICINE

## 2022-07-01 RX ORDER — VITAMIN A ACETATE, .BETA.-CAROTENE, ASCORBIC ACID, CHOLECALCIFEROL, .ALPHA.-TOCOPHEROL ACETATE, DL-, THIAMINE MONONITRATE, RIBOFLAVIN, NIACINAMIDE, PYRIDOXINE HYDROCHLORIDE, FOLIC ACID, CYANOCOBALAMIN, CALCIUM CARBONATE, FERROUS FUMARATE, ZINC OXIDE, AND CUPRIC OXIDE 2000; 2000; 120; 400; 22; 1.84; 3; 20; 10; 1; 12; 200; 27; 25; 2 [IU]/1; [IU]/1; MG/1; [IU]/1; MG/1; MG/1; MG/1; MG/1; MG/1; MG/1; UG/1; MG/1; MG/1; MG/1; MG/1
TABLET ORAL
COMMUNITY
Start: 2022-04-20 | End: 2022-12-15

## 2022-07-01 RX ORDER — METOPROLOL TARTRATE 50 MG/1
75 TABLET, FILM COATED ORAL 2 TIMES DAILY
Qty: 90 TABLET | Refills: 11 | Status: SHIPPED | OUTPATIENT
Start: 2022-07-01

## 2022-07-01 NOTE — PROGRESS NOTES
Subjective:     Encounter Date:07/01/2022      Patient ID: Radha Medrano is a 32 y.o. female.    Chief Complaint: Tachycardia  HPI  This is a 32-year-old female patient 20 weeks pregnant with her third child who has a history of inappropriate sinus tachycardia.  The patient has been experiencing rapid heart rates throughout her pregnancy.  Her beta-blocker dose has been very gradually escalated over the last few months.  The patient indicates she is currently taking 50 mg of Lopressor twice per day.  She reports less dizziness.  She indicates that her heart rates have been averaging between  bpm at rest.  She has had no issues related to her pregnancy.  She has had no syncopal episodes.  The patient had an extensive work-up in 2020 with a normal head upright tilt table test, normal cardiac monitor, normal treadmill stress test and normal echocardiogram.  She underwent a repeat outpatient 30-day cardiac monitor which showed no evidence of arrhythmia or frequent/complex atrial or ventricular ectopy.  Multiple symptomatic activations of her device showed no correlation to underlying rhythm disturbance.  She demonstrated a tendency towards resting sinus tachycardia.  The following portions of the patient's history were reviewed and updated as appropriate: allergies, current medications, past family history, past medical history, past social history, past surgical history and problem  Review of Systems   Constitutional: Negative for chills, diaphoresis, fever, malaise/fatigue, weight gain and weight loss.   HENT: Negative for ear discharge, hearing loss, hoarse voice and nosebleeds.    Eyes: Negative for discharge, double vision, pain and photophobia.   Cardiovascular: Positive for palpitations. Negative for chest pain, claudication, cyanosis, dyspnea on exertion, irregular heartbeat, leg swelling, near-syncope, orthopnea, paroxysmal nocturnal dyspnea and syncope.   Respiratory: Negative for cough,  hemoptysis, shortness of breath, sputum production and wheezing.    Endocrine: Negative for cold intolerance, heat intolerance, polydipsia, polyphagia and polyuria.   Hematologic/Lymphatic: Negative for adenopathy and bleeding problem. Does not bruise/bleed easily.   Skin: Negative for color change, flushing, itching and rash.   Musculoskeletal: Negative for muscle cramps, muscle weakness, myalgias and stiffness.   Gastrointestinal: Negative for abdominal pain, diarrhea, hematemesis, hematochezia, nausea and vomiting.   Genitourinary: Negative for dysuria, frequency and nocturia.   Neurological: Negative for focal weakness, loss of balance, numbness, paresthesias and seizures.   Psychiatric/Behavioral: Negative for altered mental status, hallucinations and suicidal ideas.   Allergic/Immunologic: Negative for HIV exposure, hives and persistent infections.           Current Outpatient Medications:   •  albuterol sulfate  (90 Base) MCG/ACT inhaler, Inhale 2 puffs Every 4 (Four) Hours As Needed for Wheezing or Shortness of Air., Disp: 18 g, Rfl: 1  •  Prenatal Vit-Fe Fumarate-FA (PNV Prenatal Plus Multivitamin) 27-1 MG tablet, , Disp: , Rfl:   •  promethazine (PHENERGAN) 12.5 MG half tablet, Take 6.25 mg by mouth Every 6 (Six) Hours As Needed for Nausea or Vomiting., Disp: , Rfl:   •  sertraline (Zoloft) 25 MG tablet, Take 1 tablet by mouth Daily., Disp: 30 tablet, Rfl: 2  •  fluticasone (Flonase) 50 MCG/ACT nasal spray, 2 sprays into the nostril(s) as directed by provider Daily., Disp: 18.2 mL, Rfl: 11  •  Synthroid 25 MCG tablet, Take 1 tablet by mouth Daily., Disp: 30 tablet, Rfl: 5    Objective:   Vitals and nursing note reviewed.   Constitutional:       Appearance: Healthy appearance. Not in distress.   Neck:      Vascular: No JVR. JVD normal.   Pulmonary:      Effort: Pulmonary effort is normal.      Breath sounds: Normal breath sounds. No wheezing. No rhonchi. No rales.   Chest:      Chest wall: Not tender  "to palpatation.   Cardiovascular:      PMI at left midclavicular line. Normal rate. Regular rhythm. Normal S1. Normal S2.      Murmurs: There is no murmur.      No gallop. No click. No rub.   Pulses:     Intact distal pulses.   Edema:     Peripheral edema absent.   Abdominal:      General: Bowel sounds are normal.      Palpations: Abdomen is soft.      Tenderness: There is no abdominal tenderness.   Musculoskeletal: Normal range of motion.         General: No tenderness. Skin:     General: Skin is warm and dry.   Neurological:      General: No focal deficit present.      Mental Status: Alert and oriented to person, place and time.       Blood pressure 104/58, pulse 95, resp. rate 18, height 154.9 cm (61\"), weight 58.2 kg (128 lb 6.4 oz), last menstrual period 02/12/2022, SpO2 99 %, not currently breastfeeding.   Lab Review:     Assessment:       1. Inappropriate sinus tachycardia  Established diagnosis.  Exacerbation during pregnancy.  Tolerating gradual slow up titration/escalation of beta-blocker therapy.  Overall symptom improvement.    2. Palpitations  Improving symptoms with beta-blocker therapy.    3. Tobacco abuse  The patient stopped smoking in 2020 and has not restarted.    Procedures    Plan:     I have recommended increasing beta-blocker to 75 mg orally twice daily.  We will continue gradual slow up titration as tolerated.    No additional testing is indicated.    The patient has been reassured regarding the benign nature of her cardiac test results and inappropriate sinus tachycardia.  I anticipate this will improve significantly after delivery.      "

## 2022-07-08 ENCOUNTER — HOSPITAL ENCOUNTER (OUTPATIENT)
Facility: HOSPITAL | Age: 32
Setting detail: OBSERVATION
Discharge: HOME | End: 2022-07-09
Attending: ADVANCED PRACTICE MIDWIFE | Admitting: OBSTETRICS & GYNECOLOGY

## 2022-07-08 VITALS
BODY MASS INDEX: 24.35 KG/M2 | WEIGHT: 129 LBS | SYSTOLIC BLOOD PRESSURE: 103 MMHG | RESPIRATION RATE: 16 BRPM | DIASTOLIC BLOOD PRESSURE: 62 MMHG | HEIGHT: 61 IN | HEART RATE: 89 BPM | TEMPERATURE: 98.9 F
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
## 2022-07-08 LAB
ABO GROUP BLD: NORMAL
BLD GP AB SCN SERPL QL: NEGATIVE
RH BLD: NEGATIVE
T&S EXPIRATION DATE: NORMAL

## 2022-07-08 PROCEDURE — 36415 COLL VENOUS BLD VENIPUNCTURE: CPT | Performed by: OBSTETRICS & GYNECOLOGY

## 2022-07-08 PROCEDURE — 86900 BLOOD TYPING SEROLOGIC ABO: CPT | Performed by: OBSTETRICS & GYNECOLOGY

## 2022-07-08 PROCEDURE — 86901 BLOOD TYPING SEROLOGIC RH(D): CPT | Performed by: OBSTETRICS & GYNECOLOGY

## 2022-07-08 PROCEDURE — G0378 HOSPITAL OBSERVATION PER HR: HCPCS

## 2022-07-08 PROCEDURE — 86850 RBC ANTIBODY SCREEN: CPT | Performed by: OBSTETRICS & GYNECOLOGY

## 2022-07-08 PROCEDURE — 99218 PR INITIAL OBSERVATION CARE/DAY 30 MINUTES: CPT | Performed by: OBSTETRICS & GYNECOLOGY

## 2022-07-08 PROCEDURE — 85460 HEMOGLOBIN FETAL: CPT | Performed by: OBSTETRICS & GYNECOLOGY

## 2022-07-09 ENCOUNTER — HOSPITAL ENCOUNTER (OUTPATIENT)
Facility: HOSPITAL | Age: 32
End: 2022-07-09
Attending: OBSTETRICS & GYNECOLOGY | Admitting: OBSTETRICS & GYNECOLOGY

## 2022-07-09 LAB
FETAL BLOOD VOL PATIENT KLEIH BETKE: 4.8 MLS
FETAL RBC/RBC BLD FC-RTO: 0.1 %
HGB F BLD QL KLEIH BETKE: POSITIVE

## 2022-07-09 PROCEDURE — G0378 HOSPITAL OBSERVATION PER HR: HCPCS

## 2022-07-09 PROCEDURE — 96372 THER/PROPH/DIAG INJ SC/IM: CPT

## 2022-07-09 PROCEDURE — 25010000002 RHO D IMMUNE GLOBULIN 1500 UNIT/2ML SOLUTION PREFILLED SYRINGE: Performed by: OBSTETRICS & GYNECOLOGY

## 2022-07-09 RX ADMIN — HUMAN RHO(D) IMMUNE GLOBULIN 1500 UNITS: 1500 SOLUTION INTRAMUSCULAR; INTRAVENOUS at 01:28

## 2022-07-09 NOTE — H&P
"Radha Medrano  1990  [id number]  [id number]    CC: slpped and fell  HPI:  Patient is 32 y.o. female   currently at 21w0d presents after slipping on wet surface and falling down 4-5 stairs on her buttocks.  Pt also fell on right elbow.  Pt did not injure herself as far as she knows.  Pt denies bleeding or leaking since fall, but has had some cramping.  Good FM.  PNC comp by maternal SVT, well controlled on metoprolol and hypothyroidism    PMH:  Current meds: PNV, metoprolol 75mg bid, zoloft 50mg/d, synthroid 25mcg/d  Illnesses: SVT, anxiety/depression, goiter  Surgeries: left partial thyroidectomy, right hip surg, right shoulder surg, diag laparoscopy    With ovar cystectomy, lap vandana, lap appy, D and C, oral surg  Allergies: sulfa- hives    Past OB History:       OB History    Para Term  AB Living   4 2 2 0 1 2   SAB IAB Ectopic Molar Multiple Live Births   1 0 0 0 0 0      # Outcome Date GA Lbr Tiago/2nd Weight Sex Delivery Anes PTL Lv   4 Current            3 Term            2 Term            1 SAB                     SH: tob neg , EtOH neg, drugs neg  FH: heart dz pos , diabetes pos , cancer pos    General ROS: HA, cramping, N.   All other systems reviewed and are negative.      Physical Examination: General appearance - alert, well appearing, and in no distress  Vital signs - /62   Pulse 89   Temp 98.9 °F (37.2 °C) (Oral)   Resp 16   Ht 154.9 cm (61\")   Wt 58.5 kg (129 lb)   LMP 2022   BMI 24.37 kg/m²   HEENT: normocephalic, atraumatic,oropharynx clear, appearance of ears and nose normal  Neck - supple, no significant adenopathy, no thyromegaly  Lymphatics - no palpable lymphadenopathy in the neck or groin, no hepatosplenomegaly  Chest - clear to auscultation, no wheezes, rales or rhonchi, respiratory effort non-labored  Heart - normal rate, regular rhythm, no murmurs, rubs, clicks or gallops, no JVD, no lower extremity edema  Abdomen - soft, nontender, " nondistended, no masses, no hepatosplenomegaly  no rebound tenderness noted, bowel sounds normal  Fundus: soft, non-tend  Back: no ecchymosis of hip or back  Extremities - no pedal edema noted, no calf tend, no visible trauma to elbow  Skin -warm and dry, normal coloration and turgor, no rashes, no suspicious skin lesions noted      Fetal monitoring:FHT's 140-150, good variability, no contractions    Radiology     Assessment 1)IUP 21 0/7 weeks   2)s/p slip and fall   3)MBT O neg    Plan 1)observe   2)fetaldex and T and S   3)if fetaldex+, will give rhogam prior to discharge, if neg, home w/o rhogam    Wander Gandhi MD  7/8/2022  22:37 EDT

## 2022-07-09 NOTE — PROGRESS NOTES
Radha Medrano  [id number]  1990      Fetaldex pos for 4ml fetal blood  P/1)rhogam 1 vial     2)home     3)keep next sched appt    Wander Gandhi MD  7/9/2022  01:05 EDT

## 2022-07-26 ENCOUNTER — TELEMEDICINE (OUTPATIENT)
Dept: INTERNAL MEDICINE | Facility: CLINIC | Age: 32
End: 2022-07-26

## 2022-07-26 DIAGNOSIS — J45.21 MILD INTERMITTENT ASTHMATIC BRONCHITIS WITH ACUTE EXACERBATION: ICD-10-CM

## 2022-07-26 DIAGNOSIS — Z3A.23 23 WEEKS GESTATION OF PREGNANCY: ICD-10-CM

## 2022-07-26 DIAGNOSIS — J40 BRONCHITIS: Primary | ICD-10-CM

## 2022-07-26 PROCEDURE — 99214 OFFICE O/P EST MOD 30 MIN: CPT | Performed by: NURSE PRACTITIONER

## 2022-07-26 RX ORDER — DEXTROMETHORPHAN HYDROBROMIDE AND PROMETHAZINE HYDROCHLORIDE 15; 6.25 MG/5ML; MG/5ML
5 SYRUP ORAL 4 TIMES DAILY PRN
Qty: 200 ML | Refills: 0 | Status: SHIPPED | OUTPATIENT
Start: 2022-07-26 | End: 2022-08-05

## 2022-07-26 RX ORDER — ALBUTEROL SULFATE 90 UG/1
2 AEROSOL, METERED RESPIRATORY (INHALATION) EVERY 4 HOURS PRN
Qty: 18 G | Refills: 1 | Status: SHIPPED | OUTPATIENT
Start: 2022-07-26

## 2022-07-26 NOTE — PROGRESS NOTES
Mode of Visit: Video  Location of patient: home  You have chosen to receive care through a telehealth visit.  Does the patient consent to use a video/audio connection for your medical care today? Yes  The visit included audio and video interaction. No technical issues occurred during this visit.    Date: 2022  Name: Radha Medrano  : 1990         Chief Complaint:   Chief Complaint   Patient presents with   • Cough     Started 1 week ago   • Wheezing     Started 1 week ago         HPI:  Radha Medrano is a 32 y.o. female presents for video visit in regard to continued cough, wheezing.  Went to local [facility] 8 days ago with fever, body aches, cough, nasal congestion.  Negative flu, COVID tests.  Currently unable to take a deep breath, laugh without coughing.  Cough wakes her from sleep.  23 weeks pregnant.  Has been drinking lots of water.  Has been drinking hot apple cider to help ease throat irritation after coughing fit. Took acetaminophen when she had a fever.  Has been using her daughter's albuterol inhaler, typically effective.  Radha has a history of asthma.  Does not currently report fever, chills, body ache, headache, nasal congestion, sneezing, nausea, rash, myalgia.  Her daughter has begun to have similar symptoms.    History: The following portions of the patient's history were reviewed and updated as appropriate: allergies, current medications, past medical history, family history, surgical history, social history and problem list.        PE:  Physical Exam   Constitutional: She appears well-developed and well-nourished. No distress.   HENT:   Head: Normocephalic.   Right Ear: External ear normal.   Left Ear: External ear normal.   Eyes: Pupils are equal, round, and reactive to light. Conjunctivae are normal.   Neck: Neck normal appearance.  Pulmonary/Chest: Effort normal.   Witnessed paroxysmal cough with talking   Neurological: She is alert.   Oriented x 3   Skin: No pallor.    Psychiatric: She has a normal mood and affect. Her speech is normal and behavior is normal. Thought content normal. Her affect is normal.          Assessment/Plan:  Diagnoses and all orders for this visit:    1. Bronchitis (Primary)  -     promethazine-dextromethorphan (PROMETHAZINE-DM) 6.25-15 MG/5ML syrup; Take 5 mL by mouth 4 (Four) Times a Day As Needed for Cough for up to 10 days.  Dispense: 200 mL; Refill: 0.  Advised may cause drowsiness.    2. Mild intermittent asthmatic bronchitis with acute exacerbation  -     albuterol sulfate  (90 Base) MCG/ACT inhaler; Inhale 2 puffs Every 4 (Four) Hours As Needed for Wheezing or Shortness of Air.  Dispense: 18 g; Refill: 1    3. 23 weeks gestation of pregnancy    Return if symptoms worsen or fail to improve. Will contact clinic before the weekend if symptoms fail to improve with these measures.    Patient was given instructions and counseling regarding her condition or for health maintenance advice. Please see specific information pulled into the AVS if appropriate.

## 2022-08-23 DIAGNOSIS — F41.8 DEPRESSION WITH ANXIETY: ICD-10-CM

## 2022-08-23 RX ORDER — SERTRALINE HYDROCHLORIDE 25 MG/1
25 TABLET, FILM COATED ORAL DAILY
Qty: 90 TABLET | Refills: 0 | Status: SHIPPED | OUTPATIENT
Start: 2022-08-23 | End: 2022-12-15

## 2022-08-23 NOTE — TELEPHONE ENCOUNTER
Rx Refill Note  Requested Prescriptions     Pending Prescriptions Disp Refills   • sertraline (Zoloft) 25 MG tablet 90 tablet 0     Sig: Take 1 tablet by mouth Daily.      Last office visit with prescribing clinician: 3/16/2022      Next office visit with prescribing clinician: Visit date not found            AVELINA TONG MA  08/23/22, 09:55 EDT       23 weeks pregnant, patient reports still taking medication.

## 2022-09-16 ENCOUNTER — OFFICE VISIT (OUTPATIENT)
Dept: INTERNAL MEDICINE | Facility: CLINIC | Age: 32
End: 2022-09-16

## 2022-09-16 VITALS
BODY MASS INDEX: 27.88 KG/M2 | TEMPERATURE: 97.1 F | SYSTOLIC BLOOD PRESSURE: 110 MMHG | HEIGHT: 62 IN | DIASTOLIC BLOOD PRESSURE: 70 MMHG | HEART RATE: 84 BPM | OXYGEN SATURATION: 98 % | WEIGHT: 151.5 LBS

## 2022-09-16 DIAGNOSIS — B86 SCABIES: Primary | ICD-10-CM

## 2022-09-16 PROCEDURE — 99213 OFFICE O/P EST LOW 20 MIN: CPT | Performed by: NURSE PRACTITIONER

## 2022-09-16 RX ORDER — PERMETHRIN 50 MG/G
CREAM TOPICAL
Qty: 60 G | Refills: 0 | Status: SHIPPED | OUTPATIENT
Start: 2022-09-16 | End: 2022-11-04 | Stop reason: HOSPADM

## 2022-09-16 RX ORDER — FAMOTIDINE 20 MG/1
20 TABLET, FILM COATED ORAL 2 TIMES DAILY
Status: ON HOLD | COMMUNITY
Start: 2022-09-13 | End: 2022-11-03

## 2022-09-16 NOTE — PROGRESS NOTES
"Chief Complaint   Patient presents with   • Rash     Itchy, red, X 2 weeks, R thigh, L leg, abdomen; has tried OTC topical benadryl and cortisone     Subjective   Radha Medrano is a 32 y.o. female.     History of Present Illness     Patient presents with a itchy, red rash.  She has had it for about 2 weeks.  Initially started on the right anterior thigh and what she thought was a bug bite, spread to left leg, abdomen.  Patient states it starts out a little bumps, turns dry and scaly.  No warmth, drainage, streaking, it would go away on its own but then pop up somewhere else.  Has tried over-the-counter topical Benadryl and cortisone without relief.  Itching is all throughout the day, not worse at night.  Rash appears to be in a linear form.  Patient denied any other of her family members having a rash similar to this.  She is about 32 weeks gestation and limited on the medication she is will be able to take so she is only tried topical treatment.    The following portions of the patient's history were reviewed and updated as appropriate: allergies, current medications, past family history, past medical history, past social history, past surgical history and problem list.    Review of Systems   Constitutional: Negative.    Respiratory: Negative.    Cardiovascular: Negative.    Skin: Positive for rash.   Allergic/Immunologic: Positive for immunocompromised state (pregnant).       Objective   /70   Pulse 84   Temp 97.1 °F (36.2 °C) (Temporal)   Ht 156.2 cm (61.5\")   Wt 68.7 kg (151 lb 8 oz)   LMP 02/12/2022   SpO2 98%   BMI 28.16 kg/m²   Body mass index is 28.16 kg/m².  Physical Exam  Vitals and nursing note reviewed.   Constitutional:       Appearance: Normal appearance.      Comments: Patient is pregnant   HENT:      Head: Normocephalic and atraumatic.   Eyes:      Extraocular Movements: Extraocular movements intact.   Cardiovascular:      Rate and Rhythm: Normal rate and regular rhythm.   Pulmonary: "      Effort: Pulmonary effort is normal.      Breath sounds: Normal breath sounds.   Musculoskeletal:         General: Normal range of motion.      Cervical back: Normal range of motion.   Skin:     General: Skin is dry.      Comments: Linear, dry scaly rash noted to right anterior thigh, left lower leg, abdomen.  No drainage, warmth, streaking noted   Neurological:      General: No focal deficit present.      Mental Status: She is alert and oriented to person, place, and time.   Psychiatric:         Mood and Affect: Mood normal.         Behavior: Behavior normal.         Thought Content: Thought content normal.         Judgment: Judgment normal.           Assessment & Plan   Radha Medrano is here today and the following problems have been addressed:      Diagnoses and all orders for this visit:    1. Scabies (Primary)  -     triamcinolone (KENALOG) 0.1 % ointment; Apply 1 application topically to the appropriate area as directed 2 (Two) Times a Day.  Dispense: 30 g; Refill: 0  -     permethrin (ELIMITE) 5 % cream; Apply topically from neck down to toes, leave on for 8-14 hours then rinse off.  Dispense: 60 g; Refill: 0    Due to the linear form of the rash with itching and failure with topical Benadryl and cortisone cream for the past 2 weeks, will treat with permethrin for possible scabies.  Apply the cream from neck down to toes, leave on for 8 to 14 hours then rinse off.  Itching can persist for about a week or so after the treatment.  Will also send triamcinolone to use as needed for itching.  Discussed washing and drying in high heat bedding and cushions.     Follow Up   Return if symptoms worsen or fail to improve.  Patient was given instructions and counseling regarding her condition or for health maintenance advice. Please see specific information pulled into the AVS if appropriate.     Lauren GOMES  [facility]

## 2022-09-24 ENCOUNTER — HOSPITAL ENCOUNTER (OUTPATIENT)
Facility: HOSPITAL | Age: 32
End: 2022-09-24
Attending: OBSTETRICS & GYNECOLOGY | Admitting: OBSTETRICS & GYNECOLOGY

## 2022-09-24 ENCOUNTER — HOSPITAL ENCOUNTER (OUTPATIENT)
Facility: HOSPITAL | Age: 32
Discharge: HOME | End: 2022-09-24
Attending: OBSTETRICS & GYNECOLOGY | Admitting: OBSTETRICS & GYNECOLOGY

## 2022-09-24 VITALS
HEART RATE: 92 BPM | RESPIRATION RATE: 16 BRPM | BODY MASS INDEX: 28.32 KG/M2 | DIASTOLIC BLOOD PRESSURE: 69 MMHG | TEMPERATURE: 98.5 F | HEIGHT: 61 IN | SYSTOLIC BLOOD PRESSURE: 107 MMHG | WEIGHT: 150 LBS

## 2022-09-24 LAB
BILIRUB UR QL STRIP: NEGATIVE
CLARITY UR: CLEAR
COLOR UR: YELLOW
GLUCOSE UR STRIP-MCNC: NEGATIVE MG/DL
HGB UR QL STRIP.AUTO: NEGATIVE
KETONES UR QL STRIP: NEGATIVE
LEUKOCYTE ESTERASE UR QL STRIP.AUTO: NEGATIVE
NITRITE UR QL STRIP: NEGATIVE
PH UR STRIP.AUTO: 7.5 [PH] (ref 5–8)
PROT UR QL STRIP: NEGATIVE
SP GR UR STRIP: 1.01 (ref 1–1.03)
UROBILINOGEN UR QL STRIP: NORMAL

## 2022-09-24 PROCEDURE — 81003 URINALYSIS AUTO W/O SCOPE: CPT | Performed by: OBSTETRICS & GYNECOLOGY

## 2022-09-24 PROCEDURE — 87086 URINE CULTURE/COLONY COUNT: CPT | Performed by: OBSTETRICS & GYNECOLOGY

## 2022-09-24 PROCEDURE — 99212 OFFICE O/P EST SF 10 MIN: CPT | Performed by: OBSTETRICS & GYNECOLOGY

## 2022-09-24 PROCEDURE — G0463 HOSPITAL OUTPT CLINIC VISIT: HCPCS

## 2022-09-24 PROCEDURE — 59025 FETAL NON-STRESS TEST: CPT

## 2022-09-24 PROCEDURE — 59025 FETAL NON-STRESS TEST: CPT | Performed by: OBSTETRICS & GYNECOLOGY

## 2022-09-25 NOTE — H&P
[facility]  Obstetric History and Physical    Chief Complaint   Patient presents with   • Decreased Fetal Movement       HPI:    Patient is a 32 y.o. female  currently at 32w1d, who presents with a complaint of decreased fetal movement since the last few hours.  She tried moving her abdomen around and her daughter tried as well.   She denies vaginal bleeding and leaking.   She started feeling and hearing movement after the monitor straps were applied.         The following portions of the patients history were reviewed and updated as appropriate: current medications, allergies, past medical history, past surgical history, past family history, past social history and problem list .       Prenatal Information:   Maternal Prenatal Labs  Blood Type No results found for: ABO   Rh Status No results found for: RH   Antibody Screen No results found for: ABSCRN   Gonnorhea No results found for: GCCX   Chlamydia No results found for: CLAMYDCU   RPR No results found for: RPR   Syphilis Antibody No results found for: SYPHILIS   Rubella No results found for: RUBELLAIGGIN   Hepatitis B Surface Antigen No results found for: HEPBSAG   HIV-1 Antibody No results found for: LABHIV1   Hepatitis C Antibody No results found for: HEPCAB   Rapid Urin Drug Screen No results found for: AMPMETHU, BARBITSCNUR, LABBENZSCN, LABMETHSCN, LABOPIASCN, THCURSCR, COCAINEUR, AMPHETSCREEN, PROPOXSCN, BUPRENORSCNU, METAMPSCNUR, OXYCODONESCN, TRICYCLICSCN   Group B Strep Culture No results found for: GBSANTIGEN           External Prenatal Results     Pregnancy Outside Results - Transcribed From Office Records - See Scanned Records For Details     Test Value Date Time    ABO  O  22    Rh  Negative  22    Antibody Screen  Negative  22    Varicella IgG       Rubella       Hgb  11.6 g/dL 22 1439    Hct  33.3 % 22 1439    Glucose Fasting GTT       Glucose Tolerance Test 1 hour       Glucose Tolerance Test 3  "hour       Gonorrhea (discrete)  Negative  19 1502    Chlamydia (discrete)  Negative  19 1502    RPR       VDRL       Syphilis Antibody       HBsAg       Herpes Simplex Virus PCR       Herpes Simplex VIrus Culture       HIV       Hep C RNA Quant PCR       Hep C Antibody  <0.1 s/co ratio 21 1318    AFP       Group B Strep       GBS Susceptibility to Clindamycin       GBS Susceptibility to Erythromycin       Fetal Fibronectin       Genetic Testing, Maternal Blood             Drug Screening     Test Value Date Time    Urine Drug Screen       Amphetamine Screen       Barbiturate Screen       Benzodiazepine Screen       Methadone Screen       Phencyclidine Screen       Opiates Screen       THC Screen       Cocaine Screen       Propoxyphene Screen       Buprenorphine Screen       Methamphetamine Screen       Oxycodone Screen       Tricyclic Antidepressants Screen             Legend    ^: Historical                          Past OB History:     OB History    Para Term  AB Living   4 2 2 0 1 2   SAB IAB Ectopic Molar Multiple Live Births   1 0 0 0 0 0      # Outcome Date GA Lbr Tiago/2nd Weight Sex Delivery Anes PTL Lv   4 Current            3 Term            2 Term            1 SAB                Past Medical History: Past Medical History:   Diagnosis Date   • Abnormal Pap smear of cervix    • Acid reflux    • Anxiety    • Anxiety and depression    • Arthritis    • Asthma    • B12 deficiency 2021   • Bipolar 1 disorder (HCC)    • Body piercing     EARS, NOSE, BELLY BUTTON   • Dysphagia     REPORTS SOMETIMES FEELS LIKE \"FOOD GETS STUCK\"   • Fracture     HISTORY OF RIGHT WRIST AS A CHILD   • Goiter     x3   • Heart rate fast     states usually 120-130 (states on med for this)   • History of migraine    • Hx of exercise stress test 2020   • Inappropriate sinus tachycardia     per Dr. Dong's note on 20.     • Injury of neck    • Insomnia    • Migraine    • Ovarian cyst    • " Palpitations    • PONV (postoperative nausea and vomiting)    • Scoliosis    • Tachycardia    • Tattoo    • Thyroid nodule    • Toe fracture     right great toe   • Wears glasses       Past Surgical History Past Surgical History:   Procedure Laterality Date   • APPENDECTOMY  2016   • CHOLECYSTECTOMY     • COLPOSCOPY     • CYST REMOVAL      OVARIAN CYST   • DILATATION AND CURETTAGE     • ENDOSCOPY N/A 12/1/2017    Procedure: ESOPHAGOGASTRODUODENOSCOPY WITH COLD FORCEP BIOPSY;  Surgeon: Danilo Shabazz MD;  Location:  [facility];  Service:    • ENDOSCOPY N/A 9/3/2020    Procedure: ESOPHAGOGASTRODUODENOSCOPY WITH BIOPSIES AND DILATATION;  Surgeon: Bhupinder Montenegro MD;  Location:  [facility];  Service: Gastroenterology;  Laterality: N/A;   • ESOPHAGEAL MOTILITY STUDY N/A 11/10/2020    Procedure: ESOPHAGEAL MANOMETRY;  Surgeon: Chivo Mcmahan MD;  Location:  [facility];  Service: Gastroenterology;  Laterality: N/A;   • HIP SURGERY Right     RIGHT HIP, REPORTS HAD BONES SCRAPED   • SHOULDER ARTHROSCOPY Right 10/11/2018    Procedure: Right shoulder diagnostic arthroscopy, limited rotator cuff debridement;  Surgeon: Tino Uribe MD;  Location:  [facility];  Service: Orthopedics   • THYROID BIOPSY      goiter   • THYROIDECTOMY Left 2/24/2021    Procedure: THYROID LOBECTOMY LEFT;  Surgeon: Yves Contreras MD;  Location:  [facility];  Service: General;  Laterality: Left;   • THYROIDECTOMY, PARTIAL Left    • WISDOM TOOTH EXTRACTION        Family History: Family History   Problem Relation Age of Onset   • Diabetes Mother    • Arthritis Mother    • No Known Problems Father    • Cancer Maternal Grandmother    • Diabetes Maternal Grandmother    • Heart attack Maternal Grandmother    • Arthritis Maternal Grandmother    • Heart attack Paternal Grandmother    • Heart disease Sister    • Migraines Sister    • Cancer Paternal Grandfather         Lung   • Colon cancer Neg Hx    • Cirrhosis Neg Hx    • Liver  cancer Neg Hx    • Liver disease Neg Hx       Social History:  reports that she quit smoking about 23 months ago. Her smoking use included cigarettes. She has a 6.50 pack-year smoking history. She has never used smokeless tobacco.   reports no history of alcohol use.   reports previous drug use. Drug: Marijuana.        Review of Systems  Denies fever, HA, CP, Shortness of air, muscle weakness,                                             and rashes      Objective     Vital Signs Range for the last 24 hours  Temperature: Temp:  [98.5 °F (36.9 °C)] 98.5 °F (36.9 °C)   Temp Source:     BP: BP: (107)/(69) 107/69   Pulse: Heart Rate:  [92] 92   Respirations:  16   SPO2:     O2 Amount (l/min):     O2 Devices     Weight: Weight:  [68 kg (150 lb)] 68 kg (150 lb)     Physical Examination: General appearance - alert, well appearing, and in no distress and oriented to person, place, and time  Chest - clear to auscultation, no wheezes, rales or rhonchi, symmetric air entry  Heart - S1 and S2 normal, no murmurs noted  Abdomen - soft, nontender, nondistended, no masses or organomegaly  no rebound tenderness noted  bowel sounds normal  No guarding, No RUQ pain  Extremities - pedal edema - none                           Fetal Heart Rate Assessment   Method: Fetal HR Assessment Method: external   Beats/min: Fetal HR (beats/min): 130   Baseline: Fetal HR Baseline: normal range   Varibility: Fetal HR Variability: moderate (amplitude range 6 to 25 bpm)   Accels: Fetal HR Accelerations: greater than/equal to 15 bpm, lasting at least 15 seconds   Decels: Fetal HR Decelerations: absent   Tracing Category:       Uterine Assessment   Method: Method: external tocotransducer   Frequency (min):     Ctx Count in 10 min:     Duration:     Intensity: Contraction Intensity: no contractions   Intensity by IUPC:     Resting Tone: Uterine Resting Tone: soft by palpation   Resting Tone by IUPC:     Jerome Units:      NST                      Indication:  Decreased fetal movement  Start time 2210                 End time: 2250  15 x 15 accels x 2  Yes  No decels  Baseline  130s  Reactive NST - Yes             Assessment:  1. Intrauterine pregnancy at 32w1d weeks gestation with reactive fetal status  2. Decreased fetal movement  - resolved     Plan:  1. Reactive NST with reassuring   2. No other issues.   3. Given reassurance and education   4.  All questions have been answered.  5.  D/C home.       Stephon Alba MD  9/24/2022  22:59 EDT

## 2022-09-27 LAB — BACTERIA SPEC AEROBE CULT: NORMAL

## 2022-10-04 ENCOUNTER — TELEPHONE (OUTPATIENT)
Dept: CARDIOLOGY | Facility: CLINIC | Age: 32
End: 2022-10-04

## 2022-10-05 ENCOUNTER — TRANSCRIBE ORDERS (OUTPATIENT)
Dept: LAB | Facility: HOSPITAL | Age: 32
End: 2022-10-05

## 2022-10-05 ENCOUNTER — LAB (OUTPATIENT)
Dept: LAB | Facility: HOSPITAL | Age: 32
End: 2022-10-05

## 2022-10-05 DIAGNOSIS — O09.892 SUPERVISION OF OTHER HIGH RISK PREGNANCIES, SECOND TRIMESTER: Primary | ICD-10-CM

## 2022-10-05 DIAGNOSIS — O09.892 SUPERVISION OF OTHER HIGH RISK PREGNANCIES, SECOND TRIMESTER: ICD-10-CM

## 2022-10-05 LAB — TSH SERPL DL<=0.05 MIU/L-ACNC: 2.83 UIU/ML (ref 0.27–4.2)

## 2022-10-05 PROCEDURE — 84439 ASSAY OF FREE THYROXINE: CPT | Performed by: INTERNAL MEDICINE

## 2022-10-05 PROCEDURE — 84443 ASSAY THYROID STIM HORMONE: CPT

## 2022-10-21 ENCOUNTER — HOSPITAL ENCOUNTER (OUTPATIENT)
Facility: HOSPITAL | Age: 32
Discharge: HOME | End: 2022-10-22
Attending: OBSTETRICS & GYNECOLOGY | Admitting: OBSTETRICS & GYNECOLOGY

## 2022-10-21 LAB — EXTERNAL GROUP B STREP ANTIGEN: NEGATIVE

## 2022-10-21 PROCEDURE — G0463 HOSPITAL OUTPT CLINIC VISIT: HCPCS

## 2022-10-22 VITALS
HEART RATE: 90 BPM | DIASTOLIC BLOOD PRESSURE: 77 MMHG | SYSTOLIC BLOOD PRESSURE: 117 MMHG | TEMPERATURE: 98.1 F | RESPIRATION RATE: 16 BRPM | OXYGEN SATURATION: 97 %

## 2022-10-22 PROCEDURE — 99213 OFFICE O/P EST LOW 20 MIN: CPT | Performed by: OBSTETRICS & GYNECOLOGY

## 2022-10-22 PROCEDURE — 59025 FETAL NON-STRESS TEST: CPT | Performed by: OBSTETRICS & GYNECOLOGY

## 2022-10-22 PROCEDURE — 59025 FETAL NON-STRESS TEST: CPT

## 2022-10-22 RX ORDER — ATENOLOL 100 MG/1
75 TABLET ORAL DAILY
Status: ON HOLD | COMMUNITY
End: 2022-11-03

## 2022-10-22 NOTE — H&P
"[facility]  Obstetric History and Physical    CC:   Fast heart rate    HPI:  Patient is a 32 y.o. female  currently at 36w1d, who presents with a complaint of a fast heart rate.  She has a history af tachycardia and is on Atenolol daily for this.   Her heart rate currently is normal.  She denies chest pain and SOA.   Denies back pain.   She says she just felt \"off\" today and wanted to be sure.   She has no other complaints.         The following portions of the patients history were reviewed and updated as appropriate: current medications, allergies, past medical history, past surgical history, past family history, past social history and problem list .       Prenatal Information:   Maternal Prenatal Labs  Blood Type No results found for: ABO   Rh Status No results found for: RH   Antibody Screen No results found for: ABSCRN   Gonnorhea No results found for: GCCX   Chlamydia No results found for: CLAMYDCU   RPR No results found for: RPR   Syphilis Antibody No results found for: SYPHILIS   Rubella No results found for: RUBELLAIGGIN   Hepatitis B Surface Antigen No results found for: HEPBSAG   HIV-1 Antibody No results found for: LABHIV1   Hepatitis C Antibody No results found for: HEPCAB   Rapid Urin Drug Screen No results found for: AMPMETHU, BARBITSCNUR, LABBENZSCN, LABMETHSCN, LABOPIASCN, THCURSCR, COCAINEUR, AMPHETSCREEN, PROPOXSCN, BUPRENORSCNU, METAMPSCNUR, OXYCODONESCN, TRICYCLICSCN   Group B Strep Culture No results found for: GBSANTIGEN           External Prenatal Results     Pregnancy Outside Results - Transcribed From Office Records - See Scanned Records For Details     Test Value Date Time    ABO  O  22    Rh  Negative  22    Antibody Screen  Negative  22    Varicella IgG       Rubella       Hgb  11.6 g/dL 22 1439    Hct  33.3 % 22 1439    Glucose Fasting GTT       Glucose Tolerance Test 1 hour       Glucose Tolerance Test 3 hour       Gonorrhea " "(discrete)  Negative  19 1502    Chlamydia (discrete)  Negative  19 1502    RPR       VDRL       Syphilis Antibody       HBsAg       Herpes Simplex Virus PCR       Herpes Simplex VIrus Culture       HIV       Hep C RNA Quant PCR       Hep C Antibody  <0.1 s/co ratio 21 1318    AFP       Group B Strep       GBS Susceptibility to Clindamycin       GBS Susceptibility to Erythromycin       Fetal Fibronectin       Genetic Testing, Maternal Blood             Drug Screening     Test Value Date Time    Urine Drug Screen       Amphetamine Screen       Barbiturate Screen       Benzodiazepine Screen       Methadone Screen       Phencyclidine Screen       Opiates Screen       THC Screen       Cocaine Screen       Propoxyphene Screen       Buprenorphine Screen       Methamphetamine Screen       Oxycodone Screen       Tricyclic Antidepressants Screen             Legend    ^: Historical                          Past OB History:     OB History    Para Term  AB Living   4 2 2 0 1 2   SAB IAB Ectopic Molar Multiple Live Births   1 0 0 0 0 0      # Outcome Date GA Lbr Tiago/2nd Weight Sex Delivery Anes PTL Lv   4 Current            3 Term            2 Term            1 SAB                Past Medical History: Past Medical History:   Diagnosis Date   • Abnormal Pap smear of cervix    • Acid reflux    • Anxiety    • Anxiety and depression    • Arthritis    • Asthma    • B12 deficiency 2021   • Bipolar 1 disorder (HCC)    • Body piercing     EARS, NOSE, BELLY BUTTON   • Dysphagia     REPORTS SOMETIMES FEELS LIKE \"FOOD GETS STUCK\"   • Fracture     HISTORY OF RIGHT WRIST AS A CHILD   • Goiter     x3   • Heart rate fast     states usually 120-130 (states on med for this)   • History of migraine    • Hx of exercise stress test 2020   • Inappropriate sinus tachycardia     per Dr. Dong's note on 20.     • Injury of neck    • Insomnia    • Migraine    • Ovarian cyst    • Palpitations    • PONV " (postoperative nausea and vomiting)    • Scoliosis    • Tachycardia    • Tattoo    • Thyroid nodule    • Toe fracture     right great toe   • Wears glasses       Past Surgical History Past Surgical History:   Procedure Laterality Date   • APPENDECTOMY  2016   • CHOLECYSTECTOMY     • COLPOSCOPY     • CYST REMOVAL      OVARIAN CYST   • DILATATION AND CURETTAGE     • ENDOSCOPY N/A 12/1/2017    Procedure: ESOPHAGOGASTRODUODENOSCOPY WITH COLD FORCEP BIOPSY;  Surgeon: Danilo Shabazz MD;  Location:  [facility];  Service:    • ENDOSCOPY N/A 9/3/2020    Procedure: ESOPHAGOGASTRODUODENOSCOPY WITH BIOPSIES AND DILATATION;  Surgeon: Bhupinder Montenegro MD;  Location:  [facility];  Service: Gastroenterology;  Laterality: N/A;   • ESOPHAGEAL MOTILITY STUDY N/A 11/10/2020    Procedure: ESOPHAGEAL MANOMETRY;  Surgeon: Chivo Mcmahan MD;  Location:  [facility];  Service: Gastroenterology;  Laterality: N/A;   • HIP SURGERY Right     RIGHT HIP, REPORTS HAD BONES SCRAPED   • SHOULDER ARTHROSCOPY Right 10/11/2018    Procedure: Right shoulder diagnostic arthroscopy, limited rotator cuff debridement;  Surgeon: Tino Uribe MD;  Location:  [facility];  Service: Orthopedics   • THYROID BIOPSY      goiter   • THYROIDECTOMY Left 2/24/2021    Procedure: THYROID LOBECTOMY LEFT;  Surgeon: Yves Contreras MD;  Location:  [facility];  Service: General;  Laterality: Left;   • THYROIDECTOMY, PARTIAL Left    • WISDOM TOOTH EXTRACTION        Family History: Family History   Problem Relation Age of Onset   • Diabetes Mother    • Arthritis Mother    • No Known Problems Father    • Cancer Maternal Grandmother    • Diabetes Maternal Grandmother    • Heart attack Maternal Grandmother    • Arthritis Maternal Grandmother    • Heart attack Paternal Grandmother    • Heart disease Sister    • Migraines Sister    • Cancer Paternal Grandfather         Lung   • Colon cancer Neg Hx    • Cirrhosis Neg Hx    • Liver cancer Neg Hx    • Liver  disease Neg Hx       Social History:  reports that she quit smoking about 2 years ago. Her smoking use included cigarettes. She has a 6.50 pack-year smoking history. She has never used smokeless tobacco.   reports no history of alcohol use.   reports that she does not currently use drugs after having used the following drugs: Marijuana.        Review of Systems  Denies fever, HA, muscle weakness,                                             and rashes.  All other pertinent positives and negatives                                          are addressed in the HPI.      Objective     Vital Signs Range for the last 24 hours  Temperature: Temp:  [98.1 °F (36.7 °C)] 98.1 °F (36.7 °C)   Temp Source: Temp src: Oral   BP: BP: (106-117)/(69-77) 117/77   Pulse: Heart Rate:  [] 90   Respirations: Resp:  [16] 16   SPO2: SpO2:  [96 %-98 %] 97 %   O2 Amount (l/min):     O2 Devices     Weight:       Physical Examination: General appearance - alert, well appearing, and in no distress and oriented to person, place, and time  Chest - clear to auscultation, no wheezes, rales or rhonchi, symmetric air entry  Heart - S1 and S2 normal, Periods of slightly tachycardia to NSR  Abdomen - soft, nontender, nondistended, no masses or organomegaly  no rebound tenderness noted  bowel sounds normal  No guarding, No RUQ pain  Extremities - pedal edema  - none        Cervix: Exam by: Method: sterile exam per RN   Dilation:  1   Effacement: Cervical Effacement: 50%   Station:  -3     Fetal Heart Rate Assessment   Method: Fetal HR Assessment Method: external   Beats/min: Fetal HR (beats/min): 135   Baseline: Fetal HR Baseline: normal range   Varibility: Fetal HR Variability: moderate (amplitude range 6 to 25 bpm)   Accels: Fetal HR Accelerations: greater than/equal to 15 bpm, lasting at least 15 seconds   Decels: Fetal HR Decelerations: absent   Tracing Category:       Uterine Assessment   Method: Method: external tocotransducer   Frequency  (min):     Ctx Count in 10 min:     Duration:     Intensity: Contraction Intensity: no contractions   Intensity by IUPC:     Resting Tone: Uterine Resting Tone: soft by palpation   Resting Tone by IUPC:     Jerome Units:      NST                     Indication:  False labour  Start time  0140                End time 0205  15 x 15 accels x 2  Yes  No decels  Baseline  130s  Reactive NST - Yes     EKG - normal sinus         Assessment:  1. Intrauterine pregnancy at 36w1d weeks gestation with reactive fetal     status  2.  Maternal tachycardia   3.  False labour     Plan:  1. Reactive NST  2. Periodic tachycardia with NSR.  Currently treated with Atenolol   3.  Contractions without S/S of labour    4.  All questions have been answered.  5.  D/C home.       Stephon Alba MD  10/22/2022  03:07 EDT

## 2022-10-24 ENCOUNTER — HOSPITAL ENCOUNTER (OUTPATIENT)
Facility: HOSPITAL | Age: 32
Discharge: HOME | End: 2022-10-24
Attending: OBSTETRICS & GYNECOLOGY | Admitting: OBSTETRICS & GYNECOLOGY

## 2022-10-24 VITALS
HEIGHT: 61 IN | SYSTOLIC BLOOD PRESSURE: 122 MMHG | TEMPERATURE: 98.7 F | HEART RATE: 101 BPM | BODY MASS INDEX: 29.34 KG/M2 | RESPIRATION RATE: 18 BRPM | DIASTOLIC BLOOD PRESSURE: 82 MMHG | WEIGHT: 155.4 LBS

## 2022-10-24 LAB
BACTERIA UR QL AUTO: NORMAL /HPF
BILIRUB UR QL STRIP: NEGATIVE
CLARITY UR: ABNORMAL
COD CRY URNS QL: NORMAL /HPF
COLOR UR: YELLOW
GLUCOSE UR STRIP-MCNC: NEGATIVE MG/DL
HGB UR QL STRIP.AUTO: ABNORMAL
HYALINE CASTS UR QL AUTO: NORMAL /LPF
KETONES UR QL STRIP: NEGATIVE
LEUKOCYTE ESTERASE UR QL STRIP.AUTO: NEGATIVE
NITRITE UR QL STRIP: NEGATIVE
PH UR STRIP.AUTO: 6 [PH] (ref 5–8)
PROT UR QL STRIP: NEGATIVE
RBC # UR STRIP: NORMAL /HPF
REF LAB TEST METHOD: NORMAL
SP GR UR STRIP: 1.02 (ref 1–1.03)
SQUAMOUS #/AREA URNS HPF: NORMAL /HPF
UROBILINOGEN UR QL STRIP: ABNORMAL
WBC # UR STRIP: NORMAL /HPF

## 2022-10-24 PROCEDURE — 59025 FETAL NON-STRESS TEST: CPT

## 2022-10-24 PROCEDURE — 99213 OFFICE O/P EST LOW 20 MIN: CPT | Performed by: OBSTETRICS & GYNECOLOGY

## 2022-10-24 PROCEDURE — 59025 FETAL NON-STRESS TEST: CPT | Performed by: OBSTETRICS & GYNECOLOGY

## 2022-10-24 PROCEDURE — 81001 URINALYSIS AUTO W/SCOPE: CPT | Performed by: OBSTETRICS & GYNECOLOGY

## 2022-10-24 PROCEDURE — 87086 URINE CULTURE/COLONY COUNT: CPT | Performed by: OBSTETRICS & GYNECOLOGY

## 2022-10-24 PROCEDURE — G0463 HOSPITAL OUTPT CLINIC VISIT: HCPCS

## 2022-10-24 NOTE — H&P
[facility]  Obstetric History and Physical    Chief Complaint   Patient presents with   • Contractions     STARTED Friday-states approx 3-5 min apart        HPI:    Patient is a 32 y.o. female  currently at 36w3d, who presents with a complaint of contractions that she says are 3- 5 minutes apart.   She feels them in her lower back. She denies vaginal leaking and bleeding.   +FM.   Denies fever and chills.  No urinary symptoms.   She was unchanged from Friday's exam both on admission and after re-check.         The following portions of the patients history were reviewed and updated as appropriate: current medications, allergies, past medical history, past surgical history, past family history, past social history and problem list .       Prenatal Information:   Prenatal Labs  Lab Results   Component Value Date    ABO O 2022    RH Negative 2022    ABSCRN Negative 2022    ABID D 2016    HEPCVIRUSABY <0.1 2021         External Prenatal Results     Pregnancy Outside Results - Transcribed From Office Records - See Scanned Records For Details     Test Value Date Time    ABO  O  22    Rh  Negative  22 2245    Antibody Screen  Negative  22 2245    Varicella IgG       Rubella       Hgb  11.6 g/dL 22 1439    Hct  33.3 % 22 1439    Glucose Fasting GTT       Glucose Tolerance Test 1 hour       Glucose Tolerance Test 3 hour       Gonorrhea (discrete)  Negative  19 1502    Chlamydia (discrete)  Negative  19 1502    RPR       VDRL       Syphilis Antibody       HBsAg       Herpes Simplex Virus PCR       Herpes Simplex VIrus Culture       HIV       Hep C RNA Quant PCR       Hep C Antibody  <0.1 s/co ratio 21 1318    AFP       Group B Strep       GBS Susceptibility to Clindamycin       GBS Susceptibility to Erythromycin       Fetal Fibronectin       Genetic Testing, Maternal Blood             Drug Screening     Test Value Date Time    Urine  "Drug Screen       Amphetamine Screen       Barbiturate Screen       Benzodiazepine Screen       Methadone Screen       Phencyclidine Screen       Opiates Screen       THC Screen       Cocaine Screen       Propoxyphene Screen       Buprenorphine Screen       Methamphetamine Screen       Oxycodone Screen       Tricyclic Antidepressants Screen             Legend    ^: Historical                          Past OB History:       OB History    Para Term  AB Living   4 2 1 1 1 2   SAB IAB Ectopic Molar Multiple Live Births   1 0 0 0 0 2      # Outcome Date GA Lbr Tiago/2nd Weight Sex Delivery Anes PTL Lv   4 Current            3 Term 16 38w0d  3544 g (7 lb 13 oz) F Vag-Spont EPI  BINU   2 SAB 11/01/15     SAB         Birth Comments: D &C   1  11 36w0d  2268 g (5 lb) M Vag-Spont  N BINU       Past Medical History: Past Medical History:   Diagnosis Date   • Abnormal Pap smear of cervix    • Acid reflux    • Anxiety    • Anxiety and depression    • Arthritis    • Asthma    • B12 deficiency 2021   • Bipolar 1 disorder (HCC)    • Body piercing     EARS, NOSE, BELLY BUTTON   • Dysphagia     REPORTS SOMETIMES FEELS LIKE \"FOOD GETS STUCK\"   • Fracture     HISTORY OF RIGHT WRIST AS A CHILD   • Goiter     x3   • Heart rate fast     states usually 120-130 (states on med for this)   • History of migraine    • Hx of exercise stress test 2020   • Inappropriate sinus tachycardia     per Dr. Dong's note on 20.     • Injury of neck    • Insomnia    • Migraine    • Ovarian cyst    • Palpitations    • PONV (postoperative nausea and vomiting)    • Scoliosis    • Tachycardia    • Tattoo    • Thyroid nodule    • Toe fracture     right great toe   • Wears glasses       Past Surgical History Past Surgical History:   Procedure Laterality Date   • APPENDECTOMY     • CHOLECYSTECTOMY     • COLPOSCOPY     • CYST REMOVAL      OVARIAN CYST   • DILATATION AND CURETTAGE     • ENDOSCOPY N/A 2017    " Procedure: ESOPHAGOGASTRODUODENOSCOPY WITH COLD FORCEP BIOPSY;  Surgeon: Danilo Shabazz MD;  Location: [facility];  Service:    • ENDOSCOPY N/A 9/3/2020    Procedure: ESOPHAGOGASTRODUODENOSCOPY WITH BIOPSIES AND DILATATION;  Surgeon: Bhupinder Montenegro MD;  Location: [facility];  Service: Gastroenterology;  Laterality: N/A;   • ESOPHAGEAL MOTILITY STUDY N/A 11/10/2020    Procedure: ESOPHAGEAL MANOMETRY;  Surgeon: Chivo Mcmahan MD;  Location:  [facility];  Service: Gastroenterology;  Laterality: N/A;   • HIP SURGERY Right     RIGHT HIP, REPORTS HAD BONES SCRAPED   • SHOULDER ARTHROSCOPY Right 10/11/2018    Procedure: Right shoulder diagnostic arthroscopy, limited rotator cuff debridement;  Surgeon: Tino Uribe MD;  Location: [facility] OR;  Service: Orthopedics   • THYROID BIOPSY      goiter   • THYROIDECTOMY Left 2/24/2021    Procedure: THYROID LOBECTOMY LEFT;  Surgeon: Yves Contreras MD;  Location:  [facility];  Service: General;  Laterality: Left;   • THYROIDECTOMY, PARTIAL Left    • WISDOM TOOTH EXTRACTION        Family History: Family History   Problem Relation Age of Onset   • Diabetes Mother    • Arthritis Mother    • No Known Problems Father    • Cancer Maternal Grandmother    • Diabetes Maternal Grandmother    • Heart attack Maternal Grandmother    • Arthritis Maternal Grandmother    • Heart attack Paternal Grandmother    • Heart disease Sister    • Migraines Sister    • Cancer Paternal Grandfather         Lung   • Colon cancer Neg Hx    • Cirrhosis Neg Hx    • Liver cancer Neg Hx    • Liver disease Neg Hx       Social History:  reports that she quit smoking about 2 years ago. Her smoking use included cigarettes. She has a 6.50 pack-year smoking history. She has never used smokeless tobacco.   reports no history of alcohol use.   reports that she does not currently use drugs after having used the following drugs: Marijuana.        General ROS: Denies fever, HA, shortness of air,  muscle weakness, and rashes    Objective       Vital Signs Range for the last 24 hours  Temperature: Temp:  [98.7 °F (37.1 °C)] 98.7 °F (37.1 °C)   Temp Source: Temp src: Oral   BP: BP: (122)/(82) 122/82   Pulse: Heart Rate:  [101] 101   Respirations: Resp:  [18] 18   SPO2:     O2 Amount (l/min):     O2 Devices     Weight: Weight:  [70.5 kg (155 lb 6.4 oz)] 70.5 kg (155 lb 6.4 oz)     Physical Examination: Alert and oriented X 3  Chest - clear to auscultation, no wheezes, rales or rhonchi, symmetric air entry  Heart - normal rate, S1, S2, no murmurs  Abdomen - no rebound tenderness noted  bowel sounds normal  Gravid uterus, No RUQ pain, No guarding  Extremities - Non-tender bilaterally        Cervix: Exam by:  MAYA    Dilation:  1-2   Effacement:  50   Station:  -2       Fetal Heart Rate Assessment   Method:     Beats/min:     Baseline:  130s   Varibility:  mod   Accels:  y   Decels:  n   Tracing Category:  1     Uterine Assessment   Method:     Frequency (min):  3-5 per hour    Ctx Count in 10 min:     Duration:     Intensity:     Intensity by IUPC:     Resting Tone:     Resting Tone by IUPC:     [place] Units:       Radha Medrano, nina  at 36w3d with an DARLINE of 2022, Date entered prior to episode creation,  Non stress test.    Indication:  False labour   Start time 0130  End time 0200  15 x 15 accelerations x 2  Yes  No decelerations  Baseline   130s  Reactive NST  Yes    Laboratory Results:  Lab Results   Component Value Date    SQUAMEPIUA 0-2 10/24/2022    SPECGRAVUR 1.018 10/24/2022    KETONESU Negative 10/24/2022    BLOODU Trace (A) 10/24/2022    LEUKOCYTESUR Negative 10/24/2022    NITRITEU Negative 10/24/2022    RBCUA 0-2 10/24/2022    WBCUA 0-2 10/24/2022    BACTERIA None Seen 10/24/2022             Assessment:  1.  Intrauterine pregnancy at 36w3d weeks gestation with reactive fetal status.    2.  False labour not ruptured    Plan:  1. FHTs  Reactive NST  2. No cervical change or signs and  symptoms of SROM or labour  3. Reviewed labour guidelines  4. All questions have been answered.  5. Discharge home with routine OB follow-up      Stephon Alba MD  10/24/2022  02:33 EDT

## 2022-10-25 LAB — BACTERIA SPEC AEROBE CULT: NORMAL

## 2022-10-31 ENCOUNTER — DOCUMENTATION (OUTPATIENT)
Dept: OBSTETRICS AND GYNECOLOGY | Facility: HOSPITAL | Age: 32
End: 2022-10-31

## 2022-10-31 NOTE — PROGRESS NOTES
Patient Radha Medrano with worsening symptomatic tachycardia despite escalating Metoprolol doses up to 100mgs BID. Patient has near syncopal episodes with tachycardia into the 160's.   Condition has worsened steadily throughout pregnancy.  Recommend induction of labor at 37+wks with Cardiology follow-up for treatment.    Delores Rucker MD

## 2022-11-01 ENCOUNTER — PREP FOR SURGERY (OUTPATIENT)
Dept: OTHER | Facility: HOSPITAL | Age: 32
End: 2022-11-01

## 2022-11-01 DIAGNOSIS — Z34.90 TERM PREGNANCY: Primary | ICD-10-CM

## 2022-11-01 RX ORDER — METHYLERGONOVINE MALEATE 0.2 MG/ML
200 INJECTION INTRAVENOUS ONCE AS NEEDED
Status: CANCELLED | OUTPATIENT
Start: 2022-11-01

## 2022-11-01 RX ORDER — BUTORPHANOL TARTRATE 1 MG/ML
1 INJECTION, SOLUTION INTRAMUSCULAR; INTRAVENOUS
Status: CANCELLED | OUTPATIENT
Start: 2022-11-01

## 2022-11-01 RX ORDER — SODIUM CHLORIDE, SODIUM LACTATE, POTASSIUM CHLORIDE, CALCIUM CHLORIDE 600; 310; 30; 20 MG/100ML; MG/100ML; MG/100ML; MG/100ML
125 INJECTION, SOLUTION INTRAVENOUS CONTINUOUS
Status: CANCELLED | OUTPATIENT
Start: 2022-11-01

## 2022-11-01 RX ORDER — MISOPROSTOL 200 UG/1
800 TABLET ORAL AS NEEDED
Status: CANCELLED | OUTPATIENT
Start: 2022-11-01

## 2022-11-01 RX ORDER — SODIUM CHLORIDE 0.9 % (FLUSH) 0.9 %
3 SYRINGE (ML) INJECTION EVERY 12 HOURS SCHEDULED
Status: CANCELLED | OUTPATIENT
Start: 2022-11-01

## 2022-11-01 RX ORDER — SODIUM CHLORIDE 0.9 % (FLUSH) 0.9 %
3-10 SYRINGE (ML) INJECTION AS NEEDED
Status: CANCELLED | OUTPATIENT
Start: 2022-11-01

## 2022-11-01 RX ORDER — OXYTOCIN/0.9 % SODIUM CHLORIDE 30/500 ML
250 PLASTIC BAG, INJECTION (ML) INTRAVENOUS CONTINUOUS
Status: CANCELLED | OUTPATIENT
Start: 2022-11-01 | End: 2022-11-01

## 2022-11-01 RX ORDER — ONDANSETRON 4 MG/1
4 TABLET, FILM COATED ORAL EVERY 6 HOURS PRN
Status: CANCELLED | OUTPATIENT
Start: 2022-11-01

## 2022-11-01 RX ORDER — ONDANSETRON 2 MG/ML
4 INJECTION INTRAMUSCULAR; INTRAVENOUS EVERY 6 HOURS PRN
Status: CANCELLED | OUTPATIENT
Start: 2022-11-01

## 2022-11-01 RX ORDER — PROMETHAZINE HYDROCHLORIDE 12.5 MG/1
12.5 SUPPOSITORY RECTAL EVERY 6 HOURS PRN
Status: CANCELLED | OUTPATIENT
Start: 2022-11-01

## 2022-11-01 RX ORDER — PROMETHAZINE HYDROCHLORIDE 12.5 MG/1
12.5 TABLET ORAL EVERY 6 HOURS PRN
Status: CANCELLED | OUTPATIENT
Start: 2022-11-01

## 2022-11-01 RX ORDER — OXYTOCIN/0.9 % SODIUM CHLORIDE 30/500 ML
2-20 PLASTIC BAG, INJECTION (ML) INTRAVENOUS
Status: CANCELLED | OUTPATIENT
Start: 2022-11-01

## 2022-11-01 RX ORDER — OXYTOCIN/0.9 % SODIUM CHLORIDE 30/500 ML
999 PLASTIC BAG, INJECTION (ML) INTRAVENOUS ONCE
Status: CANCELLED | OUTPATIENT
Start: 2022-11-01 | End: 2022-11-01

## 2022-11-01 RX ORDER — LIDOCAINE HYDROCHLORIDE 10 MG/ML
5 INJECTION, SOLUTION EPIDURAL; INFILTRATION; INTRACAUDAL; PERINEURAL AS NEEDED
Status: CANCELLED | OUTPATIENT
Start: 2022-11-01

## 2022-11-01 RX ORDER — CARBOPROST TROMETHAMINE 250 UG/ML
250 INJECTION, SOLUTION INTRAMUSCULAR AS NEEDED
Status: CANCELLED | OUTPATIENT
Start: 2022-11-01

## 2022-11-01 RX ORDER — ACETAMINOPHEN 325 MG/1
650 TABLET ORAL EVERY 4 HOURS PRN
Status: CANCELLED | OUTPATIENT
Start: 2022-11-01

## 2022-11-01 RX ORDER — TERBUTALINE SULFATE 1 MG/ML
0.25 INJECTION, SOLUTION SUBCUTANEOUS AS NEEDED
Status: CANCELLED | OUTPATIENT
Start: 2022-11-01

## 2022-11-02 ENCOUNTER — ANESTHESIA (OUTPATIENT)
Dept: LABOR AND DELIVERY | Facility: HOSPITAL | Age: 32
End: 2022-11-02

## 2022-11-02 ENCOUNTER — HOSPITAL ENCOUNTER (OUTPATIENT)
Dept: LABOR AND DELIVERY | Facility: HOSPITAL | Age: 32
Discharge: HOME | End: 2022-11-02

## 2022-11-02 ENCOUNTER — HOSPITAL ENCOUNTER (INPATIENT)
Facility: HOSPITAL | Age: 32
LOS: 2 days | Discharge: HOME | End: 2022-11-04
Attending: OBSTETRICS & GYNECOLOGY | Admitting: OBSTETRICS & GYNECOLOGY

## 2022-11-02 ENCOUNTER — ANESTHESIA EVENT (OUTPATIENT)
Dept: LABOR AND DELIVERY | Facility: HOSPITAL | Age: 32
End: 2022-11-02

## 2022-11-02 DIAGNOSIS — Z34.90 TERM PREGNANCY: ICD-10-CM

## 2022-11-02 LAB
ABO GROUP BLD: NORMAL
BLD GP AB SCN SERPL QL: NEGATIVE
DEPRECATED RDW RBC AUTO: 42.2 FL (ref 37–54)
ERYTHROCYTE [DISTWIDTH] IN BLOOD BY AUTOMATED COUNT: 12.6 % (ref 12.3–15.4)
HCT VFR BLD AUTO: 30.7 % (ref 34–46.6)
HGB BLD-MCNC: 10.3 G/DL (ref 12–15.9)
MCH RBC QN AUTO: 31 PG (ref 26.6–33)
MCHC RBC AUTO-ENTMCNC: 33.6 G/DL (ref 31.5–35.7)
MCV RBC AUTO: 92.5 FL (ref 79–97)
PLATELET # BLD AUTO: 190 10*3/MM3 (ref 140–450)
PMV BLD AUTO: 11.7 FL (ref 6–12)
RBC # BLD AUTO: 3.32 10*6/MM3 (ref 3.77–5.28)
RH BLD: NEGATIVE
T&S EXPIRATION DATE: NORMAL
WBC NRBC COR # BLD: 10.83 10*3/MM3 (ref 3.4–10.8)

## 2022-11-02 PROCEDURE — 86850 RBC ANTIBODY SCREEN: CPT | Performed by: OBSTETRICS & GYNECOLOGY

## 2022-11-02 PROCEDURE — 85027 COMPLETE CBC AUTOMATED: CPT | Performed by: OBSTETRICS & GYNECOLOGY

## 2022-11-02 PROCEDURE — 86900 BLOOD TYPING SEROLOGIC ABO: CPT | Performed by: OBSTETRICS & GYNECOLOGY

## 2022-11-02 PROCEDURE — 86901 BLOOD TYPING SEROLOGIC RH(D): CPT | Performed by: OBSTETRICS & GYNECOLOGY

## 2022-11-02 PROCEDURE — 51702 INSERT TEMP BLADDER CATH: CPT

## 2022-11-02 PROCEDURE — 25010000002 ROPIVACAINE PER 1 MG: Performed by: NURSE ANESTHETIST, CERTIFIED REGISTERED

## 2022-11-02 PROCEDURE — C1755 CATHETER, INTRASPINAL: HCPCS

## 2022-11-02 PROCEDURE — 25010000002 FENTANYL CITRATE (PF) 50 MCG/ML SOLUTION: Performed by: NURSE ANESTHETIST, CERTIFIED REGISTERED

## 2022-11-02 PROCEDURE — 0KQM0ZZ REPAIR PERINEUM MUSCLE, OPEN APPROACH: ICD-10-PCS | Performed by: OBSTETRICS & GYNECOLOGY

## 2022-11-02 PROCEDURE — C1755 CATHETER, INTRASPINAL: HCPCS | Performed by: ANESTHESIOLOGY

## 2022-11-02 RX ORDER — MISOPROSTOL 200 UG/1
800 TABLET ORAL AS NEEDED
Status: DISCONTINUED | OUTPATIENT
Start: 2022-11-02 | End: 2022-11-02 | Stop reason: HOSPADM

## 2022-11-02 RX ORDER — PROMETHAZINE HYDROCHLORIDE 12.5 MG/1
12.5 TABLET ORAL EVERY 6 HOURS PRN
Status: DISCONTINUED | OUTPATIENT
Start: 2022-11-02 | End: 2022-11-02 | Stop reason: HOSPADM

## 2022-11-02 RX ORDER — TERBUTALINE SULFATE 1 MG/ML
0.25 INJECTION, SOLUTION SUBCUTANEOUS AS NEEDED
Status: DISCONTINUED | OUTPATIENT
Start: 2022-11-02 | End: 2022-11-02

## 2022-11-02 RX ORDER — BUTORPHANOL TARTRATE 1 MG/ML
1 INJECTION, SOLUTION INTRAMUSCULAR; INTRAVENOUS
Status: DISCONTINUED | OUTPATIENT
Start: 2022-11-02 | End: 2022-11-02

## 2022-11-02 RX ORDER — SODIUM CHLORIDE 0.9 % (FLUSH) 0.9 %
3 SYRINGE (ML) INJECTION EVERY 12 HOURS SCHEDULED
Status: DISCONTINUED | OUTPATIENT
Start: 2022-11-02 | End: 2022-11-02

## 2022-11-02 RX ORDER — ACETAMINOPHEN 325 MG/1
650 TABLET ORAL EVERY 4 HOURS PRN
Status: DISCONTINUED | OUTPATIENT
Start: 2022-11-02 | End: 2022-11-02 | Stop reason: HOSPADM

## 2022-11-02 RX ORDER — ONDANSETRON 4 MG/1
4 TABLET, FILM COATED ORAL EVERY 8 HOURS PRN
Status: DISCONTINUED | OUTPATIENT
Start: 2022-11-02 | End: 2022-11-04 | Stop reason: HOSPADM

## 2022-11-02 RX ORDER — SODIUM CHLORIDE, SODIUM LACTATE, POTASSIUM CHLORIDE, CALCIUM CHLORIDE 600; 310; 30; 20 MG/100ML; MG/100ML; MG/100ML; MG/100ML
125 INJECTION, SOLUTION INTRAVENOUS CONTINUOUS
Status: DISCONTINUED | OUTPATIENT
Start: 2022-11-02 | End: 2022-11-02

## 2022-11-02 RX ORDER — MISOPROSTOL 200 UG/1
800 TABLET ORAL AS NEEDED
Status: DISCONTINUED | OUTPATIENT
Start: 2022-11-02 | End: 2022-11-04 | Stop reason: HOSPADM

## 2022-11-02 RX ORDER — ONDANSETRON 2 MG/ML
4 INJECTION INTRAMUSCULAR; INTRAVENOUS EVERY 6 HOURS PRN
Status: DISCONTINUED | OUTPATIENT
Start: 2022-11-02 | End: 2022-11-02 | Stop reason: HOSPADM

## 2022-11-02 RX ORDER — OXYTOCIN/0.9 % SODIUM CHLORIDE 30/500 ML
2-20 PLASTIC BAG, INJECTION (ML) INTRAVENOUS
Status: DISCONTINUED | OUTPATIENT
Start: 2022-11-02 | End: 2022-11-02

## 2022-11-02 RX ORDER — LIDOCAINE HYDROCHLORIDE 10 MG/ML
5 INJECTION, SOLUTION EPIDURAL; INFILTRATION; INTRACAUDAL; PERINEURAL AS NEEDED
Status: DISCONTINUED | OUTPATIENT
Start: 2022-11-02 | End: 2022-11-02

## 2022-11-02 RX ORDER — METOPROLOL TARTRATE 100 MG/1
100 TABLET ORAL EVERY 12 HOURS SCHEDULED
Status: DISCONTINUED | OUTPATIENT
Start: 2022-11-02 | End: 2022-11-04 | Stop reason: HOSPADM

## 2022-11-02 RX ORDER — CARBOPROST TROMETHAMINE 250 UG/ML
250 INJECTION, SOLUTION INTRAMUSCULAR AS NEEDED
Status: DISCONTINUED | OUTPATIENT
Start: 2022-11-02 | End: 2022-11-04 | Stop reason: HOSPADM

## 2022-11-02 RX ORDER — METHYLERGONOVINE MALEATE 0.2 MG/ML
200 INJECTION INTRAVENOUS ONCE AS NEEDED
Status: DISCONTINUED | OUTPATIENT
Start: 2022-11-02 | End: 2022-11-02 | Stop reason: HOSPADM

## 2022-11-02 RX ORDER — TRISODIUM CITRATE DIHYDRATE AND CITRIC ACID MONOHYDRATE 500; 334 MG/5ML; MG/5ML
30 SOLUTION ORAL ONCE
Status: DISCONTINUED | OUTPATIENT
Start: 2022-11-02 | End: 2022-11-02

## 2022-11-02 RX ORDER — PROMETHAZINE HYDROCHLORIDE 12.5 MG/1
12.5 TABLET ORAL EVERY 4 HOURS PRN
Status: DISCONTINUED | OUTPATIENT
Start: 2022-11-02 | End: 2022-11-04 | Stop reason: HOSPADM

## 2022-11-02 RX ORDER — DOCUSATE SODIUM 100 MG/1
100 CAPSULE, LIQUID FILLED ORAL 2 TIMES DAILY
Status: DISCONTINUED | OUTPATIENT
Start: 2022-11-02 | End: 2022-11-04 | Stop reason: HOSPADM

## 2022-11-02 RX ORDER — IBUPROFEN 600 MG/1
600 TABLET ORAL EVERY 6 HOURS PRN
Status: DISCONTINUED | OUTPATIENT
Start: 2022-11-02 | End: 2022-11-04 | Stop reason: HOSPADM

## 2022-11-02 RX ORDER — LIDOCAINE HYDROCHLORIDE AND EPINEPHRINE 15; 5 MG/ML; UG/ML
INJECTION, SOLUTION EPIDURAL AS NEEDED
Status: DISCONTINUED | OUTPATIENT
Start: 2022-11-02 | End: 2022-11-02 | Stop reason: SURG

## 2022-11-02 RX ORDER — DIPHENHYDRAMINE HYDROCHLORIDE 50 MG/ML
12.5 INJECTION INTRAMUSCULAR; INTRAVENOUS EVERY 8 HOURS PRN
Status: DISCONTINUED | OUTPATIENT
Start: 2022-11-02 | End: 2022-11-02

## 2022-11-02 RX ORDER — OXYCODONE HYDROCHLORIDE AND ACETAMINOPHEN 5; 325 MG/1; MG/1
1 TABLET ORAL EVERY 4 HOURS PRN
Status: DISCONTINUED | OUTPATIENT
Start: 2022-11-02 | End: 2022-11-04 | Stop reason: HOSPADM

## 2022-11-02 RX ORDER — ROPIVACAINE HYDROCHLORIDE 2 MG/ML
15 INJECTION, SOLUTION EPIDURAL; INFILTRATION; PERINEURAL CONTINUOUS
Status: DISCONTINUED | OUTPATIENT
Start: 2022-11-02 | End: 2022-11-02

## 2022-11-02 RX ORDER — ONDANSETRON 2 MG/ML
4 INJECTION INTRAMUSCULAR; INTRAVENOUS ONCE AS NEEDED
Status: DISCONTINUED | OUTPATIENT
Start: 2022-11-02 | End: 2022-11-02

## 2022-11-02 RX ORDER — SODIUM CHLORIDE 0.9 % (FLUSH) 0.9 %
1-10 SYRINGE (ML) INJECTION AS NEEDED
Status: DISCONTINUED | OUTPATIENT
Start: 2022-11-02 | End: 2022-11-04 | Stop reason: HOSPADM

## 2022-11-02 RX ORDER — SIMETHICONE 80 MG
80 TABLET,CHEWABLE ORAL 4 TIMES DAILY
Status: DISCONTINUED | OUTPATIENT
Start: 2022-11-02 | End: 2022-11-04 | Stop reason: HOSPADM

## 2022-11-02 RX ORDER — EPHEDRINE SULFATE 5 MG/ML
10 INJECTION INTRAVENOUS
Status: DISCONTINUED | OUTPATIENT
Start: 2022-11-02 | End: 2022-11-02

## 2022-11-02 RX ORDER — CARBOPROST TROMETHAMINE 250 UG/ML
250 INJECTION, SOLUTION INTRAMUSCULAR AS NEEDED
Status: DISCONTINUED | OUTPATIENT
Start: 2022-11-02 | End: 2022-11-02 | Stop reason: HOSPADM

## 2022-11-02 RX ORDER — DIPHENHYDRAMINE HCL 25 MG
25 CAPSULE ORAL NIGHTLY PRN
Status: DISCONTINUED | OUTPATIENT
Start: 2022-11-02 | End: 2022-11-04 | Stop reason: HOSPADM

## 2022-11-02 RX ORDER — OXYTOCIN/0.9 % SODIUM CHLORIDE 30/500 ML
250 PLASTIC BAG, INJECTION (ML) INTRAVENOUS CONTINUOUS
Status: ACTIVE | OUTPATIENT
Start: 2022-11-02 | End: 2022-11-02

## 2022-11-02 RX ORDER — METOCLOPRAMIDE HYDROCHLORIDE 5 MG/ML
10 INJECTION INTRAMUSCULAR; INTRAVENOUS ONCE AS NEEDED
Status: DISCONTINUED | OUTPATIENT
Start: 2022-11-02 | End: 2022-11-02

## 2022-11-02 RX ORDER — METHYLERGONOVINE MALEATE 0.2 MG/ML
200 INJECTION INTRAVENOUS ONCE AS NEEDED
Status: DISCONTINUED | OUTPATIENT
Start: 2022-11-02 | End: 2022-11-04 | Stop reason: HOSPADM

## 2022-11-02 RX ORDER — ROPIVACAINE HYDROCHLORIDE 5 MG/ML
INJECTION, SOLUTION EPIDURAL; INFILTRATION; PERINEURAL AS NEEDED
Status: DISCONTINUED | OUTPATIENT
Start: 2022-11-02 | End: 2022-11-02 | Stop reason: SURG

## 2022-11-02 RX ORDER — FENTANYL CITRATE 50 UG/ML
INJECTION, SOLUTION INTRAMUSCULAR; INTRAVENOUS AS NEEDED
Status: DISCONTINUED | OUTPATIENT
Start: 2022-11-02 | End: 2022-11-02 | Stop reason: SURG

## 2022-11-02 RX ORDER — ONDANSETRON 2 MG/ML
4 INJECTION INTRAMUSCULAR; INTRAVENOUS EVERY 6 HOURS PRN
Status: DISCONTINUED | OUTPATIENT
Start: 2022-11-02 | End: 2022-11-04 | Stop reason: HOSPADM

## 2022-11-02 RX ORDER — FAMOTIDINE 10 MG/ML
20 INJECTION, SOLUTION INTRAVENOUS ONCE AS NEEDED
Status: DISCONTINUED | OUTPATIENT
Start: 2022-11-02 | End: 2022-11-02

## 2022-11-02 RX ORDER — PROMETHAZINE HYDROCHLORIDE 12.5 MG/1
12.5 SUPPOSITORY RECTAL EVERY 6 HOURS PRN
Status: DISCONTINUED | OUTPATIENT
Start: 2022-11-02 | End: 2022-11-02 | Stop reason: HOSPADM

## 2022-11-02 RX ORDER — HYDROCORTISONE 25 MG/G
1 CREAM TOPICAL AS NEEDED
Status: DISCONTINUED | OUTPATIENT
Start: 2022-11-02 | End: 2022-11-04 | Stop reason: HOSPADM

## 2022-11-02 RX ORDER — EPHEDRINE SULFATE 5 MG/ML
INJECTION INTRAVENOUS
Status: DISCONTINUED
Start: 2022-11-02 | End: 2022-11-04 | Stop reason: HOSPADM

## 2022-11-02 RX ORDER — ONDANSETRON 4 MG/1
4 TABLET, FILM COATED ORAL EVERY 6 HOURS PRN
Status: DISCONTINUED | OUTPATIENT
Start: 2022-11-02 | End: 2022-11-02 | Stop reason: HOSPADM

## 2022-11-02 RX ORDER — OXYTOCIN/0.9 % SODIUM CHLORIDE 30/500 ML
999 PLASTIC BAG, INJECTION (ML) INTRAVENOUS ONCE
Status: COMPLETED | OUTPATIENT
Start: 2022-11-02 | End: 2022-11-02

## 2022-11-02 RX ORDER — SODIUM CHLORIDE 0.9 % (FLUSH) 0.9 %
3-10 SYRINGE (ML) INJECTION AS NEEDED
Status: DISCONTINUED | OUTPATIENT
Start: 2022-11-02 | End: 2022-11-02

## 2022-11-02 RX ORDER — CALCIUM CARBONATE 200(500)MG
1 TABLET,CHEWABLE ORAL 3 TIMES DAILY PRN
Status: DISCONTINUED | OUTPATIENT
Start: 2022-11-02 | End: 2022-11-04 | Stop reason: HOSPADM

## 2022-11-02 RX ORDER — PRENATAL VIT/IRON FUM/FOLIC AC 27MG-0.8MG
1 TABLET ORAL DAILY
Status: DISCONTINUED | OUTPATIENT
Start: 2022-11-03 | End: 2022-11-04 | Stop reason: HOSPADM

## 2022-11-02 RX ADMIN — EPHEDRINE SULFATE 10 MG: 5 INJECTION INTRAVENOUS at 14:45

## 2022-11-02 RX ADMIN — SODIUM CHLORIDE, POTASSIUM CHLORIDE, SODIUM LACTATE AND CALCIUM CHLORIDE 125 ML/HR: 600; 310; 30; 20 INJECTION, SOLUTION INTRAVENOUS at 09:28

## 2022-11-02 RX ADMIN — SODIUM CHLORIDE, POTASSIUM CHLORIDE, SODIUM LACTATE AND CALCIUM CHLORIDE 1000 ML: 600; 310; 30; 20 INJECTION, SOLUTION INTRAVENOUS at 13:46

## 2022-11-02 RX ADMIN — FENTANYL CITRATE 100 MCG: 50 INJECTION, SOLUTION INTRAMUSCULAR; INTRAVENOUS at 14:08

## 2022-11-02 RX ADMIN — METOPROLOL TARTRATE 100 MG: 100 TABLET, FILM COATED ORAL at 11:06

## 2022-11-02 RX ADMIN — ROPIVACAINE HYDROCHLORIDE 5 ML: 5 INJECTION, SOLUTION EPIDURAL; INFILTRATION; PERINEURAL at 14:10

## 2022-11-02 RX ADMIN — IBUPROFEN 600 MG: 600 TABLET ORAL at 22:57

## 2022-11-02 RX ADMIN — DOCUSATE SODIUM 100 MG: 100 CAPSULE, LIQUID FILLED ORAL at 21:36

## 2022-11-02 RX ADMIN — METOPROLOL TARTRATE 100 MG: 100 TABLET, FILM COATED ORAL at 20:10

## 2022-11-02 RX ADMIN — LIDOCAINE HYDROCHLORIDE AND EPINEPHRINE 3 ML: 15; 5 INJECTION, SOLUTION EPIDURAL at 14:05

## 2022-11-02 RX ADMIN — SODIUM CHLORIDE, POTASSIUM CHLORIDE, SODIUM LACTATE AND CALCIUM CHLORIDE 125 ML/HR: 600; 310; 30; 20 INJECTION, SOLUTION INTRAVENOUS at 14:34

## 2022-11-02 RX ADMIN — EPHEDRINE SULFATE 10 MG: 5 INJECTION INTRAVENOUS at 15:21

## 2022-11-02 RX ADMIN — Medication: at 21:36

## 2022-11-02 RX ADMIN — Medication 999 ML/HR: at 18:50

## 2022-11-02 RX ADMIN — WITCH HAZEL 1 PAD: 500 SOLUTION RECTAL; TOPICAL at 21:36

## 2022-11-02 RX ADMIN — Medication 2 MILLI-UNITS/MIN: at 09:55

## 2022-11-02 RX ADMIN — ROPIVACAINE HYDROCHLORIDE 15 ML/HR: 2 INJECTION, SOLUTION EPIDURAL; INFILTRATION at 14:11

## 2022-11-02 NOTE — ANESTHESIA PROCEDURE NOTES
Labor Epidural      Patient reassessed immediately prior to procedure    Patient location during procedure: floor  Performed By  CRNA/MADELINE: Celena Hanson CRNA  Preanesthetic Checklist  Completed: patient identified, IV checked, site marked, risks and benefits discussed, surgical consent, monitors and equipment checked, pre-op evaluation and timeout performed  Additional Notes  Dural puncture epidural with 25g rigo  Prep:  Pt Position:sitting  Sterile Tech:cap, gloves, mask and sterile barrier  Prep:DuraPrep  Monitoring:blood pressure monitoring  Epidural Block Procedure:  Approach:midline  Guidance:landmark technique and palpation technique  Location:L2-L3  Needle Type:Tuohy  Needle Gauge:17 G  Loss of Resistance Medium: air  Loss of Resistance: 3cm  Cath Depth at skin:10 cm  Paresthesia: none  Aspiration:negative  Test Dose:negative  Number of Attempts: 1  Post Assessment:  Dressing:occlusive dressing applied and secured with tape  Pt Tolerance:patient tolerated the procedure well with no apparent complications  Complications:no

## 2022-11-02 NOTE — H&P
[facility]  Obstetric History and Physical    CC: IOL    SUBJECTIVE:     Patient is a 32 y.o. female  currently at 37w5d, who presents with pregnancy c/b worsening maternal tachycardia requiring increasing doses of metoprolol. She has begun to have near syncopal episodes and has been progressively become more symptomatic in the last 1.5 weeks.   Here today for IOL .      Prenatal Information:  Prenatal Results     POC Urine Glucose/Protein     Test Value Reference Range Date Time    Urine Glucose  Negative mg/dL Negative, 1000 mg/dL (3+) 19 1524    Urine Protein  Negative mg/dL Negative 19 1524          Initial Prenatal Labs     Test Value Reference Range Date Time    Hemoglobin  11.6 g/dL 12.0 - 15.9 22 1439    Hematocrit  33.3 % 34.0 - 46.6 22 1439    Platelets  229 10*3/mm3 140 - 450 22 1439    Rubella IgG        Hepatitis B SAg        Hepatitis C Ab  <0.1 s/co ratio 0.0 - 0.9 21 1318    RPR        ABO  O   22 2245    Rh  Negative   22 2245    Antibody Screen  Negative   22 2245    HIV        Urine Culture  25,000 CFU/mL Normal Urogenital [place]   10/24/22 0145       25,000 CFU/mL Mixed [place] Isolated   22 2218       50,000 CFU/mL Normal Urogenital [place]   22 1501    Gonorrhea  Negative  Negative 19 1502    Chlamydia  Negative  Negative 19 1502    TSH  2.830 uIU/mL 0.270 - 4.200 10/05/22 1057       3.480 uIU/mL 0.270 - 4.200 22 1439       1.870 uIU/mL 0.270 - 4.200 22 0928    HgB A1c               2nd and 3rd Trimester     Test Value Reference Range Date Time    Hemoglobin (repeated)        Hematocrit (repeated)        Platelets   229 10*3/mm3 140 - 450 22 1439    GCT        Antibody Screen (repeated)        GTT Fasting        GTT 1 Hr        GTT 2 Hr        GTT 3 Hr        Group B Strep              Drug Screening     Test Value Reference Range Date Time    Amphetamine Screen        Barbiturate Screen         Benzodiazepine Screen        Methadone Screen        Phencyclidine Screen        Opiates Screen        THC Screen        Cocaine Screen        Propoxyphene Screen        Buprenorphine Screen        Methamphetamine Screen        Oxycodone Screen        Tricyclic Antidepressants Screen              Other (Risk screening)     Test Value Reference Range Date Time    Varicella IgG        Parvovirus IgG        CMV IgG        Cystic Fibrosis        Hemoglobin electrophoresis        NIPT        MSAFP-4        AFP (for NTD only)              Legend    ^: Historical                      External Prenatal Results     Pregnancy Outside Results - Transcribed From Office Records - See Scanned Records For Details     Test Value Date Time    ABO  O  07/08/22 2245    Rh  Negative  07/08/22 2245    Antibody Screen  Negative  07/08/22 2245    Varicella IgG       Rubella       Hgb  11.6 g/dL 05/21/22 1439    Hct  33.3 % 05/21/22 1439    Glucose Fasting GTT       Glucose Tolerance Test 1 hour       Glucose Tolerance Test 3 hour       Gonorrhea (discrete)  Negative  01/13/19 1502    Chlamydia (discrete)  Negative  01/13/19 1502    RPR       VDRL       Syphilis Antibody       HBsAg       Herpes Simplex Virus PCR       Herpes Simplex VIrus Culture       HIV       Hep C RNA Quant PCR       Hep C Antibody  <0.1 s/co ratio 05/04/21 1318    AFP       Group B Strep       GBS Susceptibility to Clindamycin       GBS Susceptibility to Erythromycin       Fetal Fibronectin       Genetic Testing, Maternal Blood             Drug Screening     Test Value Date Time    Urine Drug Screen       Amphetamine Screen       Barbiturate Screen       Benzodiazepine Screen       Methadone Screen       Phencyclidine Screen       Opiates Screen       THC Screen       Cocaine Screen       Propoxyphene Screen       Buprenorphine Screen       Methamphetamine Screen       Oxycodone Screen       Tricyclic Antidepressants Screen             Legend    ^: Historical         "                 OB History:                     OB History    Para Term  AB Living   4 2 1 1 1 2   SAB IAB Ectopic Molar Multiple Live Births   1 0 0 0 0 2      # Outcome Date GA Lbr Tiago/2nd Weight Sex Delivery Anes PTL Lv   4 Current            3 Term 16 38w0d  3544 g (7 lb 13 oz) F Vag-Spont EPI  BINU   2 SAB 11/01/15     SAB         Birth Comments: D &C   1  11 36w0d  2268 g (5 lb) M Vag-Spont  N BINU      Allergies:                        Allergies   Allergen Reactions   • Onion Itching     REPORTS CAUSES MIGRAINES and MAKES THROAT ITCHY     • Cabbage Headache   • Msg [Monosodium Glutamate] Headache   • Sulfa Antibiotics Hives      Past Medical History: Past Medical History:   Diagnosis Date   • Abnormal Pap smear of cervix    • Acid reflux    • Anxiety    • Anxiety and depression    • Arthritis    • Asthma    • B12 deficiency 2021   • Bipolar 1 disorder (HCC)    • Body piercing     EARS, NOSE, BELLY BUTTON   • Dysphagia     REPORTS SOMETIMES FEELS LIKE \"FOOD GETS STUCK\"   • Fracture     HISTORY OF RIGHT WRIST AS A CHILD   • Goiter     x3   • Heart rate fast     states usually 120-130 (states on med for this)   • History of migraine    • Hx of exercise stress test 2020   • Inappropriate sinus tachycardia     per Dr. Dong's note on 20.     • Injury of neck    • Insomnia    • Migraine    • Ovarian cyst    • Palpitations    • PONV (postoperative nausea and vomiting)    • Scoliosis    • Tachycardia    • Tattoo    • Thyroid nodule    • Toe fracture     right great toe   • Wears glasses       Past Surgical History Past Surgical History:   Procedure Laterality Date   • APPENDECTOMY     • CHOLECYSTECTOMY     • COLPOSCOPY     • CYST REMOVAL      OVARIAN CYST   • DILATATION AND CURETTAGE     • ENDOSCOPY N/A 2017    Procedure: ESOPHAGOGASTRODUODENOSCOPY WITH COLD FORCEP BIOPSY;  Surgeon: Danilo Shabazz MD;  Location: [facility] ENDOSCOPY;  Service:    • ENDOSCOPY N/A " 9/3/2020    Procedure: ESOPHAGOGASTRODUODENOSCOPY WITH BIOPSIES AND DILATATION;  Surgeon: Bhupinder Montenegro MD;  Location:  [facility];  Service: Gastroenterology;  Laterality: N/A;   • ESOPHAGEAL MOTILITY STUDY N/A 11/10/2020    Procedure: ESOPHAGEAL MANOMETRY;  Surgeon: Chivo Mcmahan MD;  Location:  [facility];  Service: Gastroenterology;  Laterality: N/A;   • HIP SURGERY Right     RIGHT HIP, REPORTS HAD BONES SCRAPED   • SHOULDER ARTHROSCOPY Right 10/11/2018    Procedure: Right shoulder diagnostic arthroscopy, limited rotator cuff debridement;  Surgeon: Tino Uribe MD;  Location:  [facility] OR;  Service: Orthopedics   • THYROID BIOPSY      goiter   • THYROIDECTOMY Left 2/24/2021    Procedure: THYROID LOBECTOMY LEFT;  Surgeon: Yves Contreras MD;  Location:  [facility];  Service: General;  Laterality: Left;   • THYROIDECTOMY, PARTIAL Left    • WISDOM TOOTH EXTRACTION        Family History: Family History   Problem Relation Age of Onset   • Diabetes Mother    • Arthritis Mother    • No Known Problems Father    • Cancer Maternal Grandmother    • Diabetes Maternal Grandmother    • Heart attack Maternal Grandmother    • Arthritis Maternal Grandmother    • Heart attack Paternal Grandmother    • Heart disease Sister    • Migraines Sister    • Cancer Paternal Grandfather         Lung   • Colon cancer Neg Hx    • Cirrhosis Neg Hx    • Liver cancer Neg Hx    • Liver disease Neg Hx       Social History:  reports that she quit smoking about 2 years ago. Her smoking use included cigarettes. She has a 6.50 pack-year smoking history. She has never used smokeless tobacco.   reports no history of alcohol use.   reports that she does not currently use drugs after having used the following drugs: Marijuana.    General ROS: Pertinent items are noted in HPI, all other systems reviewed and negative   Medications: PNV Prenatal Plus Multivitamin, albuterol sulfate HFA, atenolol, famotidine, fluticasone,  levothyroxine, metoprolol tartrate, permethrin, promethazine, sertraline, and triamcinolone       Objective       Vital Signs Range for the last 24 hours  Temperature: Temp:  [98.2 °F (36.8 °C)] 98.2 °F (36.8 °C)   Temp Source: Temp src: Oral   BP: BP: (111)/(63) 111/63   Pulse: Heart Rate:  [103] 103   Respirations: Resp:  [16] 16   SPO2:     O2 Amount (l/min):     O2 Devices     Weight:       Physical Examination: General appearance - alert, well appearing, and in no distress  Chest - clear to auscultation, no wheezes, rales or rhonchi, symmetric air entry  Heart - normal rate, regular rhythm, normal S1, S2, no murmurs, rubs, clicks or gallops  Abdomen - Soft, gravid, nontender  Extremities - pedal edema tr +      Presentation: VTX   Cervix: Exam by: Method: sterile exam per physician   Dilation: Cervical Dilation (cm): 2-3   Effacement: Cervical Effacement: 80%   Station:     AROM clear fluid    Fetal Heart Rate Assessment   Method: Fetal HR Assessment Method: external   Beats/min: Fetal HR (beats/min): 130   Baseline: Fetal HR Baseline: normal range   Variability: Fetal HR Variability: moderate (amplitude range 6 to 25 bpm)   Accels: Fetal HR Accelerations: greater than/equal to 15 bpm, lasting at least 15 seconds   Decels: Fetal HR Decelerations: absent   Tracing Category:       Uterine Assessment   Method: Method: external tocotransducer   Frequency (min): Contraction Frequency (Minutes): x1   Ctx Count in 10 min:     Duration:     Intensity: Contraction Intensity: no contractions   Intensity by IUPC:     Resting Tone: Uterine Resting Tone: soft by palpation   Resting Tone by IUPC:     [place] Units:       Laboratory Results:    pending    Assessment/Plan:   IUP 37w5d with maternal tachycardia      1. Admit L&D for IOL: plan PPP and AROM  2. GBS neg  3. FWB reassuring  4. Tachycardia: cont home medication.      Trini Knight MD  11/2/2022  10:58 EDT

## 2022-11-02 NOTE — ANESTHESIA PREPROCEDURE EVALUATION
Anesthesia Evaluation     Patient summary reviewed and Nursing notes reviewed   history of anesthetic complications: PONV  NPO Solid Status: > 6 hours  NPO Liquid Status: < 2 hours           Airway   Mallampati: I  TM distance: >3 FB  Neck ROM: full  Dental      Pulmonary    (+) asthma,  Cardiovascular - negative cardio ROS        Neuro/Psych  (+) headaches, psychiatric history (Borderline prersonality disorder) Anxiety and Depression,    GI/Hepatic/Renal/Endo    (+)  GERD,  thyroid problem (s/p thyroidectomy)     Musculoskeletal     Abdominal    Substance History - negative use     OB/GYN    (+) Pregnant,         Other   arthritis,                      Anesthesia Plan    ASA 2     epidural       Anesthetic plan, risks, benefits, and alternatives have been provided, discussed and informed consent has been obtained with: patient.    Plan discussed with attending.        CODE STATUS:    Level Of Support Discussed With: Patient  Code Status (Patient has no pulse and is not breathing): CPR (Attempt to Resuscitate)  Medical Interventions (Patient has pulse or is breathing): Full Support

## 2022-11-03 LAB
BASOPHILS # BLD AUTO: 0.04 10*3/MM3 (ref 0–0.2)
BASOPHILS NFR BLD AUTO: 0.3 % (ref 0–1.5)
DEPRECATED RDW RBC AUTO: 41.5 FL (ref 37–54)
EOSINOPHIL # BLD AUTO: 0.2 10*3/MM3 (ref 0–0.4)
EOSINOPHIL NFR BLD AUTO: 1.6 % (ref 0.3–6.2)
ERYTHROCYTE [DISTWIDTH] IN BLOOD BY AUTOMATED COUNT: 12.6 % (ref 12.3–15.4)
HCT VFR BLD AUTO: 31.2 % (ref 34–46.6)
HGB BLD-MCNC: 10.5 G/DL (ref 12–15.9)
IMM GRANULOCYTES # BLD AUTO: 0.1 10*3/MM3 (ref 0–0.05)
IMM GRANULOCYTES NFR BLD AUTO: 0.8 % (ref 0–0.5)
LYMPHOCYTES # BLD AUTO: 1.81 10*3/MM3 (ref 0.7–3.1)
LYMPHOCYTES NFR BLD AUTO: 14.7 % (ref 19.6–45.3)
MCH RBC QN AUTO: 30.9 PG (ref 26.6–33)
MCHC RBC AUTO-ENTMCNC: 33.7 G/DL (ref 31.5–35.7)
MCV RBC AUTO: 91.8 FL (ref 79–97)
MONOCYTES # BLD AUTO: 0.68 10*3/MM3 (ref 0.1–0.9)
MONOCYTES NFR BLD AUTO: 5.5 % (ref 5–12)
NEUTROPHILS NFR BLD AUTO: 77.1 % (ref 42.7–76)
NEUTROPHILS NFR BLD AUTO: 9.47 10*3/MM3 (ref 1.7–7)
NRBC BLD AUTO-RTO: 0 /100 WBC (ref 0–0.2)
PLATELET # BLD AUTO: 174 10*3/MM3 (ref 140–450)
PMV BLD AUTO: 11.8 FL (ref 6–12)
RBC # BLD AUTO: 3.4 10*6/MM3 (ref 3.77–5.28)
WBC NRBC COR # BLD: 12.3 10*3/MM3 (ref 3.4–10.8)

## 2022-11-03 PROCEDURE — 85025 COMPLETE CBC W/AUTO DIFF WBC: CPT | Performed by: OBSTETRICS & GYNECOLOGY

## 2022-11-03 RX ORDER — SERTRALINE HYDROCHLORIDE 25 MG/1
25 TABLET, FILM COATED ORAL DAILY
Status: DISCONTINUED | OUTPATIENT
Start: 2022-11-03 | End: 2022-11-04 | Stop reason: HOSPADM

## 2022-11-03 RX ORDER — LEVOTHYROXINE SODIUM 0.03 MG/1
25 TABLET ORAL
Status: DISCONTINUED | OUTPATIENT
Start: 2022-11-03 | End: 2022-11-04 | Stop reason: HOSPADM

## 2022-11-03 RX ORDER — PSEUDOEPHEDRINE HCL 30 MG
100 TABLET ORAL 2 TIMES DAILY
Qty: 30 CAPSULE | Refills: 0 | Status: SHIPPED | OUTPATIENT
Start: 2022-11-03

## 2022-11-03 RX ORDER — IBUPROFEN 600 MG/1
600 TABLET ORAL EVERY 6 HOURS PRN
Qty: 60 TABLET | Refills: 0 | Status: SHIPPED | OUTPATIENT
Start: 2022-11-03 | End: 2022-12-15

## 2022-11-03 RX ADMIN — METOPROLOL TARTRATE 100 MG: 100 TABLET, FILM COATED ORAL at 21:08

## 2022-11-03 RX ADMIN — IBUPROFEN 600 MG: 600 TABLET ORAL at 13:24

## 2022-11-03 RX ADMIN — IBUPROFEN 600 MG: 600 TABLET ORAL at 05:54

## 2022-11-03 RX ADMIN — SERTRALINE HYDROCHLORIDE 25 MG: 25 TABLET ORAL at 08:37

## 2022-11-03 RX ADMIN — OXYCODONE HYDROCHLORIDE AND ACETAMINOPHEN 1 TABLET: 5; 325 TABLET ORAL at 11:22

## 2022-11-03 RX ADMIN — OXYCODONE HYDROCHLORIDE AND ACETAMINOPHEN 1 TABLET: 5; 325 TABLET ORAL at 17:49

## 2022-11-03 RX ADMIN — SIMETHICONE 80 MG: 80 TABLET, CHEWABLE ORAL at 21:07

## 2022-11-03 RX ADMIN — IBUPROFEN 600 MG: 600 TABLET ORAL at 20:47

## 2022-11-03 RX ADMIN — OXYCODONE HYDROCHLORIDE AND ACETAMINOPHEN 1 TABLET: 5; 325 TABLET ORAL at 06:33

## 2022-11-03 RX ADMIN — PRENATAL VITAMINS-IRON FUMARATE 27 MG IRON-FOLIC ACID 0.8 MG TABLET 1 TABLET: at 08:37

## 2022-11-03 RX ADMIN — LEVOTHYROXINE SODIUM 25 MCG: 25 TABLET ORAL at 05:54

## 2022-11-03 RX ADMIN — OXYCODONE HYDROCHLORIDE AND ACETAMINOPHEN 1 TABLET: 5; 325 TABLET ORAL at 23:03

## 2022-11-03 RX ADMIN — METOPROLOL TARTRATE 100 MG: 100 TABLET, FILM COATED ORAL at 11:22

## 2022-11-03 RX ADMIN — DOCUSATE SODIUM 100 MG: 100 CAPSULE, LIQUID FILLED ORAL at 21:08

## 2022-11-03 RX ADMIN — DOCUSATE SODIUM 100 MG: 100 CAPSULE, LIQUID FILLED ORAL at 08:37

## 2022-11-03 NOTE — PROGRESS NOTES
[facility]  Obstetric Progress Note    Chief Complaint: PPD 1    Subjective     Feeling well. Pain controlled. axel diet. +amb/void. Lochia appr  Objective     Vital Signs Range for the last 24 hours  Temp:  [97.5 °F (36.4 °C)-98.2 °F (36.8 °C)] 98 °F (36.7 °C)   BP: ()/(55-90) 109/55   Heart Rate:  [] 64   Resp:  [16-18] 18                   Intake/Output last 24 hours:      Intake/Output Summary (Last 24 hours) at 11/3/2022 0812  Last data filed at 11/2/2022 1831  Gross per 24 hour   Intake --   Output 1150 ml   Net -1150 ml       Intake/Output this shift:    No intake/output data recorded.    Physical Exam:  General: No acute distress   Heart RRR   Lungs CTAB     Abdomen Soft, non tender, fundus firm   Extremities Exam of extremities: pedal edema tr +       Laboratory Results:      WBC   Date Value Ref Range Status   11/03/2022 12.30 (H) 3.40 - 10.80 10*3/mm3 Final     RBC   Date Value Ref Range Status   11/03/2022 3.40 (L) 3.77 - 5.28 10*6/mm3 Final     Hemoglobin   Date Value Ref Range Status   11/03/2022 10.5 (L) 12.0 - 15.9 g/dL Final     Hematocrit   Date Value Ref Range Status   11/03/2022 31.2 (L) 34.0 - 46.6 % Final     MCV   Date Value Ref Range Status   11/03/2022 91.8 79.0 - 97.0 fL Final     MCH   Date Value Ref Range Status   11/03/2022 30.9 26.6 - 33.0 pg Final     MCHC   Date Value Ref Range Status   11/03/2022 33.7 31.5 - 35.7 g/dL Final     RDW   Date Value Ref Range Status   11/03/2022 12.6 12.3 - 15.4 % Final     RDW-SD   Date Value Ref Range Status   11/03/2022 41.5 37.0 - 54.0 fl Final     MPV   Date Value Ref Range Status   11/03/2022 11.8 6.0 - 12.0 fL Final     Platelets   Date Value Ref Range Status   11/03/2022 174 140 - 450 10*3/mm3 Final     Neutrophil %   Date Value Ref Range Status   11/03/2022 77.1 (H) 42.7 - 76.0 % Final     Lymphocyte %   Date Value Ref Range Status   11/03/2022 14.7 (L) 19.6 - 45.3 % Final     Monocyte %   Date Value Ref Range Status   11/03/2022 5.5  5.0 - 12.0 % Final     Eosinophil %   Date Value Ref Range Status   2022 1.6 0.3 - 6.2 % Final     Basophil %   Date Value Ref Range Status   2022 0.3 0.0 - 1.5 % Final     Immature Grans %   Date Value Ref Range Status   2022 0.8 (H) 0.0 - 0.5 % Final     Neutrophils, Absolute   Date Value Ref Range Status   2022 9.47 (H) 1.70 - 7.00 10*3/mm3 Final     Lymphocytes, Absolute   Date Value Ref Range Status   2022 1.81 0.70 - 3.10 10*3/mm3 Final     Monocytes, Absolute   Date Value Ref Range Status   2022 0.68 0.10 - 0.90 10*3/mm3 Final     Eosinophils, Absolute   Date Value Ref Range Status   2022 0.20 0.00 - 0.40 10*3/mm3 Final     Basophils, Absolute   Date Value Ref Range Status   2022 0.04 0.00 - 0.20 10*3/mm3 Final     Immature Grans, Absolute   Date Value Ref Range Status   2022 0.10 (H) 0.00 - 0.05 10*3/mm3 Final     nRBC   Date Value Ref Range Status   2022 0.0 0.0 - 0.2 /100 WBC Final     Assessment/Plan: PPD 1 s/p   1.RPPC: Advance care  2. Breast: lactation support  3. Discussed circ  4. SVT: controlled on Metoprolol            Trini Knight MD  11/3/2022  08:12 EDT

## 2022-11-03 NOTE — L&D DELIVERY NOTE
[facility]  Vaginal Delivery Note  Review the Delivery Report for details.       Delivery     Delivery: Vaginal, Spontaneous     YOB: 2021    Time of Birth:  Gestational Age 6:47 PM   37w5d     Anesthesia: Epidural     Delivering clinician: Trini Knight    Forceps?   No   Vacuum? No    Shoulder dystocia present: No        Delivery narrative:  The patient progressed to complete and delivered a VMI  with Apagrs  the weight is  72 via  with epidural anesthesia. Shoulders were delivered easily. The cord was clamped and cut after 60second delay and the infant was placed on the mother's chest for skin- to - skin. Cord blood was obtained and the placenta was delivered spontaneously intact. 30 units of IV Pitocin was started. Uterine tone was appropriate.   2d degree laceration was noted and repaired with 2-0 Vicryl.   The patient tolerated the procedure well and remained in the LDR for recovery. All counts correct.      Infant    Findings: male  infant  3225 g (7 lb 1.8 oz)     Apgars: 8  @ 1 minute /    9  @ 5 minutes         Placenta, Cord, and Fluid    Placenta delivered  Spontaneous  at   2022  6:50 PM     Cord: 3 vessels  present.   Nuchal Cord?  yes; Number of nuchal loops present:  1    Cord blood obtained: Yes    Cord gases obtained:  No    Cord gas results: Venous:  No results found for: PHCVEN    Arterial:  No results found for: PHCART     Repair    Episiotomy: None     No    Lacerations: Yes  Laceration Information  Laceration Repaired?   Perineal: 2nd  Yes    Periurethral:       Labial:       Sulcus:       Vaginal:       Cervical:         Suture used for repair: 2-0 Vicryl     Quantitative Blood Loss:     100mL         Complications  none    Disposition  Mother to Mother Baby/Postpartum  in stable condition currently.  Baby to remains with mom  in stable condition currently.      Trini Knight MD  22  20:10 EDT

## 2022-11-03 NOTE — ANESTHESIA POSTPROCEDURE EVALUATION
Patient: Radha Medrano    Procedure Summary     Date: 11/02/22 Room / Location:     Anesthesia Start: 1359 Anesthesia Stop: 1850    Procedure: LABOR ANALGESIA Diagnosis:     Scheduled Providers:  Provider: Sascha Alexandre DO    Anesthesia Type: epidural ASA Status: 2          Anesthesia Type: epidural    Vitals  Vitals Value Taken Time   /60 11/03/22 0843   Temp 97.5 °F (36.4 °C) 11/03/22 0843   Pulse 75 11/03/22 0843   Resp 18 11/03/22 0843   SpO2 98 % 11/02/22 1406   Vitals shown include unvalidated device data.        Post Anesthesia Care and Evaluation    Patient location during evaluation: bedside  Patient participation: complete - patient participated  Level of consciousness: awake and alert  Pain management: adequate    Airway patency: patent  Anesthetic complications: No anesthetic complications    Cardiovascular status: acceptable  Respiratory status: acceptable  Hydration status: acceptable  Post Neuraxial Block status: Motor and sensory function returned to baseline and No signs or symptoms of PDPH

## 2022-11-03 NOTE — PAYOR COMM NOTE
"Radha Medrano (32 y.o. Female)     Date of Birth   1990    Social Security Number       Address   [address] [id number]    Home Phone   [phone]    MRN   [id number]       [facility]    Marital Status                               Admission Date   11/2/22    Admission Type   Elective    Admitting Provider   Trini Knight MD    Attending Provider   Trini Knight MD    Department, Room/Bed   [facility] MOTHER BABY 4A, N412/1       Discharge Date       Discharge Disposition       Discharge Destination                               Attending Provider: Trini Knight MD    Allergies: Onion, Cabbage, Msg [Monosodium Glutamate], Sulfa Antibiotics    Isolation: None   Infection: None   Code Status: CPR    Ht: 154.9 cm (61\")   Wt: 70.5 kg (155 lb 6.4 oz)    Admission Cmt: None   Principal Problem: Term pregnancy [Z34.90]                 Active Insurance as of 11/2/2022     Primary Coverage     Payor Plan Insurance Group Employer/Plan Group    [facility] BY [employer] [facility] BY [employer]UR[employer] [id number]     Payor Plan Address Payor Plan Phone Number Payor Plan Fax Number Effective Dates    [address] [address]   1/1/2021 - None Entered    [facility] [address]       Subscriber Name Subscriber Birth Date Member ID       RADHA MEDRANO 1990 [id number]                 Emergency Contacts      (Rel.) Home Phone Work Phone Mobile Phone    SPEARYELITZA (Mother) [phone] -- [phone]    KANIKA SPEAR (Father) [phone] -- [phone]    Rajat Medrano (Spouse) [phone] -- --               Operative/Procedure Notes (last 24 hours)      Trini Knight MD at 11/02/22 [address] [place] [address]  Vaginal Delivery Note  Review the Delivery Report for details.       Delivery     Delivery: Vaginal, Spontaneous     YOB: 2021    Time of Birth:  Gestational Age 6:47 PM   37w5d     Anesthesia: Epidural     Delivering " clinician: Trini Knight    Forceps?   No   Vacuum? No    Shoulder dystocia present: No        Delivery narrative:  The patient progressed to complete and delivered a VMI  with Apagrs  the weight is  72 via  with epidural anesthesia. Shoulders were delivered easily. The cord was clamped and cut after 60second delay and the infant was placed on the mother's chest for skin- to - skin. Cord blood was obtained and the placenta was delivered spontaneously intact. 30 units of IV Pitocin was started. Uterine tone was appropriate.   2d degree laceration was noted and repaired with 2-0 Vicryl.   The patient tolerated the procedure well and remained in the LDR for recovery. All counts correct.      Infant    Findings: male  infant  3225 g (7 lb 1.8 oz)     Apgars: 8  @ 1 minute /    9  @ 5 minutes         Placenta, Cord, and Fluid    Placenta delivered  Spontaneous  at   2022  6:50 PM     Cord: 3 vessels  present.   Nuchal Cord?  yes; Number of nuchal loops present:  1    Cord blood obtained: Yes    Cord gases obtained:  No    Cord gas results: Venous:  No results found for: PHCVEN    Arterial:  No results found for: PHCART     Repair    Episiotomy: None     No    Lacerations: Yes  Laceration Information  Laceration Repaired?   Perineal: 2nd  Yes    Periurethral:       Labial:       Sulcus:       Vaginal:       Cervical:         Suture used for repair: 2-0 Vicryl     Quantitative Blood Loss:     100mL         Complications  none    Disposition  Mother to Mother Baby/Postpartum  in stable condition currently.  Baby to remains with mom  in stable condition currently.      Trini Knight MD  22  20:10 EDT        Electronically signed by Trini Knight MD at 22       Physician Progress Notes (last 24 hours)  Notes from 22 through 22   No notes of this type exist for this encounter.

## 2022-11-04 VITALS
HEART RATE: 63 BPM | SYSTOLIC BLOOD PRESSURE: 139 MMHG | TEMPERATURE: 98.1 F | RESPIRATION RATE: 18 BRPM | DIASTOLIC BLOOD PRESSURE: 83 MMHG

## 2022-11-04 PROCEDURE — G0008 ADMIN INFLUENZA VIRUS VAC: HCPCS | Performed by: OBSTETRICS & GYNECOLOGY

## 2022-11-04 PROCEDURE — 90686 IIV4 VACC NO PRSV 0.5 ML IM: CPT | Performed by: OBSTETRICS & GYNECOLOGY

## 2022-11-04 PROCEDURE — 25010000002 INFLUENZA VAC SPLIT QUAD 0.5 ML SUSPENSION PREFILLED SYRINGE: Performed by: OBSTETRICS & GYNECOLOGY

## 2022-11-04 RX ADMIN — INFLUENZA VIRUS VACCINE 0.5 ML: 15; 15; 15; 15 SUSPENSION INTRAMUSCULAR at 11:45

## 2022-11-04 RX ADMIN — METOPROLOL TARTRATE 100 MG: 100 TABLET, FILM COATED ORAL at 08:14

## 2022-11-04 RX ADMIN — DOCUSATE SODIUM 100 MG: 100 CAPSULE, LIQUID FILLED ORAL at 08:14

## 2022-11-04 RX ADMIN — SERTRALINE HYDROCHLORIDE 25 MG: 25 TABLET ORAL at 08:14

## 2022-11-04 RX ADMIN — IBUPROFEN 600 MG: 600 TABLET ORAL at 03:30

## 2022-11-04 RX ADMIN — IBUPROFEN 600 MG: 600 TABLET ORAL at 11:45

## 2022-11-04 RX ADMIN — OXYCODONE HYDROCHLORIDE AND ACETAMINOPHEN 1 TABLET: 5; 325 TABLET ORAL at 11:45

## 2022-11-04 RX ADMIN — OXYCODONE HYDROCHLORIDE AND ACETAMINOPHEN 1 TABLET: 5; 325 TABLET ORAL at 03:30

## 2022-11-04 RX ADMIN — LEVOTHYROXINE SODIUM 25 MCG: 25 TABLET ORAL at 06:14

## 2022-11-04 RX ADMIN — PRENATAL VITAMINS-IRON FUMARATE 27 MG IRON-FOLIC ACID 0.8 MG TABLET 1 TABLET: at 08:14

## 2022-11-04 NOTE — DISCHARGE SUMMARY
[facility]  Discharge Summary      Patient: Radha Medrano            : 1990  MRN: [id number]  CSN: [id number]  Consult:   Consults     No orders found from 10/4/2022 to 11/3/2022.             Gestational Age at Discharge:  37w5d delivered      Admission  Diagnosis: Term pregnancy, pporly controlled Maternal tachycardia    Discharge Diagnosis:   Patient Active Problem List   Diagnosis   • Borderline personality disorder (HCC)   • Depression with anxiety   • Tachycardia   • Reflux esophagitis   • Acute shoulder bursitis, right   • Vitamin D deficiency   • Esophageal dysphagia   • Inappropriate sinus tachycardia   • Palpitations   • Bilirubinemia   • Nontoxic multinodular goiter   • Nausea   • Functional dysphagia   • Gilbert syndrome   • S/P partial thyroidectomy   • B12 deficiency   • Borderline abnormal TFTs   • Fall down stairs, initial encounter   • Term pregnancy       Date of Admission: 2022    Date of Discharge:   22    Procedures:             Service:  Obstetrics    Hospital Course: Patient was admitted at 37 weeks 5 days with a pregnancy complicated by maternal SVT that was poorly controlled near the end of pregnancy for induction of labor.  Her labor was induced with Pitocin and AROM and she progressed without complication.  She delivered a viable male infant with Apgars of 8 and 9 weighing 7 pounds 2 ounces via .  Her postpartum course was uncomplicated and she was discharged home on postpartum day #2    Labs:    Lab Results (last 24 hours)     ** No results found for the last 24 hours. **        HOSPITAL LABS:  Lab Results   Component Value Date    WBC 12.30 (H) 2022    HGB 10.5 (L) 2022    HCT 31.2 (L) 2022    MCV 91.8 2022     2022    AST 16 2022    ALT 13 2022     Results from last 7 days   Lab Units 22  0924   ABO TYPING  O   RH TYPING  Negative   ANTIBODY SCREEN  Negative       IMAGING        Discharge Medications      Discharge Medications      New Medications      Instructions Start Date   docusate sodium 100 MG capsule   100 mg, Oral, 2 Times Daily      ibuprofen 600 MG tablet  Commonly known as: ADVIL,MOTRIN   600 mg, Oral, Every 6 Hours PRN         Continue These Medications      Instructions Start Date   albuterol sulfate  (90 Base) MCG/ACT inhaler  Commonly known as: PROVENTIL HFA;VENTOLIN HFA;PROAIR HFA   2 puffs, Inhalation, Every 4 Hours PRN      metoprolol tartrate 50 MG tablet  Commonly known as: LOPRESSOR   75 mg, Oral, 2 Times Daily      PNV Prenatal Plus Multivitamin 27-1 MG tablet   No dose, route, or frequency recorded.      sertraline 25 MG tablet  Commonly known as: Zoloft   25 mg, Oral, Daily      Synthroid 25 MCG tablet  Generic drug: levothyroxine   25 mcg, Oral, Daily         Stop These Medications    atenolol 100 MG tablet  Commonly known as: TENORMIN     famotidine 20 MG tablet  Commonly known as: PEPCID     fluticasone 50 MCG/ACT nasal spray  Commonly known as: Flonase     permethrin 5 % cream  Commonly known as: ELIMITE     promethazine 12.5 MG half tablet  Commonly known as: PHENERGAN     triamcinolone 0.1 % ointment  Commonly known as: KENALOG            Allergies:   Allergies   Allergen Reactions   • Onion Itching     REPORTS CAUSES MIGRAINES and MAKES THROAT ITCHY     • Cabbage Headache   • Msg [Monosodium Glutamate] Headache   • Sulfa Antibiotics Hives        Discharge Disposition:  To Home    Discharge Condition:  Stable    Discharge Diet: Regular    Activity at Discharge:  Pelvic rest    Follow-up Appointments: 6 weeks        Trini Knight MD  11/04/22  08:41 EDT

## 2022-11-04 NOTE — PROGRESS NOTES
[facility]  Obstetric Progress Note    Chief Complaint: PPD 2    Subjective     Feeling well. Pain controlled. axel diet. +amb/void. Lochia appr  Objective     Vital Signs Range for the last 24 hours  Temp:  [97.5 °F (36.4 °C)-98.1 °F (36.7 °C)] 98.1 °F (36.7 °C)   BP: (106-136)/(60-81) 106/63   Heart Rate:  [68-76] 68   Resp:  [18-20] 18                   Intake/Output last 24 hours:    No intake or output data in the 24 hours ending 22 0840    Intake/Output this shift:    No intake/output data recorded.    Physical Exam:  General: No acute distress   Heart RRR   Lungs CTAB     Abdomen Soft, non tender, fundus firm   Extremities Exam of extremities: no pedal edema noted       Laboratory Results:    No new    Assessment/Plan: PPD 2 s/p   1.RPPC: Advance care  2. Dc home today  3. SVT: HR controlled on home meds          Trini Knight MD  2022  08:40 EDT

## 2022-11-05 NOTE — PAYOR COMM NOTE
"Radha Chamberlain (32 y.o. Female)     Date of Birth   1990    Social Security Number       Address   [address] [facility] [address] [facility] [address]    Home Phone   [phone]    MRN   [id number]       [facility]    Marital Status                               Admission Date   11/2/22    Admission Type   Elective    Admitting Provider   Trini Knight MD    Attending Provider       Department, Room/Bed   [facility] MOTHER BABY 4A, N412/1       Discharge Date   11/4/2022    Discharge Disposition   Home or Self Care    Discharge Destination                               Attending Provider: (none)   Allergies: Onion, Cabbage, Msg [Monosodium Glutamate], Sulfa Antibiotics    Isolation: None   Infection: None   Code Status: Prior    Ht: 154.9 cm (61\")   Wt: 70.5 kg (155 lb 6.4 oz)    Admission Cmt: None   Principal Problem: Term pregnancy [Z34.90]                 Active Insurance as of 11/2/2022     Primary Coverage     Payor Plan Insurance Group Employer/Plan Group    [id number] [id number]     Payor Plan Address Payor Plan Phone Number Payor Plan Fax Number Effective Dates    [address] [address]   1/1/2021 - None Entered    [facility] [id number]       Subscriber Name Subscriber Birth Date Member ID       RADHA CHAMBERLAIN 1990 [id number]                 Emergency Contacts      (Rel.) Home Phone Work Phone Mobile Phone    SPEARYELITZA (Mother) [phone] -- [phone]    KANIKA SPEAR (Father) [phone] -- [phone]    Rajat Chamberlain (Spouse) [phone] -- --            Insurance Information                [employer] BY [employer] BY [employer]ON Phone: --    Subscriber: Radha Chamberlain Subscriber#: [id number]    Group#: [id number] Precert#: --             Discharge Summary      Trini Knight MD at 11/04/22 0841          [facility]  Discharge Summary      Patient: Radha Phillips " Mitchell            : 1990  MRN: [id number]  [facility]: [id number]  Consult:   Consults     No orders found from 10/4/2022 to 11/3/2022.             Gestational Age at Discharge:  37w5d delivered      Admission  Diagnosis: Term pregnancy, pporly controlled Maternal tachycardia    Discharge Diagnosis:   Patient Active Problem List   Diagnosis   • Borderline personality disorder (HCC)   • Depression with anxiety   • Tachycardia   • Reflux esophagitis   • Acute shoulder bursitis, right   • Vitamin D deficiency   • Esophageal dysphagia   • Inappropriate sinus tachycardia   • Palpitations   • Bilirubinemia   • Nontoxic multinodular goiter   • Nausea   • Functional dysphagia   • Gilbert syndrome   • S/P partial thyroidectomy   • B12 deficiency   • Borderline abnormal TFTs   • Fall down stairs, initial encounter   • Term pregnancy       Date of Admission: 2022    Date of Discharge:   22    Procedures:             Service:  Obstetrics    Hospital Course: Patient was admitted at 37 weeks 5 days with a pregnancy complicated by maternal SVT that was poorly controlled near the end of pregnancy for induction of labor.  Her labor was induced with Pitocin and AROM and she progressed without complication.  She delivered a viable male infant with Apgars of 8 and 9 weighing 7 pounds 2 ounces via .  Her postpartum course was uncomplicated and she was discharged home on postpartum day #2    Labs:    Lab Results (last 24 hours)     ** No results found for the last 24 hours. **        HOSPITAL LABS:  Lab Results   Component Value Date    WBC 12.30 (H) 2022    HGB 10.5 (L) 2022    HCT 31.2 (L) 2022    MCV 91.8 2022     2022    AST 16 2022    ALT 13 2022     Results from last 7 days   Lab Units 22  0924   ABO TYPING  O   RH TYPING  Negative   ANTIBODY SCREEN  Negative       IMAGING        Discharge Medications     Discharge Medications      New Medications       Instructions Start Date   docusate sodium 100 MG capsule   100 mg, Oral, 2 Times Daily      ibuprofen 600 MG tablet  Commonly known as: ADVIL,MOTRIN   600 mg, Oral, Every 6 Hours PRN         Continue These Medications      Instructions Start Date   albuterol sulfate  (90 Base) MCG/ACT inhaler  Commonly known as: PROVENTIL HFA;VENTOLIN HFA;PROAIR HFA   2 puffs, Inhalation, Every 4 Hours PRN      metoprolol tartrate 50 MG tablet  Commonly known as: LOPRESSOR   75 mg, Oral, 2 Times Daily      PNV Prenatal Plus Multivitamin 27-1 MG tablet   No dose, route, or frequency recorded.      sertraline 25 MG tablet  Commonly known as: Zoloft   25 mg, Oral, Daily      Synthroid 25 MCG tablet  Generic drug: levothyroxine   25 mcg, Oral, Daily         Stop These Medications    atenolol 100 MG tablet  Commonly known as: TENORMIN     famotidine 20 MG tablet  Commonly known as: PEPCID     fluticasone 50 MCG/ACT nasal spray  Commonly known as: Flonase     permethrin 5 % cream  Commonly known as: ELIMITE     promethazine 12.5 MG half tablet  Commonly known as: PHENERGAN     triamcinolone 0.1 % ointment  Commonly known as: KENALOG            Allergies:   Allergies   Allergen Reactions   • Onion Itching     REPORTS CAUSES MIGRAINES and MAKES THROAT ITCHY     • Cabbage Headache   • Msg [Monosodium Glutamate] Headache   • Sulfa Antibiotics Hives        Discharge Disposition:  To Home    Discharge Condition:  Stable    Discharge Diet: Regular    Activity at Discharge:  Pelvic rest    Follow-up Appointments: 6 weeks        Trini Knight MD  11/04/22  08:41 EDT      Electronically signed by Trini Knight MD at 11/04/22 0842

## 2022-12-15 ENCOUNTER — OFFICE VISIT (OUTPATIENT)
Dept: NEUROLOGY | Facility: CLINIC | Age: 32
End: 2022-12-15

## 2022-12-15 VITALS
HEART RATE: 78 BPM | OXYGEN SATURATION: 97 % | HEIGHT: 61 IN | WEIGHT: 134.4 LBS | TEMPERATURE: 97.3 F | BODY MASS INDEX: 25.37 KG/M2 | DIASTOLIC BLOOD PRESSURE: 84 MMHG | SYSTOLIC BLOOD PRESSURE: 122 MMHG

## 2022-12-15 DIAGNOSIS — G43.719 INTRACTABLE CHRONIC MIGRAINE WITHOUT AURA AND WITHOUT STATUS MIGRAINOSUS: Primary | ICD-10-CM

## 2022-12-15 PROCEDURE — 99214 OFFICE O/P EST MOD 30 MIN: CPT | Performed by: NURSE PRACTITIONER

## 2022-12-15 RX ORDER — RIZATRIPTAN BENZOATE 10 MG/1
10 TABLET ORAL ONCE AS NEEDED
Qty: 9 TABLET | Refills: 2 | Status: SHIPPED | OUTPATIENT
Start: 2022-12-15 | End: 2022-12-15

## 2022-12-15 RX ORDER — RIMEGEPANT SULFATE 75 MG/75MG
75 TABLET, ORALLY DISINTEGRATING ORAL DAILY
Qty: 8 TABLET | Refills: 0 | COMMUNITY
Start: 2022-12-15 | End: 2023-02-20

## 2022-12-15 RX ORDER — RIMEGEPANT SULFATE 75 MG/75MG
75 TABLET, ORALLY DISINTEGRATING ORAL EVERY OTHER DAY
Qty: 16 TABLET | Refills: 5 | Status: SHIPPED | OUTPATIENT
Start: 2022-12-15 | End: 2023-02-20

## 2022-12-28 ENCOUNTER — HOSPITAL ENCOUNTER (OUTPATIENT)
Dept: GENERAL RADIOLOGY | Facility: HOSPITAL | Age: 32
Discharge: HOME | End: 2022-12-28
Admitting: FAMILY MEDICINE

## 2022-12-28 ENCOUNTER — OFFICE VISIT (OUTPATIENT)
Dept: INTERNAL MEDICINE | Facility: CLINIC | Age: 32
End: 2022-12-28

## 2022-12-28 VITALS
WEIGHT: 130 LBS | OXYGEN SATURATION: 98 % | TEMPERATURE: 98 F | BODY MASS INDEX: 24.55 KG/M2 | HEIGHT: 61 IN | HEART RATE: 98 BPM | DIASTOLIC BLOOD PRESSURE: 80 MMHG | SYSTOLIC BLOOD PRESSURE: 120 MMHG

## 2022-12-28 DIAGNOSIS — M54.50 CHRONIC MIDLINE LOW BACK PAIN WITHOUT SCIATICA: Primary | ICD-10-CM

## 2022-12-28 DIAGNOSIS — G89.29 CHRONIC MIDLINE LOW BACK PAIN WITHOUT SCIATICA: Primary | ICD-10-CM

## 2022-12-28 DIAGNOSIS — L04.9 LYMPHADENITIS, ACUTE: ICD-10-CM

## 2022-12-28 PROCEDURE — 99214 OFFICE O/P EST MOD 30 MIN: CPT | Performed by: FAMILY MEDICINE

## 2022-12-28 PROCEDURE — 72100 X-RAY EXAM L-S SPINE 2/3 VWS: CPT

## 2022-12-28 RX ORDER — SERTRALINE HYDROCHLORIDE 100 MG/1
100 TABLET, FILM COATED ORAL DAILY
COMMUNITY
Start: 2022-12-15

## 2022-12-28 RX ORDER — MELOXICAM 7.5 MG/1
7.5 TABLET ORAL DAILY
Qty: 30 TABLET | Refills: 2 | Status: SHIPPED | OUTPATIENT
Start: 2022-12-28

## 2022-12-28 RX ORDER — AMOXICILLIN AND CLAVULANATE POTASSIUM 875; 125 MG/1; MG/1
1 TABLET, FILM COATED ORAL EVERY 12 HOURS SCHEDULED
Qty: 20 TABLET | Refills: 0 | Status: SHIPPED | OUTPATIENT
Start: 2022-12-28 | End: 2023-02-20

## 2022-12-28 RX ORDER — MEDROXYPROGESTERONE ACETATE 10 MG/1
TABLET ORAL
COMMUNITY
Start: 2022-12-21

## 2022-12-28 NOTE — PROGRESS NOTES
Radha Medrano is a 32 y.o. female.    Chief Complaint   Patient presents with   • Back Pain     Has been having back pain since she gave birth a month ago.    • Skin Lesion     Has a large knot around groin area, painful.        HPI   Patient reports painful nodule to left perineum just lateral to left labia minora. She reports lesion is football shaped.  Not increased in size.   No drainage.  Not applied anything.  Has not reached out to gynecology.      Patient reports low back pain from upper lumbar to mid lumbar region.  Pain is constant aching pain.  Has tried aspercream, tylenol, motrin without relief.  She reports numbness to the lumbar region at times.  Also reports a h/o scoliosis.      The following portions of the patient's history were reviewed and updated as appropriate: allergies, current medications, past family history, past medical history, past social history, past surgical history and problem list.     Allergies   Allergen Reactions   • Onion Itching     REPORTS CAUSES MIGRAINES and MAKES THROAT ITCHY     • Cabbage Headache   • Msg [Monosodium Glutamate] Headache   • Sulfa Antibiotics Hives         Current Outpatient Medications:   •  albuterol sulfate  (90 Base) MCG/ACT inhaler, Inhale 2 puffs Every 4 (Four) Hours As Needed for Wheezing or Shortness of Air., Disp: 18 g, Rfl: 1  •  docusate sodium 100 MG capsule, Take 1 capsule by mouth 2 (Two) Times a Day., Disp: 30 capsule, Rfl: 0  •  medroxyPROGESTERone (PROVERA) 10 MG tablet, Take 1 tablet (10 mg total) by mouth daily for 10 days., Disp: , Rfl:   •  metoprolol tartrate (LOPRESSOR) 50 MG tablet, Take 1.5 tablets by mouth 2 (Two) Times a Day., Disp: 90 tablet, Rfl: 11  •  Rimegepant Sulfate (Nurtec) 75 MG tablet dispersible tablet, Take 1 tablet by mouth Every Other Day., Disp: 16 tablet, Rfl: 5  •  Rimegepant Sulfate (Nurtec) 75 MG tablet dispersible tablet, Take 1 tablet by mouth Daily., Disp: 8 tablet, Rfl: 0  •  sertraline  "(ZOLOFT) 100 MG tablet, Take 100 mg by mouth Daily., Disp: , Rfl:   •  ubrogepant 100 MG tablet, Take 1 tablet by mouth As Needed (migraine)., Disp: 10 tablet, Rfl: 5  •  amoxicillin-clavulanate (Augmentin) 875-125 MG per tablet, Take 1 tablet by mouth Every 12 (Twelve) Hours., Disp: 20 tablet, Rfl: 0  •  meloxicam (Mobic) 7.5 MG tablet, Take 1 tablet by mouth Daily., Disp: 30 tablet, Rfl: 2    ROS    Review of Systems   Constitutional: Negative for chills and fever.   Respiratory: Negative for cough and shortness of breath.    Cardiovascular: Negative for chest pain.   Musculoskeletal: Positive for back pain.   Skin: Positive for skin lesions.   Neurological: Positive for numbness.       Vitals:    12/28/22 1057   BP: 120/80   BP Location: Right arm   Patient Position: Sitting   Cuff Size: Adult   Pulse: 98   Temp: 98 °F (36.7 °C)   SpO2: 98%   Weight: 59 kg (130 lb)   Height: 154.9 cm (61\")   PainSc:   6     Body mass index is 24.56 kg/m².    Physical Exam     Physical Exam  Constitutional:       General: She is not in acute distress.     Appearance: Normal appearance. She is well-developed.   HENT:      Head: Normocephalic and atraumatic.      Right Ear: External ear normal.      Left Ear: External ear normal.   Eyes:      Extraocular Movements: Extraocular movements intact.      Conjunctiva/sclera: Conjunctivae normal.   Cardiovascular:      Rate and Rhythm: Normal rate and regular rhythm.      Heart sounds: No murmur heard.  Pulmonary:      Effort: Pulmonary effort is normal. No respiratory distress.      Breath sounds: Normal breath sounds. No wheezing.   Abdominal:      General: Bowel sounds are normal. There is no distension.      Palpations: Abdomen is soft.      Tenderness: There is no abdominal tenderness.   Genitourinary:         Comments: Firm, nodule lateral to labia on the left.  Musculoskeletal:      Lumbar back: Deformity (slight curvature appreciated the rotation to left noted) and tenderness " (midline) present. Normal range of motion.   Skin:     General: Skin is warm and dry.   Neurological:      Mental Status: She is alert and oriented to person, place, and time.      Cranial Nerves: No cranial nerve deficit.   Psychiatric:         Mood and Affect: Mood normal.         Behavior: Behavior normal.         Assessment/Plan    Diagnoses and all orders for this visit:    1. Chronic midline low back pain without sciatica (Primary)  Assessment & Plan:  Will obtain x-ray of the lumbar spine and refer to PT.  May take mobic for pain.  Can take tylenol as well if needed.  Advised not to take any OTC NSAIDs.  If no improvement with PT and medication, would recommend MRI in the future.      Orders:  -     XR Spine Lumbar 2 or 3 View  -     Ambulatory Referral to Physical Therapy Evaluate and treat    2. Lymphadenitis, acute  Assessment & Plan:  Suspected enlarge lymph node given location of nodule.  Will treat with augmentin.  If no improvement, encouraged to discuss with gynecology.       Other orders  -     meloxicam (Mobic) 7.5 MG tablet; Take 1 tablet by mouth Daily.  Dispense: 30 tablet; Refill: 2  -     amoxicillin-clavulanate (Augmentin) 875-125 MG per tablet; Take 1 tablet by mouth Every 12 (Twelve) Hours.  Dispense: 20 tablet; Refill: 0      New Medications Ordered This Visit   Medications   • meloxicam (Mobic) 7.5 MG tablet     Sig: Take 1 tablet by mouth Daily.     Dispense:  30 tablet     Refill:  2   • amoxicillin-clavulanate (Augmentin) 875-125 MG per tablet     Sig: Take 1 tablet by mouth Every 12 (Twelve) Hours.     Dispense:  20 tablet     Refill:  0       No orders of the defined types were placed in this encounter.      Return if symptoms worsen or fail to improve.    Judie Cee,

## 2022-12-29 NOTE — ASSESSMENT & PLAN NOTE
Suspected enlarge lymph node given location of nodule.  Will treat with augmentin.  If no improvement, encouraged to discuss with gynecology.

## 2022-12-29 NOTE — ASSESSMENT & PLAN NOTE
Will obtain x-ray of the lumbar spine and refer to PT.  May take mobic for pain.  Can take tylenol as well if needed.  Advised not to take any OTC NSAIDs.  If no improvement with PT and medication, would recommend MRI in the future.

## 2023-01-04 ENCOUNTER — TELEPHONE (OUTPATIENT)
Dept: INTERNAL MEDICINE | Facility: CLINIC | Age: 33
End: 2023-01-04

## 2023-01-04 NOTE — TELEPHONE ENCOUNTER
Caller: Radha Medrano    Relationship: Self    Best call back number: [phone]    What is the medical concern/diagnosis: N/A    What specialty or service is being requested: PHYSICAL THERAPY     What is the provider, practice or medical service name: [facility]    What is the office location: [place], [place]    What is the office phone number: N/A    Any additional details: PATIENT STATED THAT THE LAST PLACE THAT SHE WAS REFERRED TO DOES NOT ACCEPT HER INSURANCE AND WOULD LIKE TO SEE ABOUT GETTING REFERRAL TO  [facility]     PLEASE ADVISE

## 2023-01-09 ENCOUNTER — LAB (OUTPATIENT)
Dept: LAB | Facility: HOSPITAL | Age: 33
End: 2023-01-09
Payer: COMMERCIAL

## 2023-01-09 ENCOUNTER — TRANSCRIBE ORDERS (OUTPATIENT)
Dept: LAB | Facility: HOSPITAL | Age: 33
End: 2023-01-09
Payer: COMMERCIAL

## 2023-01-09 DIAGNOSIS — N93.9 HEMORRHAGE IN UTERUS: Primary | ICD-10-CM

## 2023-01-09 DIAGNOSIS — N93.9 HEMORRHAGE IN UTERUS: ICD-10-CM

## 2023-01-09 LAB
25(OH)D3 SERPL-MCNC: 22.4 NG/ML (ref 30–100)
BASOPHILS # BLD AUTO: 0.06 10*3/MM3 (ref 0–0.2)
BASOPHILS NFR BLD AUTO: 0.5 % (ref 0–1.5)
DEPRECATED RDW RBC AUTO: 39.1 FL (ref 37–54)
EOSINOPHIL # BLD AUTO: 0.59 10*3/MM3 (ref 0–0.4)
EOSINOPHIL NFR BLD AUTO: 4.7 % (ref 0.3–6.2)
ERYTHROCYTE [DISTWIDTH] IN BLOOD BY AUTOMATED COUNT: 12 % (ref 12.3–15.4)
HCG INTACT+B SERPL-ACNC: <1 MIU/ML
HCT VFR BLD AUTO: 37.4 % (ref 34–46.6)
HGB BLD-MCNC: 12.2 G/DL (ref 12–15.9)
IMM GRANULOCYTES # BLD AUTO: 0.04 10*3/MM3 (ref 0–0.05)
IMM GRANULOCYTES NFR BLD AUTO: 0.3 % (ref 0–0.5)
LYMPHOCYTES # BLD AUTO: 2.16 10*3/MM3 (ref 0.7–3.1)
LYMPHOCYTES NFR BLD AUTO: 17.1 % (ref 19.6–45.3)
MCH RBC QN AUTO: 29.3 PG (ref 26.6–33)
MCHC RBC AUTO-ENTMCNC: 32.6 G/DL (ref 31.5–35.7)
MCV RBC AUTO: 89.7 FL (ref 79–97)
MONOCYTES # BLD AUTO: 0.67 10*3/MM3 (ref 0.1–0.9)
MONOCYTES NFR BLD AUTO: 5.3 % (ref 5–12)
NEUTROPHILS NFR BLD AUTO: 72.1 % (ref 42.7–76)
NEUTROPHILS NFR BLD AUTO: 9.1 10*3/MM3 (ref 1.7–7)
NRBC BLD AUTO-RTO: 0 /100 WBC (ref 0–0.2)
PLATELET # BLD AUTO: 224 10*3/MM3 (ref 140–450)
PMV BLD AUTO: 12.8 FL (ref 6–12)
RBC # BLD AUTO: 4.17 10*6/MM3 (ref 3.77–5.28)
TSH SERPL DL<=0.05 MIU/L-ACNC: 3.24 UIU/ML (ref 0.27–4.2)
WBC NRBC COR # BLD: 12.62 10*3/MM3 (ref 3.4–10.8)

## 2023-01-09 PROCEDURE — 84443 ASSAY THYROID STIM HORMONE: CPT

## 2023-01-09 PROCEDURE — 82306 VITAMIN D 25 HYDROXY: CPT

## 2023-01-09 PROCEDURE — 84702 CHORIONIC GONADOTROPIN TEST: CPT

## 2023-01-09 PROCEDURE — 36415 COLL VENOUS BLD VENIPUNCTURE: CPT

## 2023-01-09 PROCEDURE — 85025 COMPLETE CBC W/AUTO DIFF WBC: CPT

## 2023-01-17 ENCOUNTER — TELEPHONE (OUTPATIENT)
Dept: INTERNAL MEDICINE | Facility: CLINIC | Age: 33
End: 2023-01-17
Payer: COMMERCIAL

## 2023-01-17 NOTE — TELEPHONE ENCOUNTER
Caller: Radha Medrano    Relationship: Self    Best call back number: [phone]    What is the best time to reach you: ANY    Who are you requesting to speak with (clinical staff, provider,  specific staff member): CLINICAL     What was the call regarding: THE PATIENT STATES THAT SHE IS IS MORE PAIN AFTER COMPLETING PHYSICAL THERAPY THAN WHEN SHE GOES IN AND WOULD LIKE A CALL BACK TO BE ADVISED ON ANYTHING FURTHER SHE COULD DO TO HELP THE PAIN.    THE PATIENT ALSO STATES THAT WHEN SHE LAYS DOWN AND GETS BACK UP SHE HAS AN IMMEDIATE RUSH OF A HEADACHE.      Do you require a callback: YES, PLEASE CALL BACK AS SOON AS POSSIBLE.

## 2023-01-18 RX ORDER — CYCLOBENZAPRINE HCL 5 MG
5 TABLET ORAL 3 TIMES DAILY PRN
Qty: 40 TABLET | Refills: 0 | Status: SHIPPED | OUTPATIENT
Start: 2023-01-18

## 2023-01-18 NOTE — TELEPHONE ENCOUNTER
Call and let her know I sent low dose flexeril. Don't drive or drink alcohol with the medication as it does cause drowsiness. For severe muscle spasms. If not improving, need to come in.

## 2023-02-16 NOTE — ANESTHESIA POSTPROCEDURE EVALUATION
Patient: Radha Dixon    Procedure Summary     Date:  10/11/18 Room / Location:  [facility] OR  / [facility] OR    Anesthesia Start:  0901 Anesthesia Stop:  1011    Procedure:  Right shoulder diagnostic arthroscopy, limited rotator cuff debridement (Right Shoulder) Diagnosis:       Acute shoulder bursitis, right      (Acute shoulder bursitis, right [M75.51])    Surgeon:  Tino Uribe MD Provider:  Tony Doss CRNA    Anesthesia Type:  general, regional ASA Status:  2          Anesthesia Type: general, regional  Last vitals  BP   120/74 (10/11/18 0755)   Temp   98.3 °F (36.8 °C) (10/11/18 0755)   Pulse   87 (10/11/18 0755)   Resp   18 (10/11/18 0755)     SpO2   92 % (10/11/18 0755)     Post Anesthesia Care and Evaluation    Patient location during evaluation: PACU  Patient participation: complete - patient participated  Level of consciousness: awake and alert and sleepy but conscious  Pain score: 2  Pain management: adequate  Airway patency: patent  Anesthetic complications: No anesthetic complications  PONV Status: none  Cardiovascular status: acceptable  Respiratory status: acceptable and face mask  Hydration status: acceptable       Patient and/or family announced that they are leaving. They were advised to stay, advised to return if worse./Physician notified

## 2023-02-20 ENCOUNTER — OFFICE VISIT (OUTPATIENT)
Dept: NEUROLOGY | Facility: CLINIC | Age: 33
End: 2023-02-20
Payer: COMMERCIAL

## 2023-02-20 VITALS
BODY MASS INDEX: 24.35 KG/M2 | SYSTOLIC BLOOD PRESSURE: 122 MMHG | TEMPERATURE: 97.3 F | DIASTOLIC BLOOD PRESSURE: 80 MMHG | HEIGHT: 61 IN | WEIGHT: 129 LBS | OXYGEN SATURATION: 98 % | HEART RATE: 92 BPM

## 2023-02-20 DIAGNOSIS — G43.719 INTRACTABLE CHRONIC MIGRAINE WITHOUT AURA AND WITHOUT STATUS MIGRAINOSUS: Primary | ICD-10-CM

## 2023-02-20 PROCEDURE — 99214 OFFICE O/P EST MOD 30 MIN: CPT | Performed by: NURSE PRACTITIONER

## 2023-02-20 PROCEDURE — 96372 THER/PROPH/DIAG INJ SC/IM: CPT | Performed by: NURSE PRACTITIONER

## 2023-02-20 RX ORDER — RIZATRIPTAN BENZOATE 10 MG/1
10 TABLET ORAL ONCE AS NEEDED
Qty: 9 TABLET | Refills: 2 | Status: SHIPPED | OUTPATIENT
Start: 2023-02-20

## 2023-02-20 NOTE — PROGRESS NOTES
"     Follow Up Office Visit      Patient Name: Radha Medrano  : 1990   MRN: [id number]     Chief Complaint:    Chief Complaint   Patient presents with   • Follow-up     Patient in office to follow up on migraines.Patient states she's at least having 10 migraines a month with her current regimen        History of Present Illness: Radha Medrano is a 32 y.o. female who is here today to follow up with migraines and was last seen on 12/15/2022.  She is taking Nurtec 75mg every other day and Ubrelvy 100mg PRN- feels the Nurtec actually causes a headache on the days she takes it; Ubrelvy decreases the migraine and \"Makes it doable\".  She has had 15/30 migraine days in the past month.  She has had 1 severe migraine since her previous visit.  She denies significant constipation.  She is not planning on pregnancy anytime within the next 3 months.     Following taken from previous visit note:  Radha Medrano is a 32 y.o. female who is here today to follow up with migraines and was last seen on 3/17/2022.  Prior to pregnancy she was taking Amitriptyline and Imitrex.  Those medications did help some but she was still having some headaches and migraines.  Migraines returned a few days after she delivered her baby.  She has had 28/30 headache days in the past month and all have progressed into migraines.  She is going to bed with a headache and waking up with one.  She is taking OTC Tylenol PRN.  She feels her migraines are now stronger, located mostly in the left frontal area, throbbing, accompanied by photophobia/phonophobia/nausea/vomiting, lasting more than 4 hours.  She is taking metoprolol 150mg BID for palpitations.  She has identified onions, cabbage and MSG as triggers for her migraines.   *Topiramate is contraindicated because patient is of child-bearing age and is not on any type of birth control.  She is unable to take triptans due to having an abnormal heart rhythm.     Subjective      Review of " Systems:   Review of Systems   Constitutional: Negative for chills, fatigue and fever.   HENT: Negative for facial swelling, hearing loss, sore throat, tinnitus and trouble swallowing.    Eyes: Negative for blurred vision, double vision, photophobia and visual disturbance.   Respiratory: Negative for cough, chest tightness and shortness of breath.    Cardiovascular: Negative for chest pain, palpitations and leg swelling.   Gastrointestinal: Negative for abdominal pain, nausea and vomiting.   Endocrine: Negative for cold intolerance and heat intolerance.   Musculoskeletal: Negative for gait problem, neck pain and neck stiffness.   Skin: Negative for color change and rash.   Allergic/Immunologic: Negative for environmental allergies and food allergies.   Neurological: Negative for dizziness, syncope, speech difficulty, weakness, light-headedness, numbness, headache and memory problem.   Psychiatric/Behavioral: Negative for behavioral problems, sleep disturbance and depressed mood. The patient is not nervous/anxious.        I have reviewed and the following portions of the patient's history were updated as appropriate: past family history, past medical history, past social history, past surgical history and problem list.    Medications:     Current Outpatient Medications:   •  albuterol sulfate  (90 Base) MCG/ACT inhaler, Inhale 2 puffs Every 4 (Four) Hours As Needed for Wheezing or Shortness of Air., Disp: 18 g, Rfl: 1  •  cyclobenzaprine (FLEXERIL) 5 MG tablet, Take 1 tablet by mouth 3 (Three) Times a Day As Needed for Muscle Spasms., Disp: 40 tablet, Rfl: 0  •  docusate sodium 100 MG capsule, Take 1 capsule by mouth 2 (Two) Times a Day., Disp: 30 capsule, Rfl: 0  •  medroxyPROGESTERone (PROVERA) 10 MG tablet, Take 1 tablet (10 mg total) by mouth daily for 10 days., Disp: , Rfl:   •  meloxicam (Mobic) 7.5 MG tablet, Take 1 tablet by mouth Daily., Disp: 30 tablet, Rfl: 2  •  metoprolol tartrate (LOPRESSOR) 50  "MG tablet, Take 1.5 tablets by mouth 2 (Two) Times a Day., Disp: 90 tablet, Rfl: 11  •  sertraline (ZOLOFT) 100 MG tablet, Take 100 mg by mouth Daily., Disp: , Rfl:   •  ubrogepant 100 MG tablet, Take 1 tablet by mouth As Needed (migraine)., Disp: 10 tablet, Rfl: 5  •  galcanezumab-gnlm (EMGALITY) 120 MG/ML auto-injector pen, Inject 1 mL under the skin into the appropriate area as directed Every 30 (Thirty) Days., Disp: 1.12 mL, Rfl: 3  •  galcanezumab-gnlm (EMGALITY) 120 MG/ML auto-injector pen, Inject 1 mL under the skin into the appropriate area as directed 1 (One) Time for 1 dose., Disp: 1.12 mL, Rfl: 0  •  rizatriptan (Maxalt) 10 MG tablet, Take 1 tablet by mouth 1 (One) Time As Needed for Migraine. May repeat in 2 hours if needed, Disp: 9 tablet, Rfl: 2    Current Facility-Administered Medications:   •  Galcanezumab-gnlm solution prefilled syringe 120 mg, 120 mg, Subcutaneous, Q28 Days, Kimmie Blake, APRN, 120 mg at 02/20/23 1045    Allergies:   Allergies   Allergen Reactions   • Onion Itching     REPORTS CAUSES MIGRAINES and MAKES THROAT ITCHY     • Cabbage Headache   • Msg [Monosodium Glutamate] Headache   • Sulfa Antibiotics Hives       Objective     Physical Exam:  Vital Signs:   Vitals:    02/20/23 0940   BP: 122/80   BP Location: Right arm   Patient Position: Sitting   Cuff Size: Adult   Pulse: 92   Temp: 97.3 °F (36.3 °C)   SpO2: 98%   Weight: 58.5 kg (129 lb)   Height: 154.9 cm (61\")   PainSc: 0-No pain     Body mass index is 24.37 kg/m².    Physical Exam  Vitals and nursing note reviewed.   Constitutional:       General: She is not in acute distress.     Appearance: Normal appearance. She is well-developed. She is not diaphoretic.   HENT:      Head: Normocephalic and atraumatic.   Eyes:      Extraocular Movements: Extraocular movements intact.      Conjunctiva/sclera: Conjunctivae normal.      Pupils: Pupils are equal, round, and reactive to light.   Pulmonary:      Effort: Pulmonary " effort is normal. No respiratory distress.   Musculoskeletal:         General: Normal range of motion.   Skin:     General: Skin is warm and dry.      Findings: No rash.   Neurological:      Mental Status: She is alert and oriented to person, place, and time.   Psychiatric:         Mood and Affect: Mood normal.         Behavior: Behavior normal.         Thought Content: Thought content normal.         Judgment: Judgment normal.         Neurologic Exam     Mental Status   Oriented to person, place, and time.     Cranial Nerves     CN III, IV, VI   Pupils are equal, round, and reactive to light.       Assessment / Plan      Assessment/Plan:   Diagnoses and all orders for this visit:    1. Intractable chronic migraine without aura and without status migrainosus (Primary)  -     galcanezumab-gnlm (EMGALITY) 120 MG/ML auto-injector pen; Inject 1 mL under the skin into the appropriate area as directed Every 30 (Thirty) Days.  Dispense: 1.12 mL; Refill: 3  -     rizatriptan (Maxalt) 10 MG tablet; Take 1 tablet by mouth 1 (One) Time As Needed for Migraine. May repeat in 2 hours if needed  Dispense: 9 tablet; Refill: 2  -     Galcanezumab-gnlm solution prefilled syringe 120 mg    2. BMI 24.0-24.9, adult    Other orders  -     galcanezumab-gnlm (EMGALITY) 120 MG/ML auto-injector pen; Inject 1 mL under the skin into the appropriate area as directed 1 (One) Time for 1 dose.  Dispense: 1.12 mL; Refill: 0    *Indications and possible SEs of Emgality and rizatriptan discussed.    *I have advised patient to stop Nurtec since it seemed to cause headaches.     Follow Up:   Return in about 3 months (around 5/20/2023) for Follow Up.    ELISEO Tamayo, FNP-C  [facility]       Please note that portions of this note may have been completed with a voice recognition program. Efforts were made to edit the dictations, but occasionally words are mistranscribed.

## 2023-03-30 ENCOUNTER — OFFICE VISIT (OUTPATIENT)
Dept: INTERNAL MEDICINE | Facility: CLINIC | Age: 33
End: 2023-03-30
Payer: COMMERCIAL

## 2023-03-30 VITALS
WEIGHT: 129.8 LBS | HEART RATE: 95 BPM | TEMPERATURE: 97.4 F | DIASTOLIC BLOOD PRESSURE: 78 MMHG | OXYGEN SATURATION: 100 % | BODY MASS INDEX: 24.51 KG/M2 | HEIGHT: 61 IN | SYSTOLIC BLOOD PRESSURE: 102 MMHG

## 2023-03-30 DIAGNOSIS — M54.42 ACUTE MIDLINE LOW BACK PAIN WITH LEFT-SIDED SCIATICA: ICD-10-CM

## 2023-03-30 DIAGNOSIS — R53.82 CHRONIC FATIGUE: ICD-10-CM

## 2023-03-30 DIAGNOSIS — L65.9 ALOPECIA: Primary | ICD-10-CM

## 2023-03-30 DIAGNOSIS — E03.8 SUBCLINICAL HYPOTHYROIDISM: ICD-10-CM

## 2023-03-30 LAB
ALBUMIN SERPL-MCNC: 4.7 G/DL (ref 3.5–5.2)
ALBUMIN/GLOB SERPL: 2 G/DL
ALP SERPL-CCNC: 58 U/L (ref 39–117)
ALT SERPL-CCNC: 9 U/L (ref 1–33)
AST SERPL-CCNC: 14 U/L (ref 1–32)
BILIRUB SERPL-MCNC: 0.3 MG/DL (ref 0–1.2)
BUN SERPL-MCNC: 9 MG/DL (ref 6–20)
BUN/CREAT SERPL: 12.3 (ref 7–25)
CALCIUM SERPL-MCNC: 9.5 MG/DL (ref 8.6–10.5)
CHLORIDE SERPL-SCNC: 106 MMOL/L (ref 98–107)
CO2 SERPL-SCNC: 24.8 MMOL/L (ref 22–29)
CREAT SERPL-MCNC: 0.73 MG/DL (ref 0.57–1)
EGFRCR SERPLBLD CKD-EPI 2021: 112.2 ML/MIN/1.73
ERYTHROCYTE [DISTWIDTH] IN BLOOD BY AUTOMATED COUNT: 11.8 % (ref 12.3–15.4)
FERRITIN SERPL-MCNC: 9.88 NG/ML (ref 13–150)
GLOBULIN SER CALC-MCNC: 2.4 GM/DL
GLUCOSE SERPL-MCNC: 98 MG/DL (ref 65–99)
HCT VFR BLD AUTO: 38.3 % (ref 34–46.6)
HGB BLD-MCNC: 12.2 G/DL (ref 12–15.9)
IRON SATN MFR SERPL: 13 % (ref 20–50)
IRON SERPL-MCNC: 62 MCG/DL (ref 37–145)
MCH RBC QN AUTO: 28.4 PG (ref 26.6–33)
MCHC RBC AUTO-ENTMCNC: 31.9 G/DL (ref 31.5–35.7)
MCV RBC AUTO: 89.1 FL (ref 79–97)
PLATELET # BLD AUTO: 196 10*3/MM3 (ref 140–450)
POTASSIUM SERPL-SCNC: 4.6 MMOL/L (ref 3.5–5.2)
PROT SERPL-MCNC: 7.1 G/DL (ref 6–8.5)
RBC # BLD AUTO: 4.3 10*6/MM3 (ref 3.77–5.28)
SODIUM SERPL-SCNC: 139 MMOL/L (ref 136–145)
T4 FREE SERPL-MCNC: 1 NG/DL (ref 0.93–1.7)
TIBC SERPL-MCNC: 460 MCG/DL
TSH SERPL DL<=0.005 MIU/L-ACNC: 3.47 UIU/ML (ref 0.27–4.2)
UIBC SERPL-MCNC: 398 MCG/DL (ref 112–346)
VIT B12 SERPL-MCNC: 391 PG/ML (ref 211–946)
WBC # BLD AUTO: 8.02 10*3/MM3 (ref 3.4–10.8)

## 2023-03-30 PROCEDURE — 99214 OFFICE O/P EST MOD 30 MIN: CPT | Performed by: NURSE PRACTITIONER

## 2023-03-30 NOTE — PROGRESS NOTES
"  Office Visit      Patient Name: Radha Medrano  : 1990   MRN: [id number]   Care Team: Patient Care Team:  Veronika Falcon APRN as PCP - General (Family Medicine)  Danilo Shabazz MD as Consulting Physician (General Surgery)  Edgardo Palacios MD as Consulting Physician (Endocrinology)    Chief Complaint  Alopecia    Subjective     Subjective      Radha Medrano presents to [facility] for alopecia and low back pain.  Alopecia-symptoms started about 1 month ago.   Currently 5 months postpartum.   Endorses heavy menses, fatigue, tinnitus, dizziness, and frequent headaches.  Denies syncopal episodes, chest pain, shortness of breath.  She is off of thyroid medication and has been since the birth of her child.  TSH in January was euthyroid.  She has not had labs rechecked since worsening symptoms.  She has not tried anything for the symptoms.   She is also complaining of midline low back pain.  Symptoms have been present since the birth of her son 5 months ago.  She went through 6 weeks of physical therapy has tried over-the-counter NSAIDs, home stretching, and heat without much relief in her symptoms.  Numbness does radiate down the left leg at times.  She denies any bowel or bladder incontinence and history of cancer.  No recent fall or trauma to the low back.    Objective     Objective   Vital Signs:   /78   Pulse 95   Temp 97.4 °F (36.3 °C)   Ht 154.9 cm (61\")   Wt 58.9 kg (129 lb 12.8 oz)   SpO2 100%   BMI 24.53 kg/m²     Physical Exam  Vitals and nursing note reviewed.   Constitutional:       General: She is not in acute distress.     Appearance: Normal appearance. She is not toxic-appearing.   HENT:      Head:      Comments: No significant hair thinning noted  Eyes:      Pupils: Pupils are equal, round, and reactive to light.   Neck:      Vascular: No carotid bruit.   Cardiovascular:      Rate and Rhythm: Normal rate and regular rhythm.      Heart " sounds: Normal heart sounds. No murmur heard.  Pulmonary:      Effort: Pulmonary effort is normal. No respiratory distress.      Breath sounds: Normal breath sounds. No wheezing.   Abdominal:      General: Bowel sounds are normal. There is no distension.      Palpations: Abdomen is soft.      Tenderness: There is no abdominal tenderness. There is no right CVA tenderness or left CVA tenderness.   Musculoskeletal:         General: Normal range of motion.      Cervical back: Normal, normal range of motion and neck supple. No tenderness. No muscular tenderness.      Thoracic back: Normal.      Lumbar back: Tenderness (To palpation of area marked) present. Negative right straight leg raise test and negative left straight leg raise test.        Back:       Comments:      Skin:     General: Skin is warm and dry.      Findings: No erythema or rash.   Neurological:      General: No focal deficit present.      Mental Status: She is alert.      Motor: No weakness.      Deep Tendon Reflexes: Reflexes normal.   Psychiatric:         Mood and Affect: Mood normal.         Behavior: Behavior normal.       Assessment / Plan      Assessment & Plan   Problem List Items Addressed This Visit    None  Visit Diagnoses     Alopecia    -  Primary    Relevant Orders    CBC No Differential    Comprehensive metabolic panel    TSH    T4, free    Iron and TIBC    Ferritin    Vitamin B12    Likely multifactorial.  Check labs as above to rule out underlying causes.  Avoid high heat on the hair, frequent hair washing, and avoid over-the-counter supplements at this time.    Subclinical hypothyroidism        Relevant Orders    CBC No Differential    Comprehensive metabolic panel    TSH    T4, free    Iron and TIBC    Ferritin    Vitamin B12    Recheck TSH and free T4 today.    Acute midline low back pain with left-sided sciatica        Relevant Orders    MRI Lumbar Spine Without Contrast    CBC No Differential    Comprehensive metabolic panel    TSH     T4, free    Iron and TIBC    Ferritin    Vitamin B12    Has failed conservative treatment with physical therapy and NSAIDs.  We will perform MRI of the lumbar spine to rule out disc disease.  Continue NSAIDs, stretching, heat/ice, and return with worsening.    Chronic fatigue        Relevant Orders    CBC No Differential    Comprehensive metabolic panel    TSH    T4, free    Iron and TIBC    Ferritin    Vitamin B12    Evaluate above labs today.  Sleep hygiene measures discussed.        Follow Up   Return in about 6 weeks (around 5/11/2023) for Annual.  Patient was given instructions and counseling regarding her condition or for health maintenance advice. Please see specific information pulled into the AVS if appropriate.     ELISEO Friedman  [facility]

## 2023-03-31 DIAGNOSIS — E61.1 IRON DEFICIENCY: Primary | ICD-10-CM

## 2023-03-31 RX ORDER — DOXYCYCLINE HYCLATE 50 MG/1
324 CAPSULE, GELATIN COATED ORAL
Qty: 30 TABLET | Refills: 3 | Status: SHIPPED | OUTPATIENT
Start: 2023-03-31

## 2023-04-03 RX ORDER — SERTRALINE HYDROCHLORIDE 100 MG/1
100 TABLET, FILM COATED ORAL DAILY
Status: CANCELLED | OUTPATIENT
Start: 2023-04-03

## 2023-04-03 NOTE — TELEPHONE ENCOUNTER
Caller: Radha Medrano    Relationship: Self    Best call back number: [phone]    Requested Prescriptions:   Requested Prescriptions     Pending Prescriptions Disp Refills   • sertraline (ZOLOFT) 100 MG tablet       Sig: Take 1 tablet by mouth Daily.        Pharmacy where request should be sent: [id number] [place], [place] - [address] - [phone]  - [phone]      Last office visit with prescribing clinician: 3/30/2023   Last telemedicine visit with prescribing clinician: 5/17/2023   Next office visit with prescribing clinician: 5/17/2023     Additional details provided by patient: PATIENT STATES THE OBGYN PRESCRIBED THIS BUT STATES SHE DOESN'T SEE THE OB AS MUCH SINCE SHE IS NO LONGER PREGNANT    Does the patient have less than a 3 day supply:  [x] Yes  [] No    Would you like a call back once the refill request has been completed: [] Yes [] No    If the office needs to give you a call back, can they leave a voicemail: [] Yes [] No    Kandis Edwards Rep   04/03/23 10:35 EDT

## 2023-04-07 RX ORDER — SERTRALINE HYDROCHLORIDE 100 MG/1
TABLET, FILM COATED ORAL
Qty: 90 TABLET | Refills: 1 | Status: SHIPPED | OUTPATIENT
Start: 2023-04-07

## 2023-04-07 NOTE — TELEPHONE ENCOUNTER
Rx Refill Note  Requested Prescriptions     Pending Prescriptions Disp Refills   • sertraline (ZOLOFT) 100 MG tablet [Pharmacy Med Name: Sertraline HCl Oral Tablet 100 MG] 90 tablet 0     Sig: TAKE 1 TABLET BY MOUTH EVERY DAY      Last office visit with prescribing clinician: 3/30/2023   Last telemedicine visit with prescribing clinician: 5/17/2023   Next office visit with prescribing clinician: 5/17/2023                         Would you like a call back once the refill request has been completed: [] Yes [] No    If the office needs to give you a call back, can they leave a voicemail: [] Yes [] No    Darío Landeros MA  04/07/23, 13:12 EDT

## 2023-05-17 ENCOUNTER — OFFICE VISIT (OUTPATIENT)
Dept: INTERNAL MEDICINE | Facility: CLINIC | Age: 33
End: 2023-05-17
Payer: COMMERCIAL

## 2023-05-17 VITALS
OXYGEN SATURATION: 98 % | SYSTOLIC BLOOD PRESSURE: 110 MMHG | DIASTOLIC BLOOD PRESSURE: 80 MMHG | BODY MASS INDEX: 23.03 KG/M2 | WEIGHT: 122 LBS | HEIGHT: 61 IN | TEMPERATURE: 97.7 F | HEART RATE: 78 BPM

## 2023-05-17 DIAGNOSIS — N89.8 VAGINAL DISCHARGE: ICD-10-CM

## 2023-05-17 DIAGNOSIS — R19.09 GROIN SWELLING: ICD-10-CM

## 2023-05-17 DIAGNOSIS — Z00.00 ANNUAL PHYSICAL EXAM: Primary | ICD-10-CM

## 2023-05-17 DIAGNOSIS — E61.1 IRON DEFICIENCY: ICD-10-CM

## 2023-05-17 DIAGNOSIS — G43.709 CHRONIC MIGRAINE WITHOUT AURA WITHOUT STATUS MIGRAINOSUS, NOT INTRACTABLE: ICD-10-CM

## 2023-05-17 DIAGNOSIS — F41.8 DEPRESSION WITH ANXIETY: ICD-10-CM

## 2023-05-17 DIAGNOSIS — E03.8 SUBCLINICAL HYPOTHYROIDISM: ICD-10-CM

## 2023-05-17 DIAGNOSIS — J45.21 MILD INTERMITTENT ASTHMATIC BRONCHITIS WITH ACUTE EXACERBATION: ICD-10-CM

## 2023-05-17 DIAGNOSIS — I89.1 LYMPHANGITIS OF GROIN: ICD-10-CM

## 2023-05-17 DIAGNOSIS — E89.0 S/P PARTIAL THYROIDECTOMY: ICD-10-CM

## 2023-05-17 PROCEDURE — 99395 PREV VISIT EST AGE 18-39: CPT | Performed by: NURSE PRACTITIONER

## 2023-05-17 NOTE — PROGRESS NOTES
Subjective   Radha Medrano is a 33 y.o. female and is here for a comprehensive physical exam. The patient reports problems - groin lymphadenopathy.    HPI: here today for annual physical with acute complaints of groin swelling.   Symptoms have been present since December. Noticed node swelling in the left groin now effecting the right side. She does endorse some lower abdominal pain, feels like period cramps even when not on her cycle. Endorses thin vaginal discharge, sometimes looks green. Reminds her of losing her mucous plug. No new sex partners, not sexually active for the most part since having her son. Saw GYN for the problem and given course of antibiotics for infected hair follicle.   Hypothyroidism- labs are stable off of medication.   Lab Results   Component Value Date    TSH 3.470 2023     Migraines- managed  By neurology, much improved with emgality.   Mood is controlled with sertraline. Denies any suicidal ideations, insomnia, or hypomanic episodes.   PHQ-2 Depression Screening  Little interest or pleasure in doing things? 0-->not at all   Feeling down, depressed, or hopeless? 0-->not at all   PHQ-2 Total Score 0     She is UTD on pap smear.     Health Habits:  Eye exam within last 2 years? Yes.   Dental exam every 6 months? Yes.   Exercise habits: No structured exercise, active with her kids.   Healthy diet? Typical American diet, small portions.     The ASCVD Risk score ([place] DK, et al., 2019) failed to calculate for the following reasons:    The 2019 ASCVD risk score is only valid for ages 40 to 79    Do you take any herbs or supplements that were not prescribed by a doctor? no  Are you taking calcium supplements? No  Are you taking aspirin daily? No     History:  LMP: Patient's last menstrual period was 2023 (exact date).  Menopause: No  Last pap date:   Abnormal pap? Yes.   Family history of breast or ovarian cancer: no    OB History    Para Term  AB Living    4 3 2 1 1 3   SAB IAB Ectopic Molar Multiple Live Births   1 0 0 0 0 3      # Outcome Date GA Lbr Tiago/2nd Weight Sex Delivery Anes PTL Lv   4 Term 22 37w5d  3225 g (7 lb 1.8 oz) M Vag-Spont EPI N BINU   3 Term 16 38w0d  3544 g (7 lb 13 oz) F Vag-Spont EPI  BINU   2 SAB 11/01/15     SAB         Birth Comments: D &C   1  11 36w0d  2268 g (5 lb) M Vag-Spont  N BINU     Sexual activity questions deferred to the physician.    The following portions of the patient's history were reviewed and updated as appropriate: She  has a past medical history of Abnormal Pap smear of cervix, Acid reflux, Anxiety, Anxiety and depression, Arthritis, Asthma, B12 deficiency (2021), Bipolar 1 disorder, Body piercing, Dysphagia, Fracture, Goiter, Heart rate fast, History of migraine, exercise stress test (2020), Inappropriate sinus tachycardia, Injury of neck, Insomnia, Migraine, Ovarian cyst, Palpitations, PONV (postoperative nausea and vomiting), Scoliosis, Tachycardia, Tattoo, Thyroid nodule, Toe fracture, and Wears glasses.  She does not have any pertinent problems on file.  She  has a past surgical history that includes Appendectomy (); Cholecystectomy; Hip surgery (Right); Cyst Removal; [place] tooth extraction; Esophagogastroduodenoscopy (N/A, 2017); Dilation and curettage of uterus; Shoulder arthroscopy (Right, 10/11/2018); Thyroid Biopsy; Esophagogastroduodenoscopy (N/A, 9/3/2020); Esophageal motility study (N/A, 11/10/2020); Colposcopy; Thyroidectomy (Left, 2021); and Thyroidectomy, partial (Left).  Her family history includes Arthritis in her maternal grandmother and mother; Cancer in her maternal grandmother and paternal grandfather; Diabetes in her maternal grandmother and mother; Heart attack in her maternal grandmother and paternal grandmother; Heart disease in her sister; Migraines in her sister; No Known Problems in her father.  She  reports that she quit smoking about 2 years  "ago. Her smoking use included cigarettes. She has a 6.50 pack-year smoking history. She has never used smokeless tobacco. She reports that she does not currently use drugs after having used the following drugs: Marijuana. She reports that she does not drink alcohol.  Current Outpatient Medications   Medication Sig Dispense Refill   • albuterol sulfate  (90 Base) MCG/ACT inhaler Inhale 2 puffs Every 4 (Four) Hours As Needed for Wheezing or Shortness of Air. 18 g 1   • cyclobenzaprine (FLEXERIL) 5 MG tablet Take 1 tablet by mouth 3 (Three) Times a Day As Needed for Muscle Spasms. 40 tablet 0   • docusate sodium 100 MG capsule Take 1 capsule by mouth 2 (Two) Times a Day. 30 capsule 0   • ferrous gluconate (FERGON) 324 MG tablet Take 1 tablet by mouth Daily With Breakfast. 30 tablet 3   • galcanezumab-gnlm (EMGALITY) 120 MG/ML auto-injector pen Inject 1 mL under the skin into the appropriate area as directed Every 30 (Thirty) Days. 1.12 mL 3   • medroxyPROGESTERone (PROVERA) 10 MG tablet Take 1 tablet (10 mg total) by mouth daily for 10 days.     • meloxicam (Mobic) 7.5 MG tablet Take 1 tablet by mouth Daily. 30 tablet 2   • metoprolol tartrate (LOPRESSOR) 50 MG tablet Take 1.5 tablets by mouth 2 (Two) Times a Day. 90 tablet 11   • rizatriptan (Maxalt) 10 MG tablet Take 1 tablet by mouth 1 (One) Time As Needed for Migraine. May repeat in 2 hours if needed 9 tablet 2   • sertraline (ZOLOFT) 100 MG tablet TAKE 1 TABLET BY MOUTH EVERY DAY 90 tablet 1   • ubrogepant 100 MG tablet Take 1 tablet by mouth As Needed (migraine). 10 tablet 5     Current Facility-Administered Medications   Medication Dose Route Frequency Provider Last Rate Last Admin   • Galcanezumab-gnlm solution prefilled syringe 120 mg  120 mg Subcutaneous Q28 Days Kimmie Blake APRN   120 mg at 02/20/23 1045       Objective   /80   Pulse 78   Temp 97.7 °F (36.5 °C)   Ht 154.9 cm (61\")   Wt 55.3 kg (122 lb)   LMP 05/04/2023 (Exact " Date)   SpO2 98%   BMI 23.05 kg/m²     Physical Exam  Vitals and nursing note reviewed. Exam conducted with a chaperone present (Tammi Mckeon, [employer] student).   Constitutional:       General: She is not in acute distress.     Appearance: Normal appearance.   HENT:      Right Ear: Tympanic membrane and ear canal normal.      Left Ear: Tympanic membrane and ear canal normal.      Nose: Nose normal.      Mouth/Throat:      Mouth: Mucous membranes are moist.      Pharynx: Oropharynx is clear. No posterior oropharyngeal erythema.   Eyes:      Extraocular Movements: Extraocular movements intact.      Pupils: Pupils are equal, round, and reactive to light.   Neck:      Thyroid: No thyroid mass or thyromegaly.      Vascular: No carotid bruit.   Cardiovascular:      Rate and Rhythm: Normal rate and regular rhythm.      Pulses: Normal pulses.      Heart sounds: Normal heart sounds. No murmur heard.  Pulmonary:      Effort: Pulmonary effort is normal.      Breath sounds: Normal breath sounds. No wheezing.   Abdominal:      General: Bowel sounds are normal. There is no distension.      Palpations: Abdomen is soft. There is no mass.      Tenderness: There is no abdominal tenderness.   Genitourinary:         Comments: Enlarged area as marked, soft and non-tender    Musculoskeletal:         General: No deformity. Normal range of motion.      Cervical back: Normal range of motion and neck supple. No muscular tenderness.   Lymphadenopathy:      Head:      Right side of head: No submandibular, tonsillar, preauricular or posterior auricular adenopathy.      Left side of head: No submandibular, tonsillar, preauricular or posterior auricular adenopathy.      Cervical: No cervical adenopathy.   Skin:     General: Skin is warm and dry.      Capillary Refill: Capillary refill takes less than 2 seconds.      Findings: No bruising or rash.   Neurological:      General: No focal deficit present.      Mental Status: She is alert and  oriented to person, place, and time.      Gait: Gait normal.      Deep Tendon Reflexes: Reflexes normal.   Psychiatric:         Mood and Affect: Mood normal.         Behavior: Behavior normal.       Assessment & Plan   Healthy female exam.    Diagnosis Plan   1. Annual physical exam  Preventative maintenance discussed during visit and information provided in AVS.      2. Iron deficiency  CBC No Differential    Iron and TIBC    Ferritin    Reticulocytes    RPR    Recheck labs today. Continue iron supplement for now.       3. Lymphangitis of groin  CBC No Differential    Iron and TIBC    Ferritin    Reticulocytes    RPR    Check above labs and vaginal swab for STD/STI. I have a low suspicion this is a true lymphnode but will perform US to rule out. I suspect more of a fatty deposit? US will show more detail.       4. Depression with anxiety  CBC No Differential    Iron and TIBC    Ferritin    Reticulocytes    RPR    Stable. Continue sertraline at current dose. Healthy diet, exercise as tolerated, daily sun exposure, stress management, and sleep hygiene measures discussed.      5. Mild intermittent asthmatic bronchitis with acute exacerbation  CBC No Differential    Iron and TIBC    Ferritin    Reticulocytes    RPR    Stable. As needed albuterol.       6. Subclinical hypothyroidism  CBC No Differential    Iron and TIBC    Ferritin    Reticulocytes    RPR    Most recent TSH normal, monitor.       7. Chronic migraine without aura without status migrainosus, not intractable  CBC No Differential    Iron and TIBC    Ferritin    Reticulocytes    RPR    Stable, managed by neurology. Keep scheduled follow-up.      8. S/P partial thyroidectomy  CBC No Differential    Iron and TIBC    Ferritin    Reticulocytes    RPR    See above.       9. Vaginal discharge  CBC No Differential    Iron and TIBC    Ferritin    Reticulocytes    RPR    Unclear etiology. Nuswab today to rule out STD/STI.        2. Patient Counseling:  --Nutrition:  Stressed importance of moderation in sodium/caffeine intake, saturated fat and cholesterol, caloric balance, sufficient intake of fresh fruits, vegetables, fiber, calcium, iron, and 1 g folate supplementation if of childbearing age.   --Discussed the issue of calcium supplement, and the daily use of baby aspirin if applicable.             --Mammogram recommended every 2 years from age 40-49 and yearly beginning at age 50.  --Exercise: Stressed the importance of regular exercise.   --Substance Abuse: Discussed cessation/primary prevention of tobacco (if applicable), alcohol, or other drug use (if applicable); driving or other dangerous activities under the influence; availability of treatment for abuse.    --Sexuality: Discussed sexually transmitted diseases, partner selection, use of condoms, avoidance of unintended pregnancy  and contraceptive alternatives.   --Injury prevention: Discussed safety belts, safety helmets, smoke detector, smoking near bedding or upholstery.   --Dental health: Discussed importance of regular tooth brushing, flossing, and dental visits every 6 months.  --Immunizations reviewed.  --Discussed benefits of screening colonoscopy (if applicable).  --After hours service discussed with patient    3. Discussed the patient's BMI with her.  The BMI is in the acceptable range  4. Return in about 6 months (around 11/17/2023) for Next scheduled follow up.  ELISEO Contreras  05/17/2023  09:04 EDT  '

## 2023-05-19 LAB
A VAGINAE DNA VAG QL NAA+PROBE: NORMAL SCORE
BVAB2 DNA VAG QL NAA+PROBE: NORMAL SCORE
C ALBICANS DNA VAG QL NAA+PROBE: NEGATIVE
C GLABRATA DNA VAG QL NAA+PROBE: NEGATIVE
C KRUSEI DNA VAG QL NAA+PROBE: NEGATIVE
C LUSITANIAE DNA VAG QL NAA+PROBE: NEGATIVE
C TRACH DNA VAG QL NAA+PROBE: NEGATIVE
CANDIDA DNA VAG QL NAA+PROBE: NEGATIVE
MEGA1 DNA VAG QL NAA+PROBE: NORMAL SCORE
N GONORRHOEA DNA VAG QL NAA+PROBE: NEGATIVE
T VAGINALIS DNA VAG QL NAA+PROBE: NEGATIVE

## 2023-05-20 LAB
ERYTHROCYTE [DISTWIDTH] IN BLOOD BY AUTOMATED COUNT: 12.6 % (ref 12.3–15.4)
FERRITIN SERPL-MCNC: 13.6 NG/ML (ref 13–150)
HCT VFR BLD AUTO: 37.8 % (ref 34–46.6)
HGB BLD-MCNC: 12.5 G/DL (ref 12–15.9)
IRON SATN MFR SERPL: 8 % (ref 20–50)
IRON SERPL-MCNC: 36 MCG/DL (ref 37–145)
MCH RBC QN AUTO: 28.5 PG (ref 26.6–33)
MCHC RBC AUTO-ENTMCNC: 33.1 G/DL (ref 31.5–35.7)
MCV RBC AUTO: 86.3 FL (ref 79–97)
PLATELET # BLD AUTO: 247 10*3/MM3 (ref 140–450)
RBC # BLD AUTO: 4.38 10*6/MM3 (ref 3.77–5.28)
RETICS/RBC NFR AUTO: 1.05 % (ref 0.7–1.9)
RPR SER QL: NON REACTIVE
TIBC SERPL-MCNC: 474 MCG/DL
UIBC SERPL-MCNC: 438 MCG/DL (ref 112–346)
WBC # BLD AUTO: 7.89 10*3/MM3 (ref 3.4–10.8)

## 2023-05-31 ENCOUNTER — HOSPITAL ENCOUNTER (OUTPATIENT)
Dept: MRI IMAGING | Facility: HOSPITAL | Age: 33
Discharge: HOME | End: 2023-05-31
Admitting: NURSE PRACTITIONER

## 2023-05-31 DIAGNOSIS — M54.42 ACUTE MIDLINE LOW BACK PAIN WITH LEFT-SIDED SCIATICA: ICD-10-CM

## 2023-05-31 PROCEDURE — 72148 MRI LUMBAR SPINE W/O DYE: CPT

## 2023-06-01 DIAGNOSIS — M51.36 DEGENERATIVE DISC DISEASE, LUMBAR: ICD-10-CM

## 2023-06-01 DIAGNOSIS — M54.50 CHRONIC MIDLINE LOW BACK PAIN WITHOUT SCIATICA: Primary | ICD-10-CM

## 2023-06-01 DIAGNOSIS — G89.29 CHRONIC MIDLINE LOW BACK PAIN WITHOUT SCIATICA: Primary | ICD-10-CM

## 2023-09-20 ENCOUNTER — TRANSCRIBE ORDERS (OUTPATIENT)
Dept: LAB | Facility: HOSPITAL | Age: 33
End: 2023-09-20
Payer: COMMERCIAL

## 2023-09-20 ENCOUNTER — LAB (OUTPATIENT)
Dept: LAB | Facility: HOSPITAL | Age: 33
End: 2023-09-20
Payer: COMMERCIAL

## 2023-09-20 DIAGNOSIS — N92.0 EXCESSIVE OR FREQUENT MENSTRUATION: ICD-10-CM

## 2023-09-20 DIAGNOSIS — N92.0 EXCESSIVE OR FREQUENT MENSTRUATION: Primary | ICD-10-CM

## 2023-09-20 LAB — TSH SERPL DL<=0.05 MIU/L-ACNC: 2.28 UIU/ML (ref 0.27–4.2)

## 2023-09-20 PROCEDURE — 84443 ASSAY THYROID STIM HORMONE: CPT

## 2023-09-20 PROCEDURE — 36415 COLL VENOUS BLD VENIPUNCTURE: CPT

## 2023-09-20 PROCEDURE — 85027 COMPLETE CBC AUTOMATED: CPT

## 2023-09-21 LAB
DEPRECATED RDW RBC AUTO: 39.5 FL (ref 37–54)
ERYTHROCYTE [DISTWIDTH] IN BLOOD BY AUTOMATED COUNT: 12.5 % (ref 12.3–15.4)
HCT VFR BLD AUTO: 35.9 % (ref 34–46.6)
HGB BLD-MCNC: 11.8 G/DL (ref 12–15.9)
MCH RBC QN AUTO: 28.6 PG (ref 26.6–33)
MCHC RBC AUTO-ENTMCNC: 32.9 G/DL (ref 31.5–35.7)
MCV RBC AUTO: 86.9 FL (ref 79–97)
PLATELET # BLD AUTO: 257 10*3/MM3 (ref 140–450)
PMV BLD AUTO: 12.9 FL (ref 6–12)
RBC # BLD AUTO: 4.13 10*6/MM3 (ref 3.77–5.28)
WBC NRBC COR # BLD: 11.45 10*3/MM3 (ref 3.4–10.8)

## 2023-09-26 DIAGNOSIS — Z82.79 FAMILY HISTORY OF NEUROFIBROMATOSIS: Primary | ICD-10-CM

## 2023-09-28 ENCOUNTER — PREP FOR SURGERY (OUTPATIENT)
Dept: OTHER | Facility: HOSPITAL | Age: 33
End: 2023-09-28
Payer: COMMERCIAL

## 2023-09-28 DIAGNOSIS — N92.4 EXCESSIVE BLEEDING IN PREMENOPAUSAL PERIOD: Primary | ICD-10-CM

## 2023-09-28 RX ORDER — CEFAZOLIN SODIUM 2 G/100ML
2000 INJECTION, SOLUTION INTRAVENOUS ONCE
OUTPATIENT
Start: 2023-09-28 | End: 2023-09-28

## 2023-09-28 RX ORDER — ACETAMINOPHEN 500 MG
1000 TABLET ORAL ONCE
OUTPATIENT
Start: 2023-09-28 | End: 2023-09-28

## 2023-09-28 RX ORDER — SODIUM CHLORIDE 0.9 % (FLUSH) 0.9 %
3 SYRINGE (ML) INJECTION EVERY 12 HOURS SCHEDULED
OUTPATIENT
Start: 2023-09-28

## 2023-09-28 RX ORDER — SODIUM CHLORIDE 0.9 % (FLUSH) 0.9 %
10 SYRINGE (ML) INJECTION AS NEEDED
OUTPATIENT
Start: 2023-09-28

## 2023-09-28 RX ORDER — HEPARIN SODIUM 5000 [USP'U]/ML
5000 INJECTION, SOLUTION INTRAVENOUS; SUBCUTANEOUS ONCE
OUTPATIENT
Start: 2023-09-28 | End: 2023-09-28

## 2023-09-28 RX ORDER — GABAPENTIN 300 MG/1
600 CAPSULE ORAL ONCE
OUTPATIENT
Start: 2023-09-28 | End: 2023-09-28

## 2023-09-28 RX ORDER — SODIUM CHLORIDE 9 MG/ML
40 INJECTION, SOLUTION INTRAVENOUS AS NEEDED
OUTPATIENT
Start: 2023-09-28

## 2023-10-03 ENCOUNTER — PRE-ADMISSION TESTING (OUTPATIENT)
Dept: PREADMISSION TESTING | Facility: HOSPITAL | Age: 33
End: 2023-10-03
Payer: COMMERCIAL

## 2023-10-03 VITALS — WEIGHT: 114.86 LBS | BODY MASS INDEX: 21.14 KG/M2 | HEIGHT: 62 IN

## 2023-10-03 DIAGNOSIS — N92.4 EXCESSIVE BLEEDING IN PREMENOPAUSAL PERIOD: ICD-10-CM

## 2023-10-03 LAB
BASOPHILS # BLD AUTO: 0.06 10*3/MM3 (ref 0–0.2)
BASOPHILS NFR BLD AUTO: 0.9 % (ref 0–1.5)
BILIRUB UR QL STRIP: NEGATIVE
CLARITY UR: CLEAR
COLOR UR: YELLOW
DEPRECATED RDW RBC AUTO: 41.1 FL (ref 37–54)
EOSINOPHIL # BLD AUTO: 0.56 10*3/MM3 (ref 0–0.4)
EOSINOPHIL NFR BLD AUTO: 8 % (ref 0.3–6.2)
ERYTHROCYTE [DISTWIDTH] IN BLOOD BY AUTOMATED COUNT: 12.5 % (ref 12.3–15.4)
GLUCOSE UR STRIP-MCNC: NEGATIVE MG/DL
HCT VFR BLD AUTO: 37.7 % (ref 34–46.6)
HGB BLD-MCNC: 11.9 G/DL (ref 12–15.9)
HGB UR QL STRIP.AUTO: NEGATIVE
IMM GRANULOCYTES # BLD AUTO: 0.01 10*3/MM3 (ref 0–0.05)
IMM GRANULOCYTES NFR BLD AUTO: 0.1 % (ref 0–0.5)
KETONES UR QL STRIP: NEGATIVE
LEUKOCYTE ESTERASE UR QL STRIP.AUTO: NEGATIVE
LYMPHOCYTES # BLD AUTO: 1.75 10*3/MM3 (ref 0.7–3.1)
LYMPHOCYTES NFR BLD AUTO: 24.9 % (ref 19.6–45.3)
MCH RBC QN AUTO: 28.1 PG (ref 26.6–33)
MCHC RBC AUTO-ENTMCNC: 31.6 G/DL (ref 31.5–35.7)
MCV RBC AUTO: 89.1 FL (ref 79–97)
MONOCYTES # BLD AUTO: 0.44 10*3/MM3 (ref 0.1–0.9)
MONOCYTES NFR BLD AUTO: 6.3 % (ref 5–12)
NEUTROPHILS NFR BLD AUTO: 4.2 10*3/MM3 (ref 1.7–7)
NEUTROPHILS NFR BLD AUTO: 59.8 % (ref 42.7–76)
NITRITE UR QL STRIP: NEGATIVE
NRBC BLD AUTO-RTO: 0 /100 WBC (ref 0–0.2)
OVALOCYTES BLD QL SMEAR: NORMAL
PH UR STRIP.AUTO: 6 [PH] (ref 5–8)
PLAT MORPH BLD: NORMAL
PLATELET # BLD AUTO: 168 10*3/MM3 (ref 140–450)
PMV BLD AUTO: 12.3 FL (ref 6–12)
POTASSIUM SERPL-SCNC: 4.4 MMOL/L (ref 3.5–5.2)
PROT UR QL STRIP: NEGATIVE
QT INTERVAL: 370 MS
QTC INTERVAL: 410 MS
RBC # BLD AUTO: 4.23 10*6/MM3 (ref 3.77–5.28)
SP GR UR STRIP: 1.01 (ref 1–1.03)
UROBILINOGEN UR QL STRIP: NORMAL
WBC MORPH BLD: NORMAL
WBC NRBC COR # BLD: 7.02 10*3/MM3 (ref 3.4–10.8)

## 2023-10-03 PROCEDURE — 81003 URINALYSIS AUTO W/O SCOPE: CPT

## 2023-10-03 PROCEDURE — 85007 BL SMEAR W/DIFF WBC COUNT: CPT

## 2023-10-03 PROCEDURE — 36415 COLL VENOUS BLD VENIPUNCTURE: CPT

## 2023-10-03 PROCEDURE — 84132 ASSAY OF SERUM POTASSIUM: CPT

## 2023-10-03 PROCEDURE — 93005 ELECTROCARDIOGRAM TRACING: CPT

## 2023-10-03 PROCEDURE — 85025 COMPLETE CBC W/AUTO DIFF WBC: CPT

## 2023-10-03 NOTE — PAT
An arrival time for procedure was not provided during PAT visit. If patient had any questions or concerns about their arrival time, they were instructed to contact their surgeon/physician.  Additionally, if the patient referred to an arrival time that was acquired from their my chart account, patient was encouraged to verify that time with their surgeon/physician. Arrival times are NOT provided in Pre Admission Testing Department.        Patient to apply Chlorhexadine wipes  to surgical area (as instructed) the night before procedure and the AM of procedure. Wipes provided.      Patient instructed to drink 20 ounces of Gatorade or Gatorlyte (if diabetic) and it needs to be completed 1 hour (for Main OR patients) or 2 hours (scheduled  section & [facility] patients) before given arrival time for procedure (NO RED Gatorade and NO Gatorade Zero).    Patient verbalized understanding.

## 2023-10-09 ENCOUNTER — ANESTHESIA (OUTPATIENT)
Dept: PERIOP | Facility: HOSPITAL | Age: 33
End: 2023-10-09
Payer: COMMERCIAL

## 2023-10-09 ENCOUNTER — ANESTHESIA EVENT (OUTPATIENT)
Dept: PERIOP | Facility: HOSPITAL | Age: 33
End: 2023-10-09
Payer: COMMERCIAL

## 2023-10-09 ENCOUNTER — HOSPITAL ENCOUNTER (OUTPATIENT)
Facility: HOSPITAL | Age: 33
Setting detail: HOSPITAL OUTPATIENT SURGERY
Discharge: HOME | End: 2023-10-09
Attending: OBSTETRICS & GYNECOLOGY | Admitting: OBSTETRICS & GYNECOLOGY
Payer: COMMERCIAL

## 2023-10-09 VITALS
TEMPERATURE: 97.4 F | SYSTOLIC BLOOD PRESSURE: 126 MMHG | HEIGHT: 62 IN | DIASTOLIC BLOOD PRESSURE: 94 MMHG | WEIGHT: 114.86 LBS | BODY MASS INDEX: 21.14 KG/M2 | HEART RATE: 71 BPM | OXYGEN SATURATION: 98 % | RESPIRATION RATE: 16 BRPM

## 2023-10-09 DIAGNOSIS — N92.4 EXCESSIVE BLEEDING IN PREMENOPAUSAL PERIOD: ICD-10-CM

## 2023-10-09 DIAGNOSIS — N92.0 MENORRHAGIA: ICD-10-CM

## 2023-10-09 DIAGNOSIS — N92.4 MENORRHAGIA, PREMENOPAUSAL: Primary | ICD-10-CM

## 2023-10-09 PROBLEM — N80.9 ENDOMETRIOSIS DETERMINED BY LAPAROSCOPY: Status: ACTIVE | Noted: 2023-10-09

## 2023-10-09 LAB
B-HCG UR QL: NEGATIVE
EXPIRATION DATE: NORMAL
INTERNAL NEGATIVE CONTROL: NORMAL
INTERNAL POSITIVE CONTROL: NORMAL
Lab: NORMAL

## 2023-10-09 PROCEDURE — 25010000002 SUGAMMADEX 200 MG/2ML SOLUTION: Performed by: NURSE ANESTHETIST, CERTIFIED REGISTERED

## 2023-10-09 PROCEDURE — 25010000002 FENTANYL CITRATE (PF) 100 MCG/2ML SOLUTION: Performed by: ANESTHESIOLOGY

## 2023-10-09 PROCEDURE — 25810000003 LACTATED RINGERS PER 1000 ML: Performed by: ANESTHESIOLOGY

## 2023-10-09 PROCEDURE — 25010000002 ONDANSETRON PER 1 MG: Performed by: ANESTHESIOLOGY

## 2023-10-09 PROCEDURE — 88305 TISSUE EXAM BY PATHOLOGIST: CPT | Performed by: OBSTETRICS & GYNECOLOGY

## 2023-10-09 PROCEDURE — 25010000002 PROPOFOL 10 MG/ML EMULSION: Performed by: ANESTHESIOLOGY

## 2023-10-09 PROCEDURE — 25010000002 HEPARIN (PORCINE) PER 1000 UNITS: Performed by: OBSTETRICS & GYNECOLOGY

## 2023-10-09 PROCEDURE — 25010000002 HYDROMORPHONE 1 MG/ML SOLUTION

## 2023-10-09 PROCEDURE — 25010000002 DEXAMETHASONE PER 1 MG: Performed by: ANESTHESIOLOGY

## 2023-10-09 PROCEDURE — 25010000002 CEFAZOLIN IN DEXTROSE 2000 MG/ 100 ML SOLUTION: Performed by: OBSTETRICS & GYNECOLOGY

## 2023-10-09 PROCEDURE — 81025 URINE PREGNANCY TEST: CPT | Performed by: OBSTETRICS & GYNECOLOGY

## 2023-10-09 PROCEDURE — 25010000002 FENTANYL CITRATE (PF) 50 MCG/ML SOLUTION

## 2023-10-09 PROCEDURE — 25810000003 SODIUM CHLORIDE PER 500 ML: Performed by: OBSTETRICS & GYNECOLOGY

## 2023-10-09 RX ORDER — FENTANYL CITRATE 50 UG/ML
INJECTION, SOLUTION INTRAMUSCULAR; INTRAVENOUS AS NEEDED
Status: DISCONTINUED | OUTPATIENT
Start: 2023-10-09 | End: 2023-10-09 | Stop reason: SURG

## 2023-10-09 RX ORDER — SODIUM CHLORIDE 9 MG/ML
INJECTION, SOLUTION INTRAVENOUS AS NEEDED
Status: DISCONTINUED | OUTPATIENT
Start: 2023-10-09 | End: 2023-10-09 | Stop reason: HOSPADM

## 2023-10-09 RX ORDER — SODIUM CHLORIDE, SODIUM LACTATE, POTASSIUM CHLORIDE, CALCIUM CHLORIDE 600; 310; 30; 20 MG/100ML; MG/100ML; MG/100ML; MG/100ML
9 INJECTION, SOLUTION INTRAVENOUS CONTINUOUS PRN
Status: DISCONTINUED | OUTPATIENT
Start: 2023-10-09 | End: 2023-10-09 | Stop reason: HOSPADM

## 2023-10-09 RX ORDER — HYDROMORPHONE HYDROCHLORIDE 1 MG/ML
0.5 INJECTION, SOLUTION INTRAMUSCULAR; INTRAVENOUS; SUBCUTANEOUS
Status: DISCONTINUED | OUTPATIENT
Start: 2023-10-09 | End: 2023-10-09 | Stop reason: HOSPADM

## 2023-10-09 RX ORDER — MELOXICAM 15 MG/1
7.5 TABLET ORAL ONCE AS NEEDED
Status: DISCONTINUED | OUTPATIENT
Start: 2023-10-09 | End: 2023-10-09 | Stop reason: HOSPADM

## 2023-10-09 RX ORDER — BUPIVACAINE HYDROCHLORIDE AND EPINEPHRINE 5; 5 MG/ML; UG/ML
INJECTION, SOLUTION PERINEURAL AS NEEDED
Status: DISCONTINUED | OUTPATIENT
Start: 2023-10-09 | End: 2023-10-09 | Stop reason: HOSPADM

## 2023-10-09 RX ORDER — MEPERIDINE HYDROCHLORIDE 25 MG/ML
12.5 INJECTION INTRAMUSCULAR; INTRAVENOUS; SUBCUTANEOUS
Status: DISCONTINUED | OUTPATIENT
Start: 2023-10-09 | End: 2023-10-09 | Stop reason: HOSPADM

## 2023-10-09 RX ORDER — ACETAMINOPHEN 325 MG/1
650 TABLET ORAL
Qty: 100 TABLET | Refills: 0 | Status: SHIPPED | OUTPATIENT
Start: 2023-10-09

## 2023-10-09 RX ORDER — FENTANYL CITRATE 50 UG/ML
50 INJECTION, SOLUTION INTRAMUSCULAR; INTRAVENOUS
Status: DISCONTINUED | OUTPATIENT
Start: 2023-10-09 | End: 2023-10-09 | Stop reason: HOSPADM

## 2023-10-09 RX ORDER — PROPOFOL 10 MG/ML
VIAL (ML) INTRAVENOUS AS NEEDED
Status: DISCONTINUED | OUTPATIENT
Start: 2023-10-09 | End: 2023-10-09 | Stop reason: SURG

## 2023-10-09 RX ORDER — MIDAZOLAM HYDROCHLORIDE 1 MG/ML
1 INJECTION INTRAMUSCULAR; INTRAVENOUS
Status: DISCONTINUED | OUTPATIENT
Start: 2023-10-09 | End: 2023-10-09 | Stop reason: HOSPADM

## 2023-10-09 RX ORDER — POLYETHYLENE GLYCOL 3350 17 G/17G
17 POWDER, FOR SOLUTION ORAL DAILY
Qty: 238 G | Refills: 0 | Status: SHIPPED | OUTPATIENT
Start: 2023-10-09 | End: 2023-10-23

## 2023-10-09 RX ORDER — HYDROCODONE BITARTRATE AND ACETAMINOPHEN 5; 325 MG/1; MG/1
TABLET ORAL
Status: COMPLETED
Start: 2023-10-09 | End: 2023-10-09

## 2023-10-09 RX ORDER — MELOXICAM 7.5 MG/1
7.5 TABLET ORAL DAILY
Qty: 20 TABLET | Refills: 0 | Status: SHIPPED | OUTPATIENT
Start: 2023-10-09

## 2023-10-09 RX ORDER — DEXAMETHASONE SODIUM PHOSPHATE 4 MG/ML
INJECTION, SOLUTION INTRA-ARTICULAR; INTRALESIONAL; INTRAMUSCULAR; INTRAVENOUS; SOFT TISSUE AS NEEDED
Status: DISCONTINUED | OUTPATIENT
Start: 2023-10-09 | End: 2023-10-09 | Stop reason: SURG

## 2023-10-09 RX ORDER — PROMETHAZINE HYDROCHLORIDE 25 MG/1
25 TABLET ORAL ONCE AS NEEDED
Status: DISCONTINUED | OUTPATIENT
Start: 2023-10-09 | End: 2023-10-09 | Stop reason: HOSPADM

## 2023-10-09 RX ORDER — DROPERIDOL 2.5 MG/ML
0.62 INJECTION, SOLUTION INTRAMUSCULAR; INTRAVENOUS
Status: DISCONTINUED | OUTPATIENT
Start: 2023-10-09 | End: 2023-10-09 | Stop reason: HOSPADM

## 2023-10-09 RX ORDER — FENTANYL CITRATE 50 UG/ML
INJECTION, SOLUTION INTRAMUSCULAR; INTRAVENOUS
Status: COMPLETED
Start: 2023-10-09 | End: 2023-10-09

## 2023-10-09 RX ORDER — LIDOCAINE HYDROCHLORIDE 10 MG/ML
0.5 INJECTION, SOLUTION EPIDURAL; INFILTRATION; INTRACAUDAL; PERINEURAL ONCE AS NEEDED
Status: DISCONTINUED | OUTPATIENT
Start: 2023-10-09 | End: 2023-10-09 | Stop reason: HOSPADM

## 2023-10-09 RX ORDER — CEFAZOLIN SODIUM 2 G/100ML
2000 INJECTION, SOLUTION INTRAVENOUS ONCE
Status: COMPLETED | OUTPATIENT
Start: 2023-10-09 | End: 2023-10-09

## 2023-10-09 RX ORDER — IPRATROPIUM BROMIDE AND ALBUTEROL SULFATE 2.5; .5 MG/3ML; MG/3ML
3 SOLUTION RESPIRATORY (INHALATION) ONCE AS NEEDED
Status: DISCONTINUED | OUTPATIENT
Start: 2023-10-09 | End: 2023-10-09 | Stop reason: HOSPADM

## 2023-10-09 RX ORDER — ONDANSETRON 2 MG/ML
INJECTION INTRAMUSCULAR; INTRAVENOUS AS NEEDED
Status: DISCONTINUED | OUTPATIENT
Start: 2023-10-09 | End: 2023-10-09 | Stop reason: SURG

## 2023-10-09 RX ORDER — ACETAMINOPHEN 325 MG/1
650 TABLET ORAL ONCE
Status: DISCONTINUED | OUTPATIENT
Start: 2023-10-09 | End: 2023-10-09 | Stop reason: HOSPADM

## 2023-10-09 RX ORDER — ONDANSETRON 2 MG/ML
4 INJECTION INTRAMUSCULAR; INTRAVENOUS ONCE AS NEEDED
Status: DISCONTINUED | OUTPATIENT
Start: 2023-10-09 | End: 2023-10-09 | Stop reason: HOSPADM

## 2023-10-09 RX ORDER — SCOLOPAMINE TRANSDERMAL SYSTEM 1 MG/1
1 PATCH, EXTENDED RELEASE TRANSDERMAL
Status: DISCONTINUED | OUTPATIENT
Start: 2023-10-09 | End: 2023-10-09 | Stop reason: HOSPADM

## 2023-10-09 RX ORDER — SODIUM CHLORIDE 0.9 % (FLUSH) 0.9 %
10 SYRINGE (ML) INJECTION AS NEEDED
Status: CANCELLED | OUTPATIENT
Start: 2023-10-09

## 2023-10-09 RX ORDER — ONDANSETRON 4 MG/1
4 TABLET, FILM COATED ORAL ONCE AS NEEDED
Status: DISCONTINUED | OUTPATIENT
Start: 2023-10-09 | End: 2023-10-09 | Stop reason: HOSPADM

## 2023-10-09 RX ORDER — BUPIVACAINE HCL/0.9 % NACL/PF 0.125 %
PLASTIC BAG, INJECTION (ML) EPIDURAL AS NEEDED
Status: DISCONTINUED | OUTPATIENT
Start: 2023-10-09 | End: 2023-10-09 | Stop reason: SURG

## 2023-10-09 RX ORDER — NALOXONE HCL 0.4 MG/ML
0.4 VIAL (ML) INJECTION AS NEEDED
Status: DISCONTINUED | OUTPATIENT
Start: 2023-10-09 | End: 2023-10-09 | Stop reason: HOSPADM

## 2023-10-09 RX ORDER — HYDROCODONE BITARTRATE AND ACETAMINOPHEN 5; 325 MG/1; MG/1
1 TABLET ORAL ONCE AS NEEDED
Status: DISCONTINUED | OUTPATIENT
Start: 2023-10-09 | End: 2023-10-09 | Stop reason: HOSPADM

## 2023-10-09 RX ORDER — GLYCOPYRROLATE 0.2 MG/ML
INJECTION INTRAMUSCULAR; INTRAVENOUS AS NEEDED
Status: DISCONTINUED | OUTPATIENT
Start: 2023-10-09 | End: 2023-10-09 | Stop reason: SURG

## 2023-10-09 RX ORDER — PROMETHAZINE HYDROCHLORIDE 25 MG/1
25 SUPPOSITORY RECTAL ONCE AS NEEDED
Status: DISCONTINUED | OUTPATIENT
Start: 2023-10-09 | End: 2023-10-09 | Stop reason: HOSPADM

## 2023-10-09 RX ORDER — SODIUM CHLORIDE 9 MG/ML
40 INJECTION, SOLUTION INTRAVENOUS AS NEEDED
Status: CANCELLED | OUTPATIENT
Start: 2023-10-09

## 2023-10-09 RX ORDER — OXYCODONE HYDROCHLORIDE 5 MG/1
5 TABLET ORAL EVERY 4 HOURS PRN
Qty: 10 TABLET | Refills: 0 | Status: SHIPPED | OUTPATIENT
Start: 2023-10-09

## 2023-10-09 RX ORDER — SODIUM CHLORIDE 9 MG/ML
40 INJECTION, SOLUTION INTRAVENOUS AS NEEDED
Status: DISCONTINUED | OUTPATIENT
Start: 2023-10-09 | End: 2023-10-09 | Stop reason: HOSPADM

## 2023-10-09 RX ORDER — ACETAMINOPHEN 500 MG
1000 TABLET ORAL ONCE
Status: COMPLETED | OUTPATIENT
Start: 2023-10-09 | End: 2023-10-09

## 2023-10-09 RX ORDER — HEPARIN SODIUM 5000 [USP'U]/ML
5000 INJECTION, SOLUTION INTRAVENOUS; SUBCUTANEOUS ONCE
Status: DISCONTINUED | OUTPATIENT
Start: 2023-10-09 | End: 2023-10-09 | Stop reason: HOSPADM

## 2023-10-09 RX ORDER — HYDRALAZINE HYDROCHLORIDE 20 MG/ML
5 INJECTION INTRAMUSCULAR; INTRAVENOUS
Status: DISCONTINUED | OUTPATIENT
Start: 2023-10-09 | End: 2023-10-09 | Stop reason: HOSPADM

## 2023-10-09 RX ORDER — MAGNESIUM HYDROXIDE 1200 MG/15ML
LIQUID ORAL AS NEEDED
Status: DISCONTINUED | OUTPATIENT
Start: 2023-10-09 | End: 2023-10-09 | Stop reason: HOSPADM

## 2023-10-09 RX ORDER — SODIUM CHLORIDE 0.9 % (FLUSH) 0.9 %
3 SYRINGE (ML) INJECTION EVERY 12 HOURS SCHEDULED
Status: DISCONTINUED | OUTPATIENT
Start: 2023-10-09 | End: 2023-10-09 | Stop reason: HOSPADM

## 2023-10-09 RX ORDER — HEPARIN SODIUM 5000 [USP'U]/ML
INJECTION, SOLUTION INTRAVENOUS; SUBCUTANEOUS AS NEEDED
Status: DISCONTINUED | OUTPATIENT
Start: 2023-10-09 | End: 2023-10-09 | Stop reason: HOSPADM

## 2023-10-09 RX ORDER — GABAPENTIN 300 MG/1
600 CAPSULE ORAL ONCE
Status: COMPLETED | OUTPATIENT
Start: 2023-10-09 | End: 2023-10-09

## 2023-10-09 RX ORDER — ROCURONIUM BROMIDE 10 MG/ML
INJECTION, SOLUTION INTRAVENOUS AS NEEDED
Status: DISCONTINUED | OUTPATIENT
Start: 2023-10-09 | End: 2023-10-09 | Stop reason: SURG

## 2023-10-09 RX ORDER — LABETALOL HYDROCHLORIDE 5 MG/ML
5 INJECTION, SOLUTION INTRAVENOUS
Status: DISCONTINUED | OUTPATIENT
Start: 2023-10-09 | End: 2023-10-09 | Stop reason: HOSPADM

## 2023-10-09 RX ORDER — FAMOTIDINE 20 MG/1
20 TABLET, FILM COATED ORAL
Status: COMPLETED | OUTPATIENT
Start: 2023-10-09 | End: 2023-10-09

## 2023-10-09 RX ORDER — SODIUM CHLORIDE 0.9 % (FLUSH) 0.9 %
3-10 SYRINGE (ML) INJECTION AS NEEDED
Status: DISCONTINUED | OUTPATIENT
Start: 2023-10-09 | End: 2023-10-09 | Stop reason: HOSPADM

## 2023-10-09 RX ORDER — LIDOCAINE HYDROCHLORIDE 10 MG/ML
INJECTION, SOLUTION EPIDURAL; INFILTRATION; INTRACAUDAL; PERINEURAL AS NEEDED
Status: DISCONTINUED | OUTPATIENT
Start: 2023-10-09 | End: 2023-10-09 | Stop reason: SURG

## 2023-10-09 RX ORDER — DROPERIDOL 2.5 MG/ML
0.62 INJECTION, SOLUTION INTRAMUSCULAR; INTRAVENOUS ONCE AS NEEDED
Status: DISCONTINUED | OUTPATIENT
Start: 2023-10-09 | End: 2023-10-09 | Stop reason: HOSPADM

## 2023-10-09 RX ORDER — SODIUM CHLORIDE 0.9 % (FLUSH) 0.9 %
10 SYRINGE (ML) INJECTION EVERY 12 HOURS SCHEDULED
Status: CANCELLED | OUTPATIENT
Start: 2023-10-09

## 2023-10-09 RX ADMIN — ACETAMINOPHEN 1000 MG: 500 TABLET ORAL at 08:08

## 2023-10-09 RX ADMIN — FAMOTIDINE 20 MG: 20 TABLET, FILM COATED ORAL at 08:08

## 2023-10-09 RX ADMIN — FENTANYL CITRATE 50 MCG: 50 INJECTION, SOLUTION INTRAMUSCULAR; INTRAVENOUS at 11:14

## 2023-10-09 RX ADMIN — FENTANYL CITRATE 100 MCG: 50 INJECTION, SOLUTION INTRAMUSCULAR; INTRAVENOUS at 09:15

## 2023-10-09 RX ADMIN — HYDROMORPHONE HYDROCHLORIDE 0.5 MG: 1 INJECTION, SOLUTION INTRAMUSCULAR; INTRAVENOUS; SUBCUTANEOUS at 11:24

## 2023-10-09 RX ADMIN — SODIUM CHLORIDE, POTASSIUM CHLORIDE, SODIUM LACTATE AND CALCIUM CHLORIDE 9 ML/HR: 600; 310; 30; 20 INJECTION, SOLUTION INTRAVENOUS at 08:09

## 2023-10-09 RX ADMIN — PROPOFOL 200 MG: 10 INJECTION, EMULSION INTRAVENOUS at 09:15

## 2023-10-09 RX ADMIN — HYDROCODONE BITARTRATE AND ACETAMINOPHEN 1 TABLET: 5; 325 TABLET ORAL at 11:52

## 2023-10-09 RX ADMIN — GLYCOPYRROLATE 0.1 MG: 0.4 INJECTION INTRAMUSCULAR; INTRAVENOUS at 09:33

## 2023-10-09 RX ADMIN — PROPOFOL 25 MCG/KG/MIN: 10 INJECTION, EMULSION INTRAVENOUS at 09:22

## 2023-10-09 RX ADMIN — Medication 100 MCG: at 09:32

## 2023-10-09 RX ADMIN — SUGAMMADEX 200 MG: 100 INJECTION, SOLUTION INTRAVENOUS at 10:03

## 2023-10-09 RX ADMIN — LIDOCAINE HYDROCHLORIDE 50 MG: 10 INJECTION, SOLUTION EPIDURAL; INFILTRATION; INTRACAUDAL; PERINEURAL at 09:15

## 2023-10-09 RX ADMIN — ONDANSETRON 4 MG: 2 INJECTION INTRAMUSCULAR; INTRAVENOUS at 10:05

## 2023-10-09 RX ADMIN — Medication 50 MCG: at 09:29

## 2023-10-09 RX ADMIN — ROCURONIUM BROMIDE 10 MG: 10 SOLUTION INTRAVENOUS at 09:39

## 2023-10-09 RX ADMIN — CEFAZOLIN SODIUM 2000 MG: 2 INJECTION, SOLUTION INTRAVENOUS at 09:17

## 2023-10-09 RX ADMIN — DEXAMETHASONE SODIUM PHOSPHATE 8 MG: 4 INJECTION, SOLUTION INTRAMUSCULAR; INTRAVENOUS at 09:25

## 2023-10-09 RX ADMIN — ROCURONIUM BROMIDE 50 MG: 10 SOLUTION INTRAVENOUS at 09:15

## 2023-10-09 RX ADMIN — GABAPENTIN 600 MG: 300 CAPSULE ORAL at 08:08

## 2023-10-09 NOTE — OP NOTE
Hysterectomy Procedure Note      Pre-operative Diagnosis: Menorrhagia    Post-operative Diagnosis: Menorrhagia and Endometriosis    Operation: Robotic Hysterectomy with bilateral salpingectomy, fulguration of peritoneal lesions    Surgeon: Maria Elena Garvin MD     Assistants:  Assistant: Robert Cuellar RNFA was responsible for performing the following activities: Retraction, Suction, Irrigation, Suturing, Closing, and Placing Dressing and their skilled assistance was necessary for the success of this case.       Anesthesia: General endotracheal anesthesia    Findings: 2 mm superficial implant of endometriosis on left uterosacral ligament, otherwise normal pelvis    Estimated Blood Loss:  Minimal, less than 25mL    Specimens: Uterus with cervix and tubes    Complications:  None    Disposition: PACU - hemodynamically stable.      Procedure Details    The patient was seen in the Holding Room. The risks, benefits, complications, treatment options, and expected outcomes were discussed with the patient. The patient concurred with the proposed plan, giving informed consent. The patient was identified as Radha Medrano and the procedure verified as robotic hysterectomy with bilateral salpingectomy. Possible bilateral oophorectomy.  A Time Out was held and the above information confirmed.    After induction of anesthesia, the patient was prepped and draped in the usual sterile manner. The uterus was sounded and fit with a Chelsea-Koh intrauterine manipulator.    The operators gloves were changed.  The supraumbilical area was injected with a dilute marcaine solution.  An 8mm supraumbilical incision was made. Intraperitoneal access was gained with the optical noncutting trocar under direct visualization. CO2 was used to insufflate the abdominopelvic cavity to a pressure of approximately 15 mm Hg. She was placed in 30 degrees Trendelenburg. One additional 8mm port site was placed on the left and two additional 8mm ports  sites were placed on the right.     The DaVinci system was docked and the procedure continued at the console. The anatomy was inspected. The ureters were identified bilaterally. The left fallopian tube was dissected away from the ovary and the broad ligament with sharp and monopolar dissection.The left uteroovarian ligament was fulgurated and transected. The round ligament was fulgurated and transected.  The anterior and posterior leaflets of the broad ligament were then .  The bladder was dissected across the midline with monopolar, sharp and blunt dissection. The procedure was repeated on the right side. The uterine vessels on the left and right were isolated, fulgurated and transected. A 2 mm implant of endometriosis was noted on the left uterosacral ligament. It was fulgurated with monopolar energy.  A circumferential incision was made along the cervicovaginal junction and the uterus was removed from the vagina. The vaginal cuff was then closed with 2-0 vicryl and 0 vicryl. There was good hemostasis at low pressure.  The bladder was retrograde filled with 300 mL sterile saline.  Bladder integrity was observed.    The DaVinci system was undocked and the procedure was completed at the bedside. The trocars were removed. The skin incisions were closed with 3-0 plain and reinforced with skin glue. The counts were correct x 2. Patient was awakened and taken to recover in stable condition.       Maria Elena Garvin MD  10/09/23  10:21 EDT

## 2023-10-09 NOTE — INTERVAL H&P NOTE
[facility] Pre-op    Full history and physical note from office is attached.    VS: /80  HR 73  RR 16  T 97.1  Sat 97%RA    LAB Results:  Lab Results   Component Value Date    WBC 7.02 10/03/2023    HGB 11.9 (L) 10/03/2023    HCT 37.7 10/03/2023    MCV 89.1 10/03/2023     10/03/2023    NEUTROABS 4.20 10/03/2023    GLUCOSE 98 03/30/2023    BUN 9 03/30/2023    CREATININE 0.73 03/30/2023    EGFRIFNONA 106 12/23/2021    EGFRIFAFRI 122 12/23/2021     03/30/2023    K 4.4 10/03/2023     03/30/2023    CO2 24.8 03/30/2023    MG 1.9 12/21/2022    CALCIUM 9.5 03/30/2023    ALBUMIN 4.7 03/30/2023    AST 14 03/30/2023    ALT 9 03/30/2023    BILITOT 0.3 03/30/2023    PTT 25.5 12/21/2022    INR 1.03 12/21/2022       Cancer Staging (if applicable)  Cancer Patient: __ yes __no __unknown__N/A; If yes, clinical stage T:__ N:__M:__, stage group or __N/A      Impression: Menorrhagia, premenopausal       Plan: TOTAL LAPAROSCOPIC HYSTERECTOMY BILATERAL SALPINGECTOMY WITH DAVINCI ROBOT       ELISEO Aquino   10/9/2023   07:52 EDT

## 2023-10-09 NOTE — ANESTHESIA POSTPROCEDURE EVALUATION
Patient: Radha Medrano    Procedure Summary       Date: 10/09/23 Room / Location:  [facility]  [facility]  [facility]    Anesthesia Start: 0906 Anesthesia Stop: 1017    Procedure: TOTAL LAPAROSCOPIC HYSTERECTOMY BILATERAL SALPINGECTOMY WITH DAVINCI ROBOT AND FULGERATION OF PERITONEAL LESION (Bilateral: Abdomen) Diagnosis:     Surgeons: Maria Elena Garvin MD Provider: Tunde Wade MD    Anesthesia Type: general ASA Status: 2            Anesthesia Type: general    Vitals  Vitals Value Taken Time   BP 92/53 10/09/23 1018   Temp     Pulse 58 10/09/23 1019   Resp     SpO2 99 % 10/09/23 1019   Vitals shown include unfiled device data.        Post Anesthesia Care and Evaluation    Patient location during evaluation: PACU  Patient participation: complete - patient participated  Level of consciousness: awake and alert  Pain score: 0  Pain management: adequate    Airway patency: patent  Anesthetic complications: No anesthetic complications  PONV Status: none  Cardiovascular status: hemodynamically stable and acceptable  Respiratory status: nonlabored ventilation, acceptable, nasal cannula, oral airway and spontaneous ventilation  Hydration status: acceptable

## 2023-10-09 NOTE — ANESTHESIA PROCEDURE NOTES
Airway  Urgency: elective    Date/Time: 10/9/2023 9:16 AM  Airway not difficult    General Information and Staff    Patient location during procedure: OR  SRNA: Fanta Dey SRNA  Indications and Patient Condition  Indications for airway management: airway protection    Preoxygenated: yes  MILS not maintained throughout  Mask difficulty assessment: 1 - vent by mask    Final Airway Details  Final airway type: endotracheal airway      Successful airway: ETT  Cuffed: yes   Successful intubation technique: direct laryngoscopy  Facilitating devices/methods: intubating stylet  Endotracheal tube insertion site: oral  Blade: Allyn  Blade size: 3  ETT size (mm): 7.0  Cormack-Lehane Classification: grade I - full view of glottis  Placement verified by: chest auscultation and capnometry   Measured from: lips  ETT/EBT  to lips (cm): 21  Number of attempts at approach: 1  Assessment: lips, teeth, and gum same as pre-op and atraumatic intubation    Additional Comments  Negative epigastric sounds, Breath sound equal bilaterally with symmetric chest rise and fall

## 2023-10-09 NOTE — ANESTHESIA PREPROCEDURE EVALUATION
Anesthesia Evaluation     Patient summary reviewed and Nursing notes reviewed   history of anesthetic complications:  PONV  NPO Solid Status: > 8 hours  NPO Liquid Status: > 2 hours           Airway   Mallampati: I  TM distance: >3 FB  Neck ROM: full  No difficulty expected  Dental - normal exam     Pulmonary     breath sounds clear to auscultation  (+) a smoker Former Abstained day of surgery, asthma,  Cardiovascular   Exercise tolerance: good (4-7 METS)    ECG reviewed  Patient on routine beta blocker and Beta blocker given within 24 hours of surgery  Rhythm: regular  Rate: normal        Neuro/Psych  (+) headaches, psychiatric history Anxiety and Depression  GI/Hepatic/Renal/Endo    (+) GERD well controlled, thyroid problem     Musculoskeletal     Abdominal   (-) obese    Abdomen: soft.   Substance History      OB/GYN    (-)  Pregnant        Other   arthritis,                 Anesthesia Plan    ASA 2     general     intravenous induction     Anesthetic plan, risks, benefits, and alternatives have been provided, discussed and informed consent has been obtained with: patient.    Plan discussed with CRNA.    CODE STATUS:

## 2023-10-10 LAB
CYTO UR: NORMAL
LAB AP CASE REPORT: NORMAL
LAB AP CLINICAL INFORMATION: NORMAL
PATH REPORT.FINAL DX SPEC: NORMAL
PATH REPORT.GROSS SPEC: NORMAL

## 2023-12-18 ENCOUNTER — OFFICE VISIT (OUTPATIENT)
Dept: NEUROLOGY | Facility: CLINIC | Age: 33
End: 2023-12-18
Payer: COMMERCIAL

## 2023-12-18 VITALS
OXYGEN SATURATION: 99 % | SYSTOLIC BLOOD PRESSURE: 110 MMHG | WEIGHT: 112 LBS | DIASTOLIC BLOOD PRESSURE: 80 MMHG | TEMPERATURE: 97.3 F | HEART RATE: 82 BPM | HEIGHT: 62 IN | BODY MASS INDEX: 20.61 KG/M2

## 2023-12-18 DIAGNOSIS — G43.719 INTRACTABLE CHRONIC MIGRAINE WITHOUT AURA AND WITHOUT STATUS MIGRAINOSUS: Primary | ICD-10-CM

## 2023-12-18 PROCEDURE — 99213 OFFICE O/P EST LOW 20 MIN: CPT | Performed by: NURSE PRACTITIONER

## 2023-12-18 RX ORDER — RIZATRIPTAN BENZOATE 10 MG/1
10 TABLET ORAL ONCE AS NEEDED
Qty: 9 TABLET | Refills: 5 | Status: SHIPPED | OUTPATIENT
Start: 2023-12-18

## 2023-12-18 NOTE — PROGRESS NOTES
"     Follow Up Office Visit      Patient Name: Radha Medrano  : 1990   MRN: [id number]     Chief Complaint:    Chief Complaint   Patient presents with    Follow-up     Patient in office to follow up on migraines.       History of Present Illness: Radha Medrano is a 33 y.o. female who is here today to follow up with migraines and was last seen on 2023.  She is taking Emgality 120mg SC monthly and Rizatriptan 10mg PRN- reports good compliance and toleration.  She is having about 3 migraines per month, just before her next dose of Emgality.  Rizatriptan works well for migraines.  She had a hysterectomy in 2023.  She feels she is doing well right now and would like to continue current therapy.  *Says her PCP has ordered an ophthalmologist referral and an MRI brain due to \"My headaches only being on the right side\".      Following taken from previous visit note:  Radha Medrano is a 33 y.o. female who is here today to follow up with migraines and was last seen on 2023.  She is taking Emgality 120mg SC monthly and reports good compliance and toleration.  She feels the Emgality has helped significantly with her migraines.  She has had a couple of migraines since previous visit and takes Maxalt 10mg PRN and some times 2 doses are necessary to get rid of the migraine.  She has had 2/30 migraine days in the past month (previously 15/30).  She has no plans for pregnancy but is not on any birth control at this time.       Subjective      Review of Systems:   Review of Systems   Neurological:  Positive for headache.       I have reviewed and the following portions of the patient's history were updated as appropriate: past family history, past medical history, past social history, past surgical history and problem list.    Medications:     Current Outpatient Medications:     acetaminophen (TYLENOL) 325 MG tablet, Take 2 tablets by mouth every 4 hours while awake for 7 days. THEN take only as " "needed for mild pain thereafter, Disp: 100 tablet, Rfl: 0    albuterol sulfate  (90 Base) MCG/ACT inhaler, Inhale 2 puffs Every 4 (Four) Hours As Needed for Wheezing or Shortness of Air., Disp: 18 g, Rfl: 1    ATENOLOL PO, Take 75 mg by mouth 2 (Two) Times a Day., Disp: , Rfl:     galcanezumab-gnlm (EMGALITY) 120 MG/ML auto-injector pen, Inject 1 mL under the skin into the appropriate area as directed Every 30 (Thirty) Days., Disp: 1.12 mL, Rfl: 5    rizatriptan (Maxalt) 10 MG tablet, Take 1 tablet by mouth 1 (One) Time As Needed for Migraine. May repeat in 2 hours if needed, Disp: 9 tablet, Rfl: 5    sertraline (ZOLOFT) 100 MG tablet, TAKE 1 TABLET BY MOUTH EVERY DAY (Patient taking differently: Take 1 tablet by mouth Daily.), Disp: 90 tablet, Rfl: 1    Allergies:   Allergies   Allergen Reactions    Onion Itching     REPORTS CAUSES MIGRAINES and MAKES THROAT ITCHY      Cabbage Headache    Msg [Monosodium Glutamate] Headache    Sulfa Antibiotics Hives       Objective     Physical Exam:  Vital Signs:   Vitals:    12/18/23 0948   BP: 110/80   BP Location: Right arm   Patient Position: Sitting   Cuff Size: Adult   Pulse: 82   Temp: 97.3 °F (36.3 °C)   SpO2: 99%   Weight: 50.8 kg (112 lb)   Height: 156.2 cm (61.5\")   PainSc: 0-No pain     Body mass index is 20.82 kg/m².    Physical Exam  Vitals and nursing note reviewed.   Constitutional:       General: She is not in acute distress.     Appearance: Normal appearance. She is well-developed and normal weight. She is not diaphoretic.   HENT:      Head: Normocephalic and atraumatic.   Eyes:      Extraocular Movements: Extraocular movements intact.      Conjunctiva/sclera: Conjunctivae normal.   Pulmonary:      Effort: Pulmonary effort is normal. No respiratory distress.   Musculoskeletal:         General: Normal range of motion.   Skin:     General: Skin is warm and dry.   Neurological:      Mental Status: She is alert and oriented to person, place, and time. "   Psychiatric:         Mood and Affect: Mood normal.         Behavior: Behavior normal.         Thought Content: Thought content normal.         Judgment: Judgment normal.         Neurologic Exam     Mental Status   Oriented to person, place, and time.        Assessment / Plan      Assessment/Plan:   Diagnoses and all orders for this visit:    1. Intractable chronic migraine without aura and without status migrainosus (Primary)  -     galcanezumab-gnlm (EMGALITY) 120 MG/ML auto-injector pen; Inject 1 mL under the skin into the appropriate area as directed Every 30 (Thirty) Days.  Dispense: 1.12 mL; Refill: 5  -     rizatriptan (Maxalt) 10 MG tablet; Take 1 tablet by mouth 1 (One) Time As Needed for Migraine. May repeat in 2 hours if needed  Dispense: 9 tablet; Refill: 5    2. BMI 20.0-20.9, adult         Follow Up:   Return in about 6 months (around 6/18/2024) for Follow Up.    ELISEO Tamayo, FNP-C  [facility]

## 2024-03-12 ENCOUNTER — OFFICE VISIT (OUTPATIENT)
Dept: ENDOCRINOLOGY | Facility: CLINIC | Age: 34
End: 2024-03-12
Payer: COMMERCIAL

## 2024-03-12 VITALS
SYSTOLIC BLOOD PRESSURE: 108 MMHG | WEIGHT: 106 LBS | DIASTOLIC BLOOD PRESSURE: 68 MMHG | BODY MASS INDEX: 20.01 KG/M2 | HEART RATE: 87 BPM | OXYGEN SATURATION: 99 % | HEIGHT: 61 IN

## 2024-03-12 DIAGNOSIS — E04.2 NONTOXIC MULTINODULAR GOITER: Primary | ICD-10-CM

## 2024-03-12 LAB
ERYTHROCYTE [SEDIMENTATION RATE] IN BLOOD: 9 MM/HR (ref 0–20)
TSH SERPL DL<=0.05 MIU/L-ACNC: 1.91 UIU/ML (ref 0.27–4.2)

## 2024-03-12 PROCEDURE — 84443 ASSAY THYROID STIM HORMONE: CPT | Performed by: INTERNAL MEDICINE

## 2024-03-12 PROCEDURE — 86376 MICROSOMAL ANTIBODY EACH: CPT | Performed by: INTERNAL MEDICINE

## 2024-03-12 PROCEDURE — 76536 US EXAM OF HEAD AND NECK: CPT | Performed by: INTERNAL MEDICINE

## 2024-03-12 PROCEDURE — 85652 RBC SED RATE AUTOMATED: CPT | Performed by: INTERNAL MEDICINE

## 2024-03-12 PROCEDURE — 99213 OFFICE O/P EST LOW 20 MIN: CPT | Performed by: INTERNAL MEDICINE

## 2024-03-12 NOTE — ASSESSMENT & PLAN NOTE
Ultrasound as above shows no obvious structural cause for dysphagia and hoarseness in the thyroid.  We will check for thyroid inflammation and recheck thyroid function.  I wonder if gerd could be the cause although she does not have symptoms of reflux

## 2024-03-12 NOTE — PROGRESS NOTES
"     Office Note      Date: 2024  Patient Name: Radha Medrano  MRN: [id number]  : 1990    Chief Complaint   Patient presents with    Goiter       History of Present Illness:   Radha Medrano is a 33 y.o. female who presents for Goiter  .   Current rx: none     Changes in history:had a baby a year ago   Questions /problems:has pressure in her neck, like she is always wearing a turtleneck     Subjective          Review of Systems:   Review of Systems   HENT:  Positive for voice change. Negative for trouble swallowing.         Neck pressure       The following portions of the patient's history were reviewed and updated as appropriate: allergies, current medications, past family history, past medical history, past social history, past surgical history, and problem list.    Objective     Visit Vitals  /68   Pulse 87   Ht 154.9 cm (61\")   Wt 48.1 kg (106 lb)   SpO2 99%   BMI 20.03 kg/m²           Physical Exam:  Physical Exam  Vitals reviewed.   Constitutional:       Appearance: Normal appearance.   Neck:      Comments: No nodules in thyroid   Lymphadenopathy:      Cervical: No cervical adenopathy.   Neurological:      Mental Status: She is alert.   Psychiatric:         Mood and Affect: Mood normal.         Behavior: Behavior normal.         Thought Content: Thought content normal.         Judgment: Judgment normal.       Assessment / Plan      Assessment & Plan:  Problem List Items Addressed This Visit          Other    Nontoxic multinodular goiter - Primary    Overview     3/12/24  Procedure: thyroid ultrasound  Indication: neck tenderness/ dysphagiave  Results- the right thyroid lobe is normal in size and echogenicity without nodules  Left lobe is surgically absent          Current Assessment & Plan     Ultrasound as above shows no obvious structural cause for dysphagia and hoarseness in the thyroid.  We will check for thyroid inflammation and recheck thyroid function.  I wonder if gerd could " be the cause although she does not have symptoms of reflux          Relevant Medications    ATENOLOL PO    Other Relevant Orders    TSH    Thyroid Peroxidase Antibody    Sedimentation Rate    US Thyroid (Completed)        Electronically signed by : Edgardo Palacios MD   03/12/2024

## 2024-03-14 LAB — THYROPEROXIDASE AB SERPL-ACNC: 9 IU/ML (ref 0–34)

## 2024-05-01 ENCOUNTER — OFFICE VISIT (OUTPATIENT)
Dept: ORTHOPEDIC SURGERY | Facility: CLINIC | Age: 34
End: 2024-05-01
Payer: COMMERCIAL

## 2024-05-01 VITALS — WEIGHT: 109.8 LBS | HEIGHT: 61 IN | BODY MASS INDEX: 20.73 KG/M2

## 2024-05-01 DIAGNOSIS — M25.531 ARTHRALGIA OF WRIST, RIGHT: Primary | ICD-10-CM

## 2024-05-01 DIAGNOSIS — S69.91XA WRIST INJURY, RIGHT, INITIAL ENCOUNTER: ICD-10-CM

## 2024-05-01 PROCEDURE — 99213 OFFICE O/P EST LOW 20 MIN: CPT | Performed by: PHYSICIAN ASSISTANT

## 2024-05-01 RX ORDER — KETOROLAC TROMETHAMINE 10 MG/1
TABLET, FILM COATED ORAL
COMMUNITY
Start: 2024-04-28

## 2024-05-01 NOTE — PROGRESS NOTES
"Subjective   Patient ID: Radha Medrano is a 34 y.o. right hand dominant female   Injury of the Right Wrist (Reports colliding with another player while playing football on 4/27/24, [facility] with xrays same day, placed in removable splint that she is not wearing, states splint makes pain worse)         History of Present Illness       Patient presents for the evaluation of right wrist pain after an injury playing football on 4/27/2024.  She states she collided with another player and fell on her wrist.  She was evaluated at an outside emergency room, had x-rays that were negative for acute fracture and placed in a Velcro wrist brace.  She does endorse occasional tingling of the right thumb.  Past Medical History:   Diagnosis Date    Abnormal Pap smear of cervix     Abnormal uterine bleeding     Acid reflux     Anxiety     Anxiety and depression     Arthritis     Asthma     B12 deficiency 05/05/2021    Bipolar 1 disorder     Body piercing     EARS, NOSE, BELLY BUTTON    Dysphagia     REPORTS SOMETIMES FEELS LIKE \"FOOD GETS STUCK\"    Fracture     HISTORY OF RIGHT WRIST AS A CHILD    Fracture of wrist 1999    treated with cast/R wrist    Goiter     x3    Heart rate fast     states usually 120-130 (states on med for this)    History of migraine     Hx of exercise stress test 04/01/2020    Inappropriate sinus tachycardia     per Dr. Dong's note on 8/12/20.      Injury of neck     Insomnia     Iron deficiency     Migraine     Ovarian cyst     Palpitations     PONV (postoperative nausea and vomiting)     Scoliosis     Tachycardia     Tattoo     Thyroid nodule     Toe fracture     right great toe    Wears glasses         Past Surgical History:   Procedure Laterality Date    APPENDECTOMY  2016    CHOLECYSTECTOMY      COLPOSCOPY      CYST REMOVAL      OVARIAN CYST    DILATATION AND CURETTAGE      ENDOSCOPY N/A 12/01/2017    Procedure: ESOPHAGOGASTRODUODENOSCOPY WITH COLD FORCEP BIOPSY;  Surgeon: Danilo Shabazz MD;  " Location:  [facility];  Service:     ENDOSCOPY N/A 09/03/2020    Procedure: ESOPHAGOGASTRODUODENOSCOPY WITH BIOPSIES AND DILATATION;  Surgeon: Bhupinder Montenegro MD;  Location:  [facility];  Service: Gastroenterology;  Laterality: N/A;    ESOPHAGEAL MOTILITY STUDY N/A 11/10/2020    Procedure: ESOPHAGEAL MANOMETRY;  Surgeon: Chivo Mcmahan MD;  Location:  [facility];  Service: Gastroenterology;  Laterality: N/A;    HIP SURGERY Right 2014    NO HARDWARE; RIGHT HIP, REPORTS HAD BONES SCRAPED    HYSTERECTOMY      SHOULDER ARTHROSCOPY Right 10/11/2018    NO HARDWARE; Procedure: Right shoulder diagnostic arthroscopy, limited rotator cuff debridement;  Surgeon: Tino Uribe MD;  Location:  [facility];  Service: Orthopedics    THYROID BIOPSY      goiter    THYROIDECTOMY Left 02/24/2021    PARTIAL THYROID LOBECTOMY LEFT;  Surgeon: Yves Contreras MD;  Location:  [facility];  Service: General;  Laterality: Left;    TOTAL LAPAROSCOPIC HYSTERECTOMY SALPINGECTOMY Bilateral 10/09/2023    Procedure: TOTAL LAPAROSCOPIC HYSTERECTOMY BILATERAL SALPINGECTOMY WITH DAVINCI ROBOT AND FULGERATION OF PERITONEAL LESION;  Surgeon: Maria Elena Garvin MD;  Location:  [facility];  Service: Robotics - DaVinci;  Laterality: Bilateral;    WISDOM TOOTH EXTRACTION         Family History   Problem Relation Age of Onset    Diabetes Mother     Arthritis Mother     No Known Problems Father     Cancer Maternal Grandmother     Diabetes Maternal Grandmother     Heart attack Maternal Grandmother     Arthritis Maternal Grandmother     Heart attack Paternal Grandmother     Heart disease Sister     Migraines Sister     Cancer Paternal Grandfather         Lung    Colon cancer Neg Hx     Cirrhosis Neg Hx     Liver cancer Neg Hx     Liver disease Neg Hx         Social History     Socioeconomic History    Marital status:      Spouse name: Rajat Medrano    Number of children: 2    Highest education level: Some college, no degree  "  Tobacco Use    Smoking status: Former     Current packs/day: 0.00     Average packs/day: 0.5 packs/day for 17.0 years (8.5 ttl pk-yrs)     Types: Cigarettes     Start date: 10/17/2003     Quit date: 10/17/2020     Years since quitting: 3.5     Passive exposure: Never    Smokeless tobacco: Never   Vaping Use    Vaping status: Never Used   Substance and Sexual Activity    Alcohol use: No    Drug use: Not Currently     Types: Marijuana     Comment: Prior to pregnancy    Sexual activity: Defer       Allergies   Allergen Reactions    Onion Itching     REPORTS CAUSES MIGRAINES and MAKES THROAT ITCHY      Cabbage Headache    Msg [Monosodium Glutamate] Headache    Sulfa Antibiotics Hives       Review of Systems   Musculoskeletal:  Positive for arthralgias (right hand and wrist) and joint swelling (right hand).   Skin:  Positive for color change (bruising right hand/wrist).       Objective   Ht 154.9 cm (61\")   Wt 49.8 kg (109 lb 12.8 oz)   BMI 20.75 kg/m²   Physical Exam  Vitals and nursing note reviewed.   Constitutional:       Appearance: Normal appearance.   Pulmonary:      Effort: Pulmonary effort is normal.   Musculoskeletal:      Right forearm: No deformity.      Right wrist: Tenderness, bony tenderness and snuff box tenderness present. No deformity or lacerations. Decreased range of motion. Normal pulse.      Right hand: Normal sensation. There is no disruption of two-point discrimination. Normal capillary refill. Normal pulse.   Neurological:      Mental Status: She is alert and oriented to person, place, and time.       Ortho Exam    Neurologic Exam     Mental Status   Oriented to person, place, and time.            Assessment & Plan   Independent Review of Radiographic Studies:    X-ray of the right wrist 1 view was performed although the order will state to view it does not provide an option for 1 view of the wrist.  This was done to rule out scaphoid fracture.  No comparison films available to review.  There " is no acute fracture to the scaphoid bone    I personally reviewed 3 images of the right wrist performed at an outside facility on 4/27/2024 for the evaluation of pain after a fall.  No comparison films available to review.  No acute osseous abnormality noted        Procedures      Diagnoses and all orders for this visit:    1. Arthralgia of wrist, right (Primary)  -     XR Wrist 2 View Right    2. Wrist injury, right, initial encounter       Ice, heat, and/or modalities as beneficial  Reduced physical activity as appropriate and avoid offending activity  Weight bearing parameters reviewed    Recommendations/Plan:  Patient is encouraged to call or return for any issues or concerns.  We will change patient to a thumb spica wrist brace and have her wear this majority of the time she may only remove for hand hygiene purposes.  She can also remove this brace to apply warm heat daily.  May take over-the-counter analgesics as tolerated.  Follow-up in 14 days repeat exam and x-ray of the right wrist.  Patient agreeable to call or return sooner for any concerns.    BMI is within normal parameters. No other follow-up for BMI required.

## 2024-05-07 ENCOUNTER — PATIENT MESSAGE (OUTPATIENT)
Dept: ORTHOPEDIC SURGERY | Facility: CLINIC | Age: 34
End: 2024-05-07
Payer: COMMERCIAL

## 2024-05-07 DIAGNOSIS — M25.531 ARTHRALGIA OF WRIST, RIGHT: Primary | ICD-10-CM

## 2024-05-07 DIAGNOSIS — S69.91XA WRIST INJURY, RIGHT, INITIAL ENCOUNTER: ICD-10-CM

## 2024-05-10 ENCOUNTER — TELEPHONE (OUTPATIENT)
Dept: ORTHOPEDIC SURGERY | Facility: CLINIC | Age: 34
End: 2024-05-10
Payer: COMMERCIAL

## 2024-05-31 ENCOUNTER — HOSPITAL ENCOUNTER (OUTPATIENT)
Dept: MRI IMAGING | Facility: HOSPITAL | Age: 34
Discharge: HOME | End: 2024-05-31
Admitting: PHYSICIAN ASSISTANT
Payer: COMMERCIAL

## 2024-05-31 PROCEDURE — 73221 MRI JOINT UPR EXTREM W/O DYE: CPT

## 2024-06-04 ENCOUNTER — OFFICE VISIT (OUTPATIENT)
Dept: ORTHOPEDIC SURGERY | Facility: CLINIC | Age: 34
End: 2024-06-04
Payer: COMMERCIAL

## 2024-06-04 VITALS — TEMPERATURE: 98 F | HEIGHT: 61 IN | BODY MASS INDEX: 20.5 KG/M2 | WEIGHT: 108.6 LBS

## 2024-06-04 DIAGNOSIS — S62.024A CLOSED NONDISPLACED FRACTURE OF MIDDLE THIRD OF SCAPHOID BONE OF RIGHT WRIST, INITIAL ENCOUNTER: ICD-10-CM

## 2024-06-04 DIAGNOSIS — S69.91XA WRIST INJURY, RIGHT, INITIAL ENCOUNTER: ICD-10-CM

## 2024-06-04 DIAGNOSIS — M25.531 ARTHRALGIA OF WRIST, RIGHT: Primary | ICD-10-CM

## 2024-06-04 PROCEDURE — 99213 OFFICE O/P EST LOW 20 MIN: CPT | Performed by: PHYSICIAN ASSISTANT

## 2024-06-04 PROCEDURE — 29125 APPL SHORT ARM SPLINT STATIC: CPT | Performed by: PHYSICIAN ASSISTANT

## 2024-06-04 RX ORDER — SERTRALINE HYDROCHLORIDE 100 MG/1
100 TABLET, FILM COATED ORAL DAILY
COMMUNITY

## 2024-06-12 ENCOUNTER — DOCUMENTATION (OUTPATIENT)
Dept: ORTHOPEDIC SURGERY | Facility: CLINIC | Age: 34
End: 2024-06-12
Payer: COMMERCIAL

## 2024-06-12 NOTE — PROGRESS NOTES
Patient came in today for cast check. She states cast is loose, she is able to move thumb inside cast. Thumb spica cast removed, placed a new thumb spica cast on right. Patient tolerated well, she will keep her follow up appointment on 7/3/24. She was notified to come back if cast becomes loose again, or if any other issues arise.

## 2024-07-02 DIAGNOSIS — S62.024A CLOSED NONDISPLACED FRACTURE OF MIDDLE THIRD OF SCAPHOID BONE OF RIGHT WRIST, INITIAL ENCOUNTER: ICD-10-CM

## 2024-07-02 DIAGNOSIS — M25.531 ARTHRALGIA OF WRIST, RIGHT: Primary | ICD-10-CM

## 2024-07-03 ENCOUNTER — OFFICE VISIT (OUTPATIENT)
Dept: ORTHOPEDIC SURGERY | Facility: CLINIC | Age: 34
End: 2024-07-03
Payer: COMMERCIAL

## 2024-07-03 VITALS — HEIGHT: 61 IN | BODY MASS INDEX: 20.39 KG/M2 | WEIGHT: 108 LBS | TEMPERATURE: 98.4 F

## 2024-07-03 DIAGNOSIS — S62.024A CLOSED NONDISPLACED FRACTURE OF MIDDLE THIRD OF SCAPHOID BONE OF RIGHT WRIST, INITIAL ENCOUNTER: Primary | ICD-10-CM

## 2024-07-03 DIAGNOSIS — M25.531 ARTHRALGIA OF WRIST, RIGHT: ICD-10-CM

## 2024-07-03 PROCEDURE — 99213 OFFICE O/P EST LOW 20 MIN: CPT | Performed by: PHYSICIAN ASSISTANT

## 2024-07-03 NOTE — PROGRESS NOTES
"Subjective   Patient ID: Radha Medrano is a 34 y.o. right hand dominant female  Follow-up of the Right Wrist (DOI: 4/27/24. States she is doing okay, she still has some soreness. )         History of Present Illness  Patient is following up for scheduled visit for reevaluation of right wrist nondisplaced scaphoid waist fracture.  She originally injured the wrist 4/27/2024.  X-rays initially were negative for acute osseous abnormality.  Patient was still experiencing tenderness to palpation to the right radial aspect of the wrist therefore, a MRI was ordered that revealed a nondisplaced fracture.  She has been using wearing a thumb spica fiberglass cast for the last 4 weeks.  She states she is doing okay but still has soreness especially after having the cast removed today                                                 Past Medical History:   Diagnosis Date    Abnormal Pap smear of cervix     Abnormal uterine bleeding     Acid reflux     Anxiety     Anxiety and depression     Arthritis     Asthma     B12 deficiency 05/05/2021    Bipolar 1 disorder     Body piercing     EARS, NOSE, BELLY BUTTON    Dysphagia     REPORTS SOMETIMES FEELS LIKE \"FOOD GETS STUCK\"    Fracture     HISTORY OF RIGHT WRIST AS A CHILD    Fracture of wrist 1999    treated with cast/R wrist    Goiter     x3    Heart rate fast     states usually 120-130 (states on med for this)    History of migraine     Hx of exercise stress test 04/01/2020    Inappropriate sinus tachycardia     per Dr. Dong's note on 8/12/20.      Injury of neck     Insomnia     Iron deficiency     Migraine     Ovarian cyst     Palpitations     PONV (postoperative nausea and vomiting)     Scoliosis     Tachycardia     Tattoo     Thyroid nodule     Toe fracture     right great toe    Wears glasses         Past Surgical History:   Procedure Laterality Date    APPENDECTOMY  2016    CHOLECYSTECTOMY      COLPOSCOPY      CYST REMOVAL      OVARIAN CYST    DILATATION AND " CURETTAGE      ENDOSCOPY N/A 12/01/2017    Procedure: ESOPHAGOGASTRODUODENOSCOPY WITH COLD FORCEP BIOPSY;  Surgeon: Danilo Shabazz MD;  Location:  [facility];  Service:     ENDOSCOPY N/A 09/03/2020    Procedure: ESOPHAGOGASTRODUODENOSCOPY WITH BIOPSIES AND DILATATION;  Surgeon: Bhupinder Montenegro MD;  Location:  [facility];  Service: Gastroenterology;  Laterality: N/A;    ESOPHAGEAL MOTILITY STUDY N/A 11/10/2020    Procedure: ESOPHAGEAL MANOMETRY;  Surgeon: Chivo Mcmahan MD;  Location:  [facility];  Service: Gastroenterology;  Laterality: N/A;    HIP SURGERY Right 2014    NO HARDWARE; RIGHT HIP, REPORTS HAD BONES SCRAPED    HYSTERECTOMY      SHOULDER ARTHROSCOPY Right 10/11/2018    NO HARDWARE; Procedure: Right shoulder diagnostic arthroscopy, limited rotator cuff debridement;  Surgeon: Tino Uribe MD;  Location:  [facility];  Service: Orthopedics    THYROID BIOPSY      goiter    THYROIDECTOMY Left 02/24/2021    PARTIAL THYROID LOBECTOMY LEFT;  Surgeon: Yves Contreras MD;  Location:  [facility];  Service: General;  Laterality: Left;    TOTAL LAPAROSCOPIC HYSTERECTOMY SALPINGECTOMY Bilateral 10/09/2023    Procedure: TOTAL LAPAROSCOPIC HYSTERECTOMY BILATERAL SALPINGECTOMY WITH DAVINCI ROBOT AND FULGERATION OF PERITONEAL LESION;  Surgeon: Maria Elena Garvin MD;  Location:  [facility];  Service: Robotics - DaVinci;  Laterality: Bilateral;    WISDOM TOOTH EXTRACTION         Family History   Problem Relation Age of Onset    Diabetes Mother     Arthritis Mother     No Known Problems Father     Cancer Maternal Grandmother     Diabetes Maternal Grandmother     Heart attack Maternal Grandmother     Arthritis Maternal Grandmother     Heart attack Paternal Grandmother     Heart disease Sister     Migraines Sister     Cancer Paternal Grandfather         Lung    Colon cancer Neg Hx     Cirrhosis Neg Hx     Liver cancer Neg Hx     Liver disease Neg Hx        Social History     Socioeconomic History     Marital status:      Spouse name: Rajat Medrano    Number of children: 2    Highest education level: Some college, no degree   Tobacco Use    Smoking status: Former     Current packs/day: 0.00     Average packs/day: 0.5 packs/day for 17.0 years (8.5 ttl pk-yrs)     Types: Cigarettes     Start date: 10/17/2003     Quit date: 10/17/2020     Years since quitting: 3.7     Passive exposure: Never    Smokeless tobacco: Never   Vaping Use    Vaping status: Never Used   Substance and Sexual Activity    Alcohol use: No    Drug use: Not Currently     Types: Marijuana     Comment: Prior to pregnancy    Sexual activity: Defer         Current Outpatient Medications:     acetaminophen (TYLENOL) 325 MG tablet, Take 2 tablets by mouth every 4 hours while awake for 7 days. THEN take only as needed for mild pain thereafter, Disp: 100 tablet, Rfl: 0    albuterol sulfate  (90 Base) MCG/ACT inhaler, Inhale 2 puffs Every 4 (Four) Hours As Needed for Wheezing or Shortness of Air., Disp: 18 g, Rfl: 1    ATENOLOL PO, Take 75 mg by mouth 2 (Two) Times a Day., Disp: , Rfl:     galcanezumab-gnlm (EMGALITY) 120 MG/ML auto-injector pen, Inject 1 mL under the skin into the appropriate area as directed Every 30 (Thirty) Days., Disp: 1.12 mL, Rfl: 5    rizatriptan (Maxalt) 10 MG tablet, Take 1 tablet by mouth 1 (One) Time As Needed for Migraine. May repeat in 2 hours if needed, Disp: 9 tablet, Rfl: 5    sertraline (ZOLOFT) 100 MG tablet, Take 1 tablet by mouth Daily., Disp: , Rfl:     Allergies   Allergen Reactions    Onion Itching     REPORTS CAUSES MIGRAINES and MAKES THROAT ITCHY      Cabbage Headache    Msg [Monosodium Glutamate] Headache    Sulfa Antibiotics Hives       Review of Systems   Constitutional:  Negative for fever.   HENT:  Negative for dental problem and voice change.    Eyes:  Negative for visual disturbance.   Respiratory:  Negative for shortness of breath.    Cardiovascular:  Negative for chest pain.  "  Gastrointestinal:  Negative for abdominal pain.   Genitourinary:  Negative for dysuria.   Musculoskeletal:  Positive for arthralgias (right wrist). Negative for gait problem and joint swelling.   Skin:  Negative for rash.   Neurological:  Negative for speech difficulty.   Hematological:  Does not bruise/bleed easily.   Psychiatric/Behavioral:  Negative for confusion.        I have reviewed the medical and surgical history, family history, social history, medications, and/or allergies, and the review of systems of this report.    Objective   Temp 98.4 °F (36.9 °C)   Ht 154.9 cm (60.98\")   Wt 49 kg (108 lb)   BMI 20.42 kg/m²    Physical Exam  Ortho Exam     Neurologic Exam     Patient is  to palpation along the right wrist anatomical snuffbox.  She is also tender along the first extensor tendon of the right wrist.  Patient is neurovascularly intact to the right upper extremity.      Assessment & Plan   Independent Review of Radiographic Studies:    X-ray of the right wrist 4 view performed in the clinic independently reviewed for the evaluation of scaphoid fracture healing.  Comparison films are available and reviewed.  No evidence of interval displacement.  There is satisfactory position alignment of the scaphoid waist fracture.  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      Procedures       Diagnoses and all orders for this visit:    1. Closed nondisplaced fracture of middle third of scaphoid bone of right wrist, initial encounter (Primary)    2. Arthralgia of wrist, right       Reduced physical activity as appropriate and avoid offending activity  Weight bearing parameters reviewed    Recommendations/Plan:  Patient is encouraged to call or return for any issues or concerns.    Return in about 3 weeks (around 7/24/2024) for [facility].  Patient agreeable to call or return sooner for any concerns.      BMI is within normal " parameters. No other follow-up for BMI required.  We discussed that because the scaphoid bone gets a minute amount of blood flow we will continue to immobilize because she is still experiencing pain.  Patient is content with the plan of care.  She was placed in a new Ortho-Glass thumb spica cast.  Follow-up in 3 weeks cast off x-ray on arrival           EMR Dragon-transcription disclaimer:  This encounter note is an electronic transcription of spoken language to printed text.  Electronic transcription of spoken language may permit erroneous or at times nonsensical words or phrases to be inadvertently transcribed.  Although I have reviewed the note for such errors, some may still exist

## 2024-07-23 DIAGNOSIS — S62.024A CLOSED NONDISPLACED FRACTURE OF MIDDLE THIRD OF SCAPHOID BONE OF RIGHT WRIST, INITIAL ENCOUNTER: Primary | ICD-10-CM

## 2024-07-24 ENCOUNTER — OFFICE VISIT (OUTPATIENT)
Dept: ORTHOPEDIC SURGERY | Facility: CLINIC | Age: 34
End: 2024-07-24
Payer: COMMERCIAL

## 2024-07-24 VITALS — WEIGHT: 107.4 LBS | HEIGHT: 61 IN | BODY MASS INDEX: 20.28 KG/M2 | TEMPERATURE: 98.2 F

## 2024-07-24 DIAGNOSIS — M25.531 ARTHRALGIA OF WRIST, RIGHT: ICD-10-CM

## 2024-07-24 DIAGNOSIS — S62.024A CLOSED NONDISPLACED FRACTURE OF MIDDLE THIRD OF SCAPHOID BONE OF RIGHT WRIST, INITIAL ENCOUNTER: Primary | ICD-10-CM

## 2024-07-24 DIAGNOSIS — M65.4 TENOSYNOVITIS, DE QUERVAIN: ICD-10-CM

## 2024-07-24 NOTE — PROGRESS NOTES
"Subjective   Patient ID: Rdaha Medrano is a 34 y.o. right hand dominant female   Follow-up of the Right Wrist (Closed nondisplaced fracture of middle third of scaphoid bone of right wrist DOI: 4/27/2024, reports she still has pain around the wrist and thumb, some tingling)         History of Present Illness     Patient presents for her scheduled follow-up visit to reevaluate closed nondisplaced fracture of the middle third of the right scaphoid wrist bone.  The original date of injury was 4/27/2024.  The initial x-rays after the injury were negative for acute osseous abnormality.  Despite having negative x-rays patient was still immobilized in a Velcro thumb spica brace.  Due to continued pain of right wrist after thumb spica Velcro immobilization, a MRI of the right wrist was ordered which revealed a nondisplaced transverse fracture of the mid scaphoid waist.  Patient was then transition to a fiberglass thumb spica cast on June 4, 2024.    Patient presents today for cast removal.  She still notices pain to the right wrist with \"a shooting zinging sensation along the right wrist\"  She does endorse a personal history of vitamin D deficiency and notes prolonged healing time with prior fractures of the foot.       Past Medical History:   Diagnosis Date    Abnormal Pap smear of cervix     Abnormal uterine bleeding     Acid reflux     Anxiety     Anxiety and depression     Arthritis     Asthma     B12 deficiency 05/05/2021    Bipolar 1 disorder     Body piercing     EARS, NOSE, BELLY BUTTON    Dysphagia     REPORTS SOMETIMES FEELS LIKE \"FOOD GETS STUCK\"    Fracture     HISTORY OF RIGHT WRIST AS A CHILD    Fracture of wrist 1999    treated with cast/R wrist    Goiter     x3    Heart rate fast     states usually 120-130 (states on med for this)    History of migraine     Hx of exercise stress test 04/01/2020    Inappropriate sinus tachycardia     per Dr. Dong's note on 8/12/20.      Injury of neck     Insomnia     " Iron deficiency     Migraine     Ovarian cyst     Palpitations     PONV (postoperative nausea and vomiting)     Scoliosis     Tachycardia     Tattoo     Thyroid nodule     Toe fracture     right great toe    Wears glasses         Past Surgical History:   Procedure Laterality Date    APPENDECTOMY  2016    CHOLECYSTECTOMY      COLPOSCOPY      CYST REMOVAL      OVARIAN CYST    DILATATION AND CURETTAGE      ENDOSCOPY N/A 12/01/2017    Procedure: ESOPHAGOGASTRODUODENOSCOPY WITH COLD FORCEP BIOPSY;  Surgeon: Danilo Shabazz MD;  Location:  [facility];  Service:     ENDOSCOPY N/A 09/03/2020    Procedure: ESOPHAGOGASTRODUODENOSCOPY WITH BIOPSIES AND DILATATION;  Surgeon: Bhupinder Montenegro MD;  Location:  [facility];  Service: Gastroenterology;  Laterality: N/A;    ESOPHAGEAL MOTILITY STUDY N/A 11/10/2020    Procedure: ESOPHAGEAL MANOMETRY;  Surgeon: Chivo Mcmahan MD;  Location:  [facility];  Service: Gastroenterology;  Laterality: N/A;    HIP SURGERY Right 2014    NO HARDWARE; RIGHT HIP, REPORTS HAD BONES SCRAPED    HYSTERECTOMY      SHOULDER ARTHROSCOPY Right 10/11/2018    NO HARDWARE; Procedure: Right shoulder diagnostic arthroscopy, limited rotator cuff debridement;  Surgeon: Tino Uribe MD;  Location:  [facility];  Service: Orthopedics    THYROID BIOPSY      goiter    THYROIDECTOMY Left 02/24/2021    PARTIAL THYROID LOBECTOMY LEFT;  Surgeon: Yves Contreras MD;  Location:  [facility];  Service: General;  Laterality: Left;    TOTAL LAPAROSCOPIC HYSTERECTOMY SALPINGECTOMY Bilateral 10/09/2023    Procedure: TOTAL LAPAROSCOPIC HYSTERECTOMY BILATERAL SALPINGECTOMY WITH DAVINCI ROBOT AND FULGERATION OF PERITONEAL LESION;  Surgeon: Maria Elena Garvin MD;  Location:  [facility];  Service: Robotics - DaVinci;  Laterality: Bilateral;    WISDOM TOOTH EXTRACTION         Family History   Problem Relation Age of Onset    Diabetes Mother     Arthritis Mother     No Known Problems Father     Cancer Maternal  "Grandmother     Diabetes Maternal Grandmother     Heart attack Maternal Grandmother     Arthritis Maternal Grandmother     Heart attack Paternal Grandmother     Heart disease Sister     Migraines Sister     Cancer Paternal Grandfather         Lung    Colon cancer Neg Hx     Cirrhosis Neg Hx     Liver cancer Neg Hx     Liver disease Neg Hx         Social History     Socioeconomic History    Marital status:      Spouse name: Rajat Medrano    Number of children: 2    Highest education level: Some college, no degree   Tobacco Use    Smoking status: Former     Current packs/day: 0.00     Average packs/day: 0.5 packs/day for 17.0 years (8.5 ttl pk-yrs)     Types: Cigarettes     Start date: 10/17/2003     Quit date: 10/17/2020     Years since quitting: 3.7     Passive exposure: Never    Smokeless tobacco: Never   Vaping Use    Vaping status: Never Used   Substance and Sexual Activity    Alcohol use: No    Drug use: Not Currently     Types: Marijuana     Comment: Prior to pregnancy    Sexual activity: Defer       Allergies   Allergen Reactions    Onion Itching     REPORTS CAUSES MIGRAINES and MAKES THROAT ITCHY      Cabbage Headache    Msg [Monosodium Glutamate] Headache    Sulfa Antibiotics Hives       Review of Systems  See hpi      Objective   Temp 98.2 °F (36.8 °C)   Ht 154.7 cm (60.9\")   Wt 48.7 kg (107 lb 6.4 oz)   BMI 20.36 kg/m²   Physical Exam  Vitals and nursing note reviewed.   Constitutional:       Appearance: Normal appearance.   Musculoskeletal:      Right forearm: No edema or deformity.      Right wrist: Bony tenderness and snuff box tenderness (mild ttp) present. No deformity, effusion, lacerations or crepitus. Normal pulse.      Right hand: Normal capillary refill. Normal pulse.   Neurological:      Mental Status: She is alert and oriented to person, place, and time.       Ortho Exam    Neurologic Exam     Mental Status   Oriented to person, place, and time.          The fiberglass cast was removed " in clinic today.  Patient still reports pain to the radial aspect of the right wrist as well as tenderness to palpation along the first extensor tendon.  As expected, she does have stiffness with flexion of the right thumb metacarpal phalangeal joint and thumb DIP joint    Assessment & Plan   Independent Review of Radiographic Studies:    4 views of the of the right wrist, indication to evaluate fracture healing, and compared with prior imaging, shows interim fracture healing, callus and or periostitis in continued good position and alignment.      Procedures      Diagnoses and all orders for this visit:    1. Closed nondisplaced fracture of middle third of scaphoid bone of right wrist, initial encounter (Primary)  -     Ambulatory Referral to Physical Therapy for Evaluation & Treatment    2. Arthralgia of wrist, right  -     Ambulatory Referral to Physical Therapy for Evaluation & Treatment    3. Tenosynovitis, de Quervain  -     Ambulatory Referral to Physical Therapy for Evaluation & Treatment       Discussion of orthopedic goals  Orthopedic activities reviewed and patient expressed appreciation  Ice, heat, and/or modalities as beneficial  Physical therapy referral given  Reduced physical activity as appropriate and avoid offending activity  Weight bearing parameters reviewed    Recommendations/Plan:      I explained to the patient the blood flow of the scaphoid bone and the possibility of nonunion of the scaphoid. However, due to the recent immobilization and the x-ray imaging today revealing satisfactory callus formation I suspect most of her symptoms are related to the first extensor tendinopathy of the right wrist.  Avoid any heavy lifting, pushing or pulling.  I would like for her to enroll in formal hand therapy and if no improvement in 4 weeks we may refer her to hand subspecialty for a second opinion or additional input on her condition.  Patient is content with the plan of care.    Patient agreeable to  call or return sooner for any concerns.    BMI is within normal parameters. No other follow-up for BMI required.

## 2024-07-26 ENCOUNTER — TELEPHONE (OUTPATIENT)
Dept: ORTHOPEDIC SURGERY | Facility: CLINIC | Age: 34
End: 2024-07-26

## 2024-07-26 DIAGNOSIS — S62.024A CLOSED NONDISPLACED FRACTURE OF MIDDLE THIRD OF SCAPHOID BONE OF RIGHT WRIST, INITIAL ENCOUNTER: Primary | ICD-10-CM

## 2024-07-26 DIAGNOSIS — M65.4 TENOSYNOVITIS, DE QUERVAIN: ICD-10-CM

## 2024-07-26 NOTE — TELEPHONE ENCOUNTER
"HUB AGENT ATTEMPTED CLINICAL WARM TRANSFER - NO ANSWER     PATIENT STATED SHE's CALLING BACK AFTER A CONVERSATION w/\"SOMEONE IN THE OFFICE EARLIER TODAY\" FRI 7/26 re PHYSICAL THERAPY FOR PATIENT's RIGHT WRIST (ORDER [id number] IN EPIC)     VERSUS GOING AHEAD & DISCUSSING SURGERY - PATIENT STATED THE PERSON SHE WAS SPEAKING WITH TOLD PATIENT THEY \"WOULD CHECK w/ERIK TAYLOR FOR HIS RECOMMENDATIONS & CALL [PATIENT] BACK\"     NO DOCUMENTATION re CALL / CONVERSATION SEEN IN [facility] - PLEASE CALL / LEAVE VMAIL PATIENT's CELL [phone] TO DISCUSS     THANKS   "

## 2024-07-26 NOTE — TELEPHONE ENCOUNTER
Called patient, advised a message was sent to Tk and we will call her back as soon as we have an answer. He is not in the office today so it will be Monday. Patient verbalized understanding.

## 2024-07-29 NOTE — TELEPHONE ENCOUNTER
Caller: OTONIELPERRY CHAMBERLAIN    Phone Number: [phone] (home)     Reason for Call:   PATIENT CALLING IN TO SEE IF SHE NEEDS TO STILL GO TO PHYSICAL THERAPY BEFORE SEEING HAND SPECIALIST

## 2024-08-01 ENCOUNTER — OFFICE VISIT (OUTPATIENT)
Dept: ORTHOPEDIC SURGERY | Facility: CLINIC | Age: 34
End: 2024-08-01
Payer: COMMERCIAL

## 2024-08-01 VITALS
HEIGHT: 62 IN | DIASTOLIC BLOOD PRESSURE: 84 MMHG | WEIGHT: 107.8 LBS | SYSTOLIC BLOOD PRESSURE: 118 MMHG | BODY MASS INDEX: 19.84 KG/M2

## 2024-08-01 DIAGNOSIS — S62.024S CLOSED NONDISPLACED FRACTURE OF MIDDLE THIRD OF SCAPHOID BONE OF RIGHT WRIST, SEQUELA: Primary | ICD-10-CM

## 2024-08-01 PROBLEM — S62.024A CLOSED NONDISPLACED FRACTURE OF MIDDLE THIRD OF NAVICULAR BONE OF RIGHT WRIST: Status: ACTIVE | Noted: 2024-08-01

## 2024-08-01 RX ORDER — METHYLPREDNISOLONE 4 MG/1
1 TABLET ORAL DAILY
Qty: 21 TABLET | Refills: 0 | Status: SHIPPED | OUTPATIENT
Start: 2024-08-01

## 2024-08-08 ENCOUNTER — HOSPITAL ENCOUNTER (OUTPATIENT)
Dept: CT IMAGING | Facility: HOSPITAL | Age: 34
Discharge: HOME | End: 2024-08-08
Admitting: STUDENT IN AN ORGANIZED HEALTH CARE EDUCATION/TRAINING PROGRAM
Payer: COMMERCIAL

## 2024-08-08 DIAGNOSIS — S62.024S CLOSED NONDISPLACED FRACTURE OF MIDDLE THIRD OF SCAPHOID BONE OF RIGHT WRIST, SEQUELA: ICD-10-CM

## 2024-08-08 PROCEDURE — 73200 CT UPPER EXTREMITY W/O DYE: CPT

## 2024-08-12 ENCOUNTER — OFFICE VISIT (OUTPATIENT)
Dept: ORTHOPEDIC SURGERY | Facility: CLINIC | Age: 34
End: 2024-08-12
Payer: COMMERCIAL

## 2024-08-12 VITALS
WEIGHT: 107 LBS | SYSTOLIC BLOOD PRESSURE: 118 MMHG | BODY MASS INDEX: 19.69 KG/M2 | HEIGHT: 62 IN | DIASTOLIC BLOOD PRESSURE: 84 MMHG

## 2024-08-12 DIAGNOSIS — S62.024D CLOSED NONDISPLACED FRACTURE OF MIDDLE THIRD OF SCAPHOID OF RIGHT WRIST WITH ROUTINE HEALING, SUBSEQUENT ENCOUNTER: Primary | ICD-10-CM

## 2024-08-12 PROCEDURE — 99213 OFFICE O/P EST LOW 20 MIN: CPT | Performed by: STUDENT IN AN ORGANIZED HEALTH CARE EDUCATION/TRAINING PROGRAM

## 2024-08-12 RX ORDER — PHENAZOPYRIDINE HYDROCHLORIDE 200 MG/1
TABLET, FILM COATED ORAL
COMMUNITY
Start: 2024-08-08

## 2024-08-12 RX ORDER — CEFDINIR 300 MG/1
1 CAPSULE ORAL EVERY 12 HOURS SCHEDULED
COMMUNITY
Start: 2024-08-08

## 2024-08-12 NOTE — PROGRESS NOTES
[facility]     Office Visit       Date: 08/12/2024   Patient Name: Radha Medrano  MRN: [id number]  YOB: 1990    Referring Physician: No ref. provider found     Chief Complaint:   Chief Complaint   Patient presents with    Follow-up     1.5wk f/u-right scaphoid waist fracture, DOI 4/27/2024, with residual wrist/digital stiffness.   (CT 8/1/24)       History of Present Illness:   Radha Medrano is a 34 y.o. female RHD to clinic for follow-up of CT scan results.  Patient sustained a right scaphoid waist fracture, DOI 4/27/2024.  She was treated nonoperatively in a thumb spica cast.  She was referred to me after increased pain after discontinuation of the cast.  She has been working with the hand therapist.  She reports there is been no change in her symptoms.  She continues to endorse stiffness and pain with wrist and digit range of motion.  She has needed to use her removable wrist brace as she states that she is unable to use her hand without it.  She was provided a Medrol Dosepak at her last clinic visit which she states had no effect on her symptoms.  No reinjury.  No other complaints or concerns.    Subjective   Review of Systems:   Review of Systems   Constitutional:  Negative for chills, fever, unexpected weight gain and unexpected weight loss.   HENT:  Negative for congestion, postnasal drip and rhinorrhea.    Eyes:  Negative for blurred vision.   Respiratory:  Negative for shortness of breath.    Cardiovascular:  Negative for leg swelling.   Gastrointestinal:  Negative for abdominal pain, nausea and vomiting.   Genitourinary:  Negative for difficulty urinating.   Musculoskeletal:  Positive for arthralgias. Negative for gait problem, joint swelling and myalgias.   Skin:  Negative for skin lesions and wound.   Neurological:  Negative for dizziness, weakness, light-headedness and numbness.   Hematological:  Does  "not bruise/bleed easily.   Psychiatric/Behavioral:  Negative for depressed mood.       Pertinent review of systems per HPI    I reviewed the patient's chief complaint, history of present illness, review of systems, past medical history, surgical history, family history, social history, medications and allergy list in the EMR on 08/12/2024 and agree with the findings above.    Objective    Vital Signs:   Vitals:    08/12/24 0730   BP: 118/84   Weight: 48.5 kg (107 lb)   Height: 157.7 cm (62.09\")     BMI: BMI is within normal parameters. No other follow-up for BMI required.     General: No acute distress. Alert and oriented.   Cardiovascular: Palpable radial pulse.   Respiratory: Breathing is nonlabored.     Ortho Exam:  Examination of the right upper extremity demonstrates no obvious swelling or deformity.  No skin lesions or abrasions.  She remains diffusely tender on examination today that includes the thumb CMC joint, scaphoid tubercle, anatomic snuffbox, thumb MCP joint, and first extensor compartment, dorsal radiocarpal joint, EDC tendons and ulnar styloid.  Wrist range of motion is limited achieving 40 degrees of extension and 15 degrees of flexion.  Sensation is intact to light touch throughout the hand.  Warm and well-perfused distally.    Imaging / Studies:    Imaging Results (Last 24 Hours)       ** No results found for the last 24 hours. **        CT scan right wrist obtained on 8/8/2024 was personally reviewed and interpreted by myself.  Prior fracture at the scaphoid waist is faintly present.  No obvious fracture line.  No evidence of nonunion.    Right wrist x-rays obtained on 7/24/2024 were personally reviewed interpreted by myself.  These demonstrate no definitive fracture line within the scaphoid.  Sclerotic area noted within the head of the ulna.     Right wrist x-rays obtained on 7/3/2024 were personally reviewed inter by myself.  These do demonstrate a subtle lucency within the scaphoid waist.   "   Right wrist MRI obtained on 5/31/2024 was personally reviewed and interpreted by myself.  These demonstrate edema throughout the scaphoid with a fracture line noted at the waist.  Appears nondisplaced and transverse in orientation.    Assessment / Plan    Assessment/Plan:   Radha Medrano is a 34 y.o. female with healing right scaphoid waist fracture, DOI 4/27/2024, with residual wrist/digital stiffness.     I discussed with the patient her CT scan findings demonstrate evidence of interval healing.  I am unable to appreciate a distinct fracture line on the CT scan.  The patient expressed frustration during discussions.  She states that she should not be in pain and that physical therapy should not be painful.  She reports that she is unable to use her hand and wrist at home caring for her kids.  She does not feel that this is normal.  I reassured her that her imaging demonstrates interval healing and that 50 percent fracture healing has been shown in the literature to be safe to begin range of motion and mobilization.  I reiterated that the stiffness secondary to cast wear is likely causing her increase in pain and that therapy is recommended and encouraged her to work through stiffness and improve her pain.  She did not seem satisfied with answers that were provided today.  I offered her a third opinion, however she declined and states that doctors are not helping her anyways.  My recommendations are to continue with hand therapy, discontinue use of immobilization, and for return to clinic in 2 months for reevaluation of her pain and motion.  She did not express agreement but did express understanding.      ICD-10-CM ICD-9-CM   1. Closed nondisplaced fracture of middle third of scaphoid of right wrist with routine healing, subsequent encounter  S62.024D V54.19     Follow Up:   Return in about 2 months (around 10/12/2024) for Follow Up.      Gemini Reeves MD  [facility]

## 2024-08-13 ENCOUNTER — OFFICE VISIT (OUTPATIENT)
Dept: ORTHOPEDIC SURGERY | Facility: CLINIC | Age: 34
End: 2024-08-13
Payer: COMMERCIAL

## 2024-08-13 VITALS
SYSTOLIC BLOOD PRESSURE: 128 MMHG | WEIGHT: 103.4 LBS | BODY MASS INDEX: 19.03 KG/M2 | DIASTOLIC BLOOD PRESSURE: 86 MMHG | HEIGHT: 62 IN

## 2024-08-13 DIAGNOSIS — G90.511 COMPLEX REGIONAL PAIN SYNDROME TYPE 1 OF RIGHT UPPER EXTREMITY: Primary | ICD-10-CM

## 2024-08-13 DIAGNOSIS — S62.024D CLOSED NONDISPLACED FRACTURE OF MIDDLE THIRD OF SCAPHOID OF RIGHT WRIST WITH ROUTINE HEALING, SUBSEQUENT ENCOUNTER: ICD-10-CM

## 2024-08-13 DIAGNOSIS — M25.631 WRIST STIFFNESS, RIGHT: ICD-10-CM

## 2024-08-13 PROCEDURE — 99214 OFFICE O/P EST MOD 30 MIN: CPT | Performed by: PHYSICIAN ASSISTANT

## 2024-08-13 RX ORDER — LIDOCAINE 50 MG/G
1 OINTMENT TOPICAL 3 TIMES DAILY PRN
Qty: 50 G | Refills: 1 | Status: SHIPPED | OUTPATIENT
Start: 2024-08-13

## 2024-08-13 RX ORDER — GABAPENTIN 300 MG/1
300 CAPSULE ORAL 3 TIMES DAILY
Qty: 15 CAPSULE | Refills: 0 | Status: SHIPPED | OUTPATIENT
Start: 2024-08-13

## 2024-08-13 NOTE — PROGRESS NOTES
Subjective   Patient ID: Radha Medrano is a 34 y.o. right hand dominant female is being seen for orthopaedic evaluation today for right wrist   Follow-up of the Right Wrist (Closed nondisplaced fracture of middle third of scaphoid bone of right wrist DOI: 4/27/2024. Saw hand surgeon on 8/14/24 and did not like what the doctor told her, so would like to follow up on ct with our office.     Patient presents today for continued right wrist arthralgia.  Patient initially injured her right wrist 4/27/2024.  She collided with another player and fell on her wrist.  The initial x-rays were negative for acute fracture.  She was placed in a Velcro wrist brace while in the emergency room.  Despite being immobilized in a Velcro wrist brace for 2 weeks she continued to experience right wrist pain and a MRI of the right wrist was ordered which revealed a nondisplaced scaphoid waist fracture.  Patient was then transition to a short arm thumb spica cast.  She wore the thumb spica cast until  7/24/2024.  Unfortunately, patient still experiences discomfort to the right wrist.  She was recently evaluated by our hand surgeon in [place] who ordered a CT scan of the right wrist.  There was no evidence of acute fracture on the new CT nor was there evidence of nonunion.    Patient has tried oral analgesics over-the-counter as well as a Medrol Dosepak without improvement in symptoms.    Patient also endorses that at times she feels a coolness sensation to the right hand as well as tingling to the digits of the right hand.  Since having the cast removed, she endorses that even a longsleeve shirt touching the end of her wrist will often cause discomfort and pain.             Past Medical History:   Diagnosis Date    Abnormal Pap smear of cervix     Abnormal uterine bleeding     Acid reflux     Anxiety     Anxiety and depression     Arthritis     Asthma     B12 deficiency 05/05/2021    Bipolar 1 disorder     Body piercing     EARS, NOSE,  "BELLY BUTTON    Dysphagia     REPORTS SOMETIMES FEELS LIKE \"FOOD GETS STUCK\"    Fracture     HISTORY OF RIGHT WRIST AS A CHILD    Fracture of wrist 1999    treated with cast/R wrist    Goiter     x3    Heart rate fast     states usually 120-130 (states on med for this)    History of migraine     Hx of exercise stress test 04/01/2020    Inappropriate sinus tachycardia     per Dr. Dong's note on 8/12/20.      Injury of neck     Insomnia     Iron deficiency     Migraine     Ovarian cyst     Palpitations     PONV (postoperative nausea and vomiting)     Scoliosis     Tachycardia     Tattoo     Thyroid nodule     Toe fracture     right great toe    Wears glasses         Past Surgical History:   Procedure Laterality Date    APPENDECTOMY  2016    CHOLECYSTECTOMY      COLPOSCOPY      CYST REMOVAL      OVARIAN CYST    DILATATION AND CURETTAGE      ENDOSCOPY N/A 12/01/2017    Procedure: ESOPHAGOGASTRODUODENOSCOPY WITH COLD FORCEP BIOPSY;  Surgeon: Danilo Shabazz MD;  Location: [facility] ENDOSCOPY;  Service:     ENDOSCOPY N/A 09/03/2020    Procedure: ESOPHAGOGASTRODUODENOSCOPY WITH BIOPSIES AND DILATATION;  Surgeon: Bhupinder Montenegro MD;  Location:  [facility] ENDOSCOPY;  Service: Gastroenterology;  Laterality: N/A;    ESOPHAGEAL MOTILITY STUDY N/A 11/10/2020    Procedure: ESOPHAGEAL MANOMETRY;  Surgeon: Chivo Mcmahan MD;  Location:  [facility];  Service: Gastroenterology;  Laterality: N/A;    HIP SURGERY Right 2014    NO HARDWARE; RIGHT HIP, REPORTS HAD BONES SCRAPED    HYSTERECTOMY      SHOULDER ARTHROSCOPY Right 10/11/2018    NO HARDWARE; Procedure: Right shoulder diagnostic arthroscopy, limited rotator cuff debridement;  Surgeon: Tino Uribe MD;  Location:  [facility] OR;  Service: Orthopedics    THYROID BIOPSY      goiter    THYROIDECTOMY Left 02/24/2021    PARTIAL THYROID LOBECTOMY LEFT;  Surgeon: Yves Contreras MD;  Location:  [facility] OR;  Service: General;  Laterality: Left;    TOTAL LAPAROSCOPIC " HYSTERECTOMY SALPINGECTOMY Bilateral 10/09/2023    Procedure: TOTAL LAPAROSCOPIC HYSTERECTOMY BILATERAL SALPINGECTOMY WITH DAVINCI ROBOT AND FULGERATION OF PERITONEAL LESION;  Surgeon: Maria Elena Garvin MD;  Location: [facility];  Service: Robotics - DaVinci;  Laterality: Bilateral;    WISDOM TOOTH EXTRACTION         Family History   Problem Relation Age of Onset    Diabetes Mother     Arthritis Mother     No Known Problems Father     Cancer Maternal Grandmother     Diabetes Maternal Grandmother     Heart attack Maternal Grandmother     Arthritis Maternal Grandmother     Heart attack Paternal Grandmother     Heart disease Sister     Migraines Sister     Cancer Paternal Grandfather         Lung    Colon cancer Neg Hx     Cirrhosis Neg Hx     Liver cancer Neg Hx     Liver disease Neg Hx         Social History     Socioeconomic History    Marital status:      Spouse name: Rajat Medrano    Number of children: 2    Highest education level: Some college, no degree   Tobacco Use    Smoking status: Former     Current packs/day: 0.00     Average packs/day: 0.5 packs/day for 17.0 years (8.5 ttl pk-yrs)     Types: Cigarettes     Start date: 10/17/2003     Quit date: 10/17/2020     Years since quitting: 3.8     Passive exposure: Never    Smokeless tobacco: Never   Vaping Use    Vaping status: Never Used   Substance and Sexual Activity    Alcohol use: No    Drug use: Not Currently     Types: Marijuana     Comment: Prior to pregnancy    Sexual activity: Defer       Allergies   Allergen Reactions    Onion Itching     REPORTS CAUSES MIGRAINES and MAKES THROAT ITCHY      Cabbage Headache    Msg [Monosodium Glutamate] Headache    Sulfa Antibiotics Hives       Review of Systems   Musculoskeletal:  Positive for arthralgias (right wrist) and joint swelling (right wrist, as well as stiffness).   Neurological:  Positive for numbness (tingling to the right thumb distal tip).       Objective   /86 (BP Location: Left arm, Patient  "Position: Sitting, Cuff Size: Adult)   Ht 157.7 cm (62.09\")   Wt 46.9 kg (103 lb 6.4 oz)   BMI 18.86 kg/m²   Physical Exam  Vitals and nursing note reviewed.   Constitutional:       Appearance: Normal appearance.   Pulmonary:      Effort: Pulmonary effort is normal.   Musculoskeletal:      Right wrist: Tenderness (ECU tendon without crepitus,) present. No deformity or snuff box tenderness. Normal pulse.      Right hand: Decreased sensation (distal tip of right thumb only on volar aspect). Normal capillary refill. Normal pulse.      Comments: TTP to the Radiocarpal joint space dorsal aspect.     Neurological:      Mental Status: She is alert and oriented to person, place, and time.       Ortho Exam    Neurologic Exam     Mental Status   Oriented to person, place, and time.      There is a slight mottled appearance to the palmar aspect of the right hand when compared to the contralateral hand.  The right wrist range of motion is limited with approximately 30 degrees of extension and 10 -to 15 degrees of flexion.        Assessment & Plan   Independent Review of Radiographic Studies:    No new imaging done today.  We reviewed the CT imaging of the right wrist in the clinic today.  I concur with Dr. Moreno the fracture line observed within the scaphoid is very faint.  There is no convincing CT evidence of nonunion.    Procedures  [] No procedures were performed in office today.    Diagnoses and all orders for this visit:    1. Complex regional pain syndrome type 1 of right upper extremity (Primary)  -     lidocaine (XYLOCAINE) 5 % ointment; Apply 1 Application topically to the appropriate area as directed 3 (Three) Times a Day As Needed (for pain).  Dispense: 50 g; Refill: 1  -     gabapentin (NEURONTIN) 300 MG capsule; Take 1 capsule by mouth 3 (Three) Times a Day.  Dispense: 15 capsule; Refill: 0    2. Closed nondisplaced fracture of middle third of scaphoid of right wrist with routine healing, subsequent " encounter  -     lidocaine (XYLOCAINE) 5 % ointment; Apply 1 Application topically to the appropriate area as directed 3 (Three) Times a Day As Needed (for pain).  Dispense: 50 g; Refill: 1  -     gabapentin (NEURONTIN) 300 MG capsule; Take 1 capsule by mouth 3 (Three) Times a Day.  Dispense: 15 capsule; Refill: 0    3. Wrist stiffness, right       Discussion of orthopedic goals  Risk, benefits, and merits of treatment alternatives reviewed with the patient and questions answered  Physical therapy ongoing  Reduced physical activity as appropriate and avoid offending activity  Weight bearing parameters reviewed    Recommendations/Plan:  Patient is encouraged to call or return for any issues or concerns.    Based off the patient's symptoms reported as a coolness to the digits of the right hand, slight mottled skin appearance, hypersensitivity with further weight objects, I suspect she may be experiencing RSD/complex regional pain syndrome.  Patient may also have a component of tendinopathy as well. I would like to write a one time rx for gabapentin and topical lidocaine.  We discussed the potential for addiction while taking gabapentin.  A Alin and Rx agreement were obtained in clinic today.    I encouraged her to no longer use wrist immobilization as this could further irritate the symptoms of tendinitis.  A handout/printout was provided regarding RSD.  Patient agreeable to call or return sooner for any concerns.  I would like to recheck the patient in 4 to 6 weeks.  BMI is within normal parameters. No other follow-up for BMI required.

## 2024-09-09 ENCOUNTER — OFFICE VISIT (OUTPATIENT)
Dept: ORTHOPEDIC SURGERY | Facility: CLINIC | Age: 34
End: 2024-09-09
Payer: COMMERCIAL

## 2024-09-09 VITALS
DIASTOLIC BLOOD PRESSURE: 60 MMHG | BODY MASS INDEX: 19.77 KG/M2 | SYSTOLIC BLOOD PRESSURE: 92 MMHG | HEIGHT: 62 IN | WEIGHT: 107.4 LBS

## 2024-09-09 DIAGNOSIS — R20.2 RIGHT HAND PARESTHESIA: ICD-10-CM

## 2024-09-09 DIAGNOSIS — G90.511 COMPLEX REGIONAL PAIN SYNDROME TYPE 1 OF RIGHT UPPER EXTREMITY: Primary | ICD-10-CM

## 2024-09-09 DIAGNOSIS — S62.024D CLOSED NONDISPLACED FRACTURE OF MIDDLE THIRD OF SCAPHOID OF RIGHT WRIST WITH ROUTINE HEALING, SUBSEQUENT ENCOUNTER: ICD-10-CM

## 2024-09-09 PROCEDURE — 99213 OFFICE O/P EST LOW 20 MIN: CPT | Performed by: PHYSICIAN ASSISTANT

## 2024-09-09 NOTE — PROGRESS NOTES
"Subjective   Patient ID: Radha Medrano is a 34 y.o. right hand dominant female   Follow-up of the Right Wrist (Closed nondisplaced fracture of middle third of scaphoid of right wrist DOI:  4/27/24, reports physical therapy is helpful)         History of Present Illness    Patient is following up for a scheduled appointment to reevaluate the right wrist arthralgia after suffering a closed nondisplaced fracture of the middle third of the scaphoid on 4/27/2024.  Patient was immobilized during a proper time span but unfortunately has developed what I feel is highly related to complex regional pain syndrome type I.  Patient is pleased to report she is feeling some better.  She still has occasional she describes as aggravating moments with the right wrist.  She states gripping or grasping will often cause a shooting pain from the thumb into the right forearm.  There is occasional tingling to the right hand.  She especially notices a \"sandpaper sensation to the thumb and tips of the right digits.  She is still enrolled in physical therapy and reports the TENS unit greatly improves her symptoms temporarily.  She was able to use gabapentin and topical lidocaine which she also feels improved her symptoms.         Past Medical History:   Diagnosis Date    Abnormal Pap smear of cervix     Abnormal uterine bleeding     Acid reflux     Anxiety     Anxiety and depression     Arthritis     Asthma     B12 deficiency 05/05/2021    Bipolar 1 disorder     Body piercing     EARS, NOSE, BELLY BUTTON    Dysphagia     REPORTS SOMETIMES FEELS LIKE \"FOOD GETS STUCK\"    Fracture     HISTORY OF RIGHT WRIST AS A CHILD    Fracture of wrist 1999    treated with cast/R wrist    Goiter     x3    Heart rate fast     states usually 120-130 (states on med for this)    History of migraine     Hx of exercise stress test 04/01/2020    Inappropriate sinus tachycardia     per Dr. Dong's note on 8/12/20.      Injury of neck     Insomnia     Iron " deficiency     Migraine     Ovarian cyst     Palpitations     PONV (postoperative nausea and vomiting)     Scoliosis     Tachycardia     Tattoo     Thyroid nodule     Toe fracture     right great toe    Wears glasses         Past Surgical History:   Procedure Laterality Date    APPENDECTOMY  2016    CHOLECYSTECTOMY      COLPOSCOPY      CYST REMOVAL      OVARIAN CYST    DILATATION AND CURETTAGE      ENDOSCOPY N/A 12/01/2017    Procedure: ESOPHAGOGASTRODUODENOSCOPY WITH COLD FORCEP BIOPSY;  Surgeon: Danilo Shabazz MD;  Location:  [facility];  Service:     ENDOSCOPY N/A 09/03/2020    Procedure: ESOPHAGOGASTRODUODENOSCOPY WITH BIOPSIES AND DILATATION;  Surgeon: Bhupinder Montenegro MD;  Location:  [facility]COPY;  Service: Gastroenterology;  Laterality: N/A;    ESOPHAGEAL MOTILITY STUDY N/A 11/10/2020    Procedure: ESOPHAGEAL MANOMETRY;  Surgeon: Chivo Mcmahan MD;  Location:  [facility];  Service: Gastroenterology;  Laterality: N/A;    HIP SURGERY Right 2014    NO HARDWARE; RIGHT HIP, REPORTS HAD BONES SCRAPED    HYSTERECTOMY      SHOULDER ARTHROSCOPY Right 10/11/2018    NO HARDWARE; Procedure: Right shoulder diagnostic arthroscopy, limited rotator cuff debridement;  Surgeon: Tino Uribe MD;  Location:  [facility];  Service: Orthopedics    THYROID BIOPSY      goiter    THYROIDECTOMY Left 02/24/2021    PARTIAL THYROID LOBECTOMY LEFT;  Surgeon: Yves Contreras MD;  Location:  [facility];  Service: General;  Laterality: Left;    TOTAL LAPAROSCOPIC HYSTERECTOMY SALPINGECTOMY Bilateral 10/09/2023    Procedure: TOTAL LAPAROSCOPIC HYSTERECTOMY BILATERAL SALPINGECTOMY WITH DAVINCI ROBOT AND FULGERATION OF PERITONEAL LESION;  Surgeon: Maria Elena Garvin MD;  Location:  [facility];  Service: Robotics - DaVinci;  Laterality: Bilateral;    WISDOM TOOTH EXTRACTION         Family History   Problem Relation Age of Onset    Diabetes Mother     Arthritis Mother     No Known Problems Father     Cancer Maternal  "Grandmother     Diabetes Maternal Grandmother     Heart attack Maternal Grandmother     Arthritis Maternal Grandmother     Heart attack Paternal Grandmother     Heart disease Sister     Migraines Sister     Cancer Paternal Grandfather         Lung    Colon cancer Neg Hx     Cirrhosis Neg Hx     Liver cancer Neg Hx     Liver disease Neg Hx         Social History     Socioeconomic History    Marital status:      Spouse name: Rajat Medrano    Number of children: 2    Highest education level: Some college, no degree   Tobacco Use    Smoking status: Former     Current packs/day: 0.00     Average packs/day: 0.5 packs/day for 17.0 years (8.5 ttl pk-yrs)     Types: Cigarettes     Start date: 10/17/2003     Quit date: 10/17/2020     Years since quitting: 3.8     Passive exposure: Never    Smokeless tobacco: Never   Vaping Use    Vaping status: Never Used   Substance and Sexual Activity    Alcohol use: No    Drug use: Not Currently     Types: Marijuana     Comment: Prior to pregnancy    Sexual activity: Defer       Allergies   Allergen Reactions    Onion Itching     REPORTS CAUSES MIGRAINES and MAKES THROAT ITCHY      Cabbage Headache    Msg [Monosodium Glutamate] Headache    Sulfa Antibiotics Hives       Review of Systems   Musculoskeletal:  Positive for arthralgias (right wrist).       Objective   BP 92/60 (BP Location: Left arm, Patient Position: Sitting, Cuff Size: Adult)   Ht 157.7 cm (62.09\")   Wt 48.7 kg (107 lb 6.4 oz)   BMI 19.59 kg/m²   Physical Exam  Vitals and nursing note reviewed.   Constitutional:       Appearance: Normal appearance.   Pulmonary:      Effort: Pulmonary effort is normal.   Musculoskeletal:      Right wrist: Tenderness and bony tenderness present. No deformity or snuff box tenderness. Normal pulse.      Right hand: Decreased sensation of the median distribution. Normal capillary refill. Normal pulse.   Neurological:      Mental Status: She is alert and oriented to person, place, and " time.   Psychiatric:         Behavior: Behavior normal.       Right Hand Exam     Range of Motion   Wrist   Extension:  35   Flexion:  60   Hand   MP Thumb: 70     Muscle Strength   : 4/5     Tests   Tinel's sign (median nerve): positive  Finkelstein's test: positive    Other   Erythema: absent  Sensation: decreased (distal aspect of the right thumb)  Pulse: present        No anatomical snuffbox ttp  She is now able to make a composite right wrist fist    Neurologic Exam     Mental Status   Oriented to person, place, and time.        Faint positive right wrist Finkelstein test    Assessment & Plan   Independent Review of Radiographic Studies:    No new imaging done today.      Procedures      Diagnoses and all orders for this visit:    1. Complex regional pain syndrome type 1 of right upper extremity (Primary)  -     EMG & Nerve Conduction Test    2. Right hand paresthesia  -     EMG & Nerve Conduction Test    3. Closed nondisplaced fracture of middle third of scaphoid of right wrist with routine healing, subsequent encounter       Discussion of orthopedic goals  Orthopedic activities reviewed and patient expressed appreciation  Regular exercise as tolerated  Risk, benefits, and merits of treatment alternatives reviewed with the patient and questions answered  Physical therapy ongoing    Recommendations/Plan:  Patient is encouraged to call or return for any issues or concerns.  I would like to order EMG to rule out Neuritis/CTS  Follow up after EMG of RUE    Patient agreeable to call or return sooner for any concerns.    BMI is within normal parameters. No other follow-up for BMI required.

## 2024-09-25 ENCOUNTER — PROCEDURE VISIT (OUTPATIENT)
Dept: ORTHOPEDIC SURGERY | Facility: CLINIC | Age: 34
End: 2024-09-25
Payer: COMMERCIAL

## 2024-09-25 DIAGNOSIS — M79.641 RIGHT HAND PAIN: Primary | ICD-10-CM

## 2024-09-25 DIAGNOSIS — R20.2 PARESTHESIA: ICD-10-CM

## 2024-10-14 ENCOUNTER — OFFICE VISIT (OUTPATIENT)
Dept: ORTHOPEDIC SURGERY | Facility: CLINIC | Age: 34
End: 2024-10-14
Payer: COMMERCIAL

## 2024-10-14 VITALS
HEIGHT: 62 IN | BODY MASS INDEX: 19.91 KG/M2 | SYSTOLIC BLOOD PRESSURE: 110 MMHG | WEIGHT: 108.2 LBS | DIASTOLIC BLOOD PRESSURE: 54 MMHG

## 2024-10-14 DIAGNOSIS — S69.91XA WRIST INJURY, RIGHT, INITIAL ENCOUNTER: Primary | ICD-10-CM

## 2024-10-14 DIAGNOSIS — M77.8 RIGHT WRIST TENDINITIS: ICD-10-CM

## 2024-10-14 PROCEDURE — 99213 OFFICE O/P EST LOW 20 MIN: CPT | Performed by: PHYSICIAN ASSISTANT

## 2024-10-14 NOTE — PROGRESS NOTES
"Subjective   Patient ID: Radha Medrano is a 34 y.o. right hand dominant female   Pain of the Right Wrist (Reports on 10/12/2024, she picked up her toddler after he was injured and felt a \"pop\" in her wrist and felt sudden pain )         History of Present Illness  Patient presents with an acute injury of the right wrist that occurred on 10/12/2024.  She states she suddenly picked up her toddler after he had fallen and she developed an immediate painful pop to the radial aspect of the right wrist.  Since, she has had pain along the radial aspect of the wrist with radiation of her pain into the mid forearm.  There was no bruising.  No numbness.    Of note, patient originally injured her right wrist 4/27/2024, initial x-rays after her injury were negative for acute osseous abnormality.  A MRI was subsequently ordered due to continued pain that revealed a subacute scaphoid fracture.  Patient was immobilized for a total of 12 weeks and is currently enrolled in physical therapy.  She was subsequently diagnosed with complex regional pain syndrome that she developed after immobilization.  Patient reports she was doing much better from the injury 4/27/2024 until this recent injury that occurred on 10/12/2024.                                                     Past Medical History:   Diagnosis Date    Abnormal Pap smear of cervix     Abnormal uterine bleeding     Acid reflux     Anxiety     Anxiety and depression     Arthritis     Asthma     B12 deficiency 05/05/2021    Bipolar 1 disorder     Body piercing     EARS, NOSE, BELLY BUTTON    Dysphagia     REPORTS SOMETIMES FEELS LIKE \"FOOD GETS STUCK\"    Fracture     HISTORY OF RIGHT WRIST AS A CHILD    Fracture of wrist 1999    treated with cast/R wrist    Goiter     x3    Heart rate fast     states usually 120-130 (states on med for this)    History of migraine     Hx of exercise stress test 04/01/2020    Inappropriate sinus tachycardia     per Dr. Dong's note on " 8/12/20.      Injury of neck     Insomnia     Iron deficiency     Migraine     Ovarian cyst     Palpitations     PONV (postoperative nausea and vomiting)     Scoliosis     Tachycardia     Tattoo     Thyroid nodule     Toe fracture     right great toe    Wears glasses         Past Surgical History:   Procedure Laterality Date    APPENDECTOMY  2016    CHOLECYSTECTOMY      COLPOSCOPY      CYST REMOVAL      OVARIAN CYST    DILATATION AND CURETTAGE      ENDOSCOPY N/A 12/01/2017    Procedure: ESOPHAGOGASTRODUODENOSCOPY WITH COLD FORCEP BIOPSY;  Surgeon: Danilo Shabazz MD;  Location:  [facility];  Service:     ENDOSCOPY N/A 09/03/2020    Procedure: ESOPHAGOGASTRODUODENOSCOPY WITH BIOPSIES AND DILATATION;  Surgeon: Bhupinder Montenegro MD;  Location:  [facility];  Service: Gastroenterology;  Laterality: N/A;    ESOPHAGEAL MOTILITY STUDY N/A 11/10/2020    Procedure: ESOPHAGEAL MANOMETRY;  Surgeon: Chivo Mcmahan MD;  Location:  [facility];  Service: Gastroenterology;  Laterality: N/A;    HIP SURGERY Right 2014    NO HARDWARE; RIGHT HIP, REPORTS HAD BONES SCRAPED    HYSTERECTOMY      SHOULDER ARTHROSCOPY Right 10/11/2018    NO HARDWARE; Procedure: Right shoulder diagnostic arthroscopy, limited rotator cuff debridement;  Surgeon: Tino Uribe MD;  Location:  [facility];  Service: Orthopedics    THYROID BIOPSY      goiter    THYROIDECTOMY Left 02/24/2021    PARTIAL THYROID LOBECTOMY LEFT;  Surgeon: Yves Contreras MD;  Location:  [facility];  Service: General;  Laterality: Left;    TOTAL LAPAROSCOPIC HYSTERECTOMY SALPINGECTOMY Bilateral 10/09/2023    Procedure: TOTAL LAPAROSCOPIC HYSTERECTOMY BILATERAL SALPINGECTOMY WITH DAVINCI ROBOT AND FULGERATION OF PERITONEAL LESION;  Surgeon: Maria Elena Garvin MD;  Location:  [facility];  Service: Robotics - DaVinci;  Laterality: Bilateral;    WISDOM TOOTH EXTRACTION         Family History   Problem Relation Age of Onset    Diabetes Mother     Arthritis Mother      "No Known Problems Father     Cancer Maternal Grandmother     Diabetes Maternal Grandmother     Heart attack Maternal Grandmother     Arthritis Maternal Grandmother     Heart attack Paternal Grandmother     Heart disease Sister     Migraines Sister     Cancer Paternal Grandfather         Lung    Colon cancer Neg Hx     Cirrhosis Neg Hx     Liver cancer Neg Hx     Liver disease Neg Hx         Social History     Socioeconomic History    Marital status:      Spouse name: Rajat Medrano    Number of children: 2    Highest education level: Some college, no degree   Tobacco Use    Smoking status: Former     Current packs/day: 0.00     Average packs/day: 0.5 packs/day for 17.0 years (8.5 ttl pk-yrs)     Types: Cigarettes     Start date: 10/17/2003     Quit date: 10/17/2020     Years since quitting: 3.9     Passive exposure: Never    Smokeless tobacco: Never   Vaping Use    Vaping status: Never Used   Substance and Sexual Activity    Alcohol use: No    Drug use: Not Currently     Types: Marijuana     Comment: Prior to pregnancy    Sexual activity: Defer       Allergies   Allergen Reactions    Onion Itching     REPORTS CAUSES MIGRAINES and MAKES THROAT ITCHY      Cabbage Headache    Msg [Monosodium Glutamate] Headache    Sulfa Antibiotics Hives       Review of Systems  See HPI    Objective   /54 (BP Location: Left arm, Patient Position: Sitting, Cuff Size: Adult)   Ht 157.7 cm (62.09\")   Wt 49.1 kg (108 lb 3.2 oz)   BMI 19.73 kg/m²   Physical Exam  Vitals and nursing note reviewed.   Constitutional:       Appearance: Normal appearance.   Pulmonary:      Effort: Pulmonary effort is normal.   Musculoskeletal:      Right forearm: No bony tenderness.      Right wrist: No swelling, deformity, effusion, lacerations, bony tenderness or snuff box tenderness. Normal pulse.      Right hand: Tenderness present. No swelling or deformity. Normal range of motion. Normal sensation. Normal capillary refill. Normal pulse.        " Hands:       Comments: Normal ROM to all digits of right hand ( active and passive ROM)   Neurological:      Mental Status: She is alert and oriented to person, place, and time.       Ortho Exam    There is no anatomical snuffbox tenderness to palpation of the right wrist.      Assessment & Plan   Independent Review of Radiographic Studies:    X-ray of the right wrist 4 view which includes a scaphoid view performed in our clinic independently reviewed for the evaluation of wrist pain after injury.  Comparison films are available and reviewed.  No evidence of acute osseous abnormality. Scaphoid is intact.      Procedures      Diagnoses and all orders for this visit:    1. Wrist injury, right, initial encounter (Primary)  -     XR Wrist 3+ View Right  -     Ambulatory Referral to Physical Therapy for Evaluation & Treatment    2. Right wrist tendinitis  -     Ambulatory Referral to Physical Therapy for Evaluation & Treatment       Discussion of orthopedic goals  Orthopedic activities reviewed and patient expressed appreciation  Risk, benefits, and merits of treatment alternatives reviewed with the patient and questions answered  Ice, heat, and/or modalities as beneficial  Use brace as instructed    Recommendations/Plan:  Exercise, medications, injections, other patient advice, and return appointment as noted.  Patient is encouraged to call or return for any issues or concerns.  Patient assures me she has a wrist brace at home that she can utilize for the next 2 weeks.  I instructed her to avoid strenuous activity involving the right wrist.  She is still participating with outpatient physical therapy from her April 2020 for wrist injury.  I will update the ambulatory physical therapy outpatient order to reflect her suspected diagnosis from today's visit.  If no improvement in 2 weeks she will contact our clinic and we will try to reorder a MRI of the right wrist.  Patient verbalized understanding.    Patient agreeable  to call or return sooner for any concerns.    BMI is within normal parameters. No other follow-up for BMI required.

## 2025-07-11 DIAGNOSIS — S62.024D CLOSED NONDISPLACED FRACTURE OF MIDDLE THIRD OF SCAPHOID OF RIGHT WRIST WITH ROUTINE HEALING, SUBSEQUENT ENCOUNTER: Primary | ICD-10-CM

## 2025-07-14 ENCOUNTER — OFFICE VISIT (OUTPATIENT)
Dept: ORTHOPEDIC SURGERY | Facility: CLINIC | Age: 35
End: 2025-07-14
Payer: COMMERCIAL

## 2025-07-14 VITALS — HEIGHT: 62 IN | BODY MASS INDEX: 20.24 KG/M2 | TEMPERATURE: 98.2 F | WEIGHT: 110 LBS

## 2025-07-14 DIAGNOSIS — M67.431 GANGLION CYST OF DORSUM OF RIGHT WRIST: ICD-10-CM

## 2025-07-14 DIAGNOSIS — R20.2 RIGHT HAND PARESTHESIA: Primary | ICD-10-CM

## 2025-07-14 PROCEDURE — 99213 OFFICE O/P EST LOW 20 MIN: CPT | Performed by: PHYSICIAN ASSISTANT

## 2025-07-14 NOTE — PROGRESS NOTES
"Subjective   Patient ID: Radha Medrano is a 35 y.o. female  Pain of the Right Wrist (Here today for pain and numbness in wrist and hand. Denies new injury. States she developed a knot with pain approximately 2 months ago.)         History of Present Illness    Patient presents to the clinic for the evaluation of paresthesias to the right 3rd and 4th digits.  She states it feels like a lighter is burning all fingertips of the right hand.  She also noticed a palpable knot on the dorsum of the hand 2 months ago.    Of note, patient originally injured her right wrist 4/27/2024, initial x-rays after her injury were negative for acute osseous abnormality. A MRI was subsequently ordered due to continued pain that revealed a subacute scaphoid fracture.  The fracture healed well with immobilization but patient developed complex regional pain syndrome after the immobilization timeframe.  She attended formal physical therapy and ultimately was doing much better until 2 months ago the above-mentioned symptoms began.    Pain Descriptors: Aching, Tingling, Numbness  Pain Frequency: Constant/continuous                 Patient's Stated Pain Goal: 6             Past Medical History:   Diagnosis Date    Abnormal Pap smear of cervix     Abnormal uterine bleeding     Acid reflux     Anxiety     Anxiety and depression     Arthritis     Asthma     B12 deficiency 05/05/2021    Bipolar 1 disorder     Body piercing     EARS, NOSE, BELLY BUTTON    Dysphagia     REPORTS SOMETIMES FEELS LIKE \"FOOD GETS STUCK\"    Fracture     HISTORY OF RIGHT WRIST AS A CHILD    Fracture of wrist 1999    treated with cast/R wrist    Goiter     x3    Heart rate fast     states usually 120-130 (states on med for this)    History of migraine     Hx of exercise stress test 04/01/2020    Inappropriate sinus tachycardia     per Dr. Dong's note on 8/12/20.      Injury of neck     Insomnia     Iron deficiency     Migraine     Ovarian cyst     Palpitations     " PONV (postoperative nausea and vomiting)     Scoliosis     Tachycardia     Tattoo     Thyroid nodule     Toe fracture     right great toe    Wears glasses         Past Surgical History:   Procedure Laterality Date    APPENDECTOMY  2016    CHOLECYSTECTOMY      COLPOSCOPY      CYST REMOVAL      OVARIAN CYST    DILATATION AND CURETTAGE      ENDOSCOPY N/A 12/01/2017    Procedure: ESOPHAGOGASTRODUODENOSCOPY WITH COLD FORCEP BIOPSY;  Surgeon: Danilo Shabazz MD;  Location:  [facility];  Service:     ENDOSCOPY N/A 09/03/2020    Procedure: ESOPHAGOGASTRODUODENOSCOPY WITH BIOPSIES AND DILATATION;  Surgeon: Bhupinder Montenegro MD;  Location:  [facility];  Service: Gastroenterology;  Laterality: N/A;    ESOPHAGEAL MOTILITY STUDY N/A 11/10/2020    Procedure: ESOPHAGEAL MANOMETRY;  Surgeon: Chivo Mcmahan MD;  Location:  [facility];  Service: Gastroenterology;  Laterality: N/A;    HIP SURGERY Right 2014    NO HARDWARE; RIGHT HIP, REPORTS HAD BONES SCRAPED    HYSTERECTOMY      SHOULDER ARTHROSCOPY Right 10/11/2018    NO HARDWARE; Procedure: Right shoulder diagnostic arthroscopy, limited rotator cuff debridement;  Surgeon: Tino Uribe MD;  Location:  [facility];  Service: Orthopedics    THYROID BIOPSY      goiter    THYROIDECTOMY Left 02/24/2021    PARTIAL THYROID LOBECTOMY LEFT;  Surgeon: Yves Contreras MD;  Location:  [facility];  Service: General;  Laterality: Left;    TOTAL LAPAROSCOPIC HYSTERECTOMY SALPINGECTOMY Bilateral 10/9/2023    Procedure: TOTAL LAPAROSCOPIC HYSTERECTOMY BILATERAL SALPINGECTOMY WITH DAVINCI ROBOT AND FULGERATION OF PERITONEAL LESION;  Surgeon: Maria Elena Garvin MD;  Location:  [facility];  Service: Robotics - DaVinci;  Laterality: Bilateral;    WISDOM TOOTH EXTRACTION         Family History   Problem Relation Age of Onset    Diabetes Mother     Arthritis Mother     Diabetes Father     Heart disease Sister     Migraines Sister     Cancer Maternal Grandmother     Diabetes  Maternal Grandmother     Heart attack Maternal Grandmother     Arthritis Maternal Grandmother     Heart attack Paternal Grandmother     Cancer Paternal Grandfather         Lung    Colon cancer Neg Hx     Cirrhosis Neg Hx     Liver cancer Neg Hx     Liver disease Neg Hx        Social History     Socioeconomic History    Marital status:      Spouse name: Rajat Medrano    Number of children: 2    Highest education level: Some college, no degree   Tobacco Use    Smoking status: Former     Current packs/day: 0.00     Average packs/day: 0.5 packs/day for 17.0 years (8.5 ttl pk-yrs)     Types: Cigarettes     Start date: 10/17/2003     Quit date: 10/17/2020     Years since quittin.7     Passive exposure: Never    Smokeless tobacco: Never   Vaping Use    Vaping status: Never Used   Substance and Sexual Activity    Alcohol use: No    Drug use: Not Currently     Types: Marijuana     Comment: Prior to pregnancy    Sexual activity: Defer         Current Outpatient Medications:     albuterol sulfate  (90 Base) MCG/ACT inhaler, Inhale 2 puffs Every 4 (Four) Hours As Needed for Wheezing or Shortness of Air., Disp: 18 g, Rfl: 1    ATENOLOL PO, Take 75 mg by mouth 2 (Two) Times a Day., Disp: , Rfl:     galcanezumab-gnlm (EMGALITY) 120 MG/ML auto-injector pen, Inject 1 mL under the skin into the appropriate area as directed Every 30 (Thirty) Days., Disp: 1.12 mL, Rfl: 5    lidocaine (XYLOCAINE) 5 % ointment, Apply 1 Application topically to the appropriate area as directed 3 (Three) Times a Day As Needed (for pain)., Disp: 50 g, Rfl: 1    rizatriptan (Maxalt) 10 MG tablet, Take 1 tablet by mouth 1 (One) Time As Needed for Migraine. May repeat in 2 hours if needed, Disp: 9 tablet, Rfl: 5    sertraline (ZOLOFT) 100 MG tablet, Take 1 tablet by mouth Daily., Disp: , Rfl:     acetaminophen (TYLENOL) 325 MG tablet, Take 2 tablets by mouth every 4 hours while awake for 7 days. THEN take only as needed for mild pain  "thereafter, Disp: 100 tablet, Rfl: 0    gabapentin (NEURONTIN) 300 MG capsule, Take 1 capsule by mouth 3 (Three) Times a Day. (Patient not taking: Reported on 7/14/2025), Disp: 15 capsule, Rfl: 0    phenazopyridine (PYRIDIUM) 200 MG tablet, TAKE 1 TABLET BY MOUTH EVERY 8 HOURS FOR 3 DAYS (Patient not taking: Reported on 7/14/2025), Disp: , Rfl:     Allergies   Allergen Reactions    Onion Itching     REPORTS CAUSES MIGRAINES and MAKES THROAT ITCHY      Cabbage Headache    Msg [Monosodium Glutamate] Headache    Sulfa Antibiotics Hives       Review of Systems   Constitutional:  Negative for diaphoresis, fever and unexpected weight change.   HENT:  Negative for dental problem and sore throat.    Eyes:  Negative for visual disturbance.   Respiratory:  Negative for shortness of breath.    Cardiovascular:  Negative for chest pain.   Gastrointestinal:  Negative for abdominal pain, constipation, diarrhea, nausea and vomiting.   Genitourinary:  Negative for difficulty urinating and frequency.   Musculoskeletal:  Positive for arthralgias.   Neurological:  Negative for headaches.   Hematological:  Does not bruise/bleed easily.       I have reviewed the medical and surgical history, family history, social history, medications, and/or allergies, and the review of systems of this report.    Objective   Temp 98.2 °F (36.8 °C)   Ht 157.5 cm (62\")   Wt 49.9 kg (110 lb)   BMI 20.12 kg/m²    Physical Exam  Vitals and nursing note reviewed.   Constitutional:       Appearance: Normal appearance.   Pulmonary:      Effort: Pulmonary effort is normal.   Musculoskeletal:      Right forearm: No swelling or edema.      Right wrist: No snuff box tenderness or crepitus. Normal range of motion. Normal pulse.      Right hand: Tenderness (cyst on dorsum of hand) present. Decreased sensation (3rd and 4th digit). Normal capillary refill.   Neurological:      Mental Status: She is alert and oriented to person, place, and time.       Ortho Exam "     Neurological Exam  Mental Status  Alert. Oriented to person, place, and time.     Wrist extension seems to exacerbate the paresthesias within the right hand.    No right hand skin changes noted on exam.      Flesh-colored palpable fluctuant well-circumscribed cyst to the dorsum of the right wrist with wrist hyperflexion.  This measures 1.5 cm x 1.5 cm  This does transilluminate with a penlight    Assessment & Plan   Independent Review of Radiographic Studies:    X-ray of the right wrist 3 view performed in the clinic independently reviewed for the evaluation of wrist cyst pain as well as numbness and tingling to the right hand.  Comparison films are available and reviewed.  No evidence of acute or subacute fracture healing.  No acute dislocation.      Procedures       Diagnoses and all orders for this visit:    1. Right hand paresthesia (Primary)  -     EMG & Nerve Conduction Test    2. Ganglion cyst of dorsum of right wrist       Discussion of orthopedic goals  Orthopedic activities reviewed and patient expressed appreciation  Risk, benefits, and merits of treatment alternatives reviewed with the patient and questions answered    Recommendations/Plan:  Patient is encouraged to call or return for any issues or concerns.    No follow-ups on file.  Patient agreeable to call or return sooner for any concerns.      BMI is within normal parameters. No other follow-up for BMI required.  I explained to the patient I would like to order an EMG to rule out neuropathy versus carpal tunnel syndrome etc.  We discussed treatment options for the likely ganglion cyst to the dorsum of the right hand.  We discussed the treatment option of in office aspiration versus outpatient cyst removal.  I explained that if I aspirated the cyst in clinic today there is a high probability the cyst would reaccumulate and reform.  Patient would like to hold off on aspiration at this time.  If her EMG reveals carpal tunnel syndrome and moderate  or severe form she may be a candidate for outpatient carpal tunnel release and cyst removal of the right upper extremity.  However, if the EMG comes back within normal limits this is likely a manifestation of CRPS from her remote injury  Follow-up after your EMG.  May consider MRI of the right wrist if any changes of the cyst occur.           EMR Dragon-transcription disclaimer:  This encounter note is an electronic transcription of spoken language to printed text.  Electronic transcription of spoken language may permit erroneous or at times nonsensical words or phrases to be inadvertently transcribed.  Although I have reviewed the note for such errors, some may still exist

## 2025-07-25 ENCOUNTER — TELEPHONE (OUTPATIENT)
Dept: ORTHOPEDIC SURGERY | Facility: CLINIC | Age: 35
End: 2025-07-25
Payer: COMMERCIAL

## 2025-07-25 NOTE — TELEPHONE ENCOUNTER
LVM advising the amount due for EMG on the day of the procedure will be $222.83, and to return call with any questions.

## 2025-08-06 ENCOUNTER — PROCEDURE VISIT (OUTPATIENT)
Dept: ORTHOPEDIC SURGERY | Facility: CLINIC | Age: 35
End: 2025-08-06
Payer: COMMERCIAL

## 2025-08-06 DIAGNOSIS — R20.2 PARESTHESIA: Primary | ICD-10-CM

## 2025-08-06 DIAGNOSIS — M79.641 RIGHT HAND PAIN: ICD-10-CM

## 2025-08-07 ENCOUNTER — RESULTS FOLLOW-UP (OUTPATIENT)
Dept: ORTHOPEDIC SURGERY | Facility: CLINIC | Age: 35
End: 2025-08-07
Payer: COMMERCIAL

## 2025-08-13 ENCOUNTER — OFFICE VISIT (OUTPATIENT)
Dept: ORTHOPEDIC SURGERY | Facility: CLINIC | Age: 35
End: 2025-08-13
Payer: COMMERCIAL

## 2025-08-13 ENCOUNTER — PREP FOR SURGERY (OUTPATIENT)
Dept: OTHER | Facility: HOSPITAL | Age: 35
End: 2025-08-13
Payer: COMMERCIAL

## 2025-08-13 VITALS
HEIGHT: 62 IN | BODY MASS INDEX: 19.98 KG/M2 | SYSTOLIC BLOOD PRESSURE: 110 MMHG | WEIGHT: 108.6 LBS | DIASTOLIC BLOOD PRESSURE: 70 MMHG

## 2025-08-13 DIAGNOSIS — M67.431 GANGLION CYST OF DORSUM OF RIGHT WRIST: Primary | ICD-10-CM

## 2025-08-13 PROCEDURE — 99214 OFFICE O/P EST MOD 30 MIN: CPT | Performed by: PHYSICIAN ASSISTANT

## 2025-08-13 RX ORDER — FAMOTIDINE 10 MG/ML
20 INJECTION, SOLUTION INTRAVENOUS
OUTPATIENT
Start: 2025-08-14 | End: 2025-08-15

## 2025-08-21 ENCOUNTER — PRE-ADMISSION TESTING (OUTPATIENT)
Dept: PREADMISSION TESTING | Facility: HOSPITAL | Age: 35
End: 2025-08-21
Payer: COMMERCIAL

## 2025-08-21 ENCOUNTER — HOSPITAL ENCOUNTER (OUTPATIENT)
Dept: GENERAL RADIOLOGY | Facility: HOSPITAL | Age: 35
Discharge: HOME | End: 2025-08-21
Payer: COMMERCIAL

## 2025-08-21 ENCOUNTER — RESULTS FOLLOW-UP (OUTPATIENT)
Dept: OTHER | Facility: HOSPITAL | Age: 35
End: 2025-08-21
Payer: COMMERCIAL

## 2025-08-21 VITALS — WEIGHT: 109.2 LBS | HEIGHT: 61 IN | BODY MASS INDEX: 20.62 KG/M2

## 2025-08-21 DIAGNOSIS — M67.431 GANGLION CYST OF DORSUM OF RIGHT WRIST: ICD-10-CM

## 2025-08-21 LAB
ALBUMIN SERPL-MCNC: 4.5 G/DL (ref 3.5–5.2)
ALBUMIN/GLOB SERPL: 1.7 G/DL
ALP SERPL-CCNC: 48 U/L (ref 39–117)
ALT SERPL W P-5'-P-CCNC: 6 U/L (ref 1–33)
ANION GAP SERPL CALCULATED.3IONS-SCNC: 10.2 MMOL/L (ref 5–15)
AST SERPL-CCNC: 20 U/L (ref 1–32)
BACTERIA UR QL AUTO: NORMAL /HPF
BASOPHILS # BLD AUTO: 0.07 10*3/MM3 (ref 0–0.2)
BASOPHILS NFR BLD AUTO: 0.7 % (ref 0–1.5)
BILIRUB SERPL-MCNC: 1.6 MG/DL (ref 0–1.2)
BILIRUB UR QL STRIP: NEGATIVE
BUN SERPL-MCNC: 9 MG/DL (ref 6–20)
BUN/CREAT SERPL: 13.6 (ref 7–25)
CALCIUM SPEC-SCNC: 9.5 MG/DL (ref 8.6–10.5)
CHLORIDE SERPL-SCNC: 104 MMOL/L (ref 98–107)
CLARITY UR: CLEAR
CO2 SERPL-SCNC: 23.8 MMOL/L (ref 22–29)
COLOR UR: YELLOW
CREAT SERPL-MCNC: 0.66 MG/DL (ref 0.57–1)
DEPRECATED RDW RBC AUTO: 37.8 FL (ref 37–54)
EGFRCR SERPLBLD CKD-EPI 2021: 118.2 ML/MIN/1.73
EOSINOPHIL # BLD AUTO: 0.56 10*3/MM3 (ref 0–0.4)
EOSINOPHIL NFR BLD AUTO: 5.5 % (ref 0.3–6.2)
ERYTHROCYTE [DISTWIDTH] IN BLOOD BY AUTOMATED COUNT: 11.5 % (ref 12.3–15.4)
GLOBULIN UR ELPH-MCNC: 2.7 GM/DL
GLUCOSE SERPL-MCNC: 97 MG/DL (ref 65–99)
GLUCOSE UR STRIP-MCNC: NEGATIVE MG/DL
HCT VFR BLD AUTO: 39.9 % (ref 34–46.6)
HGB BLD-MCNC: 13.2 G/DL (ref 12–15.9)
HGB UR QL STRIP.AUTO: NEGATIVE
HYALINE CASTS UR QL AUTO: NORMAL /LPF
IMM GRANULOCYTES # BLD AUTO: 0.03 10*3/MM3 (ref 0–0.05)
IMM GRANULOCYTES NFR BLD AUTO: 0.3 % (ref 0–0.5)
KETONES UR QL STRIP: ABNORMAL
LEUKOCYTE ESTERASE UR QL STRIP.AUTO: ABNORMAL
LYMPHOCYTES # BLD AUTO: 2.56 10*3/MM3 (ref 0.7–3.1)
LYMPHOCYTES NFR BLD AUTO: 25.3 % (ref 19.6–45.3)
MCH RBC QN AUTO: 29.7 PG (ref 26.6–33)
MCHC RBC AUTO-ENTMCNC: 33.1 G/DL (ref 31.5–35.7)
MCV RBC AUTO: 89.9 FL (ref 79–97)
MONOCYTES # BLD AUTO: 0.4 10*3/MM3 (ref 0.1–0.9)
MONOCYTES NFR BLD AUTO: 3.9 % (ref 5–12)
NEUTROPHILS NFR BLD AUTO: 6.51 10*3/MM3 (ref 1.7–7)
NEUTROPHILS NFR BLD AUTO: 64.3 % (ref 42.7–76)
NITRITE UR QL STRIP: NEGATIVE
NRBC BLD AUTO-RTO: 0 /100 WBC (ref 0–0.2)
PH UR STRIP.AUTO: 6.5 [PH] (ref 5–8)
PLATELET # BLD AUTO: 191 10*3/MM3 (ref 140–450)
PMV BLD AUTO: 12.7 FL (ref 6–12)
POTASSIUM SERPL-SCNC: 4.7 MMOL/L (ref 3.5–5.2)
PROT SERPL-MCNC: 7.2 G/DL (ref 6–8.5)
PROT UR QL STRIP: NEGATIVE
RBC # BLD AUTO: 4.44 10*6/MM3 (ref 3.77–5.28)
RBC # UR STRIP: NORMAL /HPF
REF LAB TEST METHOD: NORMAL
SODIUM SERPL-SCNC: 138 MMOL/L (ref 136–145)
SP GR UR STRIP: 1.02 (ref 1–1.03)
SQUAMOUS #/AREA URNS HPF: NORMAL /HPF
UROBILINOGEN UR QL STRIP: ABNORMAL
WBC # UR STRIP: NORMAL /HPF
WBC NRBC COR # BLD AUTO: 10.13 10*3/MM3 (ref 3.4–10.8)

## 2025-08-21 PROCEDURE — 85025 COMPLETE CBC W/AUTO DIFF WBC: CPT

## 2025-08-21 PROCEDURE — 87081 CULTURE SCREEN ONLY: CPT

## 2025-08-21 PROCEDURE — 71046 X-RAY EXAM CHEST 2 VIEWS: CPT

## 2025-08-21 PROCEDURE — 36415 COLL VENOUS BLD VENIPUNCTURE: CPT

## 2025-08-21 PROCEDURE — 81001 URINALYSIS AUTO W/SCOPE: CPT

## 2025-08-21 PROCEDURE — 93005 ELECTROCARDIOGRAM TRACING: CPT

## 2025-08-21 PROCEDURE — 80053 COMPREHEN METABOLIC PANEL: CPT

## 2025-08-22 ENCOUNTER — TELEPHONE (OUTPATIENT)
Dept: INTERNAL MEDICINE | Facility: CLINIC | Age: 35
End: 2025-08-22
Payer: COMMERCIAL

## 2025-08-22 LAB
MRSA SPEC QL CULT: NORMAL
QT INTERVAL: 396 MS
QTC INTERVAL: 433 MS

## 2025-08-29 ENCOUNTER — OFFICE VISIT (OUTPATIENT)
Dept: INTERNAL MEDICINE | Facility: CLINIC | Age: 35
End: 2025-08-29
Payer: COMMERCIAL

## 2025-08-29 VITALS
DIASTOLIC BLOOD PRESSURE: 70 MMHG | OXYGEN SATURATION: 98 % | TEMPERATURE: 97.6 F | HEART RATE: 78 BPM | WEIGHT: 108 LBS | SYSTOLIC BLOOD PRESSURE: 108 MMHG | BODY MASS INDEX: 20.39 KG/M2 | HEIGHT: 61 IN | RESPIRATION RATE: 16 BRPM

## 2025-08-29 DIAGNOSIS — E89.0 S/P PARTIAL THYROIDECTOMY: ICD-10-CM

## 2025-08-29 DIAGNOSIS — E53.8 B12 DEFICIENCY: ICD-10-CM

## 2025-08-29 DIAGNOSIS — E80.4 GILBERT SYNDROME: Chronic | ICD-10-CM

## 2025-08-29 DIAGNOSIS — R09.82 POSTNASAL DRIP: ICD-10-CM

## 2025-08-29 DIAGNOSIS — E55.9 VITAMIN D DEFICIENCY: ICD-10-CM

## 2025-08-29 DIAGNOSIS — Z00.00 ANNUAL PHYSICAL EXAM: Primary | ICD-10-CM

## 2025-08-29 DIAGNOSIS — Z13.220 LIPID SCREENING: ICD-10-CM

## 2025-08-29 DIAGNOSIS — E80.6 BILIRUBINEMIA: ICD-10-CM

## 2025-08-29 PROCEDURE — 99395 PREV VISIT EST AGE 18-39: CPT | Performed by: PHYSICIAN ASSISTANT

## 2025-08-29 RX ORDER — AZELASTINE 1 MG/ML
2 SPRAY, METERED NASAL 2 TIMES DAILY
Qty: 30 ML | Refills: 11 | Status: SHIPPED | OUTPATIENT
Start: 2025-08-29

## 2025-08-30 LAB
25(OH)D3+25(OH)D2 SERPL-MCNC: 26.9 NG/ML (ref 30–100)
ALBUMIN SERPL-MCNC: 4.8 G/DL (ref 3.9–4.9)
ALP SERPL-CCNC: 53 IU/L (ref 44–121)
ALT SERPL-CCNC: 10 IU/L (ref 0–32)
AST SERPL-CCNC: 18 IU/L (ref 0–40)
BILIRUB DIRECT SERPL-MCNC: 0.41 MG/DL (ref 0–0.4)
BILIRUB SERPL-MCNC: 1.5 MG/DL (ref 0–1.2)
CHOLEST SERPL-MCNC: 161 MG/DL (ref 100–199)
FOLATE SERPL-MCNC: >20 NG/ML
HDLC SERPL-MCNC: 79 MG/DL
LDLC SERPL CALC-MCNC: 70 MG/DL (ref 0–99)
PROT SERPL-MCNC: 7.1 G/DL (ref 6–8.5)
TRIGL SERPL-MCNC: 57 MG/DL (ref 0–149)
TSH SERPL DL<=0.005 MIU/L-ACNC: 2.61 UIU/ML (ref 0.45–4.5)
VIT B12 SERPL-MCNC: 435 PG/ML (ref 232–1245)
VLDLC SERPL CALC-MCNC: 12 MG/DL (ref 5–40)

## (undated) DEVICE — ELECTRD BLD MEGADYNE 2.5IN SS

## (undated) DEVICE — STRIP,CLOSURE,WOUND,MEDI-STRIP,1/2X4: Brand: MEDLINE

## (undated) DEVICE — ADHS LIQ MASTISOL 2/3ML

## (undated) DEVICE — FRCP BIOP COLD ENDOJAW ALLGTR W/NDL 2.8X2300MM BLU

## (undated) DEVICE — NDL SPINE 18G 31/2IN PNK

## (undated) DEVICE — CVR HNDL LIGHT RIGID

## (undated) DEVICE — DRSNG SURESITE123 6X8IN

## (undated) DEVICE — VLV SXN AIR/H2O ORCAPOD3 1P/U STRL

## (undated) DEVICE — SUT SILK 2/0 TIES 18IN A185H

## (undated) DEVICE — CONMED SCOPE SAVER BITE BLOCK, 20X27 MM: Brand: SCOPE SAVER

## (undated) DEVICE — DRSNG SURESITE WNDW 4X4.5

## (undated) DEVICE — Device

## (undated) DEVICE — ANTIBACTERIAL UNDYED BRAIDED (POLYGLACTIN 910), SYNTHETIC ABSORBABLE SUTURE: Brand: COATED VICRYL

## (undated) DEVICE — MEDI-VAC NON-CONDUCTIVE SUCTION TUBING: Brand: CARDINAL HEALTH

## (undated) DEVICE — APPL CHLORAPREP TINTED 26ML TEAL

## (undated) DEVICE — ELECTRD MENISCAL STD 165MM

## (undated) DEVICE — CUFF SCD HEMOFORCE SEQ CALF STD MD

## (undated) DEVICE — TBG INFLOW FMS DUO W/O 1WY VLV

## (undated) DEVICE — MANIP UTER RUMI 2 KOH EFFICIENT 3CM BL

## (undated) DEVICE — GLV SURG TRIUMPH ORTHO W/ALOE PF LTX 8 STRL

## (undated) DEVICE — CANNULA SEAL

## (undated) DEVICE — GAUZE,SPONGE,4"X4",16PLY,XRAY,STRL,LF: Brand: MEDLINE

## (undated) DEVICE — SYR LUERLOK 30CC

## (undated) DEVICE — DRSNG WND BORDR/ADHS NONADHR/GZ LF 2X2IN STRL

## (undated) DEVICE — SYR LUER SLPTP 50ML

## (undated) DEVICE — SUT ETHLN 3/0 FS1 663G

## (undated) DEVICE — COLUMN DRAPE

## (undated) DEVICE — SHEET,DRAPE,70X100,STERILE: Brand: MEDLINE

## (undated) DEVICE — SCRB SURG BACTOSHIELD CHG 4PCT 4OZ

## (undated) DEVICE — GLV SURG DERMASSURE GRN LF PF 7.0

## (undated) DEVICE — SNAP KOVER: Brand: UNBRANDED

## (undated) DEVICE — PAD GRND REM POLYHESIVE A/ DISP

## (undated) DEVICE — PK HIP GEN 20

## (undated) DEVICE — NDL HYPO ECLPS SFTY 22G 1 1/2IN

## (undated) DEVICE — GLV SURG SENSICARE W/ALOE PF LF 8 STRL

## (undated) DEVICE — FLEXIBLE YANKAUER,MEDIUM TIP, NO VACUUM CONTROL: Brand: ARGYLE

## (undated) DEVICE — SUT VIC 2/0 CT2 27IN J269H

## (undated) DEVICE — TP SXN YANKR BULB STRL

## (undated) DEVICE — PK MINOR SPLT 10

## (undated) DEVICE — SPNG GZ STRL 2S 4X4 12PLY

## (undated) DEVICE — CLN MEGADYNE TP SS

## (undated) DEVICE — ENDOGATOR AUXILIARY WATER JET CONNECTOR: Brand: ENDOGATOR

## (undated) DEVICE — TRENDELENBURG WINGPAD POSITIONING KIT DELUXE - WITHOUT BODY STRAP: Brand: SOULE MEDICAL

## (undated) DEVICE — PATIENT RETURN ELECTRODE, SINGLE-USE, CONTACT QUALITY MONITORING, ADULT, WITH 9FT CORD, FOR PATIENTS WEIGING OVER 33LBS. (15KG): Brand: MEGADYNE

## (undated) DEVICE — PK SOL VISC ESOPH 80ML

## (undated) DEVICE — ANTIBACTERIAL UNDYED BRAIDED (POLYGLACTIN 910), SYNTHETIC ABSORBABLE SURGICAL SUTURE: Brand: COATED VICRYL

## (undated) DEVICE — TIP COVER ACCESSORY

## (undated) DEVICE — 3M™ STERI-STRIP™ REINFORCED ADHESIVE SKIN CLOSURES, R1547, 1/2 IN X 4 IN (12 MM X 100 MM), 6 STRIPS/ENVELOPE: Brand: 3M™ STERI-STRIP™

## (undated) DEVICE — DISH PETRI 3.5IN MD STRL LF

## (undated) DEVICE — BLANKT WARM UPPR/BDY ARM/OUT 57X196CM

## (undated) DEVICE — INTENDED FOR TISSUE SEPARATION, AND OTHER PROCEDURES THAT REQUIRE A SHARP SURGICAL BLADE TO PUNCTURE OR CUT.: Brand: BARD-PARKER ® CARBON RIB-BACK BLADES

## (undated) DEVICE — JELLY,LUBE,STERILE,FLIP TOP,TUBE,2-OZ: Brand: MEDLINE

## (undated) DEVICE — HYBRID TUBING/CAP SET FOR OLYMPUS® SCOPES: Brand: ERBE

## (undated) DEVICE — SUT SILK 3/0 TIES 18IN A184H

## (undated) DEVICE — ARM DRAPE

## (undated) DEVICE — NDL HYPO ECLPS SFTY 18G 1 1/2IN

## (undated) DEVICE — SUT VIC 2/0 SH 27IN

## (undated) DEVICE — IRRIGATOR BULB ASEPTO 60CC STRL

## (undated) DEVICE — VIOLET BRAIDED (POLYGLACTIN 910), SYNTHETIC ABSORBABLE SUTURE: Brand: COATED VICRYL

## (undated) DEVICE — LAPAROVUE VISIBILITY SYSTEM LAPAROSCOPIC SOLUTIONS: Brand: LAPAROVUE

## (undated) DEVICE — GOWN,REINF,POLY,ECL,PP SLV,XL: Brand: MEDLINE

## (undated) DEVICE — DRAPE,TOP,102X53,STERILE: Brand: MEDLINE

## (undated) DEVICE — DISPOSABLE BIPOLAR FORCEPS 5 1/2" (14CM) SEMKIN, 0.7MM TIP AND 12 FT. (3.6M) CABLE: Brand: KIRWAN

## (undated) DEVICE — GLV SURG BIOGEL LTX PF 7 1/2

## (undated) DEVICE — BLADELESS OBTURATOR: Brand: WECK VISTA

## (undated) DEVICE — GLV SURG TRIUMPH MICRO PF LTX 7.5 STRL

## (undated) DEVICE — OCCLUSIVE GAUZE STRIP,3% BISMUTH TRIBROMOPHENATE IN PETROLATUM BLEND: Brand: XEROFORM

## (undated) DEVICE — ST TBG CONN PNEUMOCLEAR EVAC SMOKE HEAT/HUMID

## (undated) DEVICE — PK MAJ GYN DAVINCI 10

## (undated) DEVICE — SUCTION CANISTER, 1500CC, RIGID: Brand: DEROYAL

## (undated) DEVICE — MANIP UTER RUMI TP 6.7MMX8CM BLU

## (undated) DEVICE — ENDOSCOPY PORT CONNECTOR FOR OLYMPUS® SCOPES: Brand: ERBE